# Patient Record
Sex: FEMALE | Race: WHITE | Employment: OTHER | URBAN - METROPOLITAN AREA
[De-identification: names, ages, dates, MRNs, and addresses within clinical notes are randomized per-mention and may not be internally consistent; named-entity substitution may affect disease eponyms.]

---

## 2017-01-09 ENCOUNTER — OFFICE VISIT (OUTPATIENT)
Dept: ORTHOPEDICS CLINIC | Facility: CLINIC | Age: 67
End: 2017-01-09

## 2017-01-09 ENCOUNTER — HOSPITAL ENCOUNTER (OUTPATIENT)
Dept: GENERAL RADIOLOGY | Facility: HOSPITAL | Age: 67
Discharge: HOME | End: 2017-01-09
Attending: ORTHOPAEDIC SURGERY | Admitting: ORTHOPAEDIC SURGERY
Payer: MEDICARE

## 2017-01-09 DIAGNOSIS — T14.8XXA FRACTURE: ICD-10-CM

## 2017-01-09 DIAGNOSIS — S82.035A CLOSED NONDISPLACED TRANSVERSE FRACTURE OF LEFT PATELLA, INITIAL ENCOUNTER: Primary | ICD-10-CM

## 2017-01-09 PROCEDURE — 73560 X-RAY EXAM OF KNEE 1 OR 2: CPT

## 2017-01-09 PROCEDURE — 27520 TREAT KNEECAP FRACTURE: CPT | Performed by: ORTHOPAEDIC SURGERY

## 2017-01-09 NOTE — PROGRESS NOTES
Patient is a 51-year-old female who fell 2-1/2 weeks ago. She was seen in the emergency room on 22 December given a knee immobilizer and has not followed up since that time.   She has been walking with a knee immobilizer and walker describes some left knee pump daily and one aspirin daily if she can tolerate such medications which she states she has in the past without difficulty.   If there is any calf pain occurring she needs and go to the emergency room right away and be seen which is not the case at the p

## 2017-01-16 ENCOUNTER — OFFICE VISIT (OUTPATIENT)
Dept: SURGERY | Facility: CLINIC | Age: 67
End: 2017-01-16

## 2017-01-16 VITALS
RESPIRATION RATE: 17 BRPM | DIASTOLIC BLOOD PRESSURE: 73 MMHG | BODY MASS INDEX: 32.78 KG/M2 | HEART RATE: 71 BPM | HEIGHT: 66 IN | WEIGHT: 204 LBS | SYSTOLIC BLOOD PRESSURE: 131 MMHG

## 2017-01-16 DIAGNOSIS — R31.29 MICROHEMATURIA: Primary | ICD-10-CM

## 2017-01-16 LAB
BACTERIA UR QL AUTO: NEGATIVE /HPF
BILIRUB UR QL: NEGATIVE
COLOR UR: YELLOW
GLUCOSE UR-MCNC: NEGATIVE MG/DL
HGB UR QL STRIP.AUTO: NEGATIVE
KETONES UR-MCNC: NEGATIVE MG/DL
NITRITE UR QL STRIP.AUTO: NEGATIVE
PH UR: 5 [PH] (ref 5–8)
PROT UR-MCNC: NEGATIVE MG/DL
RBC #/AREA URNS AUTO: 7 /HPF
SP GR UR STRIP: 1.02 (ref 1–1.03)
UROBILINOGEN UR STRIP-ACNC: <2
VIT C UR-MCNC: NEGATIVE MG/DL
WBC #/AREA URNS AUTO: 25 /HPF

## 2017-01-16 PROCEDURE — G0463 HOSPITAL OUTPT CLINIC VISIT: HCPCS | Performed by: UROLOGY

## 2017-01-16 PROCEDURE — 99204 OFFICE O/P NEW MOD 45 MIN: CPT | Performed by: UROLOGY

## 2017-01-16 NOTE — PROGRESS NOTES
SUBJECTIVE:  Tung Cedillo is a 77year old female who presents for a consultation at the request of, and a copy of this note will be sent to, Dr. Carissa Hamilton, for evaluation of  hematuria. She states that the problem is unchanged.  Symptoms include noted s dizziness or fainting, palpitations, leg swelling, nocturia, or claudication. GASTROINTESTINAL:  Negative for nausea, vomiting, diarrhea, constipation, heartburn or indigestion, abdominal pains, bloody or tarry stools.   GENERAL: Denies:  weight gain, weig

## 2017-01-30 ENCOUNTER — OFFICE VISIT (OUTPATIENT)
Dept: ORTHOPEDICS CLINIC | Facility: CLINIC | Age: 67
End: 2017-01-30

## 2017-01-30 ENCOUNTER — HOSPITAL ENCOUNTER (OUTPATIENT)
Dept: GENERAL RADIOLOGY | Facility: HOSPITAL | Age: 67
Discharge: HOME | End: 2017-01-30
Attending: ORTHOPAEDIC SURGERY | Admitting: ORTHOPAEDIC SURGERY
Payer: MEDICARE

## 2017-01-30 ENCOUNTER — TELEPHONE (OUTPATIENT)
Dept: INTERNAL MEDICINE CLINIC | Facility: CLINIC | Age: 67
End: 2017-01-30

## 2017-01-30 DIAGNOSIS — T14.8XXA FRACTURE: ICD-10-CM

## 2017-01-30 DIAGNOSIS — S82.035D CLOSED NONDISPLACED TRANSVERSE FRACTURE OF LEFT PATELLA WITH ROUTINE HEALING, SUBSEQUENT ENCOUNTER: Primary | ICD-10-CM

## 2017-01-30 DIAGNOSIS — Z09 FOLLOW UP: Primary | ICD-10-CM

## 2017-01-30 PROCEDURE — 99024 POSTOP FOLLOW-UP VISIT: CPT | Performed by: ORTHOPAEDIC SURGERY

## 2017-01-30 PROCEDURE — G0463 HOSPITAL OUTPT CLINIC VISIT: HCPCS | Performed by: ORTHOPAEDIC SURGERY

## 2017-01-30 PROCEDURE — 73560 X-RAY EXAM OF KNEE 1 OR 2: CPT

## 2017-01-30 NOTE — TELEPHONE ENCOUNTER
NEEDS 2 REFERRAL     DR Eliot NARANJO   HAS APT ON THE [address]  FOLLOW UP    ( 3 )    FOR KNEE FRACTURE       ALSO NEEDS REFERRAL FOR PX THERAPY  LEFT KNEE    NEEDS 3 TIMES A WEEK FOR 4 TO 6 WEEKS   STARTING IN FEB.

## 2017-01-30 NOTE — PROGRESS NOTES
She presents for follow-up she denies any new complaints in the left knee. She has had a patella fracture that was from December 22 that was essentially nondisplaced. She has been wearing her knee immobilizer and weightbearing but uses a walker.     On ex

## 2017-02-01 NOTE — TELEPHONE ENCOUNTER
PT order already entered by Dr Cami Johnson. Referral for Dr Cami Johnson pended and routed to Dr Ramez Koehler.

## 2017-02-02 ENCOUNTER — TELEPHONE (OUTPATIENT)
Dept: SURGERY | Facility: CLINIC | Age: 67
End: 2017-02-02

## 2017-02-02 NOTE — TELEPHONE ENCOUNTER
Patient would like a call back from RN regarding Cystoscopy scheduled on 02/16 and CT Urogram 02/06.  Has questions please call thank you

## 2017-02-06 ENCOUNTER — HOSPITAL ENCOUNTER (OUTPATIENT)
Dept: CT IMAGING | Facility: HOSPITAL | Age: 67
Discharge: HOME | End: 2017-02-06
Attending: UROLOGY
Payer: MEDICARE

## 2017-02-06 DIAGNOSIS — R31.29 MICROHEMATURIA: ICD-10-CM

## 2017-02-06 LAB — CREAT BLD-MCNC: 0.8 MG/DL (ref 0.5–1.5)

## 2017-02-06 PROCEDURE — 76377 3D RENDER W/INTRP POSTPROCES: CPT

## 2017-02-06 PROCEDURE — 74178 CT ABD&PLV WO CNTR FLWD CNTR: CPT

## 2017-02-06 PROCEDURE — 82565 ASSAY OF CREATININE: CPT

## 2017-02-07 ENCOUNTER — OFFICE VISIT (OUTPATIENT)
Dept: PHYSICAL THERAPY | Facility: HOSPITAL | Age: 67
End: 2017-02-07
Attending: ORTHOPAEDIC SURGERY
Payer: MEDICARE

## 2017-02-07 DIAGNOSIS — T14.8XXA FRACTURE: Primary | ICD-10-CM

## 2017-02-07 PROCEDURE — 97110 THERAPEUTIC EXERCISES: CPT

## 2017-02-07 PROCEDURE — 97162 PT EVAL MOD COMPLEX 30 MIN: CPT

## 2017-02-07 NOTE — PROGRESS NOTES
LOWER EXTREMITY EVALUATION:   Referring Physician: Dr. Erika Carmichael  Date of Onset: 12/22/16 Date of Service: 2/7/2017   Diagnosis: L patellar fracture   PATIENT SUMMARY:   Tung Cedillo is a 77year old y/o female who presents to therapy today with complai housework. Newt Rings would benefit from skilled Physical Therapy to address the above impairments to allow for return to functional mobility.      Precautions:  None     OBJECTIVE:   Observation: Slight swelling and warmth about L knee; B knee valgus in moustapha None  Rehab Potential:good    FOTO: 30/100  Current status G Code: Initial Mobility: Walking and Moving Around CL: 60-79% impaired, limited, or restricted  Goal status G Code:  Mobility: Walking and Moving Around CJ: 20-39% impaired, limited, or restricted

## 2017-02-08 ENCOUNTER — TELEPHONE (OUTPATIENT)
Dept: SURGERY | Facility: CLINIC | Age: 67
End: 2017-02-08

## 2017-02-08 DIAGNOSIS — Z01.818 PREOP EXAMINATION: Primary | ICD-10-CM

## 2017-02-08 DIAGNOSIS — R39.9 SYMPTOM INVOLVING BLADDER: ICD-10-CM

## 2017-02-08 NOTE — TELEPHONE ENCOUNTER
Spoke to patient. Per Dr. Jerome Mcburney, please get patient's number to reach her. Number given to Dr. Jerome Mcburney. Please see contact for number. Patient states Dr. Jerome Mcburney called her.

## 2017-02-09 ENCOUNTER — OFFICE VISIT (OUTPATIENT)
Dept: PHYSICAL THERAPY | Facility: HOSPITAL | Age: 67
End: 2017-02-09
Attending: ORTHOPAEDIC SURGERY
Payer: MEDICARE

## 2017-02-09 DIAGNOSIS — T14.8XXA FRACTURE: Primary | ICD-10-CM

## 2017-02-09 PROCEDURE — 97140 MANUAL THERAPY 1/> REGIONS: CPT

## 2017-02-09 PROCEDURE — 97110 THERAPEUTIC EXERCISES: CPT

## 2017-02-09 NOTE — TELEPHONE ENCOUNTER
Staff I called this patient and discussed with her the results of her recent CT urogram showing an abnormality in her right renal pelvis.   She and I agreed to cancel her office cystoscopy scheduled for February 16, 2017 (staff please do so), and schedule h

## 2017-02-09 NOTE — TELEPHONE ENCOUNTER
Noted. appt cancelled in [facility], as requested by . Routed to surgery scheduler, Jane Marin for follow through.

## 2017-02-09 NOTE — PROGRESS NOTES
Dx: R patellar fx        Authorized # of Visits:  2/8 (Yaron Orozco)         Next MD visit: none scheduled  Fall Risk: standard         Precautions: n/a           Medication Changes since last visit?: No    Subjective: \"I have pain about 6-7/10.   I have m

## 2017-02-10 ENCOUNTER — TELEPHONE (OUTPATIENT)
Dept: SURGERY | Facility: CLINIC | Age: 67
End: 2017-02-10

## 2017-02-10 DIAGNOSIS — R31.1 BENIGN ESSENTIAL MICROSCOPIC HEMATURIA: Primary | ICD-10-CM

## 2017-02-10 NOTE — TELEPHONE ENCOUNTER
Noted. Called patient with time and date for procedure scheduled for Wednesday 02/15/17 @ 9:00, for Cysto,RRGPG,Right Ureteroscopy, Possible Biopsy, Possible Stent Placement, at [address]. Patient aware nothing further needed.

## 2017-02-13 ENCOUNTER — APPOINTMENT (OUTPATIENT)
Dept: LAB | Age: 67
End: 2017-02-13
Attending: UROLOGY
Payer: MEDICARE

## 2017-02-13 ENCOUNTER — LAB ENCOUNTER (OUTPATIENT)
Dept: LAB | Age: 67
End: 2017-02-13
Attending: UROLOGY
Payer: MEDICARE

## 2017-02-13 ENCOUNTER — APPOINTMENT (OUTPATIENT)
Dept: PHYSICAL THERAPY | Facility: HOSPITAL | Age: 67
End: 2017-02-13
Attending: ORTHOPAEDIC SURGERY
Payer: MEDICARE

## 2017-02-13 DIAGNOSIS — Z01.818 PREOP EXAMINATION: ICD-10-CM

## 2017-02-13 DIAGNOSIS — R39.9 SYMPTOM INVOLVING BLADDER: ICD-10-CM

## 2017-02-13 LAB
ANION GAP SERPL CALC-SCNC: 11 MMOL/L (ref 0–18)
BASOPHILS # BLD: 0 K/UL (ref 0–0.2)
BASOPHILS NFR BLD: 0 %
BUN SERPL-MCNC: 13 MG/DL (ref 8–20)
BUN/CREAT SERPL: 16.5 (ref 10–20)
CALCIUM SERPL-MCNC: 8.8 MG/DL (ref 8.5–10.5)
CHLORIDE SERPL-SCNC: 106 MMOL/L (ref 95–110)
CO2 SERPL-SCNC: 24 MMOL/L (ref 22–32)
CREAT SERPL-MCNC: 0.79 MG/DL (ref 0.5–1.5)
EOSINOPHIL # BLD: 0.1 K/UL (ref 0–0.7)
EOSINOPHIL NFR BLD: 2 %
ERYTHROCYTE [DISTWIDTH] IN BLOOD BY AUTOMATED COUNT: 15.1 % (ref 11–15)
GLUCOSE SERPL-MCNC: 103 MG/DL (ref 70–99)
HCT VFR BLD AUTO: 38 % (ref 35–48)
HGB BLD-MCNC: 12.7 G/DL (ref 12–16)
LYMPHOCYTES # BLD: 0.4 K/UL (ref 1–4)
LYMPHOCYTES NFR BLD: 7 %
MCH RBC QN AUTO: 29.6 PG (ref 27–32)
MCHC RBC AUTO-ENTMCNC: 33.6 G/DL (ref 32–37)
MCV RBC AUTO: 88.1 FL (ref 80–100)
MONOCYTES # BLD: 0.3 K/UL (ref 0–1)
MONOCYTES NFR BLD: 5 %
NEUTROPHILS # BLD AUTO: 4.9 K/UL (ref 1.8–7.7)
NEUTROPHILS NFR BLD: 87 %
OSMOLALITY UR CALC.SUM OF ELEC: 292 MOSM/KG (ref 275–295)
PLATELET # BLD AUTO: 163 K/UL (ref 140–400)
PMV BLD AUTO: 10.6 FL (ref 7.4–10.3)
POTASSIUM SERPL-SCNC: 3.7 MMOL/L (ref 3.3–5.1)
RBC # BLD AUTO: 4.31 M/UL (ref 3.7–5.4)
SODIUM SERPL-SCNC: 141 MMOL/L (ref 136–144)
WBC # BLD AUTO: 5.7 K/UL (ref 4–11)

## 2017-02-13 PROCEDURE — 85025 COMPLETE CBC W/AUTO DIFF WBC: CPT

## 2017-02-13 PROCEDURE — 87086 URINE CULTURE/COLONY COUNT: CPT

## 2017-02-13 PROCEDURE — 93005 ELECTROCARDIOGRAM TRACING: CPT

## 2017-02-13 PROCEDURE — 36415 COLL VENOUS BLD VENIPUNCTURE: CPT

## 2017-02-13 PROCEDURE — 80048 BASIC METABOLIC PNL TOTAL CA: CPT

## 2017-02-13 PROCEDURE — 93010 ELECTROCARDIOGRAM REPORT: CPT | Performed by: UROLOGY

## 2017-02-14 RX ORDER — LEVOFLOXACIN 5 MG/ML
500 INJECTION, SOLUTION INTRAVENOUS EVERY 24 HOURS
Status: DISCONTINUED | OUTPATIENT
Start: 2017-02-14 | End: 2017-02-14

## 2017-02-15 ENCOUNTER — HOSPITAL ENCOUNTER (OUTPATIENT)
Facility: HOSPITAL | Age: 67
Setting detail: HOSPITAL OUTPATIENT SURGERY
Discharge: HOME | End: 2017-02-15
Attending: UROLOGY | Admitting: UROLOGY
Payer: MEDICARE

## 2017-02-15 ENCOUNTER — ANESTHESIA EVENT (OUTPATIENT)
Dept: SURGERY | Facility: HOSPITAL | Age: 67
End: 2017-02-15

## 2017-02-15 ENCOUNTER — ANESTHESIA (OUTPATIENT)
Dept: SURGERY | Facility: HOSPITAL | Age: 67
End: 2017-02-15

## 2017-02-15 ENCOUNTER — APPOINTMENT (OUTPATIENT)
Dept: GENERAL RADIOLOGY | Facility: HOSPITAL | Age: 67
End: 2017-02-15
Attending: UROLOGY
Payer: MEDICARE

## 2017-02-15 ENCOUNTER — TELEPHONE (OUTPATIENT)
Dept: SURGERY | Facility: CLINIC | Age: 67
End: 2017-02-15

## 2017-02-15 ENCOUNTER — SURGERY (OUTPATIENT)
Age: 67
End: 2017-02-15

## 2017-02-15 VITALS
WEIGHT: 210 LBS | HEART RATE: 84 BPM | BODY MASS INDEX: 33.75 KG/M2 | RESPIRATION RATE: 14 BRPM | OXYGEN SATURATION: 94 % | DIASTOLIC BLOOD PRESSURE: 57 MMHG | TEMPERATURE: 98 F | SYSTOLIC BLOOD PRESSURE: 114 MMHG | HEIGHT: 66 IN

## 2017-02-15 DIAGNOSIS — N20.0 KIDNEY STONES: Primary | ICD-10-CM

## 2017-02-15 PROCEDURE — 0T768DZ DILATION OF RIGHT URETER WITH INTRALUMINAL DEVICE, VIA NATURAL OR ARTIFICIAL OPENING ENDOSCOPIC: ICD-10-PCS | Performed by: UROLOGY

## 2017-02-15 PROCEDURE — 74420 UROGRAPHY RTRGR +-KUB: CPT

## 2017-02-15 PROCEDURE — 52353 CYSTOURETERO W/LITHOTRIPSY: CPT | Performed by: UROLOGY

## 2017-02-15 PROCEDURE — 0TF68ZZ FRAGMENTATION IN RIGHT URETER, VIA NATURAL OR ARTIFICIAL OPENING ENDOSCOPIC: ICD-10-PCS | Performed by: UROLOGY

## 2017-02-15 PROCEDURE — BT1D1ZZ FLUOROSCOPY OF RIGHT KIDNEY, URETER AND BLADDER USING LOW OSMOLAR CONTRAST: ICD-10-PCS | Performed by: UROLOGY

## 2017-02-15 DEVICE — STENT URET 6F 26CM WO GW INL: Type: IMPLANTABLE DEVICE | Site: URETER | Status: FUNCTIONAL

## 2017-02-15 RX ORDER — METOCLOPRAMIDE HYDROCHLORIDE 5 MG/ML
INJECTION INTRAMUSCULAR; INTRAVENOUS AS NEEDED
Status: DISCONTINUED | OUTPATIENT
Start: 2017-02-15 | End: 2017-02-15 | Stop reason: SURG

## 2017-02-15 RX ORDER — SODIUM CHLORIDE, SODIUM LACTATE, POTASSIUM CHLORIDE, CALCIUM CHLORIDE 600; 310; 30; 20 MG/100ML; MG/100ML; MG/100ML; MG/100ML
INJECTION, SOLUTION INTRAVENOUS CONTINUOUS
Status: DISCONTINUED | OUTPATIENT
Start: 2017-02-15 | End: 2017-02-15

## 2017-02-15 RX ORDER — FAMOTIDINE 20 MG/1
20 TABLET ORAL ONCE
Status: COMPLETED | OUTPATIENT
Start: 2017-02-15 | End: 2017-02-15

## 2017-02-15 RX ORDER — MORPHINE SULFATE 2 MG/ML
2 INJECTION, SOLUTION INTRAMUSCULAR; INTRAVENOUS EVERY 10 MIN PRN
Status: DISCONTINUED | OUTPATIENT
Start: 2017-02-15 | End: 2017-02-15

## 2017-02-15 RX ORDER — MORPHINE SULFATE 4 MG/ML
4 INJECTION, SOLUTION INTRAMUSCULAR; INTRAVENOUS EVERY 10 MIN PRN
Status: DISCONTINUED | OUTPATIENT
Start: 2017-02-15 | End: 2017-02-15

## 2017-02-15 RX ORDER — ACETAMINOPHEN 325 MG/1
650 TABLET ORAL ONCE
Status: COMPLETED | OUTPATIENT
Start: 2017-02-15 | End: 2017-02-15

## 2017-02-15 RX ORDER — HYDROCODONE BITARTRATE AND ACETAMINOPHEN 5; 325 MG/1; MG/1
1 TABLET ORAL AS NEEDED
Status: DISCONTINUED | OUTPATIENT
Start: 2017-02-15 | End: 2017-02-15

## 2017-02-15 RX ORDER — LIDOCAINE HYDROCHLORIDE 20 MG/ML
JELLY TOPICAL AS NEEDED
Status: DISCONTINUED | OUTPATIENT
Start: 2017-02-15 | End: 2017-02-15 | Stop reason: HOSPADM

## 2017-02-15 RX ORDER — ACETAMINOPHEN AND CODEINE PHOSPHATE 300; 30 MG/1; MG/1
1 TABLET ORAL EVERY 4 HOURS PRN
Qty: 20 TABLET | Refills: 0 | Status: SHIPPED | OUTPATIENT
Start: 2017-02-15 | End: 2018-11-15 | Stop reason: ALTCHOICE

## 2017-02-15 RX ORDER — MORPHINE SULFATE 10 MG/ML
6 INJECTION, SOLUTION INTRAMUSCULAR; INTRAVENOUS EVERY 10 MIN PRN
Status: DISCONTINUED | OUTPATIENT
Start: 2017-02-15 | End: 2017-02-15

## 2017-02-15 RX ORDER — PHENAZOPYRIDINE HYDROCHLORIDE 200 MG/1
200 TABLET, FILM COATED ORAL 3 TIMES DAILY PRN
Qty: 10 TABLET | Refills: 0 | Status: SHIPPED | OUTPATIENT
Start: 2017-02-15 | End: 2017-02-20

## 2017-02-15 RX ORDER — METOCLOPRAMIDE 10 MG/1
10 TABLET ORAL ONCE
Status: COMPLETED | OUTPATIENT
Start: 2017-02-15 | End: 2017-02-15

## 2017-02-15 RX ORDER — LIDOCAINE HYDROCHLORIDE 10 MG/ML
INJECTION, SOLUTION EPIDURAL; INFILTRATION; INTRACAUDAL; PERINEURAL AS NEEDED
Status: DISCONTINUED | OUTPATIENT
Start: 2017-02-15 | End: 2017-02-15 | Stop reason: SURG

## 2017-02-15 RX ORDER — ONDANSETRON 2 MG/ML
4 INJECTION INTRAMUSCULAR; INTRAVENOUS ONCE AS NEEDED
Status: DISCONTINUED | OUTPATIENT
Start: 2017-02-15 | End: 2017-02-15

## 2017-02-15 RX ORDER — DEXAMETHASONE SODIUM PHOSPHATE 4 MG/ML
VIAL (ML) INJECTION AS NEEDED
Status: DISCONTINUED | OUTPATIENT
Start: 2017-02-15 | End: 2017-02-15 | Stop reason: SURG

## 2017-02-15 RX ORDER — HYDROMORPHONE HYDROCHLORIDE 1 MG/ML
0.2 INJECTION, SOLUTION INTRAMUSCULAR; INTRAVENOUS; SUBCUTANEOUS EVERY 5 MIN PRN
Status: DISCONTINUED | OUTPATIENT
Start: 2017-02-15 | End: 2017-02-15

## 2017-02-15 RX ORDER — SODIUM CHLORIDE, SODIUM LACTATE, POTASSIUM CHLORIDE, CALCIUM CHLORIDE 600; 310; 30; 20 MG/100ML; MG/100ML; MG/100ML; MG/100ML
INJECTION, SOLUTION INTRAVENOUS CONTINUOUS PRN
Status: DISCONTINUED | OUTPATIENT
Start: 2017-02-15 | End: 2017-02-15 | Stop reason: SURG

## 2017-02-15 RX ORDER — MIDAZOLAM HYDROCHLORIDE 1 MG/ML
INJECTION INTRAMUSCULAR; INTRAVENOUS AS NEEDED
Status: DISCONTINUED | OUTPATIENT
Start: 2017-02-15 | End: 2017-02-15 | Stop reason: SURG

## 2017-02-15 RX ORDER — HYDROMORPHONE HYDROCHLORIDE 1 MG/ML
0.4 INJECTION, SOLUTION INTRAMUSCULAR; INTRAVENOUS; SUBCUTANEOUS EVERY 5 MIN PRN
Status: DISCONTINUED | OUTPATIENT
Start: 2017-02-15 | End: 2017-02-15

## 2017-02-15 RX ORDER — NALOXONE HYDROCHLORIDE 0.4 MG/ML
80 INJECTION, SOLUTION INTRAMUSCULAR; INTRAVENOUS; SUBCUTANEOUS AS NEEDED
Status: DISCONTINUED | OUTPATIENT
Start: 2017-02-15 | End: 2017-02-15

## 2017-02-15 RX ORDER — SULFAMETHOXAZOLE AND TRIMETHOPRIM 800; 160 MG/1; MG/1
1 TABLET ORAL 2 TIMES DAILY
Qty: 6 TABLET | Refills: 0 | Status: SHIPPED | OUTPATIENT
Start: 2017-02-16 | End: 2017-02-19

## 2017-02-15 RX ORDER — LEVOFLOXACIN 5 MG/ML
500 INJECTION, SOLUTION INTRAVENOUS EVERY 24 HOURS
Status: COMPLETED | OUTPATIENT
Start: 2017-02-15 | End: 2017-02-15

## 2017-02-15 RX ORDER — PHENAZOPYRIDINE HYDROCHLORIDE 200 MG/1
200 TABLET, FILM COATED ORAL ONCE
Status: COMPLETED | OUTPATIENT
Start: 2017-02-15 | End: 2017-02-15

## 2017-02-15 RX ORDER — HYDROMORPHONE HYDROCHLORIDE 1 MG/ML
0.6 INJECTION, SOLUTION INTRAMUSCULAR; INTRAVENOUS; SUBCUTANEOUS EVERY 5 MIN PRN
Status: DISCONTINUED | OUTPATIENT
Start: 2017-02-15 | End: 2017-02-15

## 2017-02-15 RX ORDER — HYDROCODONE BITARTRATE AND ACETAMINOPHEN 5; 325 MG/1; MG/1
2 TABLET ORAL AS NEEDED
Status: DISCONTINUED | OUTPATIENT
Start: 2017-02-15 | End: 2017-02-15

## 2017-02-15 RX ADMIN — MIDAZOLAM HYDROCHLORIDE 2 MG: 1 INJECTION INTRAMUSCULAR; INTRAVENOUS at 08:55:00

## 2017-02-15 RX ADMIN — SODIUM CHLORIDE, SODIUM LACTATE, POTASSIUM CHLORIDE, CALCIUM CHLORIDE: 600; 310; 30; 20 INJECTION, SOLUTION INTRAVENOUS at 08:49:00

## 2017-02-15 RX ADMIN — METOCLOPRAMIDE HYDROCHLORIDE 10 MG: 5 INJECTION INTRAMUSCULAR; INTRAVENOUS at 09:20:00

## 2017-02-15 RX ADMIN — DEXAMETHASONE SODIUM PHOSPHATE 4 MG: 4 MG/ML VIAL (ML) INJECTION at 09:03:00

## 2017-02-15 RX ADMIN — LIDOCAINE HYDROCHLORIDE 50 MG: 10 INJECTION, SOLUTION EPIDURAL; INFILTRATION; INTRACAUDAL; PERINEURAL at 09:03:00

## 2017-02-15 NOTE — OPERATIVE REPORT
[facility]    Operative Note     Bonita Rae Location: OR   [facility] [id number] MRN [id number]   Admission Date 2/15/2017 Operation Date 2/15/2017   Attending Physician Belkys Harris MD Operating Physician Darleen Kanner, MD      Preoperati stone.  There is some inadvertent air bubbles introduced into the proximal to mid right ureter. At this point I placed a second wire using a dual-lumen catheter in the form of a Glidewire.   I placed the [employer] (WCG) digital ureteroscope over the Glidewire wit the Guru wire and placed a 6 [employer] [place] by 26 cm Bard double-J stent under fluoroscopic and cystoscopic guidance. The patient tolerated the procedure well. There were no perioperative or immediate postoperative complications.   I was present and performed

## 2017-02-15 NOTE — INTERVAL H&P NOTE
Pre-op Diagnosis: micro hematuria    The above referenced H&P was reviewed by Darlyn Avendaño MD on 2/15/2017, the patient was examined and no significant changes have occurred in the patient's condition since the H&P was performed.   I discussed with the pa

## 2017-02-15 NOTE — H&P
SUBJECTIVE:  Maximino Bryson is a 77year old female who presents for a consultation at the request of, and a copy of this note will be sent to, Dr. Cleve Smith, for evaluation of  hematuria. She states that the problem is unchanged.  Symptoms include noted s pleurisy. CARDIOVASCULAR:  Negative for pain or chest discomfort, dizziness or fainting, palpitations, leg swelling, nocturia, or claudication.   GASTROINTESTINAL:  Negative for nausea, vomiting, diarrhea, constipation, heartburn or indigestion, abdominal

## 2017-02-15 NOTE — ANESTHESIA PREPROCEDURE EVALUATION
Anesthesia PreOp Note    HPI:     Cuauhtemoc Cisneros is a 77year old female who presents for preoperative consultation requested by:  Amalia Valenzuela MD    Date of Surgery: 2/15/2017    Procedure(s):  CYSTOSCOPY RETROGRADE  CYSTOSCOPY URETEROSCOPY  CYSTOSCO Calcium Carb-Cholecalciferol (OYSTER SHELL CALCIUM/VITAMIN D) 250-125 MG-UNIT Oral Tab  Disp:  Rfl:  2/14/2017 at Unknown time   Vitamin B-12 1000 MCG Oral Tab  Disp:  Rfl:  2/14/2017 at Unknown time       Current Facility-Administered Medications Ordere Signs: Body mass index is 33.91 kg/(m^2). height is 1.676 m (5' 6\") and weight is 95.255 kg (210 lb). Her oral temperature is 98.2 °F (36.8 °C). Her pulse is 69. Her respiration is 20 and oxygen saturation is 97%.     02/11/17  0901 02/15/17  0751   Pul

## 2017-02-15 NOTE — TELEPHONE ENCOUNTER
Staff I placed a right ureteral stent today on this patient. Please place her in the stent logbook. She needs a 2 week office visits to follow-up. Please call the patient tomorrow and arrange.   I will ask her to obtain a KUB prior to her office visit an

## 2017-02-15 NOTE — ANESTHESIA POSTPROCEDURE EVALUATION
Patient: Judy Mao    Procedure Summary     Date Anesthesia Start Anesthesia Stop Room / Location    02/15/17 0851  [facility] MAIN OR 14 / [facility] MAIN OR       Procedure Diagnosis Surgeon Responsible Provider    CYSTOSCOPY RETROGRADE (Right ); CYSTOSCOPY UR

## 2017-02-20 ENCOUNTER — OFFICE VISIT (OUTPATIENT)
Dept: INTERNAL MEDICINE CLINIC | Facility: CLINIC | Age: 67
End: 2017-02-20

## 2017-02-20 VITALS
BODY MASS INDEX: 34.01 KG/M2 | HEART RATE: 73 BPM | HEIGHT: 66 IN | SYSTOLIC BLOOD PRESSURE: 124 MMHG | DIASTOLIC BLOOD PRESSURE: 77 MMHG | WEIGHT: 211.63 LBS | RESPIRATION RATE: 18 BRPM | TEMPERATURE: 98 F

## 2017-02-20 DIAGNOSIS — J44.9 ASTHMA WITH COPD (CHRONIC OBSTRUCTIVE PULMONARY DISEASE) (HCC): ICD-10-CM

## 2017-02-20 DIAGNOSIS — E66.09 NON MORBID OBESITY DUE TO EXCESS CALORIES: Primary | ICD-10-CM

## 2017-02-20 DIAGNOSIS — E78.5 HYPERLIPIDEMIA, UNSPECIFIED HYPERLIPIDEMIA TYPE: ICD-10-CM

## 2017-02-20 DIAGNOSIS — S82.002A CLOSED DISPLACED FRACTURE OF LEFT PATELLA, UNSPECIFIED FRACTURE MORPHOLOGY, INITIAL ENCOUNTER: ICD-10-CM

## 2017-02-20 DIAGNOSIS — M17.0 PRIMARY OSTEOARTHRITIS OF BOTH KNEES: ICD-10-CM

## 2017-02-20 PROCEDURE — 99214 OFFICE O/P EST MOD 30 MIN: CPT | Performed by: INTERNAL MEDICINE

## 2017-02-20 PROCEDURE — G0463 HOSPITAL OUTPT CLINIC VISIT: HCPCS | Performed by: INTERNAL MEDICINE

## 2017-02-20 NOTE — TELEPHONE ENCOUNTER
Spoke with pt and gave her an appt for 3/3 at 10:45am and told her to complete the KUB prior to her appt. I also placed her in the stent book.

## 2017-02-20 NOTE — PROGRESS NOTES
HPI:    Patient ID: Ladarius Nunez is a 77year old female.     HPI    S/p fracture patella  Left leg on a leg immobilizer  Ongoing physical therapy    Asthma stable uses  resue 3 itmes a day  Uses regardless    Pt advised and seen by pulmonary  She is s Disp: 90 capsule Rfl: 0   VENTOLIN  (90 BASE) MCG/ACT Inhalation Aero Soln INHALE 2 PUFFS INTO THE LUNGS EVERY 4 (FOUR) HOURS AS NEEDED FOR WHEEZING.  Disp: 54 g Rfl: 1   FERROUS SULFATE CR OR  Disp:  Rfl:    Acetaminophen 500 MG Oral Cap  Disp:  Rfl oropharyngeal exudate. Eyes: Conjunctivae and EOM are normal. Pupils are equal, round, and reactive to light. Right eye exhibits no discharge. Left eye exhibits no discharge. No scleral icterus. Neck: Neck supple. No thyromegaly present.    Berta Angelo disease chronic   Increase PO water intake   Monitor     (E78.5) Hyperlipidemia, unspecified hyperlipidemia type  Plan: low chol diet    vollow up in  3months  Patient voiced understanding  and agrees with plan          No orders of the defined types were

## 2017-02-21 ENCOUNTER — OFFICE VISIT (OUTPATIENT)
Dept: PHYSICAL THERAPY | Facility: HOSPITAL | Age: 67
End: 2017-02-21
Attending: ORTHOPAEDIC SURGERY
Payer: MEDICARE

## 2017-02-21 DIAGNOSIS — T14.8XXA FRACTURE: Primary | ICD-10-CM

## 2017-02-21 PROCEDURE — 97110 THERAPEUTIC EXERCISES: CPT

## 2017-02-21 PROCEDURE — 97112 NEUROMUSCULAR REEDUCATION: CPT

## 2017-02-21 PROCEDURE — 97140 MANUAL THERAPY 1/> REGIONS: CPT

## 2017-02-21 NOTE — PROGRESS NOTES
Dx: L patellar fx        Authorized # of Visits:  3/8 [facility])         Next MD visit: 2/27/17  Fall Risk: standard         Precautions: n/a           Medication Changes since last visit?: No    Subjective: Patient reports she still has pain, has been

## 2017-02-22 ENCOUNTER — OFFICE VISIT (OUTPATIENT)
Dept: PHYSICAL THERAPY | Facility: HOSPITAL | Age: 67
End: 2017-02-22
Attending: ORTHOPAEDIC SURGERY
Payer: MEDICARE

## 2017-02-22 DIAGNOSIS — T14.8XXA FRACTURE: Primary | ICD-10-CM

## 2017-02-22 PROCEDURE — 97112 NEUROMUSCULAR REEDUCATION: CPT

## 2017-02-22 PROCEDURE — 97110 THERAPEUTIC EXERCISES: CPT

## 2017-02-22 PROCEDURE — 97140 MANUAL THERAPY 1/> REGIONS: CPT

## 2017-02-22 NOTE — PROGRESS NOTES
Dx: L patellar fx        Authorized # of Visits:  4/8 [facility])         Next MD visit: 2/27/17  Fall Risk: standard         Precautions: n/a           Medication Changes since last visit?: No    Subjective: \"The new exercises are difficult.    I am wo

## 2017-02-27 ENCOUNTER — TELEPHONE (OUTPATIENT)
Dept: FAMILY MEDICINE CLINIC | Facility: CLINIC | Age: 67
End: 2017-02-27

## 2017-02-27 ENCOUNTER — HOSPITAL ENCOUNTER (OUTPATIENT)
Dept: GENERAL RADIOLOGY | Facility: HOSPITAL | Age: 67
Discharge: HOME | End: 2017-02-27
Attending: ORTHOPAEDIC SURGERY | Admitting: ORTHOPAEDIC SURGERY
Payer: MEDICARE

## 2017-02-27 ENCOUNTER — OFFICE VISIT (OUTPATIENT)
Dept: ORTHOPEDICS CLINIC | Facility: CLINIC | Age: 67
End: 2017-02-27

## 2017-02-27 DIAGNOSIS — M51.37 DEGENERATION OF LUMBAR OR LUMBOSACRAL INTERVERTEBRAL DISC: ICD-10-CM

## 2017-02-27 DIAGNOSIS — Z47.89 ORTHOPEDIC AFTERCARE: ICD-10-CM

## 2017-02-27 DIAGNOSIS — S82.035D CLOSED NONDISPLACED TRANSVERSE FRACTURE OF LEFT PATELLA WITH ROUTINE HEALING, SUBSEQUENT ENCOUNTER: ICD-10-CM

## 2017-02-27 DIAGNOSIS — M17.10 OSTEOARTHRITIS OF KNEE, UNSPECIFIED LATERALITY, UNSPECIFIED OSTEOARTHRITIS TYPE: Primary | ICD-10-CM

## 2017-02-27 DIAGNOSIS — M25.562 ACUTE PAIN OF LEFT KNEE: ICD-10-CM

## 2017-02-27 DIAGNOSIS — I83.90: ICD-10-CM

## 2017-02-27 DIAGNOSIS — Z47.89 ORTHOPEDIC AFTERCARE: Primary | ICD-10-CM

## 2017-02-27 PROCEDURE — G0463 HOSPITAL OUTPT CLINIC VISIT: HCPCS | Performed by: ORTHOPAEDIC SURGERY

## 2017-02-27 PROCEDURE — 73562 X-RAY EXAM OF KNEE 3: CPT

## 2017-02-27 PROCEDURE — 99024 POSTOP FOLLOW-UP VISIT: CPT | Performed by: ORTHOPAEDIC SURGERY

## 2017-02-27 NOTE — PROGRESS NOTES
Patient presents for follow-up now 2 months after her patella fracture of the left patella. She denies significant pain she has been using her knee immobilizer but working on bending. She shows the ability to ambulate without it.   On physical exam the pa

## 2017-02-28 ENCOUNTER — OFFICE VISIT (OUTPATIENT)
Dept: PHYSICAL THERAPY | Facility: HOSPITAL | Age: 67
End: 2017-02-28
Attending: Other
Payer: MEDICARE

## 2017-02-28 PROCEDURE — 97116 GAIT TRAINING THERAPY: CPT

## 2017-02-28 PROCEDURE — 97110 THERAPEUTIC EXERCISES: CPT

## 2017-02-28 PROCEDURE — 97140 MANUAL THERAPY 1/> REGIONS: CPT

## 2017-02-28 NOTE — PROGRESS NOTES
Dx: L patellar fx        Authorized # of Visits:  5/8 [facility])         Next MD visit: 2/27/17  Fall Risk: standard         Precautions: n/a           Medication Changes since last visit?: No    Subjective: Patient reports she saw MD yesterday, states Charges: 1man, 2TE, 1 gait      Total Timed Treatment: 53 min  Total Treatment Time: 55 min

## 2017-03-02 ENCOUNTER — APPOINTMENT (OUTPATIENT)
Dept: PHYSICAL THERAPY | Facility: HOSPITAL | Age: 67
End: 2017-03-02
Attending: ORTHOPAEDIC SURGERY
Payer: MEDICARE

## 2017-03-02 ENCOUNTER — HOSPITAL ENCOUNTER (OUTPATIENT)
Dept: GENERAL RADIOLOGY | Facility: HOSPITAL | Age: 67
Discharge: HOME | End: 2017-03-02
Attending: UROLOGY
Payer: MEDICARE

## 2017-03-02 DIAGNOSIS — N20.0 KIDNEY STONE: ICD-10-CM

## 2017-03-02 DIAGNOSIS — N20.0 KIDNEY STONES: ICD-10-CM

## 2017-03-02 PROCEDURE — 74020 XR ABDOMEN MINIMUM 2 VIEWS (CPT=74020): CPT

## 2017-03-03 ENCOUNTER — OFFICE VISIT (OUTPATIENT)
Dept: SURGERY | Facility: CLINIC | Age: 67
End: 2017-03-03

## 2017-03-03 VITALS
SYSTOLIC BLOOD PRESSURE: 136 MMHG | BODY MASS INDEX: 34 KG/M2 | RESPIRATION RATE: 17 BRPM | WEIGHT: 211 LBS | HEART RATE: 82 BPM | DIASTOLIC BLOOD PRESSURE: 69 MMHG

## 2017-03-03 DIAGNOSIS — N20.0 KIDNEY STONES: Primary | ICD-10-CM

## 2017-03-03 PROCEDURE — 99214 OFFICE O/P EST MOD 30 MIN: CPT | Performed by: UROLOGY

## 2017-03-03 PROCEDURE — G0463 HOSPITAL OUTPT CLINIC VISIT: HCPCS | Performed by: UROLOGY

## 2017-03-03 NOTE — PROGRESS NOTES
Jessica Ernandez is a 77year old female.     HPI:   Patient presents with:  Hematuria: XRAY FOLLOW UP      57-year-old female accompanied by her daughter in follow-up to cystoscopy, right ureteroscopy holmium laser lithotripsy and stent placement performe Social History:   Smoking Status: Former Smoker                   Packs/Day: 0.30  Years: 30        Types: Cigarettes      Quit date: 02/11/2004    Alcohol Use: Yes           0.0 oz/week       0 Standard drinks or equivalent per week       Comment: s discussion. Orders This Visit:  No orders of the defined types were placed in this encounter.        Meds This Visit:    No prescriptions requested or ordered in this encounter    Imaging & Referrals:  [facility] [address]  [place]

## 2017-03-04 NOTE — TELEPHONE ENCOUNTER
Spoke with pt & stated she needs a f/u appt with Dr Hetal Parker for her LT knee pain. Order pended. pls advise, thanks in advance.            Future Appointments  Date Time Provider Almita Morales   3/7/2017 9:30 AM Carly Henriquez, PT [facility] PT EM [facility]   3/9/2

## 2017-03-06 ENCOUNTER — APPOINTMENT (OUTPATIENT)
Dept: PHYSICAL THERAPY | Facility: HOSPITAL | Age: 67
End: 2017-03-06
Attending: ORTHOPAEDIC SURGERY
Payer: MEDICARE

## 2017-03-07 ENCOUNTER — OFFICE VISIT (OUTPATIENT)
Dept: PHYSICAL THERAPY | Facility: HOSPITAL | Age: 67
End: 2017-03-07
Attending: ORTHOPAEDIC SURGERY
Payer: MEDICARE

## 2017-03-07 PROCEDURE — 97110 THERAPEUTIC EXERCISES: CPT

## 2017-03-07 PROCEDURE — 97140 MANUAL THERAPY 1/> REGIONS: CPT

## 2017-03-07 NOTE — PROGRESS NOTES
Dx: L patellar fx        Authorized # of Visits:  6/8 [facility])         Next MD visit: 3/27/17  Fall Risk: standard         Precautions: n/a           Medication Changes since last visit?: No    Subjective: Patient reports she is still sore, has been

## 2017-03-08 ENCOUNTER — APPOINTMENT (OUTPATIENT)
Dept: PHYSICAL THERAPY | Facility: HOSPITAL | Age: 67
End: 2017-03-08
Attending: ORTHOPAEDIC SURGERY
Payer: MEDICARE

## 2017-03-09 ENCOUNTER — OFFICE VISIT (OUTPATIENT)
Dept: PHYSICAL THERAPY | Facility: HOSPITAL | Age: 67
End: 2017-03-09
Attending: ORTHOPAEDIC SURGERY
Payer: MEDICARE

## 2017-03-09 PROCEDURE — 97110 THERAPEUTIC EXERCISES: CPT

## 2017-03-09 NOTE — PROGRESS NOTES
Patient Name: Patience Saravia: 6/24/1950, MRN: [id number]   Date:  3/9/2017  Referring Physician:  Fausto Sweeney    Diagnosis: L patellar fracture    Progress Summary    Pt has attended 7 of 8 approved visits in Physical Therapy.      Progress Not 90 day period.  Treatment will include: LE strengthening, stretching, ROM, manual therapy, gait and balance training       Patient was advised of these findings, precautions, and treatment options and has agreed to actively participate in planning and for t 4+/5 to negotiate stairs at home reciprocally    4. Patient will demo normalized gait pattern with no brace and LRAD to be able to walk for 30 minutes at the grocery store    Plan: Continue to increase LE ROM and strengthening exercises.  Progress closed ch

## 2017-03-13 ENCOUNTER — OFFICE VISIT (OUTPATIENT)
Dept: PHYSICAL THERAPY | Facility: HOSPITAL | Age: 67
End: 2017-03-13
Attending: ORTHOPAEDIC SURGERY
Payer: MEDICARE

## 2017-03-13 DIAGNOSIS — T14.8XXA FRACTURE: Primary | ICD-10-CM

## 2017-03-13 PROCEDURE — 97140 MANUAL THERAPY 1/> REGIONS: CPT

## 2017-03-13 PROCEDURE — 97110 THERAPEUTIC EXERCISES: CPT

## 2017-03-13 NOTE — PROGRESS NOTES
Dx: L patellar fx        Authorized # of Visits:  8/8 [facility])         Next MD visit: 3/27/17  Fall Risk: standard         Precautions: n/a           Medication Changes since last visit?: No    Subjective: \" My knee is a little sore today from the

## 2017-03-15 ENCOUNTER — APPOINTMENT (OUTPATIENT)
Dept: PHYSICAL THERAPY | Facility: HOSPITAL | Age: 67
End: 2017-03-15
Attending: ORTHOPAEDIC SURGERY
Payer: MEDICARE

## 2017-03-17 ENCOUNTER — APPOINTMENT (OUTPATIENT)
Dept: PHYSICAL THERAPY | Facility: HOSPITAL | Age: 67
End: 2017-03-17
Attending: ORTHOPAEDIC SURGERY
Payer: MEDICARE

## 2017-03-21 ENCOUNTER — OFFICE VISIT (OUTPATIENT)
Dept: PHYSICAL THERAPY | Facility: HOSPITAL | Age: 67
End: 2017-03-21
Attending: ORTHOPAEDIC SURGERY
Payer: MEDICARE

## 2017-03-21 ENCOUNTER — HOSPITAL ENCOUNTER (OUTPATIENT)
Dept: GENERAL RADIOLOGY | Facility: HOSPITAL | Age: 67
Discharge: HOME | End: 2017-03-21
Attending: UROLOGY
Payer: MEDICARE

## 2017-03-21 DIAGNOSIS — N20.0 KIDNEY STONES: ICD-10-CM

## 2017-03-21 PROCEDURE — 74020 XR ABDOMEN MINIMUM 2 VIEWS (CPT=74020): CPT

## 2017-03-21 PROCEDURE — 97110 THERAPEUTIC EXERCISES: CPT

## 2017-03-21 NOTE — PROGRESS NOTES
Dx: L patellar fx        Authorized # of Visits:  9/16 [facility])         Next MD visit: 3/27/17  Fall Risk: standard         Precautions: n/a           Medication Changes since last visit?: No    Subjective: Patient reports she was trying to mop and

## 2017-03-22 ENCOUNTER — OFFICE VISIT (OUTPATIENT)
Dept: SURGERY | Facility: CLINIC | Age: 67
End: 2017-03-22

## 2017-03-22 ENCOUNTER — HOSPITAL ENCOUNTER (EMERGENCY)
Facility: HOSPITAL | Age: 67
Discharge: HOME | End: 2017-03-22
Admitting: UROLOGY
Payer: MEDICARE

## 2017-03-22 VITALS
BODY MASS INDEX: 32.14 KG/M2 | TEMPERATURE: 98 F | OXYGEN SATURATION: 99 % | HEIGHT: 66 IN | RESPIRATION RATE: 20 BRPM | DIASTOLIC BLOOD PRESSURE: 57 MMHG | HEART RATE: 75 BPM | WEIGHT: 200 LBS | SYSTOLIC BLOOD PRESSURE: 117 MMHG

## 2017-03-22 VITALS
DIASTOLIC BLOOD PRESSURE: 68 MMHG | SYSTOLIC BLOOD PRESSURE: 112 MMHG | HEART RATE: 66 BPM | TEMPERATURE: 98 F | RESPIRATION RATE: 16 BRPM | HEIGHT: 66 IN | BODY MASS INDEX: 32.14 KG/M2 | WEIGHT: 200 LBS

## 2017-03-22 DIAGNOSIS — L84 CORN: ICD-10-CM

## 2017-03-22 DIAGNOSIS — R82.90 URINE FINDING: ICD-10-CM

## 2017-03-22 DIAGNOSIS — S90.414A: Primary | ICD-10-CM

## 2017-03-22 DIAGNOSIS — N20.0 KIDNEY STONES: Primary | ICD-10-CM

## 2017-03-22 PROCEDURE — 99213 OFFICE O/P EST LOW 20 MIN: CPT | Performed by: UROLOGY

## 2017-03-22 PROCEDURE — 99282 EMERGENCY DEPT VISIT SF MDM: CPT

## 2017-03-22 PROCEDURE — G0463 HOSPITAL OUTPT CLINIC VISIT: HCPCS | Performed by: UROLOGY

## 2017-03-22 NOTE — ED PROVIDER NOTES
Patient Seen in: [facility] AND [facility] Emergency Department    History   Patient presents with:  Musculoskeletal Problem    Stated Complaint: right 5th toes injury    HPI    Patient presents into the emergency room for evaluation of a wound to the right smal • Cancer Mother      unknown type   • Stroke Father 79     CVA   • Diabetes Father          Smoking Status: Former Smoker                   Packs/Day: 0.30  Years: 30        Types: Cigarettes      Quit date: 02/11/2004    Alcohol Use: Yes           0.0 MD  [address] [facility]  [phone]    Schedule an appointment as soon as possible for a visit in 2 days        Medications Prescribed:  Current Discharge Medication List          Ry BRAMBILA

## 2017-03-23 ENCOUNTER — TELEPHONE (OUTPATIENT)
Dept: INTERNAL MEDICINE CLINIC | Facility: CLINIC | Age: 67
End: 2017-03-23

## 2017-03-23 DIAGNOSIS — S90.414A: Primary | ICD-10-CM

## 2017-03-23 NOTE — TELEPHONE ENCOUNTER
Pt need to have a referral   [facility] Dr. Chaidez Delay phone# [phone]  RE--- F/U for the toe   Appt is on Thursday 03-30-17  Please advise

## 2017-03-23 NOTE — PROGRESS NOTES
Qasim Forrest is a 77year old female. HPI:   Patient presents with:  Kidney Problem:  Follow up      63-year-old female accompanied by her daughter in follow-up to cystoscopy right retrograde pyelogram and right ureteroscopy laser lithotripsy and st date: 02/11/2004    Alcohol Use: Yes           0.0 oz/week       0 Standard drinks or equivalent per week       Comment: socially       Medications (Active prior to today's visit):    Current Outpatient Prescriptions:  VENTOLIN  (90 Base) MCG/ACT In Referrals:  None     3/23/2017  Alonso Alexander MD

## 2017-03-24 ENCOUNTER — APPOINTMENT (OUTPATIENT)
Dept: PHYSICAL THERAPY | Facility: HOSPITAL | Age: 67
End: 2017-03-24
Attending: ORTHOPAEDIC SURGERY
Payer: MEDICARE

## 2017-03-27 ENCOUNTER — OFFICE VISIT (OUTPATIENT)
Dept: ORTHOPEDICS CLINIC | Facility: CLINIC | Age: 67
End: 2017-03-27

## 2017-03-27 ENCOUNTER — HOSPITAL ENCOUNTER (OUTPATIENT)
Dept: GENERAL RADIOLOGY | Facility: HOSPITAL | Age: 67
Discharge: HOME | End: 2017-03-27
Attending: ORTHOPAEDIC SURGERY
Payer: MEDICARE

## 2017-03-27 DIAGNOSIS — S82.035D CLOSED NONDISPLACED TRANSVERSE FRACTURE OF LEFT PATELLA WITH ROUTINE HEALING, SUBSEQUENT ENCOUNTER: Primary | ICD-10-CM

## 2017-03-27 DIAGNOSIS — Z47.89 ORTHOPEDIC AFTERCARE: ICD-10-CM

## 2017-03-27 PROCEDURE — 99213 OFFICE O/P EST LOW 20 MIN: CPT | Performed by: ORTHOPAEDIC SURGERY

## 2017-03-27 PROCEDURE — 73560 X-RAY EXAM OF KNEE 1 OR 2: CPT

## 2017-03-27 PROCEDURE — G0463 HOSPITAL OUTPT CLINIC VISIT: HCPCS | Performed by: ORTHOPAEDIC SURGERY

## 2017-03-27 NOTE — PROGRESS NOTES
Patient presents for follow-up stating she is in therapy and feels much better. There is still some soreness in the knee with certain activities. She states she is dramatically better than 2 months ago or even a month ago.   There is no calf swelling or t

## 2017-03-28 ENCOUNTER — OFFICE VISIT (OUTPATIENT)
Dept: OPHTHALMOLOGY | Facility: CLINIC | Age: 67
End: 2017-03-28

## 2017-03-28 DIAGNOSIS — H25.13 AGE-RELATED NUCLEAR CATARACT OF BOTH EYES: Primary | ICD-10-CM

## 2017-03-28 PROCEDURE — G0463 HOSPITAL OUTPT CLINIC VISIT: HCPCS | Performed by: OPHTHALMOLOGY

## 2017-03-28 PROCEDURE — 99213 OFFICE O/P EST LOW 20 MIN: CPT | Performed by: OPHTHALMOLOGY

## 2017-03-28 PROCEDURE — 92015 DETERMINE REFRACTIVE STATE: CPT | Performed by: OPHTHALMOLOGY

## 2017-03-28 NOTE — PATIENT INSTRUCTIONS
Age-related nuclear cataract of both eyes   Discussed diagnosis of cataracts in detail with patient. Cataract surgery not indicated at this time due to vision level. Will continue to monitor yearly.   Patient will call sooner than 1 year recall if they no

## 2017-03-29 ENCOUNTER — OFFICE VISIT (OUTPATIENT)
Dept: PHYSICAL THERAPY | Facility: HOSPITAL | Age: 67
End: 2017-03-29
Attending: ORTHOPAEDIC SURGERY
Payer: MEDICARE

## 2017-03-29 PROCEDURE — 97140 MANUAL THERAPY 1/> REGIONS: CPT

## 2017-03-29 PROCEDURE — 97110 THERAPEUTIC EXERCISES: CPT

## 2017-03-29 NOTE — PROGRESS NOTES
Dx: L patellar fx        Authorized # of Visits:  10/16 [facility])         Next MD visit: 3/27/17  Fall Risk: standard         Precautions: n/a           Medication Changes since last visit?: No    Subjective:  \"My knee is still weak.  I was trying t

## 2017-03-30 PROBLEM — J44.9 ASTHMA WITH COPD (CHRONIC OBSTRUCTIVE PULMONARY DISEASE) (HCC): Chronic | Status: ACTIVE | Noted: 2017-03-30

## 2017-03-30 PROBLEM — J44.9 ASTHMA WITH COPD (CHRONIC OBSTRUCTIVE PULMONARY DISEASE): Chronic | Status: ACTIVE | Noted: 2017-03-30

## 2017-03-30 PROBLEM — J44.89 ASTHMA WITH COPD (CHRONIC OBSTRUCTIVE PULMONARY DISEASE) (HCC): Chronic | Status: ACTIVE | Noted: 2017-03-30

## 2017-03-30 PROBLEM — J44.89 ASTHMA WITH COPD (CHRONIC OBSTRUCTIVE PULMONARY DISEASE): Chronic | Status: ACTIVE | Noted: 2017-03-30

## 2017-03-30 NOTE — TELEPHONE ENCOUNTER
HARRIS called patient and advised her to reschedule her appt with Dr Chacorta Blank. Patient is requesting a call once referral has been approved at [phone].

## 2017-03-30 NOTE — TELEPHONE ENCOUNTER
Patient calling to check on status of this referral since she just received call that her referral was still not received from Dr Carlotta Roman office.   [facility] - Dr Colton Toscano  Phone: [phone]

## 2017-04-04 ENCOUNTER — APPOINTMENT (OUTPATIENT)
Dept: PHYSICAL THERAPY | Facility: HOSPITAL | Age: 67
End: 2017-04-04
Attending: ORTHOPAEDIC SURGERY
Payer: MEDICARE

## 2017-04-04 PROCEDURE — 97110 THERAPEUTIC EXERCISES: CPT | Performed by: PHYSICAL THERAPIST

## 2017-04-04 PROCEDURE — 97140 MANUAL THERAPY 1/> REGIONS: CPT | Performed by: PHYSICAL THERAPIST

## 2017-04-04 NOTE — PROGRESS NOTES
Dx: L patellar fx        Authorized # of Visits:  11/16 [facility])         Next MD visit: 3/27/17  Fall Risk: standard         Precautions: n/a           Medication Changes since last visit?: No     Pain scale: \"about a 5 this morning\"    Julio to negotiate stairs at home reciprocally    4. Patient will demo normalized gait pattern with no brace and LRAD to be able to walk for 30 minutes at the grocery store    Plan: Continue to increase LE ROM and strengthening exercises.  Progress closed chain a

## 2017-04-06 ENCOUNTER — OFFICE VISIT (OUTPATIENT)
Dept: PHYSICAL THERAPY | Facility: HOSPITAL | Age: 67
End: 2017-04-06
Attending: ORTHOPAEDIC SURGERY
Payer: MEDICARE

## 2017-04-06 ENCOUNTER — TELEPHONE (OUTPATIENT)
Dept: SURGERY | Facility: CLINIC | Age: 67
End: 2017-04-06

## 2017-04-06 DIAGNOSIS — R31.9 HEMATURIA: Primary | ICD-10-CM

## 2017-04-06 PROCEDURE — 97110 THERAPEUTIC EXERCISES: CPT

## 2017-04-06 PROCEDURE — 97140 MANUAL THERAPY 1/> REGIONS: CPT

## 2017-04-06 NOTE — TELEPHONE ENCOUNTER
Patient brought to exam room, name and  verified. Patient c/o increased blood in her urine, frequency; onset 2 weeks, on and off, not constant. Patient denies fever, chills, n/v, pain, blood clots. Patient states she is able to empty her bladder.   Gifty

## 2017-04-06 NOTE — TELEPHONE ENCOUNTER
Patient contacted. Relayed Dr. Karina Ireland message below. Instructed patient to call our office on Monday for her urine results. Patient verbalized understanding. All questions answered.

## 2017-04-06 NOTE — TELEPHONE ENCOUNTER
Pt stated that she has blood in her urine and when she wipes. Pt is at  . Pt will wait for a call back in the waiting area.

## 2017-04-06 NOTE — TELEPHONE ENCOUNTER
No need for another KUB. SYmptoms likely related to stent. Followup as planned. Followup on urine studies.   Thanks

## 2017-04-06 NOTE — PROGRESS NOTES
Dx: L patellar fx        Authorized # of Visits:  12/16 [facility])         Next MD visit: 3/27/17  Fall Risk: standard         Precautions: n/a           Medication Changes since last visit?: No   Pain scale: same, 5/10  Subjective:  \"I feel like yecenia strength 4+/5 to negotiate stairs at home reciprocally    4. Patient will demo normalized gait pattern with no brace and LRAD to be able to walk for 30 minutes at the grocery store    Plan: Continue to increase LE ROM and strengthening exercises.  Progress

## 2017-04-10 ENCOUNTER — OFFICE VISIT (OUTPATIENT)
Dept: PHYSICAL THERAPY | Facility: HOSPITAL | Age: 67
End: 2017-04-10
Attending: ORTHOPAEDIC SURGERY
Payer: MEDICARE

## 2017-04-10 PROCEDURE — 97110 THERAPEUTIC EXERCISES: CPT

## 2017-04-10 PROCEDURE — 97140 MANUAL THERAPY 1/> REGIONS: CPT

## 2017-04-10 NOTE — PROGRESS NOTES
Dx: L patellar fx        Authorized # of Visits:  13/16 [facility])         Next MD visit: 3/27/17  Fall Risk: standard         Precautions: n/a           Medication Changes since last visit?: No   Pain scale: same, 5/10  Subjective:  \"My knee is sti Patient will demo L knee strength 4+/5 and L hip strength 4+/5 to negotiate stairs at home reciprocally    4.  Patient will demo normalized gait pattern with no brace and LRAD to be able to walk for 30 minutes at the grocery store    Plan: Continue to incre

## 2017-04-12 ENCOUNTER — APPOINTMENT (OUTPATIENT)
Dept: PHYSICAL THERAPY | Facility: HOSPITAL | Age: 67
End: 2017-04-12
Attending: ORTHOPAEDIC SURGERY
Payer: MEDICARE

## 2017-04-13 ENCOUNTER — OFFICE VISIT (OUTPATIENT)
Dept: SURGERY | Facility: CLINIC | Age: 67
End: 2017-04-13

## 2017-04-13 VITALS
RESPIRATION RATE: 16 BRPM | DIASTOLIC BLOOD PRESSURE: 62 MMHG | WEIGHT: 200 LBS | HEIGHT: 66 IN | SYSTOLIC BLOOD PRESSURE: 110 MMHG | HEART RATE: 68 BPM | BODY MASS INDEX: 32.14 KG/M2 | TEMPERATURE: 98 F

## 2017-04-13 DIAGNOSIS — N20.0 KIDNEY STONES: Primary | ICD-10-CM

## 2017-04-13 PROCEDURE — 99213 OFFICE O/P EST LOW 20 MIN: CPT | Performed by: UROLOGY

## 2017-04-13 PROCEDURE — 52310 CYSTOSCOPY AND TREATMENT: CPT | Performed by: UROLOGY

## 2017-04-13 NOTE — PROGRESS NOTES
Mateo Gallegos is a 77year old female. HPI:   Patient presents with:   Follow - Up: Cystoscopy with stent removal with Dr. Angel Xie    14-year-old female presents for cystoscopy and right ureteral stent removal most recently seen in the office March 23 hours as needed for Pain.  Disp: 20 tablet Rfl: 0   omeprazole 20 MG Oral Capsule Delayed Release TAKE 1 CAPSULE BY MOUTH   EVERY MORNING BEFORE BREAKFAST Disp: 90 capsule Rfl: 0   FERROUS SULFATE CR OR  Disp:  Rfl:    Acetaminophen 500 MG Oral Cap  Disp: 4/13/2017  Franchesca Barragan MD

## 2017-04-14 RX ORDER — OMEPRAZOLE 20 MG/1
CAPSULE, DELAYED RELEASE ORAL
Qty: 90 CAPSULE | Refills: 3 | Status: SHIPPED | OUTPATIENT
Start: 2017-04-14 | End: 2017-12-13

## 2017-04-17 ENCOUNTER — APPOINTMENT (OUTPATIENT)
Dept: PHYSICAL THERAPY | Facility: HOSPITAL | Age: 67
End: 2017-04-17
Attending: ORTHOPAEDIC SURGERY
Payer: MEDICARE

## 2017-04-18 ENCOUNTER — OFFICE VISIT (OUTPATIENT)
Dept: PHYSICAL THERAPY | Facility: HOSPITAL | Age: 67
End: 2017-04-18
Attending: ORTHOPAEDIC SURGERY
Payer: MEDICARE

## 2017-04-18 PROCEDURE — 97110 THERAPEUTIC EXERCISES: CPT

## 2017-04-18 NOTE — PROGRESS NOTES
Dx: L patellar fx        Author[facility] of Visits:  [facility])         Next MD visit: May 2017  Fall Risk: standard         Precautions: n/a           Medication Changes since last visit?: No   Pain scale: 5/10 B knees  Subjective:  Patient repor demo L knee AROM at least 2-120 deg to be able to don a pair of pants without difficulty  3. Patient will demo L knee strength 4+/5 and L hip strength 4+/5 to negotiate stairs at home reciprocally    4.  Patient will demo normalized gait pattern with no bra

## 2017-04-19 ENCOUNTER — APPOINTMENT (OUTPATIENT)
Dept: PHYSICAL THERAPY | Facility: HOSPITAL | Age: 67
End: 2017-04-19
Attending: ORTHOPAEDIC SURGERY
Payer: MEDICARE

## 2017-04-21 ENCOUNTER — OFFICE VISIT (OUTPATIENT)
Dept: PHYSICAL THERAPY | Facility: HOSPITAL | Age: 67
End: 2017-04-21
Attending: ORTHOPAEDIC SURGERY
Payer: MEDICARE

## 2017-04-21 ENCOUNTER — TELEPHONE (OUTPATIENT)
Dept: INTERNAL MEDICINE CLINIC | Facility: CLINIC | Age: 67
End: 2017-04-21

## 2017-04-21 PROCEDURE — 97110 THERAPEUTIC EXERCISES: CPT

## 2017-04-21 NOTE — PROGRESS NOTES
Dx: L patellar fx        Authorized # of Visits:  15/16 [facility])         Next MD visit: May 2017  Fall Risk: standard         Precautions: n/a           Medication Changes since last visit?: No   Pain scale: 5/10 B knees  Subjective:  Patient reports will be independent with HEP and it's progression. 2. Patient will demo L knee AROM at least 2-120 deg to be able to don a pair of pants without difficulty  3.  Patient will demo L knee strength 4+/5 and L hip strength 4+/5 to negotiate stairs at home reci

## 2017-04-21 NOTE — TELEPHONE ENCOUNTER
[facility] reports that patient was seen by Dr Denise Antony for a nail biopsy on April 10, 2017.  Clinic is calling to request referral for this procedure with the following codes:  Dx Codes: L60.3, M20.41, L84, and M79.674  CPT Code

## 2017-04-24 ENCOUNTER — APPOINTMENT (OUTPATIENT)
Dept: PHYSICAL THERAPY | Facility: HOSPITAL | Age: 67
End: 2017-04-24
Attending: ORTHOPAEDIC SURGERY
Payer: MEDICARE

## 2017-04-25 ENCOUNTER — OFFICE VISIT (OUTPATIENT)
Dept: PHYSICAL THERAPY | Facility: HOSPITAL | Age: 67
End: 2017-04-25
Attending: ORTHOPAEDIC SURGERY
Payer: MEDICARE

## 2017-04-25 PROCEDURE — 97110 THERAPEUTIC EXERCISES: CPT

## 2017-04-25 NOTE — TELEPHONE ENCOUNTER
[facility] calling checking status of referral, please call when referral approved. Please fax to [phone].

## 2017-04-25 NOTE — PROGRESS NOTES
Patient Name: Juan C Dubon: 6/24/1950, MRN: [id number]   Date:  4/25/2017  Referring Physician:  Dung Hale    Diagnosis: L patellar fx     Discharge Summary    Pt has attended 16 visits in Physical Therapy.      Progress Note Start Date: 3/ Walking and Moving Around, CJ: 20-39% impaired, limited, or restricted    Plan: Discharge PT    Patient was advised of these findings, precautions, and treatment options and has agreed to actively participate in planning and for this course of care.     Der Morrell movements - NOT TODAY  Standing hip hikes x 10 x  2 B (pt required significant cueing to avoid compensation) - NOT TODAY  Stand hip flex then abd x 10 B (BUE support) - NOT TODAY  Tilt board taps 20x M/L - NOT TODAY  Sidestepping at barre x 4 laps with RTB

## 2017-04-26 ENCOUNTER — APPOINTMENT (OUTPATIENT)
Dept: PHYSICAL THERAPY | Facility: HOSPITAL | Age: 67
End: 2017-04-26
Attending: ORTHOPAEDIC SURGERY
Payer: MEDICARE

## 2017-05-01 ENCOUNTER — APPOINTMENT (OUTPATIENT)
Dept: PHYSICAL THERAPY | Facility: HOSPITAL | Age: 67
End: 2017-05-01
Attending: ORTHOPAEDIC SURGERY
Payer: MEDICARE

## 2017-05-09 ENCOUNTER — HOSPITAL ENCOUNTER (OUTPATIENT)
Dept: ULTRASOUND IMAGING | Facility: HOSPITAL | Age: 67
Discharge: HOME | End: 2017-05-09
Attending: UROLOGY
Payer: MEDICARE

## 2017-05-09 DIAGNOSIS — N20.0 KIDNEY STONES: ICD-10-CM

## 2017-05-09 PROCEDURE — 76770 US EXAM ABDO BACK WALL COMP: CPT | Performed by: UROLOGY

## 2017-05-12 ENCOUNTER — TELEPHONE (OUTPATIENT)
Dept: SURGERY | Facility: CLINIC | Age: 67
End: 2017-05-12

## 2017-05-12 ENCOUNTER — OFFICE VISIT (OUTPATIENT)
Dept: SURGERY | Facility: CLINIC | Age: 67
End: 2017-05-12

## 2017-05-12 VITALS
SYSTOLIC BLOOD PRESSURE: 114 MMHG | HEIGHT: 66 IN | BODY MASS INDEX: 41.78 KG/M2 | TEMPERATURE: 98 F | DIASTOLIC BLOOD PRESSURE: 66 MMHG | HEART RATE: 64 BPM | RESPIRATION RATE: 16 BRPM | WEIGHT: 260 LBS

## 2017-05-12 DIAGNOSIS — N13.39 OTHER HYDRONEPHROSIS: ICD-10-CM

## 2017-05-12 DIAGNOSIS — N20.0 KIDNEY STONES: Primary | ICD-10-CM

## 2017-05-12 PROCEDURE — G0463 HOSPITAL OUTPT CLINIC VISIT: HCPCS | Performed by: UROLOGY

## 2017-05-12 PROCEDURE — 99213 OFFICE O/P EST LOW 20 MIN: CPT | Performed by: UROLOGY

## 2017-05-12 NOTE — PROGRESS NOTES
Ladarius Nunez is a 77year old female. HPI:   Patient presents with:  Kidney Problem: Patient states she doing well overall. Denies any pain or discomfort. 77year old female accompanied by her daughter in followup to a visit from 4/13/2017.   Zachary Maciel Delayed Release TAKE 1 CAPSULE BY MOUTH   EVERY MORNING BEFORE BREAKFAST Disp: 90 capsule Rfl: 3   VENTOLIN  (90 Base) MCG/ACT Inhalation Aero Soln INHALE 2 PUFFS INTO THE LUNGS EVERY 4 (FOUR) HOURS AS NEEDED FOR WHEEZING.  Disp: 54 g Rfl: 1   Acetam

## 2017-05-12 NOTE — TELEPHONE ENCOUNTER
Staff I had asked the patient to obtain a mag3 lasix renogram today but I also would like her to get a KUB same day. I placed the order. Please call her and notify her.

## 2017-05-15 ENCOUNTER — OFFICE VISIT (OUTPATIENT)
Dept: INTERNAL MEDICINE CLINIC | Facility: CLINIC | Age: 67
End: 2017-05-15

## 2017-05-15 VITALS
DIASTOLIC BLOOD PRESSURE: 74 MMHG | WEIGHT: 210.63 LBS | SYSTOLIC BLOOD PRESSURE: 114 MMHG | HEART RATE: 71 BPM | HEIGHT: 66 IN | BODY MASS INDEX: 33.85 KG/M2 | TEMPERATURE: 99 F

## 2017-05-15 DIAGNOSIS — E66.09 NON MORBID OBESITY DUE TO EXCESS CALORIES: ICD-10-CM

## 2017-05-15 DIAGNOSIS — Z00.00 ENCOUNTER FOR MEDICARE ANNUAL WELLNESS EXAM: Primary | ICD-10-CM

## 2017-05-15 DIAGNOSIS — Z91.81 AT HIGH RISK FOR FALLS: ICD-10-CM

## 2017-05-15 DIAGNOSIS — M51.37 DEGENERATION OF LUMBAR OR LUMBOSACRAL INTERVERTEBRAL DISC: ICD-10-CM

## 2017-05-15 DIAGNOSIS — K21.9 GASTROESOPHAGEAL REFLUX DISEASE WITHOUT ESOPHAGITIS: ICD-10-CM

## 2017-05-15 DIAGNOSIS — J45.909 UNCOMPLICATED ASTHMA, UNSPECIFIED ASTHMA SEVERITY: ICD-10-CM

## 2017-05-15 DIAGNOSIS — J44.9 COPD, MILD (HCC): ICD-10-CM

## 2017-05-15 DIAGNOSIS — Z12.31 VISIT FOR SCREENING MAMMOGRAM: ICD-10-CM

## 2017-05-15 DIAGNOSIS — M17.0 PRIMARY OSTEOARTHRITIS OF BOTH KNEES: ICD-10-CM

## 2017-05-15 DIAGNOSIS — H25.13 AGE-RELATED NUCLEAR CATARACT OF BOTH EYES: ICD-10-CM

## 2017-05-15 DIAGNOSIS — I83.93 VARICOSE VEINS OF BOTH LOWER EXTREMITIES: ICD-10-CM

## 2017-05-15 PROBLEM — E66.01 SEVERE OBESITY (BMI >= 40) (HCC): Chronic | Status: ACTIVE | Noted: 2017-05-15

## 2017-05-15 PROBLEM — D69.6 THROMBOCYTOPENIA: Chronic | Status: ACTIVE | Noted: 2017-05-15

## 2017-05-15 PROBLEM — D69.6 THROMBOCYTOPENIA (HCC): Chronic | Status: ACTIVE | Noted: 2017-05-15

## 2017-05-15 PROCEDURE — 96160 PT-FOCUSED HLTH RISK ASSMT: CPT | Performed by: INTERNAL MEDICINE

## 2017-05-15 PROCEDURE — G0439 PPPS, SUBSEQ VISIT: HCPCS | Performed by: INTERNAL MEDICINE

## 2017-05-15 PROCEDURE — G0463 HOSPITAL OUTPT CLINIC VISIT: HCPCS | Performed by: INTERNAL MEDICINE

## 2017-05-15 PROCEDURE — 99213 OFFICE O/P EST LOW 20 MIN: CPT | Performed by: INTERNAL MEDICINE

## 2017-05-15 RX ORDER — MULTIVIT WITH MINERALS/LUTEIN
TABLET ORAL
COMMUNITY
Start: 2017-04-14

## 2017-05-15 NOTE — PROGRESS NOTES
HPI:    Patient ID: Jessica Ernandez is a 77year old female.     HPI    Medicare annual exam and follow pu of chornic medical problems   Including but not limited to asthma   Uses rescue inhaler daily regardless of breathing issue :        Headache no  di fever, chills, activity change and fatigue. HENT: Negative for ear discharge, nosebleeds, postnasal drip, rhinorrhea, sinus pressure and sore throat. Eyes: Negative for pain, discharge and redness.    Respiratory: Negative for cough, chest tightness, s Vitamin B-12 1000 MCG Oral Tab  Disp:  Rfl:      Allergies:No Known Allergies    HISTORY:  Past Medical History   Diagnosis Date   • Lipid screening 06/09/2014   • Osteoporosis screening 06/20/2014   • DVT of lower extremity (deep venous thrombosis) ([facility]. Lymphadenopathy:     She has no cervical adenopathy. Neurological: She is alert. Skin: No rash noted. She is not diaphoretic (obese). No erythema.    Varicose veins chronic  No rednes no tenderness   Discoloration form prior swelling and open wounds 0-18    CALCULATED OSMOLALITY      275-295 mOsm/kg    GFR, Non-      >=60    GFR, -American      >=60    Case Report          GENERAL CATEGORIZATION:          FINAL DIAGNOSIS          GROSS DESCRIPTION          PROCEDURE          CL 0-18    CALCULATED OSMOLALITY      275-295 mOsm/kg    GFR, Non-      >=60    GFR, -American      >=60    Case Report       Non-Gynecologic Cytology                          Case: [id number] . . .    GENERAL CATEGORIZATION:       Benign UROBILINOGEN,SEMI-QN      <2.0     LEUKOCYTES      Negative     ASCORBIC ACID      Negative mg/dL     SQUAM EPI CELLS UR           WBC      0-5 /HPF     RBC      0-3 /HPF     BACTERIA      None Seen /HPF     Glucose      70-99 mg/dL 103 (H)    Sodium URINE      1.002 - 1.035 1.013   URINE PH      5.0 - 8.0 6.0   PROTEIN, URINE, QUAL. Negative mg/dL Negative   GLUCOSE, URINE, QUAL. Negative mg/dL Negative   KETONES, URINE      Negative mg/dL Negative   BILIRUBIN, URINE, QUAL.       Negative Neg last KUB have resolved. -MAG3 lasix renogarm to assess for chronic hydro without obstruction.  -Followup 2 weeks to review those results. She understands plan and agrees. PFT 7/2014  IMPRESSION  1. Mild obstructive ventilatory limitation.   2. Signific extremities  Plan: chornic  Much better   Has some scarring from prior wounds  Discoloration dark stasis dermatitis   Stable monitor    (J44.9) COPD, mild (HCC)  Plan: chornic  Cont med monitor    (Z68.35) BMI 35.0-35.9,adult  Plan: lower caloric intaek  c Interaction    If you are a male age 38-65 or a female age 47-67, do you take aspirin?: Yes    Have you had any immunizations at another office such as Influenza, Hepatitis B, Tetanus, or Pneumococcal?: No     Functional Ability     Bathing or Showering: N here.  Cognitive Assessment     What day of the week is this?: Correct    What month is it?: Correct    What year is it?: Correct    Recall \"Ball\": Correct    Recall \"Flag\": Correct    Recall \"Tree\": Correct      ALLERGIES:   No Known Allergies    CU CVA   • Diabetes Father    • Glaucoma Neg    • Macular degeneration Neg       SOCIAL HISTORY:     Smoking Status: Former Smoker                   Packs/Day: 0.30  Years: 30        Types: Cigarettes      Quit date: 02/11/2004    Alcohol Use: Yes 78        EKG One Time completed    Colorectal Cancer Screening      Colonoscopy Screen every 10 years Colonoscopy,5 Years due on 10/10/2019 Update Health Maintenance if applicable    Flex Sigmoidoscopy Screen every 10 years No results found for this or an 09/16/2016 3.8    No flowsheet data found. Creatinine  Annually CREATININE (mg/dL)   Date Value   02/13/2017 0.79     CREATININE (P) (mg/dL)   Date Value   09/16/2016 0.77    No flowsheet data found.     BUN  Annually BUN (mg/dL)   Date Value   02/13/2 previous visit.          SUGGESTED VACCINATIONS - Influenza, Pneumococcal, Zoster, Tetanus     Immunization History  Administered            Date(s) Administered    Influenza Vaccine, No Preserv, 3YR +                          11/19/2014      Pneumococcal (

## 2017-05-15 NOTE — TELEPHONE ENCOUNTER
Patient contacted. Patient is aware to have a KUB the same day she is scheduled for lasix renogram. Patient verbalized understanding and states will comply.

## 2017-05-16 PROBLEM — D69.6 THROMBOCYTOPENIA: Chronic | Status: RESOLVED | Noted: 2017-05-15 | Resolved: 2017-05-16

## 2017-05-16 PROBLEM — D69.6 THROMBOCYTOPENIA (HCC): Chronic | Status: RESOLVED | Noted: 2017-05-15 | Resolved: 2017-05-16

## 2017-05-24 ENCOUNTER — OFFICE VISIT (OUTPATIENT)
Dept: INTERNAL MEDICINE CLINIC | Facility: CLINIC | Age: 67
End: 2017-05-24

## 2017-05-24 ENCOUNTER — OFFICE VISIT (OUTPATIENT)
Dept: SURGERY | Facility: CLINIC | Age: 67
End: 2017-05-24

## 2017-05-24 VITALS
WEIGHT: 207 LBS | DIASTOLIC BLOOD PRESSURE: 55 MMHG | HEART RATE: 63 BPM | TEMPERATURE: 98 F | HEIGHT: 66 IN | BODY MASS INDEX: 33.27 KG/M2 | RESPIRATION RATE: 16 BRPM | SYSTOLIC BLOOD PRESSURE: 103 MMHG

## 2017-05-24 VITALS
HEART RATE: 63 BPM | DIASTOLIC BLOOD PRESSURE: 65 MMHG | RESPIRATION RATE: 18 BRPM | WEIGHT: 207.19 LBS | OXYGEN SATURATION: 99 % | HEIGHT: 66 IN | BODY MASS INDEX: 33.3 KG/M2 | SYSTOLIC BLOOD PRESSURE: 111 MMHG

## 2017-05-24 DIAGNOSIS — K21.9 GASTROESOPHAGEAL REFLUX DISEASE, ESOPHAGITIS PRESENCE NOT SPECIFIED: ICD-10-CM

## 2017-05-24 DIAGNOSIS — G47.33 OSA ON CPAP: Primary | ICD-10-CM

## 2017-05-24 DIAGNOSIS — M15.9 OSTEOARTHRITIS OF MULTIPLE JOINTS, UNSPECIFIED OSTEOARTHRITIS TYPE: ICD-10-CM

## 2017-05-24 DIAGNOSIS — Z99.89 OSA ON CPAP: Primary | ICD-10-CM

## 2017-05-24 DIAGNOSIS — R21 RASH: Primary | ICD-10-CM

## 2017-05-24 DIAGNOSIS — E66.9 OBESITY (BMI 30-39.9): ICD-10-CM

## 2017-05-24 DIAGNOSIS — R53.82 CHRONIC FATIGUE: ICD-10-CM

## 2017-05-24 PROBLEM — D69.6 THROMBOCYTOPENIA (HCC): Chronic | Status: ACTIVE | Noted: 2017-05-24

## 2017-05-24 PROBLEM — M19.90 OA (OSTEOARTHRITIS): Status: ACTIVE | Noted: 2017-05-24

## 2017-05-24 PROBLEM — D69.6 THROMBOCYTOPENIA: Chronic | Status: ACTIVE | Noted: 2017-05-24

## 2017-05-24 PROBLEM — R53.83 FATIGUE: Status: ACTIVE | Noted: 2017-05-24

## 2017-05-24 PROCEDURE — G0463 HOSPITAL OUTPT CLINIC VISIT: HCPCS | Performed by: INTERNAL MEDICINE

## 2017-05-24 PROCEDURE — 99213 OFFICE O/P EST LOW 20 MIN: CPT | Performed by: INTERNAL MEDICINE

## 2017-05-24 PROCEDURE — 99204 OFFICE O/P NEW MOD 45 MIN: CPT | Performed by: INTERNAL MEDICINE

## 2017-05-24 NOTE — PROGRESS NOTES
[facility] Consultation Note       Date of Consult:  2017    Patient:  Adarsh Gale  :      1950  MRN:      [id number]    Referring Provider: Dr. Netta Tolliver       Chief Complaint:  Patient presents with:  Consult  Weight Disp: 1 Inhaler Rfl: 10   Nystatin 095562 UNIT/GM External Powder Apply 1 Application topically 2 (two) times daily.  Disp: 1 Bottle Rfl: 1   REDNESS RELIEVER EYE DROPS 0.05 % Ophthalmic Solution  Disp:  Rfl:    Multiple Vitamins-Minerals (Edmonia Catena food meals/week:  2 meals/week    Nutritional Goals Reviewed and Discussed:     Limit carbohydrates to 100 gms per day, Eat 100-200 calories within 1 hour of waking up and Eat 3-4 cups of fresh fruit or vegetables daily    Behavior Modifications Reviewed a type  Chronic fatigue  Obesity (BMI 30-39. 9)    PLAN     Patient is not interested in bariatric surgery. Patient desires to pursue traditional weight loss at this time. Patient was instructed to continue wearing their CPaP as recommended.      GERD: Th

## 2017-05-25 ENCOUNTER — HOSPITAL ENCOUNTER (OUTPATIENT)
Dept: GENERAL RADIOLOGY | Facility: HOSPITAL | Age: 67
Discharge: HOME | End: 2017-05-25
Attending: UROLOGY
Payer: MEDICARE

## 2017-05-25 DIAGNOSIS — N20.0 KIDNEY STONES: ICD-10-CM

## 2017-05-25 DIAGNOSIS — N20.0 KIDNEY STONE: ICD-10-CM

## 2017-05-25 PROCEDURE — 74000 XR ABDOMEN (1 VIEW) (CPT=74000): CPT | Performed by: UROLOGY

## 2017-05-26 ENCOUNTER — HOSPITAL ENCOUNTER (OUTPATIENT)
Dept: NUCLEAR MEDICINE | Facility: HOSPITAL | Age: 67
Discharge: HOME | End: 2017-05-26
Attending: UROLOGY
Payer: MEDICARE

## 2017-05-26 DIAGNOSIS — N13.39 OTHER HYDRONEPHROSIS: ICD-10-CM

## 2017-05-26 PROCEDURE — 78708 K FLOW/FUNCT IMAGE W/DRUG: CPT | Performed by: UROLOGY

## 2017-05-26 RX ORDER — FUROSEMIDE 10 MG/ML
INJECTION INTRAMUSCULAR; INTRAVENOUS
Status: DISPENSED
Start: 2017-05-26 | End: 2017-05-27

## 2017-06-01 PROBLEM — D69.6 THROMBOCYTOPENIA (HCC): Chronic | Status: RESOLVED | Noted: 2017-05-24 | Resolved: 2017-06-01

## 2017-06-01 PROBLEM — D69.6 THROMBOCYTOPENIA: Chronic | Status: RESOLVED | Noted: 2017-05-24 | Resolved: 2017-06-01

## 2017-06-07 NOTE — PROGRESS NOTES
HPI:    Patient ID: Jessica Ernandez is a 77year old female.     HPI    See emergently walked in    Would like evalauted for skin discoloration  Also had  A large skin tag left upper arm    /55 mmHg  Pulse 63  Temp(Src) 97.8 °F (36.6 °C) (Tympanic) child 33 years ago   • Anemia    • Esophageal reflux    • Colon polyp    • Asthma    • Fracture 12/2016     KNEE NO SURGERY   • Obesity (BMI 30-39. 9)           Past Surgical History    COLONOSCOPY  2014      Family History   Problem Relation Age of Onset

## 2017-06-12 ENCOUNTER — TELEPHONE (OUTPATIENT)
Dept: SURGERY | Facility: CLINIC | Age: 67
End: 2017-06-12

## 2017-06-12 RX ORDER — BUDESONIDE AND FORMOTEROL FUMARATE DIHYDRATE 160; 4.5 UG/1; UG/1
AEROSOL RESPIRATORY (INHALATION)
Qty: 3 INHALER | Refills: 11 | Status: SHIPPED | OUTPATIENT
Start: 2017-06-12 | End: 2018-06-20

## 2017-06-14 NOTE — TELEPHONE ENCOUNTER
Lasix renogram result is normal.  Please call and notify patient. Followup in 6 months please.   Thanks

## 2017-06-15 ENCOUNTER — HOSPITAL ENCOUNTER (OUTPATIENT)
Dept: MAMMOGRAPHY | Facility: HOSPITAL | Age: 67
Discharge: HOME | End: 2017-06-15
Attending: INTERNAL MEDICINE
Payer: MEDICARE

## 2017-06-15 DIAGNOSIS — Z12.31 VISIT FOR SCREENING MAMMOGRAM: ICD-10-CM

## 2017-06-15 PROCEDURE — 77067 SCR MAMMO BI INCL CAD: CPT | Performed by: INTERNAL MEDICINE

## 2017-06-15 RX ORDER — NYSTATIN 100000 [USP'U]/G
POWDER TOPICAL
Qty: 60 G | Refills: 1 | Status: SHIPPED | OUTPATIENT
Start: 2017-06-15 | End: 2018-01-15

## 2017-06-15 NOTE — TELEPHONE ENCOUNTER
Patient calling again. Please call thank you. Upset that she has been waiting for results since 05/25.

## 2017-06-16 ENCOUNTER — OFFICE VISIT (OUTPATIENT)
Dept: DERMATOLOGY CLINIC | Facility: CLINIC | Age: 67
End: 2017-06-16

## 2017-06-16 DIAGNOSIS — D23.30 BENIGN NEOPLASM OF SKIN OF FACE: ICD-10-CM

## 2017-06-16 DIAGNOSIS — D23.60 BENIGN NEOPLASM OF SKIN OF UPPER LIMB, INCLUDING SHOULDER, UNSPECIFIED LATERALITY: ICD-10-CM

## 2017-06-16 DIAGNOSIS — D48.5 NEOPLASM OF UNCERTAIN BEHAVIOR OF SKIN: Primary | ICD-10-CM

## 2017-06-16 DIAGNOSIS — D23.4 BENIGN NEOPLASM OF SCALP AND SKIN OF NECK: ICD-10-CM

## 2017-06-16 DIAGNOSIS — D23.5 BENIGN NEOPLASM OF SKIN OF TRUNK, EXCEPT SCROTUM: ICD-10-CM

## 2017-06-16 PROCEDURE — 88304 TISSUE EXAM BY PATHOLOGIST: CPT | Performed by: DERMATOLOGY

## 2017-06-16 PROCEDURE — 99202 OFFICE O/P NEW SF 15 MIN: CPT | Performed by: DERMATOLOGY

## 2017-06-16 PROCEDURE — 11403 EXC TR-EXT B9+MARG 2.1-3CM: CPT | Performed by: DERMATOLOGY

## 2017-06-19 NOTE — TELEPHONE ENCOUNTER
Pt calling asking about results of the KUB of 5/25 and the Lasix renogram of 5/26. I to pt that she agreed in the plan at [address] [place] [address] of  5/12 that she would have the 2 tests and f/u in 2 weeks to discuss results. Pt does not have a f/u appt schd. Tasked to Jessica Rivero.  MARTÍN

## 2017-06-20 NOTE — PROGRESS NOTES
Quick Note:    The pathology report from last visit showed Acrochordon (tag) . Please log in test results, send biopsy results letter.  Pt to rtc 1 year or prn.  ______

## 2017-06-21 NOTE — TELEPHONE ENCOUNTER
No evidence of high grade mechanical obstruction. She should followup for an office visit in 6 months.   Thanks

## 2017-06-23 NOTE — TELEPHONE ENCOUNTER
Patient called back and gave KUB results, per KHB. Transferred patient to  to make December follow up appointment.

## 2017-06-26 ENCOUNTER — HOSPITAL ENCOUNTER (OUTPATIENT)
Dept: ULTRASOUND IMAGING | Facility: HOSPITAL | Age: 67
Discharge: HOME | End: 2017-06-26
Attending: INTERNAL MEDICINE
Payer: MEDICARE

## 2017-06-26 ENCOUNTER — HOSPITAL ENCOUNTER (OUTPATIENT)
Dept: MAMMOGRAPHY | Facility: HOSPITAL | Age: 67
Discharge: HOME | End: 2017-06-26
Attending: INTERNAL MEDICINE
Payer: MEDICARE

## 2017-06-26 DIAGNOSIS — R92.8 ABNORMAL MAMMOGRAM: ICD-10-CM

## 2017-06-26 PROCEDURE — 76642 ULTRASOUND BREAST LIMITED: CPT | Performed by: INTERNAL MEDICINE

## 2017-06-26 PROCEDURE — 77065 DX MAMMO INCL CAD UNI: CPT | Performed by: INTERNAL MEDICINE

## 2017-07-03 NOTE — PROGRESS NOTES
Bonita Rae is a 79year old female. HPI:     CC:  Patient presents with:  Lesion: New pt presents with lesion on left arm x10 years. Started small and grew over the years. Denies pain and bleeding. No family and personal hx of skin cancer.    Derm P Acetaminophen-Codeine #3 300-30 MG Oral Tab Take 1 tablet by mouth every 4 (four) hours as needed for Pain.  Disp: 20 tablet Rfl: 0   FERROUS SULFATE CR OR  Disp:  Rfl:    Acetaminophen 500 MG Oral Cap  Disp:  Rfl:    REDNESS RELIEVER EYE DROPS 0.05 % Oph visit.    HPI:  Patient presents with:  Lesion: New pt presents with lesion on left arm x10 years. Started small and grew over the years. Denies pain and bleeding. No family and personal hx of skin cancer.    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 This will result in a permanent scar, which will be longer than the size of the lesion.  Further excision may be n

## 2017-07-12 ENCOUNTER — OFFICE VISIT (OUTPATIENT)
Dept: SURGERY | Facility: CLINIC | Age: 67
End: 2017-07-12

## 2017-07-12 VITALS
BODY MASS INDEX: 33.98 KG/M2 | HEART RATE: 56 BPM | HEIGHT: 66 IN | DIASTOLIC BLOOD PRESSURE: 78 MMHG | WEIGHT: 211.44 LBS | OXYGEN SATURATION: 97 % | SYSTOLIC BLOOD PRESSURE: 127 MMHG | RESPIRATION RATE: 18 BRPM

## 2017-07-12 DIAGNOSIS — E66.9 OBESITY (BMI 30-39.9): ICD-10-CM

## 2017-07-12 DIAGNOSIS — M17.0 PRIMARY OSTEOARTHRITIS OF BOTH KNEES: ICD-10-CM

## 2017-07-12 DIAGNOSIS — K21.9 GASTROESOPHAGEAL REFLUX DISEASE, ESOPHAGITIS PRESENCE NOT SPECIFIED: ICD-10-CM

## 2017-07-12 DIAGNOSIS — R53.82 CHRONIC FATIGUE: ICD-10-CM

## 2017-07-12 DIAGNOSIS — G47.33 OSA ON CPAP: Primary | ICD-10-CM

## 2017-07-12 DIAGNOSIS — Z99.89 OSA ON CPAP: Primary | ICD-10-CM

## 2017-07-12 PROCEDURE — 99214 OFFICE O/P EST MOD 30 MIN: CPT | Performed by: INTERNAL MEDICINE

## 2017-07-12 NOTE — PROGRESS NOTES
[address], [facility]  [address] [facility] [address] [facility] [address]  Dept: [phone]     Date:   2017    Patient:  Radha Kohler  :      1950  MRN:      [id number]    Chief Complaint: Disp: 54 g Rfl: 1   Acetaminophen-Codeine #3 300-30 MG Oral Tab Take 1 tablet by mouth every 4 (four) hours as needed for Pain.  Disp: 20 tablet Rfl: 0   FERROUS SULFATE CR OR  Disp:  Rfl:    Acetaminophen 500 MG Oral Cap  Disp:  Rfl:    REDNESS RELIEVER EY states the following:  · Eats 3 meal(s) per day  · Length of time it takes to consume a meal:  20  · # of snacks per day: 1 Type of snacks:  fruit  · Amount of soda consumption per day:  occasional  · Amount of water (in ounces) per day:  64  · Drinking be apnea has been stable since the last clinic visit. There has not been any increase in hyper-somnolence. GERD:  The patient states her acid reflux has been well controlled with her current medication. No symptoms of heartburn or indigestion.     Encount

## 2017-07-31 ENCOUNTER — OFFICE VISIT (OUTPATIENT)
Dept: DERMATOLOGY CLINIC | Facility: CLINIC | Age: 67
End: 2017-07-31

## 2017-07-31 DIAGNOSIS — Z48.02 ENCOUNTER FOR REMOVAL OF SUTURES: Primary | ICD-10-CM

## 2017-07-31 DIAGNOSIS — D23.60 BENIGN NEOPLASM OF SKIN OF UPPER LIMB, INCLUDING SHOULDER, UNSPECIFIED LATERALITY: ICD-10-CM

## 2017-08-07 NOTE — PROGRESS NOTES
Zachary Krause is a 79year old female. Patient presents with:  Derm Problem: pt walked in to check biopsy site to left upper arm - sutures removed, site red and irritated            Review of patient's allergies indicates no known allergies.     Cu 30. 00        Types: Cigarettes     Quit date: 2/11/2004  Alcohol use: Yes           0.0 oz/week     Comment: socially                  Current Outpatient Prescriptions:  hydrochlorothiazide 12.5 MG Oral Cap Take 1 capsule (12.5 mg total) by mouth daily.  Alina Williamson History Main Topics   Smoking status: Former Smoker  0.30 Packs/day  For 30.00 Years     Types: Cigarettes    Quit date: 2/11/2004    Smokeless tobacco: Not on file    Alcohol use Yes  0.0 oz/week     Comment: socially    Drug use: No    Sexual activity: N defined types were placed in this encounter.       Meds & Refills for this Visit:   No prescriptions requested or ordered in this encounter    Encounter for removal of sutures  (primary encounter diagnosis)  Benign neoplasm of skin of upper limb, including

## 2017-08-14 ENCOUNTER — OFFICE VISIT (OUTPATIENT)
Dept: INTERNAL MEDICINE CLINIC | Facility: CLINIC | Age: 67
End: 2017-08-14

## 2017-08-14 VITALS
WEIGHT: 211 LBS | HEIGHT: 66 IN | SYSTOLIC BLOOD PRESSURE: 102 MMHG | BODY MASS INDEX: 33.91 KG/M2 | DIASTOLIC BLOOD PRESSURE: 65 MMHG | TEMPERATURE: 98 F | HEART RATE: 59 BPM

## 2017-08-14 DIAGNOSIS — J44.9 ASTHMA WITH COPD (CHRONIC OBSTRUCTIVE PULMONARY DISEASE) (HCC): ICD-10-CM

## 2017-08-14 DIAGNOSIS — J45.40 MODERATE PERSISTENT ASTHMA WITHOUT COMPLICATION: ICD-10-CM

## 2017-08-14 DIAGNOSIS — J44.9 COPD, MILD (HCC): ICD-10-CM

## 2017-08-14 DIAGNOSIS — R60.0 LOWER EXTREMITY EDEMA: ICD-10-CM

## 2017-08-14 DIAGNOSIS — I83.93: ICD-10-CM

## 2017-08-14 DIAGNOSIS — E78.5 HYPERLIPIDEMIA, UNSPECIFIED HYPERLIPIDEMIA TYPE: ICD-10-CM

## 2017-08-14 DIAGNOSIS — M17.0 PRIMARY OSTEOARTHRITIS OF BOTH KNEES: Primary | ICD-10-CM

## 2017-08-14 PROCEDURE — G0463 HOSPITAL OUTPT CLINIC VISIT: HCPCS | Performed by: INTERNAL MEDICINE

## 2017-08-14 PROCEDURE — 99214 OFFICE O/P EST MOD 30 MIN: CPT | Performed by: INTERNAL MEDICINE

## 2017-08-14 RX ORDER — HYDROCHLOROTHIAZIDE 12.5 MG/1
12.5 CAPSULE, GELATIN COATED ORAL DAILY
Qty: 90 CAPSULE | Refills: 1 | Status: SHIPPED | OUTPATIENT
Start: 2017-08-14 | End: 2017-12-13

## 2017-08-28 NOTE — PROGRESS NOTES
HPI:    Patient ID: Siena Machado is a 79year old female.     HPI   3 month follow up    HTN  Long standing history of hypertension  More than a year temo  Status::::: controlled:::::::::::::::::::::::::::::y   sympotms  :        Headache no  dizziness TIMES DAILY. Disp: 60 g Rfl: 1   SYMBICORT 160-4.5 MCG/ACT Inhalation Aerosol INHALE 2 PUFFS INTO THE LUNGS TWO TIMES DAILY.  Disp: 3 Inhaler Rfl: 11   Ascorbic Acid (VITAMIN C) 1000 MG Oral Tab  Disp:  Rfl:    OMEPRAZOLE 20 MG Oral Capsule Delayed Release Physical Exam   Constitutional: She appears well-developed. No distress. HENT:   Head: Normocephalic and atraumatic.    Right Ear: External ear normal.   Left Ear: External ear normal.   Nose: Nose normal.   Mouth/Throat: Oropharynx is clear and moist. No Meds This Visit:  Signed Prescriptions Disp Refills    hydrochlorothiazide 12.5 MG Oral Cap 90 capsule 1      Sig: Take 1 capsule (12.5 mg total) by mouth daily.            Imaging & Referrals:  None        KAVON#[id number]

## 2017-09-03 DIAGNOSIS — R60.0 LOWER EXTREMITY EDEMA: ICD-10-CM

## 2017-09-05 RX ORDER — HYDROCHLOROTHIAZIDE 12.5 MG/1
12.5 CAPSULE, GELATIN COATED ORAL DAILY
Qty: 30 CAPSULE | Refills: 1 | OUTPATIENT
Start: 2017-09-05

## 2017-09-07 ENCOUNTER — TELEPHONE (OUTPATIENT)
Dept: INTERNAL MEDICINE CLINIC | Facility: CLINIC | Age: 67
End: 2017-09-07

## 2017-09-07 NOTE — TELEPHONE ENCOUNTER
Jimbo Willis from [facility] is calling stating that the pt contacted them requesting a prescription for:    Omega 3   Lidocaine ointment    Was this discussed at pt's last OV on 08/14/2017? Would Dr. Andrew Cornejo want to prescribe these medications?

## 2017-09-08 ENCOUNTER — TELEPHONE (OUTPATIENT)
Dept: OTHER | Age: 67
End: 2017-09-08

## 2017-09-08 DIAGNOSIS — M25.569 KNEE PAIN, UNSPECIFIED CHRONICITY, UNSPECIFIED LATERALITY: ICD-10-CM

## 2017-09-08 DIAGNOSIS — M51.37 DEGENERATION OF LUMBAR OR LUMBOSACRAL INTERVERTEBRAL DISC: Primary | ICD-10-CM

## 2017-09-08 DIAGNOSIS — Z09 FOLLOW UP: ICD-10-CM

## 2017-09-08 NOTE — TELEPHONE ENCOUNTER
[address], informed pharmacy doctor denied med requests.     Left message for pt to call back, transfer to triage

## 2017-09-08 NOTE — TELEPHONE ENCOUNTER
Pt requesting a referral for physical therapy through [facility]. Pt has chronic pain in neck, back and now her knees.   She states that she checked with her insurance as she had already had therapy at [facility].  Her insurance allows for this PT at [facility] but a refe

## 2017-09-12 NOTE — TELEPHONE ENCOUNTER
This is personal request  And she is requesting PT     NECK  BACK and KNEE   Appropriated documentation is needed  For this request     For   Insurance coveraage  Please have patient make a follow up appt  Give her a referral to see orthopedic for follow u

## 2017-09-12 NOTE — TELEPHONE ENCOUNTER
Advised patient of Dr. Elliot Seals note and number provided to orthopedic. Patient verbalized understanding and will call back to schedule an appointment with Dr Davonte Dillon.

## 2017-10-31 ENCOUNTER — OFFICE VISIT (OUTPATIENT)
Dept: SURGERY | Facility: CLINIC | Age: 67
End: 2017-10-31

## 2017-10-31 VITALS
SYSTOLIC BLOOD PRESSURE: 115 MMHG | HEIGHT: 66 IN | HEART RATE: 54 BPM | WEIGHT: 209.31 LBS | DIASTOLIC BLOOD PRESSURE: 73 MMHG | RESPIRATION RATE: 16 BRPM | OXYGEN SATURATION: 98 % | BODY MASS INDEX: 33.64 KG/M2

## 2017-10-31 DIAGNOSIS — K21.9 GASTROESOPHAGEAL REFLUX DISEASE, ESOPHAGITIS PRESENCE NOT SPECIFIED: ICD-10-CM

## 2017-10-31 DIAGNOSIS — M17.0 PRIMARY OSTEOARTHRITIS OF BOTH KNEES: ICD-10-CM

## 2017-10-31 DIAGNOSIS — E66.9 OBESITY (BMI 30-39.9): ICD-10-CM

## 2017-10-31 DIAGNOSIS — G47.33 OSA ON CPAP: Primary | ICD-10-CM

## 2017-10-31 DIAGNOSIS — Z99.89 OSA ON CPAP: Primary | ICD-10-CM

## 2017-10-31 DIAGNOSIS — R53.82 CHRONIC FATIGUE: ICD-10-CM

## 2017-10-31 PROCEDURE — 99214 OFFICE O/P EST MOD 30 MIN: CPT | Performed by: INTERNAL MEDICINE

## 2017-10-31 NOTE — PROGRESS NOTES
[address] [place] [address] [address] [facility] [address]  Dept: [phone]     Date:   2017    Patient:  Juanito West  :      1950  MRN:      [id number]    Chief Complaint: Base) MCG/ACT Inhalation Aero Soln INHALE 2 PUFFS INTO THE LUNGS EVERY 4 (FOUR) HOURS AS NEEDED FOR WHEEZING. Disp: 54 g Rfl: 1   Acetaminophen-Codeine #3 300-30 MG Oral Tab Take 1 tablet by mouth every 4 (four) hours as needed for Pain.  Disp: 20 tablet Rf Intake: 130  · Is patient exercising? yes  · Type of exercise?  Walk for  45 Minutes    Eating Habits  · Patient states the following:  · Eats 3 meal(s) per day  · Length of time it takes to consume a meal:  20  · # of snacks per day: 1 Type of snacks:  fru turgor normal. No rashes or lesions    ASSESSMENT     OBSTRUCTIVE SLEEP APNEA: The patient states her sleep apnea has been stable since the last clinic visit. There has not been any increase in hyper-somnolence.      GERD:  The patient states her acid reflu

## 2017-11-01 ENCOUNTER — TELEPHONE (OUTPATIENT)
Dept: INTERNAL MEDICINE CLINIC | Facility: CLINIC | Age: 67
End: 2017-11-01

## 2017-11-01 DIAGNOSIS — E66.09 EXOGENOUS OBESITY: Primary | ICD-10-CM

## 2017-11-01 NOTE — TELEPHONE ENCOUNTER
Dr. Susanne Cotton,    Pt called requesting a referral for Dr. Mahesh Mcdaniel, needs follow up visits. Please advise and sign off on referral.      Thank you, Managed Care.

## 2017-12-01 ENCOUNTER — TELEPHONE (OUTPATIENT)
Dept: SURGERY | Facility: CLINIC | Age: 67
End: 2017-12-01

## 2017-12-01 NOTE — TELEPHONE ENCOUNTER
Patient has her 6month follow up on 12/04/17. Patient would like to know if she needs to have any lab work or imaging done prior to her appt. Please call.  Thank you

## 2017-12-01 NOTE — TELEPHONE ENCOUNTER
Phoned pt and spoke with her. Informed her it does not appear that any additional labs or imaging were ordered, for her fov appt. She is thankful and will keep appt as scheduled.

## 2017-12-04 ENCOUNTER — OFFICE VISIT (OUTPATIENT)
Dept: SURGERY | Facility: CLINIC | Age: 67
End: 2017-12-04

## 2017-12-04 ENCOUNTER — HOSPITAL ENCOUNTER (OUTPATIENT)
Dept: GENERAL RADIOLOGY | Facility: HOSPITAL | Age: 67
Discharge: HOME | End: 2017-12-04
Attending: UROLOGY
Payer: MEDICARE

## 2017-12-04 VITALS
SYSTOLIC BLOOD PRESSURE: 107 MMHG | WEIGHT: 200 LBS | BODY MASS INDEX: 31.39 KG/M2 | DIASTOLIC BLOOD PRESSURE: 69 MMHG | HEIGHT: 67 IN | TEMPERATURE: 98 F | HEART RATE: 59 BPM

## 2017-12-04 DIAGNOSIS — N20.0 KIDNEY STONES: Primary | ICD-10-CM

## 2017-12-04 DIAGNOSIS — N20.0 KIDNEY STONES: ICD-10-CM

## 2017-12-04 PROCEDURE — 99213 OFFICE O/P EST LOW 20 MIN: CPT | Performed by: UROLOGY

## 2017-12-04 PROCEDURE — 74020 XR ABDOMEN MINIMUM 2 VIEWS (CPT=74020): CPT | Performed by: UROLOGY

## 2017-12-04 PROCEDURE — G0463 HOSPITAL OUTPT CLINIC VISIT: HCPCS | Performed by: UROLOGY

## 2017-12-04 NOTE — PROGRESS NOTES
Griffin Machado is a 79year old female. HPI:   Patient presents with:  Kidney Problem: Follow up. Denies any flank pain and any voiding issues. 41-year-old female accompanied by her daughter in follow-up to a previous visit May 12, 2017.   She p total) by mouth daily. Disp: 90 capsule Rfl: 1   NYSTATIN 518856 UNIT/GM External Powder APPLY TOPICALLY TWO TIMES DAILY. Disp: 60 g Rfl: 1   SYMBICORT 160-4.5 MCG/ACT Inhalation Aerosol INHALE 2 PUFFS INTO THE LUNGS TWO TIMES DAILY.  Disp: 3 Inhaler Rfl: 1

## 2017-12-07 ENCOUNTER — TELEPHONE (OUTPATIENT)
Dept: OTHER | Age: 67
End: 2017-12-07

## 2017-12-13 ENCOUNTER — OFFICE VISIT (OUTPATIENT)
Dept: INTERNAL MEDICINE CLINIC | Facility: CLINIC | Age: 67
End: 2017-12-13

## 2017-12-13 VITALS
BODY MASS INDEX: 34.57 KG/M2 | SYSTOLIC BLOOD PRESSURE: 103 MMHG | HEART RATE: 68 BPM | HEIGHT: 64.5 IN | WEIGHT: 205 LBS | DIASTOLIC BLOOD PRESSURE: 61 MMHG | TEMPERATURE: 98 F

## 2017-12-13 DIAGNOSIS — J45.40 MODERATE PERSISTENT ASTHMA WITHOUT COMPLICATION: ICD-10-CM

## 2017-12-13 DIAGNOSIS — I10 ESSENTIAL HYPERTENSION: Primary | ICD-10-CM

## 2017-12-13 DIAGNOSIS — E78.5 HYPERLIPIDEMIA, UNSPECIFIED HYPERLIPIDEMIA TYPE: ICD-10-CM

## 2017-12-13 DIAGNOSIS — J44.9 ASTHMA WITH COPD (CHRONIC OBSTRUCTIVE PULMONARY DISEASE) (HCC): ICD-10-CM

## 2017-12-13 DIAGNOSIS — M17.0 PRIMARY OSTEOARTHRITIS OF BOTH KNEES: ICD-10-CM

## 2017-12-13 PROCEDURE — 99214 OFFICE O/P EST MOD 30 MIN: CPT | Performed by: INTERNAL MEDICINE

## 2017-12-13 PROCEDURE — G0463 HOSPITAL OUTPT CLINIC VISIT: HCPCS | Performed by: INTERNAL MEDICINE

## 2017-12-13 RX ORDER — HYDROCHLOROTHIAZIDE 12.5 MG/1
12.5 CAPSULE, GELATIN COATED ORAL DAILY
Qty: 90 CAPSULE | Refills: 3 | Status: SHIPPED | OUTPATIENT
Start: 2017-12-13 | End: 2018-05-05

## 2017-12-13 RX ORDER — OMEPRAZOLE 20 MG/1
CAPSULE, DELAYED RELEASE ORAL
Qty: 90 CAPSULE | Refills: 3 | Status: SHIPPED | OUTPATIENT
Start: 2017-12-13 | End: 2018-05-08

## 2017-12-13 NOTE — PROGRESS NOTES
HPI:    Patient ID: Ulices Reynoso is a 79year old female.     HPI    Follow up    HTN        Headache no  dizziness        no                             Blurred vision no  palpitaionsSyncope no  Chest pain  no  PND  Orthopnea no  Weakness  No  Low sal Prescriptions:  omeprazole 20 MG Oral Capsule Delayed Release TAKE 1 CAPSULE BY MOUTH   EVERY MORNING BEFORE BREAKFAST Disp: 90 capsule Rfl: 3   hydrochlorothiazide 12.5 MG Oral Cap Take 1 capsule (12.5 mg total) by mouth daily.  Disp: 90 capsule Rfl: 3   N Packs/day: 0.30      Years: 30.00        Types: Cigarettes     Quit date: 2/11/2004  Smokeless tobacco: Former User                     Alcohol use: Yes           0.0 oz/week     Comment: socially       PHYSICAL EXAM:    Physical Exam   Consti veins chronic  monitor    (J44.9) Asthma with COPD (chronic obstructive pulmonary disease) ([facility])  Plan: controlled CPM    (Z68.35) BMI 35.0-35.9,adult  Plan: low fat low chol wt loss diet    (J45.40) Moderate persistent asthma without complication  Plan: c

## 2017-12-29 ENCOUNTER — PATIENT OUTREACH (OUTPATIENT)
Dept: CASE MANAGEMENT | Age: 67
End: 2017-12-29

## 2017-12-29 NOTE — PROGRESS NOTES
Spoke to patient and stated she is interested in enrolling into the program.    Patient identified with a potential need for Chronic Care Management services. Called patient to introduce self and availability of Chronic Care Management services.   Patient

## 2018-01-02 NOTE — PROGRESS NOTES
Spoke with pt, scheduled her for Thursday 1/4/18 at 10 am for initial ccm outreach. Pt in agreement with this plan.

## 2018-01-04 ENCOUNTER — PATIENT OUTREACH (OUTPATIENT)
Dept: CASE MANAGEMENT | Age: 68
End: 2018-01-04

## 2018-01-04 DIAGNOSIS — M51.37 DEGENERATION OF LUMBAR OR LUMBOSACRAL INTERVERTEBRAL DISC: ICD-10-CM

## 2018-01-04 DIAGNOSIS — K21.9 GASTROESOPHAGEAL REFLUX DISEASE, ESOPHAGITIS PRESENCE NOT SPECIFIED: ICD-10-CM

## 2018-01-04 DIAGNOSIS — R53.82 CHRONIC FATIGUE: ICD-10-CM

## 2018-01-04 DIAGNOSIS — E66.9 OBESITY (BMI 30-39.9): ICD-10-CM

## 2018-01-04 DIAGNOSIS — M17.0 PRIMARY OSTEOARTHRITIS OF BOTH KNEES: ICD-10-CM

## 2018-01-04 DIAGNOSIS — J44.9 ASTHMA WITH COPD (CHRONIC OBSTRUCTIVE PULMONARY DISEASE) (HCC): Chronic | ICD-10-CM

## 2018-01-04 PROCEDURE — 99490 CHRNC CARE MGMT STAFF 1ST 20: CPT

## 2018-01-04 NOTE — PROGRESS NOTES
No answer for initial CCM outreach call. [id number]  name and number provided for further follow up.

## 2018-01-04 NOTE — PROGRESS NOTES
Spoke to Terry Escalona at [facility] about CCM, HIPAA verified, current care plan and performed CCM assessment.  Reviewed pt Patient Active Problem List:     Osteoarthritis of knee     Degeneration of lumbar or lumbosacral intervertebral disc     Age-related nuclear potential barriers are present that could prevent compliance with medication regimen. At this time, no barriers reported. Meggan Suarez reports is stable on all medications and denies experiencing any side effects. Care Team: Reviewed and updated.     Your pavithra drinks water with each meal.  Drinks tea and coffee. Care Plan: Reviewed and updated where needed  Sending education on: no needs at this time. Care :  Work on for next time: monitor and support  Resources needed: none at this time.

## 2018-01-10 ENCOUNTER — OFFICE VISIT (OUTPATIENT)
Dept: SURGERY | Facility: CLINIC | Age: 68
End: 2018-01-10

## 2018-01-10 VITALS
HEIGHT: 66 IN | SYSTOLIC BLOOD PRESSURE: 118 MMHG | HEART RATE: 76 BPM | RESPIRATION RATE: 16 BRPM | BODY MASS INDEX: 32.6 KG/M2 | WEIGHT: 202.88 LBS | DIASTOLIC BLOOD PRESSURE: 75 MMHG

## 2018-01-10 DIAGNOSIS — R53.82 CHRONIC FATIGUE: ICD-10-CM

## 2018-01-10 DIAGNOSIS — K21.9 GASTROESOPHAGEAL REFLUX DISEASE, ESOPHAGITIS PRESENCE NOT SPECIFIED: ICD-10-CM

## 2018-01-10 DIAGNOSIS — G47.33 OSA ON CPAP: Primary | ICD-10-CM

## 2018-01-10 DIAGNOSIS — E66.9 OBESITY (BMI 30-39.9): ICD-10-CM

## 2018-01-10 DIAGNOSIS — M17.0 PRIMARY OSTEOARTHRITIS OF BOTH KNEES: ICD-10-CM

## 2018-01-10 DIAGNOSIS — Z99.89 OSA ON CPAP: Primary | ICD-10-CM

## 2018-01-10 PROCEDURE — 99214 OFFICE O/P EST MOD 30 MIN: CPT | Performed by: INTERNAL MEDICINE

## 2018-01-10 NOTE — PROGRESS NOTES
[address], [facility]  [address] [facility] 91 [facility] [address]  Dept: [phone]     Date:   2017    Patient:  Joseph Jake  :      1950  MRN:      [id number]    Chief Complaint: WHEEZING. Disp: 54 g Rfl: 1   Acetaminophen-Codeine #3 300-30 MG Oral Tab Take 1 tablet by mouth every 4 (four) hours as needed for Pain.  Disp: 20 tablet Rfl: 0   FERROUS SULFATE CR OR  Disp:  Rfl:    Acetaminophen 500 MG Oral Cap  Disp:  Rfl:    REDNESS R following:  · Eats 3 meal(s) per day  · Length of time it takes to consume a meal:  20  · # of snacks per day: 1 Type of snacks:  fruit  · Amount of soda consumption per day:  occasional  · Amount of water (in ounces) per day:  64  · Drinking between meals stable since the last clinic visit. There has not been any increase in hyper-somnolence. GERD:  The patient states her acid reflux has been well controlled with her current medication. No symptoms of heartburn or indigestion.     Encounter Diagnosis(se

## 2018-01-18 RX ORDER — NYSTATIN 100000 [USP'U]/G
POWDER TOPICAL
Qty: 60 G | Refills: 1 | Status: SHIPPED | OUTPATIENT
Start: 2018-01-18 | End: 2021-06-23

## 2018-01-18 NOTE — TELEPHONE ENCOUNTER
LOV: 12/13/17  Last Rx: 6/15/17    No protocol     Please advise in regards to refill request. Thank You

## 2018-01-22 ENCOUNTER — LAB ENCOUNTER (OUTPATIENT)
Dept: LAB | Age: 68
End: 2018-01-22
Attending: INTERNAL MEDICINE
Payer: MEDICARE

## 2018-01-22 DIAGNOSIS — E78.5 HYPERLIPIDEMIA, UNSPECIFIED HYPERLIPIDEMIA TYPE: ICD-10-CM

## 2018-01-22 DIAGNOSIS — I10 ESSENTIAL HYPERTENSION: ICD-10-CM

## 2018-01-22 LAB
ALBUMIN SERPL BCP-MCNC: 3.9 G/DL (ref 3.5–4.8)
ALBUMIN/GLOB SERPL: 1.4 {RATIO} (ref 1–2)
ALP SERPL-CCNC: 64 U/L (ref 32–100)
ALT SERPL-CCNC: 12 U/L (ref 14–54)
ANION GAP SERPL CALC-SCNC: 11 MMOL/L (ref 0–18)
AST SERPL-CCNC: 21 U/L (ref 15–41)
BACTERIA UR QL AUTO: NEGATIVE /HPF
BILIRUB SERPL-MCNC: 0.8 MG/DL (ref 0.3–1.2)
BUN SERPL-MCNC: 15 MG/DL (ref 8–20)
BUN/CREAT SERPL: 20.8 (ref 10–20)
CALCIUM SERPL-MCNC: 9 MG/DL (ref 8.5–10.5)
CHLORIDE SERPL-SCNC: 104 MMOL/L (ref 95–110)
CHOLEST SERPL-MCNC: 164 MG/DL (ref 110–200)
CO2 SERPL-SCNC: 26 MMOL/L (ref 22–32)
CREAT SERPL-MCNC: 0.72 MG/DL (ref 0.5–1.5)
ERYTHROCYTE [DISTWIDTH] IN BLOOD BY AUTOMATED COUNT: 15 % (ref 11–15)
GLOBULIN PLAS-MCNC: 2.7 G/DL (ref 2.5–3.7)
GLUCOSE SERPL-MCNC: 82 MG/DL (ref 70–99)
HCT VFR BLD AUTO: 36.6 % (ref 35–48)
HDLC SERPL-MCNC: 77 MG/DL
HGB BLD-MCNC: 12.3 G/DL (ref 12–16)
LDLC SERPL CALC-MCNC: 78 MG/DL (ref 0–99)
MCH RBC QN AUTO: 30.4 PG (ref 27–32)
MCHC RBC AUTO-ENTMCNC: 33.6 G/DL (ref 32–37)
MCV RBC AUTO: 90.5 FL (ref 80–100)
NONHDLC SERPL-MCNC: 87 MG/DL
OSMOLALITY UR CALC.SUM OF ELEC: 292 MOSM/KG (ref 275–295)
PLATELET # BLD AUTO: 136 K/UL (ref 140–400)
PMV BLD AUTO: 9.8 FL (ref 7.4–10.3)
POTASSIUM SERPL-SCNC: 3.4 MMOL/L (ref 3.3–5.1)
PROT SERPL-MCNC: 6.6 G/DL (ref 5.9–8.4)
RBC # BLD AUTO: 4.04 M/UL (ref 3.7–5.4)
RBC #/AREA URNS AUTO: 2 /HPF
SODIUM SERPL-SCNC: 141 MMOL/L (ref 136–144)
T4 FREE SERPL-MCNC: 0.82 NG/DL (ref 0.58–1.64)
TRIGL SERPL-MCNC: 45 MG/DL (ref 1–149)
TSH SERPL-ACNC: 1.57 UIU/ML (ref 0.45–5.33)
WBC # BLD AUTO: 4.3 K/UL (ref 4–11)
WBC #/AREA URNS AUTO: 16 /HPF

## 2018-01-22 PROCEDURE — 80061 LIPID PANEL: CPT

## 2018-01-22 PROCEDURE — 87086 URINE CULTURE/COLONY COUNT: CPT

## 2018-01-22 PROCEDURE — 85027 COMPLETE CBC AUTOMATED: CPT

## 2018-01-22 PROCEDURE — 36415 COLL VENOUS BLD VENIPUNCTURE: CPT

## 2018-01-22 PROCEDURE — 80053 COMPREHEN METABOLIC PANEL: CPT

## 2018-01-22 PROCEDURE — 84443 ASSAY THYROID STIM HORMONE: CPT

## 2018-01-22 PROCEDURE — 81015 MICROSCOPIC EXAM OF URINE: CPT

## 2018-01-22 PROCEDURE — 84439 ASSAY OF FREE THYROXINE: CPT

## 2018-01-29 ENCOUNTER — OFFICE VISIT (OUTPATIENT)
Dept: OBGYN CLINIC | Facility: CLINIC | Age: 68
End: 2018-01-29

## 2018-01-29 VITALS
HEART RATE: 62 BPM | BODY MASS INDEX: 33 KG/M2 | SYSTOLIC BLOOD PRESSURE: 113 MMHG | DIASTOLIC BLOOD PRESSURE: 68 MMHG | WEIGHT: 203 LBS

## 2018-01-29 DIAGNOSIS — Z12.31 ENCOUNTER FOR SCREENING MAMMOGRAM FOR MALIGNANT NEOPLASM OF BREAST: ICD-10-CM

## 2018-01-29 DIAGNOSIS — Z01.419 WELL WOMAN EXAM WITH ROUTINE GYNECOLOGICAL EXAM: Primary | ICD-10-CM

## 2018-01-29 PROCEDURE — G0101 CA SCREEN;PELVIC/BREAST EXAM: HCPCS | Performed by: CLINICAL NURSE SPECIALIST

## 2018-01-29 PROCEDURE — 99387 INIT PM E/M NEW PAT 65+ YRS: CPT | Performed by: CLINICAL NURSE SPECIALIST

## 2018-01-29 NOTE — PROGRESS NOTES
Qasim Forrest is a 79year old female  No LMP recorded. Patient is not currently having periods (Reason: Menopause). Patient presents with:  Gyn Exam: NP, Annual.  New patient.  Unsure of when last annual exam. Does not think she has had a pap since History Narrative    Live with daughter    Work retired    -     -     No pets no    H/o birds > 10 yrs       FAMILY HISTORY:  Family History   Problem Relation Age of Onset   • Diabetes Mother 58   • Cancer Mother      unknown type   • heartburn, abdominal pain, diarrhea or constipation  Genitourinary:  denies dysuria, incontinence, abnormal vaginal discharge, vaginal itching  Musculoskeletal:  denies back pain. Skin/Breast:  Denies any breast pain, lumps, or discharge.    Neurological: any vaginal bleeding. Encouraged 1500 mg calcium w/ vit D. Encouraged weight bearing exercise. Follow-up in one year.

## 2018-02-01 ENCOUNTER — PATIENT OUTREACH (OUTPATIENT)
Dept: CASE MANAGEMENT | Age: 68
End: 2018-02-01

## 2018-02-01 DIAGNOSIS — M15.9 OSTEOARTHRITIS OF MULTIPLE JOINTS, UNSPECIFIED OSTEOARTHRITIS TYPE: ICD-10-CM

## 2018-02-01 DIAGNOSIS — G47.33 OSA ON CPAP: ICD-10-CM

## 2018-02-01 DIAGNOSIS — E66.9 OBESITY (BMI 30-39.9): ICD-10-CM

## 2018-02-01 DIAGNOSIS — J44.9 ASTHMA WITH COPD (CHRONIC OBSTRUCTIVE PULMONARY DISEASE) (HCC): Chronic | ICD-10-CM

## 2018-02-01 DIAGNOSIS — M17.10 OSTEOARTHRITIS OF KNEE, UNSPECIFIED LATERALITY, UNSPECIFIED OSTEOARTHRITIS TYPE: ICD-10-CM

## 2018-02-01 DIAGNOSIS — Z99.89 OSA ON CPAP: ICD-10-CM

## 2018-02-01 PROCEDURE — 99490 CHRNC CARE MGMT STAFF 1ST 20: CPT

## 2018-02-01 NOTE — PROGRESS NOTES
Spoke to Rosaura Berumen at [facility] about CCM, HIPAA verified, current care plan and performed CCM assessment.  Reviewed pt Patient Active Problem List:     Osteoarthritis of knee     Degeneration of lumbar or lumbosacral intervertebral disc     Age-related nuclear (Xuan)        [facility], [address], [address]  [address] [place] [address] [address] [address]  [address], [address] [facility]

## 2018-03-01 ENCOUNTER — PATIENT OUTREACH (OUTPATIENT)
Dept: CASE MANAGEMENT | Age: 68
End: 2018-03-01

## 2018-03-12 ENCOUNTER — HOSPITAL ENCOUNTER (OUTPATIENT)
Dept: GENERAL RADIOLOGY | Age: 68
Discharge: HOME | End: 2018-03-12
Attending: INTERNAL MEDICINE | Admitting: INTERNAL MEDICINE
Payer: MEDICARE

## 2018-03-12 ENCOUNTER — OFFICE VISIT (OUTPATIENT)
Dept: INTERNAL MEDICINE CLINIC | Facility: CLINIC | Age: 68
End: 2018-03-12

## 2018-03-12 VITALS
BODY MASS INDEX: 30.53 KG/M2 | SYSTOLIC BLOOD PRESSURE: 105 MMHG | DIASTOLIC BLOOD PRESSURE: 65 MMHG | TEMPERATURE: 98 F | HEART RATE: 77 BPM | HEIGHT: 66 IN | WEIGHT: 190 LBS

## 2018-03-12 DIAGNOSIS — I10 ESSENTIAL HYPERTENSION: ICD-10-CM

## 2018-03-12 DIAGNOSIS — E78.5 HYPERLIPIDEMIA, UNSPECIFIED HYPERLIPIDEMIA TYPE: ICD-10-CM

## 2018-03-12 DIAGNOSIS — J45.41 MODERATE PERSISTENT ASTHMA WITH EXACERBATION: ICD-10-CM

## 2018-03-12 DIAGNOSIS — J45.41 MODERATE PERSISTENT ASTHMA WITH EXACERBATION: Primary | ICD-10-CM

## 2018-03-12 DIAGNOSIS — R60.0 LOWER EXTREMITY EDEMA: ICD-10-CM

## 2018-03-12 DIAGNOSIS — J44.9 ASTHMA WITH COPD (CHRONIC OBSTRUCTIVE PULMONARY DISEASE) (HCC): ICD-10-CM

## 2018-03-12 DIAGNOSIS — M15.9 OSTEOARTHRITIS OF MULTIPLE JOINTS, UNSPECIFIED OSTEOARTHRITIS TYPE: ICD-10-CM

## 2018-03-12 DIAGNOSIS — D69.6 THROMBOCYTOPENIA (HCC): ICD-10-CM

## 2018-03-12 PROCEDURE — G0463 HOSPITAL OUTPT CLINIC VISIT: HCPCS | Performed by: INTERNAL MEDICINE

## 2018-03-12 PROCEDURE — 71046 X-RAY EXAM CHEST 2 VIEWS: CPT | Performed by: INTERNAL MEDICINE

## 2018-03-12 PROCEDURE — 99214 OFFICE O/P EST MOD 30 MIN: CPT | Performed by: INTERNAL MEDICINE

## 2018-03-12 RX ORDER — METHYLPREDNISOLONE 4 MG/1
TABLET ORAL
Qty: 1 KIT | Refills: 0 | Status: SHIPPED | OUTPATIENT
Start: 2018-03-12 | End: 2018-04-11

## 2018-03-12 RX ORDER — AZITHROMYCIN 250 MG/1
TABLET, FILM COATED ORAL
Qty: 6 TABLET | Refills: 0 | Status: SHIPPED | OUTPATIENT
Start: 2018-03-12 | End: 2018-04-11

## 2018-03-12 NOTE — PROGRESS NOTES
HPI:    Patient ID: Bonita Rae is a 79year old female.     HPI    Cough fever chills for 2 wks    Using rescue inhaler every 4 hours    /65 (BP Location: Right arm, Patient Position: Sitting, Cuff Size: large)   Pulse 77   Temp 98.4 °F (36.9 ° needed for Pain.  Disp: 20 tablet Rfl: 0   FERROUS SULFATE CR OR  Disp:  Rfl:    Acetaminophen 500 MG Oral Cap  Disp:  Rfl:    REDNESS RELIEVER EYE DROPS 0.05 % Ophthalmic Solution  Disp:  Rfl:    Multiple Vitamins-Minerals (WOMENS MULTIVITAMIN PLUS OR)  Soila Patterson present. Cardiovascular: Normal rate, regular rhythm, normal heart sounds and intact distal pulses. Exam reveals no gallop. No murmur heard. Pulmonary/Chest: Effort normal and breath sounds normal. No respiratory distress. She has no wheezes.  She ha

## 2018-03-19 ENCOUNTER — PATIENT OUTREACH (OUTPATIENT)
Dept: CASE MANAGEMENT | Age: 68
End: 2018-03-19

## 2018-03-19 DIAGNOSIS — M17.10 OSTEOARTHRITIS OF KNEE, UNSPECIFIED LATERALITY, UNSPECIFIED OSTEOARTHRITIS TYPE: ICD-10-CM

## 2018-03-19 DIAGNOSIS — M15.9 OSTEOARTHRITIS OF MULTIPLE JOINTS, UNSPECIFIED OSTEOARTHRITIS TYPE: ICD-10-CM

## 2018-03-19 DIAGNOSIS — M51.37 DEGENERATION OF LUMBAR OR LUMBOSACRAL INTERVERTEBRAL DISC: ICD-10-CM

## 2018-03-19 DIAGNOSIS — E66.9 OBESITY (BMI 30-39.9): ICD-10-CM

## 2018-03-19 DIAGNOSIS — J44.9 ASTHMA WITH COPD (CHRONIC OBSTRUCTIVE PULMONARY DISEASE) (HCC): Chronic | ICD-10-CM

## 2018-03-19 PROCEDURE — 99490 CHRNC CARE MGMT STAFF 1ST 20: CPT

## 2018-03-19 NOTE — PROGRESS NOTES
Spoke to Ami Gagnon for CCM call.     Medical History  Patient Active Problem List:     Osteoarthritis of knee     Degeneration of lumbar or lumbosacral intervertebral disc     Age-related nuclear cataract of both eyes     Asthma with COPD (chr continues with program from Dr. Marvin Hoskins. Lost appetite when she was sick recently. Has lost 13# so far. Has appt with Dr. Marvin Hoksins on 3/21/18. Activity was down with URI. · Back Pain- has it on and off but nothing with regularity.   Having issues with l

## 2018-03-19 NOTE — PROGRESS NOTES
Rec'd vmm from pt requesting call back for monthly cmm outreach. Time Spent This Encounter Total: 1 min medical record review, telephone,  communication.        Monthly Minute Total including today: 8

## 2018-03-21 ENCOUNTER — TELEPHONE (OUTPATIENT)
Dept: INTERNAL MEDICINE CLINIC | Facility: CLINIC | Age: 68
End: 2018-03-21

## 2018-03-21 ENCOUNTER — OFFICE VISIT (OUTPATIENT)
Dept: SURGERY | Facility: CLINIC | Age: 68
End: 2018-03-21

## 2018-03-21 VITALS
HEIGHT: 66 IN | BODY MASS INDEX: 31.02 KG/M2 | RESPIRATION RATE: 16 BRPM | OXYGEN SATURATION: 98 % | WEIGHT: 193 LBS | SYSTOLIC BLOOD PRESSURE: 118 MMHG | HEART RATE: 56 BPM | DIASTOLIC BLOOD PRESSURE: 71 MMHG

## 2018-03-21 DIAGNOSIS — M15.9 OSTEOARTHRITIS OF MULTIPLE JOINTS, UNSPECIFIED OSTEOARTHRITIS TYPE: ICD-10-CM

## 2018-03-21 DIAGNOSIS — E66.9 OBESITY (BMI 30-39.9): ICD-10-CM

## 2018-03-21 DIAGNOSIS — R53.82 CHRONIC FATIGUE: ICD-10-CM

## 2018-03-21 DIAGNOSIS — G47.33 OSA ON CPAP: Primary | ICD-10-CM

## 2018-03-21 DIAGNOSIS — K21.9 GASTROESOPHAGEAL REFLUX DISEASE, ESOPHAGITIS PRESENCE NOT SPECIFIED: ICD-10-CM

## 2018-03-21 DIAGNOSIS — E66.09 EXOGENOUS OBESITY: Primary | ICD-10-CM

## 2018-03-21 DIAGNOSIS — Z99.89 OSA ON CPAP: Primary | ICD-10-CM

## 2018-03-21 PROCEDURE — 99214 OFFICE O/P EST MOD 30 MIN: CPT | Performed by: INTERNAL MEDICINE

## 2018-03-21 NOTE — PROGRESS NOTES
[address], [facility]  [address] [facility] 91 [facility] [address]  Dept: [phone]     Date:   2017    Patient:  Juanito West  :      1950  MRN:      [id number]    Chief Complaint: Inhalation Aerosol INHALE 2 PUFFS INTO THE LUNGS TWO TIMES DAILY.  Disp: 3 Inhaler Rfl: 11   Ascorbic Acid (VITAMIN C) 1000 MG Oral Tab  Disp:  Rfl:    VENTOLIN  (90 Base) MCG/ACT Inhalation Aero Soln INHALE 2 PUFFS INTO THE LUNGS EVERY 4 (FOUR) HOUR Discussed:       · Patient has a Food Journal?: yes   · Patient is reading nutrition labels? yes  · Average Caloric Intake:     · Average CHO Intake: 130  · Is patient exercising? yes  · Type of exercise?  Walk for  45 Minutes    Eating Habits  · Patient s sounds normal; no masses,  no organomegaly  Extremities: edema limited movement in knees  Pulses: 2+ and symmetric  Skin: Skin color, texture, turgor normal. No rashes or lesions    ASSESSMENT     OBSTRUCTIVE SLEEP APNEA: The patient states her sleep apnea

## 2018-03-21 NOTE — TELEPHONE ENCOUNTER
Dr. Mikie Grace,    Pt called requesting a referral to be seen by Dr. Festus Garcia. Please advise and sign off on referral if you agree. Thank you, Managed Care.

## 2018-04-11 ENCOUNTER — OFFICE VISIT (OUTPATIENT)
Dept: INTERNAL MEDICINE CLINIC | Facility: CLINIC | Age: 68
End: 2018-04-11

## 2018-04-11 VITALS
DIASTOLIC BLOOD PRESSURE: 65 MMHG | HEART RATE: 70 BPM | BODY MASS INDEX: 32.3 KG/M2 | HEIGHT: 66 IN | WEIGHT: 201 LBS | TEMPERATURE: 98 F | SYSTOLIC BLOOD PRESSURE: 102 MMHG

## 2018-04-11 DIAGNOSIS — J44.9 ASTHMA WITH COPD (CHRONIC OBSTRUCTIVE PULMONARY DISEASE) (HCC): Chronic | ICD-10-CM

## 2018-04-11 DIAGNOSIS — M51.37 DEGENERATION OF LUMBAR OR LUMBOSACRAL INTERVERTEBRAL DISC: ICD-10-CM

## 2018-04-11 DIAGNOSIS — E66.9 OBESITY (BMI 30-39.9): ICD-10-CM

## 2018-04-11 DIAGNOSIS — H25.13 AGE-RELATED NUCLEAR CATARACT OF BOTH EYES: ICD-10-CM

## 2018-04-11 DIAGNOSIS — K21.9 GASTROESOPHAGEAL REFLUX DISEASE, ESOPHAGITIS PRESENCE NOT SPECIFIED: ICD-10-CM

## 2018-04-11 DIAGNOSIS — Z00.00 ENCOUNTER FOR ANNUAL HEALTH EXAMINATION: Primary | ICD-10-CM

## 2018-04-11 DIAGNOSIS — M17.10 OSTEOARTHRITIS OF KNEE, UNSPECIFIED LATERALITY, UNSPECIFIED OSTEOARTHRITIS TYPE: ICD-10-CM

## 2018-04-11 PROCEDURE — G0439 PPPS, SUBSEQ VISIT: HCPCS | Performed by: INTERNAL MEDICINE

## 2018-04-11 NOTE — PATIENT INSTRUCTIONS
[address] SCREENING SCHEDULE   Tests on this list are recommended by your physician but may not be covered, or covered at this frequency, by your insurer. Please check with your insurance carrier before scheduling to verify coverage.    Court Vallejo in their lifetime   • Anyone with a family history    Colorectal Cancer Screening  Covered up to Age 76     Colonoscopy Screen   Covered every 10 years- more often if abnormal Colonoscopy,5 Years due on 10/10/2019 Update [facility] if applicable Please get every year    Pneumococcal 13 (Prevnar)  Covered Once after 65   Orders placed or performed in visit on 12/19/16  -PNEUMOCOCCAL VACC, 13 DIMITRIS IM    Please get once after your 65th birthday    Pneumococcal 23 (Pneumovax)  Covered Once after 72   O physician but may not be covered, or covered at this frequency, by your insurer. Please check with your insurance carrier before scheduling to verify coverage.    PREVENTATIVE SERVICES  INDICATIONS AND SCHEDULE Internal Lab or Procedure External Lab or Proc Covered up to Age 76     Colonoscopy Screen   Covered every 10 years- more often if abnormal Colonoscopy,5 Years due on 10/10/2019 Update Health Maintenance if applicable    Flex Sigmoidoscopy Screen  Covered every 5 years No results found for this or any placed or performed in visit on 12/19/16  -PNEUMOCOCCAL VACC, 13 DIMITRIS IM    Please get once after your 65th birthday    Pneumococcal 23 (Pneumovax)  Covered Once after 65   Orders placed or performed in visit on 11/09/15  -PNEUMOCOCCAL IMMUNIZATION    Pleas

## 2018-04-11 NOTE — PROGRESS NOTES
HPI:    Patient ID: Radha Kohler is a 79year old female.     HPI      Middle tow on the left   Pain  Felt like there was glass under her toe  Skin was sensitive  This am  Woke up with it now it is better  Paiin lasted a hour    [employer] History:   Diagnosis Date   • Anemia     FeSo4   • Asthma    • Colon polyp    • DVT of lower extremity (deep venous thrombosis) ([facility] [address]     post delivery of last child 33 years ago   • Esophageal reflux    • Fracture 12/2016    KNEE NO SURGERY   • Hyperlipid

## 2018-04-19 ENCOUNTER — PATIENT OUTREACH (OUTPATIENT)
Dept: CASE MANAGEMENT | Age: 68
End: 2018-04-19

## 2018-04-19 DIAGNOSIS — Z99.89 OSA ON CPAP: ICD-10-CM

## 2018-04-19 DIAGNOSIS — G47.33 OSA ON CPAP: ICD-10-CM

## 2018-04-19 DIAGNOSIS — J44.9 ASTHMA WITH COPD (CHRONIC OBSTRUCTIVE PULMONARY DISEASE) (HCC): Chronic | ICD-10-CM

## 2018-04-19 DIAGNOSIS — M51.37 DEGENERATION OF LUMBAR OR LUMBOSACRAL INTERVERTEBRAL DISC: ICD-10-CM

## 2018-04-19 DIAGNOSIS — E66.9 OBESITY (BMI 30-39.9): ICD-10-CM

## 2018-04-19 DIAGNOSIS — M15.9 OSTEOARTHRITIS OF MULTIPLE JOINTS, UNSPECIFIED OSTEOARTHRITIS TYPE: ICD-10-CM

## 2018-04-19 PROCEDURE — 99490 CHRNC CARE MGMT STAFF 1ST 20: CPT

## 2018-04-19 NOTE — PROGRESS NOTES
Chart reviewed. Call to patient. She is busy right now but agrees to speak with CCMN. Call dropped. Time Spent This Encounter Total: 6 min medical record review, telephone,  communication.      Monthly Minute Total including today: 6

## 2018-04-19 NOTE — PROGRESS NOTES
Call back to patient. Scheduled time to speak with her on 4/20/18. Will call her around 9 am.  She is in agreement with this plan. Time Spent This Encounter Total: 2 min medical record review, telephone,  communication.        Monthly Minute Total incl

## 2018-04-20 NOTE — PROGRESS NOTES
Spoke to Ami Montes for CCM call.     Medical History  Patient Active Problem List:     Osteoarthritis of knee     Degeneration of lumbar or lumbosacral intervertebral disc     Age-related nuclear cataract of both eyes     Asthma with COPD (chr very confident                   confidence: 4                    Care Manager Follow Up: 1 month    Reason For Follow Up: review progress and or barriers towards patients goals.      Care managers interventions: monitor and support    Time Spent This Encou

## 2018-04-23 NOTE — PROGRESS NOTES
HPI:   Quang Armenta is a 79year old female who presents for a Medicare Subsequent Annual Wellness visit (Pt already had Initial Annual Wellness).       Annual Physical due on 01/29/2019        Fall/Risk Assessment   She has been screened for Falls and directives standard forms performed Face to Face with patient and Family/surrogate (if present), and forms available to patient in AVS       She does NOT have a Power of  for [employer] on file in [address] [place] [address] Rd.    Advance care planning including the explan 0.72 01/22/2018   GLU 82 01/22/2018        CBC  (most recent labs)     Lab Results  Component Value Date   WBC 4.3 01/22/2018   HGB 12.3 01/22/2018    (L) 01/22/2018        ALLERGIES:   She has No Known Allergies.     CURRENT MEDICATIONS:     [address] included Cigarettes. She has a 9.00 pack-year smoking history. She has quit using smokeless tobacco. She reports that she drinks alcohol. She reports that she does not use drugs.      REVIEW OF SYSTEMS:   Review of Systems   Constitutional: Negative for act about missing the telephone ring or doorbell:  No I have trouble hearing conversations in a noisy background such as a crowded room or restaurant:  Yes   I get confused about where sounds come from:  No I misunderstand some words in a sentence and need to alert. Gait normal.   Skin: Skin is warm and dry. No lesion and no rash noted. She is not diaphoretic.    Psychiatric: Her behavior is normal. Thought content normal.        Vaccination History     Immunization History   Administered Date(s) Administered and lifestyle change discussed. Modification of risk for CAD discussed. Patient voiced understanding and agrees with current plan and management. Dajuan marie           Diet assessment: good     PLAN:  The patient indicates understanding of these issues and a Visit Annually: Diabetics, FHx Glaucoma, AA>50, > 65 No flowsheet data found. Bone Density Screening      Dexascan Every two years Last Dexa Scan:   XR DEXA BONE DENSITOMETRY (CPT=77080) 07/24/2015    No flowsheet data found.     Pap and Pelvic Potassium (mmol/L)   Date Value   01/22/2018 3.4     POTASSIUM (P) (mmol/L)   Date Value   09/16/2016 3.8    No flowsheet data found.     Creatinine  Annually Creatinine (mg/dL)   Date Value   01/22/2018 0.72     CREATININE (P) (mg/dL)   Date Value   09/16/

## 2018-05-07 ENCOUNTER — NURSE TRIAGE (OUTPATIENT)
Dept: OTHER | Age: 68
End: 2018-05-07

## 2018-05-07 ENCOUNTER — TELEPHONE (OUTPATIENT)
Dept: INTERNAL MEDICINE CLINIC | Facility: CLINIC | Age: 68
End: 2018-05-07

## 2018-05-07 DIAGNOSIS — R39.9 URINARY SYMPTOM OR SIGN: ICD-10-CM

## 2018-05-07 DIAGNOSIS — Z01.00 EYE EXAM, ROUTINE: Primary | ICD-10-CM

## 2018-05-07 NOTE — TELEPHONE ENCOUNTER
Action Requested: Summary for Provider     []  Critical Lab, Recommendations Needed  [] Need Additional Advice  [x]   FYI    []   Need Orders  [] Need Medications Sent to Pharmacy  []  Other     SUMMARY: Pt c/o intermittent lower back pain that radiates to

## 2018-05-07 NOTE — TELEPHONE ENCOUNTER
HI Dr. Ck Arredondo,     Patient called to get two referrals, one for Dr. Gerson Parker and there other for Dr. Amor Carlos. Please enter DX ans sign referrals if you agree.      Thank you,   [address]

## 2018-05-07 NOTE — TELEPHONE ENCOUNTER
Pt stts she has one pill left   Pt stts this RX should have been requested as well   Please advise         Current Outpatient Prescriptions:  omeprazole 20 MG Oral Capsule Delayed Release TAKE 1 CAPSULE BY MOUTH   EVERY MORNING BEFORE BREAKFAST Disp: 90 ca

## 2018-05-08 ENCOUNTER — OFFICE VISIT (OUTPATIENT)
Dept: INTERNAL MEDICINE CLINIC | Facility: CLINIC | Age: 68
End: 2018-05-08

## 2018-05-08 VITALS
BODY MASS INDEX: 31.02 KG/M2 | TEMPERATURE: 98 F | HEART RATE: 61 BPM | WEIGHT: 193 LBS | DIASTOLIC BLOOD PRESSURE: 65 MMHG | HEIGHT: 66 IN | SYSTOLIC BLOOD PRESSURE: 115 MMHG

## 2018-05-08 DIAGNOSIS — G89.29 CHRONIC BILATERAL LOW BACK PAIN WITHOUT SCIATICA: ICD-10-CM

## 2018-05-08 DIAGNOSIS — M54.50 CHRONIC BILATERAL LOW BACK PAIN WITHOUT SCIATICA: ICD-10-CM

## 2018-05-08 DIAGNOSIS — M17.0 PRIMARY OSTEOARTHRITIS OF BOTH KNEES: Primary | ICD-10-CM

## 2018-05-08 PROCEDURE — 99212 OFFICE O/P EST SF 10 MIN: CPT | Performed by: INTERNAL MEDICINE

## 2018-05-08 PROCEDURE — 99214 OFFICE O/P EST MOD 30 MIN: CPT | Performed by: INTERNAL MEDICINE

## 2018-05-08 RX ORDER — HYDROCHLOROTHIAZIDE 12.5 MG/1
12.5 CAPSULE, GELATIN COATED ORAL DAILY
Qty: 90 CAPSULE | Refills: 0 | Status: SHIPPED | OUTPATIENT
Start: 2018-05-08 | End: 2018-07-25

## 2018-05-08 RX ORDER — OMEPRAZOLE 20 MG/1
CAPSULE, DELAYED RELEASE ORAL
Qty: 90 CAPSULE | Refills: 0 | Status: SHIPPED | OUTPATIENT
Start: 2018-05-08

## 2018-05-08 NOTE — TELEPHONE ENCOUNTER
Hypertensive Medications  Protocol Criteria:  · Appointment scheduled in the past 6 months or in the next 3 months  · BMP or CMP in the past 12 months  · Creatinine result < 2  Recent Outpatient Visits            3 weeks ago Encounter for annual health exa Hypertensive Medications Protocol Passed5/7 11:39 AM   CMP or BMP in past 12 months    Serum Creatinine < 2.0    Appointment in past 6 or next 3 months       Pending Prescriptions Disp Refills    HYDROCHLOROTHIAZIDE 12.5 MG Oral Cap [Pharmacy Med Name: HYD

## 2018-05-15 ENCOUNTER — PATIENT OUTREACH (OUTPATIENT)
Dept: CASE MANAGEMENT | Age: 68
End: 2018-05-15

## 2018-05-15 DIAGNOSIS — J44.9 ASTHMA WITH COPD (CHRONIC OBSTRUCTIVE PULMONARY DISEASE) (HCC): Chronic | ICD-10-CM

## 2018-05-15 DIAGNOSIS — M51.37 DEGENERATION OF LUMBAR OR LUMBOSACRAL INTERVERTEBRAL DISC: ICD-10-CM

## 2018-05-15 DIAGNOSIS — M15.9 OSTEOARTHRITIS OF MULTIPLE JOINTS, UNSPECIFIED OSTEOARTHRITIS TYPE: ICD-10-CM

## 2018-05-15 DIAGNOSIS — E66.9 OBESITY (BMI 30-39.9): ICD-10-CM

## 2018-05-15 DIAGNOSIS — G47.33 OSA ON CPAP: ICD-10-CM

## 2018-05-15 DIAGNOSIS — Z99.89 OSA ON CPAP: ICD-10-CM

## 2018-05-15 PROCEDURE — 99490 CHRNC CARE MGMT STAFF 1ST 20: CPT

## 2018-05-15 NOTE — PROGRESS NOTES
Spoke to Ami Walters verified for CCM call.     Medical History  Patient Active Problem List:     Osteoarthritis of knee     Degeneration of lumbar or lumbosacral intervertebral disc     Age-related nuclear cataract of both eyes     Asthma with COPD (chr plan.  Self-Management Abilities (patient reported)             (1) least confident in achieving goal to (5) very confident                   confidence: 4            Care Manager Follow Up: 1 month    Reason For Follow Up: review progress and or barriers

## 2018-05-15 NOTE — PROGRESS NOTES
Chart reviewed. Call to patient, has company over right now, Requests call back in an hour or so. Time Spent This Encounter Total: 6 min medical record review, telephone,  communication.        Monthly Minute Total including today: 6

## 2018-05-16 PROBLEM — R53.83 FATIGUE: Status: RESOLVED | Noted: 2017-05-24 | Resolved: 2018-05-16

## 2018-05-21 NOTE — PROGRESS NOTES
HPI:    Patient ID: Maximino Bryson is a 79year old female.     HPI   Same complaint low back pain  Currently  Patient complain mor of knee  Pain   presistnet  Low back is sore  But knee hurts her more    Chronic  Was seen by ortho in the past   And rayne VENTOLIN  (90 Base) MCG/ACT Inhalation Aero Soln INHALE 2 PUFFS INTO THE LUNGS EVERY 4 (FOUR) HOURS AS NEEDED FOR WHEEZING.  Disp: 54 g Rfl: 1   Acetaminophen-Codeine #3 300-30 MG Oral Tab Take 1 tablet by mouth every 4 (four) hours as needed for P Alcohol use: Yes           0.0 oz/week     Comment: socially       PHYSICAL EXAM:    Physical Exam   Constitutional: She appears well-developed. Cardiovascular: Normal rate and regular rhythm.     Pulmonary/Chest: Effort normal and breath sounds no

## 2018-05-24 ENCOUNTER — OFFICE VISIT (OUTPATIENT)
Dept: SURGERY | Facility: CLINIC | Age: 68
End: 2018-05-24

## 2018-05-24 VITALS
HEIGHT: 66 IN | HEART RATE: 65 BPM | DIASTOLIC BLOOD PRESSURE: 62 MMHG | RESPIRATION RATE: 16 BRPM | BODY MASS INDEX: 31.44 KG/M2 | WEIGHT: 195.63 LBS | OXYGEN SATURATION: 94 % | SYSTOLIC BLOOD PRESSURE: 107 MMHG

## 2018-05-24 DIAGNOSIS — E66.9 OBESITY (BMI 30-39.9): ICD-10-CM

## 2018-05-24 DIAGNOSIS — K21.9 GASTROESOPHAGEAL REFLUX DISEASE, ESOPHAGITIS PRESENCE NOT SPECIFIED: Primary | ICD-10-CM

## 2018-05-24 DIAGNOSIS — R63.5 WEIGHT GAIN: ICD-10-CM

## 2018-05-24 DIAGNOSIS — M15.9 OSTEOARTHRITIS OF MULTIPLE JOINTS, UNSPECIFIED OSTEOARTHRITIS TYPE: ICD-10-CM

## 2018-05-24 PROCEDURE — 99214 OFFICE O/P EST MOD 30 MIN: CPT | Performed by: INTERNAL MEDICINE

## 2018-05-24 NOTE — PROGRESS NOTES
[address], [address] [facility] [address]  Dept: [phone]     Date:   2017    Patient:  Alexandra Jenkins  :      1950  MRN:      [id number]    Chief Complaint: Tab  Disp:  Rfl:    VENTOLIN  (90 Base) MCG/ACT Inhalation Aero Soln INHALE 2 PUFFS INTO THE LUNGS EVERY 4 (FOUR) HOURS AS NEEDED FOR WHEEZING.  Disp: 54 g Rfl: 1   Acetaminophen-Codeine #3 300-30 MG Oral Tab Take 1 tablet by mouth every 4 (four) susanne CHO Intake: 130  · Is patient exercising? yes  · Type of exercise?  Walk for  45 Minutes    Eating Habits  · Patient states the following:  · Eats 3 meal(s) per day  · Length of time it takes to consume a meal:  20  · # of snacks per day: 1 Type of snacks: texture, turgor normal. No rashes or lesions    ASSESSMENT     OBSTRUCTIVE SLEEP APNEA: The patient states her sleep apnea has been stable since the last clinic visit. There has not been any increase in hyper-somnolence.      GERD:  The patient states her a

## 2018-06-13 ENCOUNTER — PATIENT OUTREACH (OUTPATIENT)
Dept: CASE MANAGEMENT | Age: 68
End: 2018-06-13

## 2018-06-13 DIAGNOSIS — M17.10 OSTEOARTHRITIS OF KNEE, UNSPECIFIED LATERALITY, UNSPECIFIED OSTEOARTHRITIS TYPE: ICD-10-CM

## 2018-06-13 DIAGNOSIS — J44.9 ASTHMA WITH COPD (CHRONIC OBSTRUCTIVE PULMONARY DISEASE) (HCC): Chronic | ICD-10-CM

## 2018-06-13 DIAGNOSIS — M51.37 DEGENERATION OF LUMBAR OR LUMBOSACRAL INTERVERTEBRAL DISC: ICD-10-CM

## 2018-06-13 DIAGNOSIS — K21.9 GASTROESOPHAGEAL REFLUX DISEASE, ESOPHAGITIS PRESENCE NOT SPECIFIED: ICD-10-CM

## 2018-06-13 DIAGNOSIS — E66.9 OBESITY (BMI 30-39.9): ICD-10-CM

## 2018-06-13 NOTE — PROGRESS NOTES
[id number]  name and number provided for further follow up. Time Spent This Encounter Total: 5 min medical record review, telephone,  communication.        Monthly Minute Total including today: 5

## 2018-06-13 NOTE — PROGRESS NOTES
Spoke to Ami Hernandez verified for CCM call.     Medical History  Patient Active Problem List:     Osteoarthritis of knee     Degeneration of lumbar or lumbosacral intervertebral disc     Age-related nuclear cataract of both eyes     Asthma with COPD (chr action plan.   Self-Management Abilities (patient reported)             (1) least confident in achieving goal to (5) very confident                   confidence: 5              Care Manager Follow Up: 1 month    Reason For Follow Up: review progress and or

## 2018-06-20 RX ORDER — BUDESONIDE AND FORMOTEROL FUMARATE DIHYDRATE 160; 4.5 UG/1; UG/1
AEROSOL RESPIRATORY (INHALATION)
Qty: 3 INHALER | Refills: 2 | Status: SHIPPED | OUTPATIENT
Start: 2018-06-20 | End: 2019-03-13

## 2018-06-21 NOTE — TELEPHONE ENCOUNTER
Refill Protocol Appointment Criteria  · Appointment scheduled in the past 6 months or in the next 3 months  Recent Outpatient Visits            3 weeks ago Gastroesophageal reflux disease, esophagitis presence not specified    [facility]

## 2018-06-30 PROCEDURE — 99490 CHRNC CARE MGMT STAFF 1ST 20: CPT

## 2018-07-05 ENCOUNTER — OFFICE VISIT (OUTPATIENT)
Dept: OPHTHALMOLOGY | Facility: CLINIC | Age: 68
End: 2018-07-05

## 2018-07-05 DIAGNOSIS — H25.13 AGE-RELATED NUCLEAR CATARACT OF BOTH EYES: Primary | ICD-10-CM

## 2018-07-05 PROCEDURE — 92014 COMPRE OPH EXAM EST PT 1/>: CPT | Performed by: OPHTHALMOLOGY

## 2018-07-05 NOTE — ASSESSMENT & PLAN NOTE
Discussed diagnosis of cataracts in detail with patient. Cataract surgery not indicated at this time due to vision level. Will continue to monitor yearly. Patient will call sooner than 1 year recall if they notice a change in vision.     Reassured zaria

## 2018-07-05 NOTE — PROGRESS NOTES
Marcos Holland is a 76year old female. HPI:     HPI     Consult    Additional comments: Consult per Dr. Paulo Hong   Patient is here for a complete exam.  Patient denies any ocular complaints.          Last edited by Micaela Dao on 7 Ascorbic Acid (VITAMIN C) 1000 MG Oral Tab  Disp:  Rfl:    VENTOLIN  (90 Base) MCG/ACT Inhalation Aero Soln INHALE 2 PUFFS INTO THE LUNGS EVERY 4 (FOUR) HOURS AS NEEDED FOR WHEEZING.  Disp: 54 g Rfl: 1   Acetaminophen-Codeine #3 300-30 MG Oral Tab sclerosis, Trace Cortical cataract    Vitreous Clear Clear          Fundus Exam       Right Left    Disc Good rim, Temporal crescent Good rim, Temporal crescent    C/D Ratio 0.1 0.1    Macula Normal Normal    Vessels Normal Normal    Periphery Normal Karol Lopez

## 2018-07-17 ENCOUNTER — PATIENT OUTREACH (OUTPATIENT)
Dept: CASE MANAGEMENT | Age: 68
End: 2018-07-17

## 2018-07-17 DIAGNOSIS — J44.9 ASTHMA WITH COPD (CHRONIC OBSTRUCTIVE PULMONARY DISEASE) (HCC): Chronic | ICD-10-CM

## 2018-07-17 DIAGNOSIS — K21.9 GASTROESOPHAGEAL REFLUX DISEASE, ESOPHAGITIS PRESENCE NOT SPECIFIED: ICD-10-CM

## 2018-07-17 DIAGNOSIS — E66.9 OBESITY (BMI 30-39.9): ICD-10-CM

## 2018-07-17 DIAGNOSIS — M17.10 OSTEOARTHRITIS OF KNEE, UNSPECIFIED LATERALITY, UNSPECIFIED OSTEOARTHRITIS TYPE: ICD-10-CM

## 2018-07-17 NOTE — PROGRESS NOTES
Spoke to Ami Hernandez verified for CCM call.     Medical History  Patient Active Problem List:     Osteoarthritis of knee     Degeneration of lumbar or lumbosacral intervertebral disc     Age-related nuclear cataract of both eyes     Asthma with COPD (chr least confident in achieving goal to (5) very confident                   confidence: 5                 Care Manager Follow Up: 1 month  Reason For Follow Up: review progress and or barriers towards patients goals.      Care managers interventions: monitor

## 2018-07-25 ENCOUNTER — OFFICE VISIT (OUTPATIENT)
Dept: INTERNAL MEDICINE CLINIC | Facility: CLINIC | Age: 68
End: 2018-07-25
Payer: MEDICARE

## 2018-07-25 VITALS
DIASTOLIC BLOOD PRESSURE: 68 MMHG | BODY MASS INDEX: 31.82 KG/M2 | WEIGHT: 198 LBS | HEIGHT: 66 IN | SYSTOLIC BLOOD PRESSURE: 107 MMHG | HEART RATE: 58 BPM | TEMPERATURE: 98 F

## 2018-07-25 DIAGNOSIS — I83.93 ASYMPTOMATIC VARICOSE VEINS OF BOTH LOWER EXTREMITIES: ICD-10-CM

## 2018-07-25 DIAGNOSIS — K21.9 GASTROESOPHAGEAL REFLUX DISEASE WITHOUT ESOPHAGITIS: ICD-10-CM

## 2018-07-25 DIAGNOSIS — J44.9 ASTHMA WITH COPD (CHRONIC OBSTRUCTIVE PULMONARY DISEASE) (HCC): ICD-10-CM

## 2018-07-25 DIAGNOSIS — M17.0 PRIMARY OSTEOARTHRITIS OF BOTH KNEES: Primary | ICD-10-CM

## 2018-07-25 DIAGNOSIS — J45.40 MODERATE PERSISTENT ASTHMA WITHOUT COMPLICATION: ICD-10-CM

## 2018-07-25 DIAGNOSIS — E78.5 HYPERLIPIDEMIA, UNSPECIFIED HYPERLIPIDEMIA TYPE: ICD-10-CM

## 2018-07-25 DIAGNOSIS — E66.9 OBESITY (BMI 30-39.9): ICD-10-CM

## 2018-07-25 PROCEDURE — 99212 OFFICE O/P EST SF 10 MIN: CPT | Performed by: INTERNAL MEDICINE

## 2018-07-25 PROCEDURE — 99214 OFFICE O/P EST MOD 30 MIN: CPT | Performed by: INTERNAL MEDICINE

## 2018-07-25 RX ORDER — HYDROCHLOROTHIAZIDE 12.5 MG/1
12.5 CAPSULE, GELATIN COATED ORAL DAILY
Qty: 90 CAPSULE | Refills: 0 | Status: SHIPPED | OUTPATIENT
Start: 2018-07-25 | End: 2019-06-24

## 2018-07-25 NOTE — PROGRESS NOTES
HPI:    Patient ID: Jessica Ernandez is a 76year old female.     HPI    Follow up  Worsening knee pain both    /68 (BP Location: Right arm, Patient Position: Sitting, Cuff Size: large)   Pulse 58   Temp 98.1 °F (36.7 °C) (Oral)   Ht 5' 6\" (1.676 m) Capsule Delayed Release TAKE 1 CAPSULE BY MOUTH   EVERY MORNING BEFORE BREAKFAST Disp: 90 capsule Rfl: 0   NYSTATIN 028308 UNIT/GM External Powder APPLY TOPICALLY TWO TIMES DAILY Disp: 60 g Rfl: 1   Ascorbic Acid (VITAMIN C) 1000 MG Oral Tab  Disp:  Rfl: User                     Alcohol use: Yes           0.0 oz/week     Comment: socially       PHYSICAL EXAM:    Physical Exam   Constitutional: She appears well-developed. No distress. HENT:   Head: Normocephalic and atraumatic.    Right Ear: External ear n unspecified hyperlipidemia type  Plan: low chold iet     (K21.9) Gastroesophageal reflux disease without esophagitis  Plan: GERD precaution    (E66.9) Obesity (BMI 30-39. 9)  Plan: WT loss advsed    Medication reviewed. Refill will be given as needed .   Med

## 2018-07-31 PROCEDURE — 99490 CHRNC CARE MGMT STAFF 1ST 20: CPT

## 2018-08-16 ENCOUNTER — OFFICE VISIT (OUTPATIENT)
Dept: SURGERY | Facility: CLINIC | Age: 68
End: 2018-08-16
Payer: MEDICARE

## 2018-08-16 ENCOUNTER — PATIENT OUTREACH (OUTPATIENT)
Dept: CASE MANAGEMENT | Age: 68
End: 2018-08-16

## 2018-08-16 VITALS
SYSTOLIC BLOOD PRESSURE: 101 MMHG | RESPIRATION RATE: 18 BRPM | BODY MASS INDEX: 31.99 KG/M2 | WEIGHT: 199.06 LBS | HEART RATE: 64 BPM | OXYGEN SATURATION: 97 % | HEIGHT: 66 IN | DIASTOLIC BLOOD PRESSURE: 65 MMHG

## 2018-08-16 DIAGNOSIS — K21.9 GASTROESOPHAGEAL REFLUX DISEASE, ESOPHAGITIS PRESENCE NOT SPECIFIED: Primary | ICD-10-CM

## 2018-08-16 DIAGNOSIS — M17.0 OSTEOARTHRITIS OF BOTH KNEES, UNSPECIFIED OSTEOARTHRITIS TYPE: ICD-10-CM

## 2018-08-16 DIAGNOSIS — E66.9 OBESITY (BMI 30-39.9): ICD-10-CM

## 2018-08-16 DIAGNOSIS — R63.5 WEIGHT GAIN: ICD-10-CM

## 2018-08-16 DIAGNOSIS — Z51.81 ENCOUNTER FOR THERAPEUTIC DRUG MONITORING: ICD-10-CM

## 2018-08-16 DIAGNOSIS — M15.9 OSTEOARTHRITIS OF MULTIPLE JOINTS, UNSPECIFIED OSTEOARTHRITIS TYPE: ICD-10-CM

## 2018-08-16 DIAGNOSIS — K21.9 GASTROESOPHAGEAL REFLUX DISEASE, ESOPHAGITIS PRESENCE NOT SPECIFIED: ICD-10-CM

## 2018-08-16 DIAGNOSIS — J44.9 ASTHMA WITH COPD (CHRONIC OBSTRUCTIVE PULMONARY DISEASE) (HCC): Chronic | ICD-10-CM

## 2018-08-16 PROCEDURE — 99214 OFFICE O/P EST MOD 30 MIN: CPT | Performed by: INTERNAL MEDICINE

## 2018-08-16 RX ORDER — TOPIRAMATE 25 MG/1
25 TABLET ORAL EVERY EVENING
Qty: 30 TABLET | Refills: 2 | Status: SHIPPED | OUTPATIENT
Start: 2018-08-16 | End: 2018-11-19

## 2018-08-16 NOTE — PROGRESS NOTES
Chart reviewed. Currently at appt with Dr. Maritza Rosales, will outreach at a later time. Time Spent This Encounter Total: 4 min medical record review, telephone,  communication.        Monthly Minute Total including today: 4

## 2018-08-16 NOTE — PROGRESS NOTES
[address], [facility]  [address] [facility] [address] [facility] [address]  Dept: [phone]     Date:   2017    Patient:  Jo Blank  :      1950  MRN:      [id number]    Chief Complaint: External Powder APPLY TOPICALLY TWO TIMES DAILY Disp: 60 g Rfl: 1   Ascorbic Acid (VITAMIN C) 1000 MG Oral Tab  Disp:  Rfl:    VENTOLIN  (90 Base) MCG/ACT Inhalation Aero Soln INHALE 2 PUFFS INTO THE LUNGS EVERY 4 (FOUR) HOURS AS NEEDED FOR WHEEZING Food Journal?: yes   · Patient is reading nutrition labels? yes  · Average Caloric Intake:     · Average CHO Intake: 130  · Is patient exercising? yes  · Type of exercise?  Walk for  45 Minutes    Eating Habits  · Patient states the following:  · Eats 3 me organomegaly  Extremities: edema limited movement in knees  Pulses: 2+ and symmetric  Skin: Skin color, texture, turgor normal. No rashes or lesions    ASSESSMENT     OBSTRUCTIVE SLEEP APNEA: The patient states her sleep apnea has been stable since the las

## 2018-08-17 NOTE — PROGRESS NOTES
Spoke to Ami Hernandez verified for CCM call.     Medical History  Patient Active Problem List:     Osteoarthritis of knee     Degeneration of lumbar or lumbosacral intervertebral disc     Age-related nuclear cataract of both eyes     Asthma with COPD (chr Plan:    • Goal from previous outreach: Pt would like to lose weight. • Patient reported progress toward goal: Wt increased slightly      Update to previous barriers: drinking fruit juice, will eat more whole fruit.    • Patient Repo

## 2018-08-19 ENCOUNTER — APPOINTMENT (OUTPATIENT)
Dept: ULTRASOUND IMAGING | Facility: HOSPITAL | Age: 68
End: 2018-08-19
Attending: EMERGENCY MEDICINE
Payer: MEDICARE

## 2018-08-19 ENCOUNTER — HOSPITAL ENCOUNTER (EMERGENCY)
Facility: HOSPITAL | Age: 68
Discharge: HOME | End: 2018-08-19
Attending: EMERGENCY MEDICINE
Payer: MEDICARE

## 2018-08-19 VITALS
WEIGHT: 199 LBS | HEART RATE: 68 BPM | BODY MASS INDEX: 31.98 KG/M2 | RESPIRATION RATE: 20 BRPM | TEMPERATURE: 99 F | DIASTOLIC BLOOD PRESSURE: 67 MMHG | HEIGHT: 66 IN | OXYGEN SATURATION: 96 % | SYSTOLIC BLOOD PRESSURE: 125 MMHG

## 2018-08-19 DIAGNOSIS — L03.115 CELLULITIS OF RIGHT LEG: ICD-10-CM

## 2018-08-19 DIAGNOSIS — R60.0 BILATERAL LOWER EXTREMITY EDEMA: Primary | ICD-10-CM

## 2018-08-19 LAB
ANION GAP SERPL CALC-SCNC: 8 MMOL/L (ref 0–18)
BACTERIA UR QL AUTO: NEGATIVE /HPF
BASOPHILS # BLD: 0 K/UL (ref 0–0.2)
BASOPHILS NFR BLD: 1 %
BILIRUB UR QL: NEGATIVE
BNP SERPL-MCNC: 78 PG/ML (ref 0–100)
BUN SERPL-MCNC: 17 MG/DL (ref 8–20)
BUN/CREAT SERPL: 17.9 (ref 10–20)
CALCIUM SERPL-MCNC: 9.1 MG/DL (ref 8.5–10.5)
CHLORIDE SERPL-SCNC: 109 MMOL/L (ref 95–110)
CLARITY UR: CLEAR
CO2 SERPL-SCNC: 24 MMOL/L (ref 22–32)
COLOR UR: YELLOW
CREAT SERPL-MCNC: 0.95 MG/DL (ref 0.5–1.5)
EOSINOPHIL # BLD: 0.1 K/UL (ref 0–0.7)
EOSINOPHIL NFR BLD: 2 %
ERYTHROCYTE [DISTWIDTH] IN BLOOD BY AUTOMATED COUNT: 14.5 % (ref 11–15)
GLUCOSE SERPL-MCNC: 87 MG/DL (ref 70–99)
GLUCOSE UR-MCNC: NEGATIVE MG/DL
HCT VFR BLD AUTO: 35.6 % (ref 35–48)
HGB BLD-MCNC: 12.1 G/DL (ref 12–16)
HGB UR QL STRIP.AUTO: NEGATIVE
HYALINE CASTS #/AREA URNS AUTO: 1 /LPF
KETONES UR-MCNC: NEGATIVE MG/DL
LYMPHOCYTES # BLD: 1.1 K/UL (ref 1–4)
LYMPHOCYTES NFR BLD: 20 %
MCH RBC QN AUTO: 30.3 PG (ref 27–32)
MCHC RBC AUTO-ENTMCNC: 33.8 G/DL (ref 32–37)
MCV RBC AUTO: 89.5 FL (ref 80–100)
MONOCYTES # BLD: 0.6 K/UL (ref 0–1)
MONOCYTES NFR BLD: 11 %
NEUTROPHILS # BLD AUTO: 3.6 K/UL (ref 1.8–7.7)
NEUTROPHILS NFR BLD: 67 %
NITRITE UR QL STRIP.AUTO: NEGATIVE
OSMOLALITY UR CALC.SUM OF ELEC: 293 MOSM/KG (ref 275–295)
PH UR: 5 [PH] (ref 5–8)
PLATELET # BLD AUTO: 147 K/UL (ref 140–400)
PMV BLD AUTO: 9.8 FL (ref 7.4–10.3)
POTASSIUM SERPL-SCNC: 3.7 MMOL/L (ref 3.3–5.1)
PROT UR-MCNC: NEGATIVE MG/DL
RBC # BLD AUTO: 3.98 M/UL (ref 3.7–5.4)
RBC #/AREA URNS AUTO: 2 /HPF
SODIUM SERPL-SCNC: 141 MMOL/L (ref 136–144)
SP GR UR STRIP: 1.02 (ref 1–1.03)
UROBILINOGEN UR STRIP-ACNC: <2
VIT C UR-MCNC: NEGATIVE MG/DL
WBC # BLD AUTO: 5.4 K/UL (ref 4–11)
WBC #/AREA URNS AUTO: 3 /HPF

## 2018-08-19 PROCEDURE — 96365 THER/PROPH/DIAG IV INF INIT: CPT

## 2018-08-19 PROCEDURE — 93970 EXTREMITY STUDY: CPT | Performed by: EMERGENCY MEDICINE

## 2018-08-19 PROCEDURE — 80048 BASIC METABOLIC PNL TOTAL CA: CPT | Performed by: EMERGENCY MEDICINE

## 2018-08-19 PROCEDURE — 99284 EMERGENCY DEPT VISIT MOD MDM: CPT

## 2018-08-19 PROCEDURE — 83880 ASSAY OF NATRIURETIC PEPTIDE: CPT | Performed by: EMERGENCY MEDICINE

## 2018-08-19 PROCEDURE — 85025 COMPLETE CBC W/AUTO DIFF WBC: CPT | Performed by: EMERGENCY MEDICINE

## 2018-08-19 PROCEDURE — 81001 URINALYSIS AUTO W/SCOPE: CPT | Performed by: EMERGENCY MEDICINE

## 2018-08-19 RX ORDER — CEPHALEXIN 500 MG/1
500 CAPSULE ORAL 4 TIMES DAILY
Qty: 28 CAPSULE | Refills: 0 | Status: SHIPPED | OUTPATIENT
Start: 2018-08-19 | End: 2018-11-15 | Stop reason: ALTCHOICE

## 2018-08-19 NOTE — ED PROVIDER NOTES
Patient Seen in: [facility] AND [facility] Emergency Department    History   Patient presents with:  Swelling Edema (cardiovascular, metabolic)    Stated Complaint: right leg swelling and painful    HPI    She presents to the emergency department today complaini (5' 6\")   Wt 90.3 kg   SpO2 96%   BMI 32.12 kg/m²         Physical Exam   Constitutional: She is oriented to person, place, and time. She appears well-developed and well-nourished. No distress. HENT:   Head: Normocephalic.    Eyes: Conjunctivae and EOM a cm Baker cyst. Similar to June 12, 2014. Dictated by (CST): Charlotte Maddox MD on 8/19/2018 at 13:26     Approved by (CST):  Charlotte Maddox MD on 8/19/2018 at 13:27            Radiology exams  Viewed and reviewed by myself and findings discussed wi

## 2018-08-21 ENCOUNTER — TELEPHONE (OUTPATIENT)
Dept: INTERNAL MEDICINE CLINIC | Facility: CLINIC | Age: 68
End: 2018-08-21

## 2018-08-21 DIAGNOSIS — I87.2 VENOUS INSUFFICIENCY: Primary | ICD-10-CM

## 2018-08-21 NOTE — TELEPHONE ENCOUNTER
Dr. Susanne Cotton,    Pt called requesting a referral to Dr. Jaspreet Riley. Per pt she is being referred by ER pt was seen in ER for swelling edema. Please advise and sign off on referral if you agree.     Thank you, Thea Chapa Small-Referral Specialist.

## 2018-08-27 ENCOUNTER — TELEPHONE (OUTPATIENT)
Dept: INTERNAL MEDICINE CLINIC | Facility: CLINIC | Age: 68
End: 2018-08-27

## 2018-08-27 NOTE — TELEPHONE ENCOUNTER
Patient stated to disregard request. Rolando Granados on getting calls from this pharmacy asking if doctor approved medication. Patient is using her current medication including asper cream and doing fine.

## 2018-08-31 PROCEDURE — 99490 CHRNC CARE MGMT STAFF 1ST 20: CPT

## 2018-09-04 ENCOUNTER — HOSPITAL ENCOUNTER (OUTPATIENT)
Dept: GENERAL RADIOLOGY | Facility: HOSPITAL | Age: 68
Discharge: HOME | End: 2018-09-04
Attending: ORTHOPAEDIC SURGERY
Payer: MEDICARE

## 2018-09-04 ENCOUNTER — OFFICE VISIT (OUTPATIENT)
Dept: ORTHOPEDICS CLINIC | Facility: CLINIC | Age: 68
End: 2018-09-04
Payer: MEDICARE

## 2018-09-04 VITALS — SYSTOLIC BLOOD PRESSURE: 111 MMHG | HEART RATE: 60 BPM | RESPIRATION RATE: 16 BRPM | DIASTOLIC BLOOD PRESSURE: 68 MMHG

## 2018-09-04 DIAGNOSIS — M25.562 PAIN IN BOTH KNEES, UNSPECIFIED CHRONICITY: ICD-10-CM

## 2018-09-04 DIAGNOSIS — M25.561 PAIN IN BOTH KNEES, UNSPECIFIED CHRONICITY: ICD-10-CM

## 2018-09-04 DIAGNOSIS — M17.12 PRIMARY OSTEOARTHRITIS OF LEFT KNEE: ICD-10-CM

## 2018-09-04 DIAGNOSIS — M17.11 PRIMARY OSTEOARTHRITIS OF RIGHT KNEE: Primary | ICD-10-CM

## 2018-09-04 PROCEDURE — 99214 OFFICE O/P EST MOD 30 MIN: CPT | Performed by: ORTHOPAEDIC SURGERY

## 2018-09-04 PROCEDURE — 20610 DRAIN/INJ JOINT/BURSA W/O US: CPT | Performed by: ORTHOPAEDIC SURGERY

## 2018-09-04 PROCEDURE — G0463 HOSPITAL OUTPT CLINIC VISIT: HCPCS | Performed by: ORTHOPAEDIC SURGERY

## 2018-09-04 PROCEDURE — 73564 X-RAY EXAM KNEE 4 OR MORE: CPT | Performed by: ORTHOPAEDIC SURGERY

## 2018-09-04 NOTE — PROGRESS NOTES
Per verbal order from [facility], draw up 4ml of 1% lidocaine and 2ml of Kenalog 10 for cortisone injection to bilateral knee.  Swapna Agustin

## 2018-09-04 NOTE — PROGRESS NOTES
9/4/2018  Osiel Herrera  6/24/1950  76year old   female  Guillermo Wolf MD    HPI:   Patient presents with:  Knee Pain: Bilateral - onset long time ago when she fell on concreete floor - she has pain all around the knees rated as 8-9/10 at all th (four) hours as needed for Pain.  Disp: 20 tablet Rfl: 0   FERROUS SULFATE CR OR  Disp:  Rfl:    Acetaminophen 500 MG Oral Cap  Disp:  Rfl:    REDNESS RELIEVER EYE DROPS 0.05 % Ophthalmic Solution  Disp:  Rfl:    Multiple Vitamins-Minerals (Zurdo Lira soft.  The patient's lower extremities are warm and pink with brisk capillary refill and 2+ distal pulses. Sensation is intact to light touch in superficial peroneal, deep peroneal, sural, saphenous, and tibial nerve distributions.   The patient has 5/5 st

## 2018-09-14 ENCOUNTER — PATIENT OUTREACH (OUTPATIENT)
Dept: CASE MANAGEMENT | Age: 68
End: 2018-09-14

## 2018-09-14 DIAGNOSIS — E66.9 OBESITY (BMI 30-39.9): ICD-10-CM

## 2018-09-14 DIAGNOSIS — M17.0 OSTEOARTHRITIS OF BOTH KNEES, UNSPECIFIED OSTEOARTHRITIS TYPE: ICD-10-CM

## 2018-09-14 DIAGNOSIS — J44.9 ASTHMA WITH COPD (CHRONIC OBSTRUCTIVE PULMONARY DISEASE) (HCC): Chronic | ICD-10-CM

## 2018-09-14 DIAGNOSIS — K21.9 GASTROESOPHAGEAL REFLUX DISEASE, ESOPHAGITIS PRESENCE NOT SPECIFIED: ICD-10-CM

## 2018-09-14 NOTE — PROGRESS NOTES
Spoke to Ami Hernandez verified for CCM call.     Medical History  Patient Active Problem List:     Osteoarthritis of knee     Degeneration of lumbar or lumbosacral intervertebral disc     Age-related nuclear cataract of both eyes     Asthma with COPD (chr • Patient reported progress toward goal: seeing Dr. Timothy Mcdermott, had slight gain this past month. • Update to previous barriers: decrease juice consumption.       Patient Reported New Barriers And Concerns: none                 - Plan for overcoming all

## 2018-09-30 PROCEDURE — 99490 CHRNC CARE MGMT STAFF 1ST 20: CPT

## 2018-10-09 ENCOUNTER — OFFICE VISIT (OUTPATIENT)
Dept: ORTHOPEDICS CLINIC | Facility: CLINIC | Age: 68
End: 2018-10-09
Payer: MEDICARE

## 2018-10-09 DIAGNOSIS — M17.12 PRIMARY OSTEOARTHRITIS OF LEFT KNEE: ICD-10-CM

## 2018-10-09 DIAGNOSIS — M17.11 PRIMARY OSTEOARTHRITIS OF RIGHT KNEE: Primary | ICD-10-CM

## 2018-10-09 PROCEDURE — G0463 HOSPITAL OUTPT CLINIC VISIT: HCPCS | Performed by: ORTHOPAEDIC SURGERY

## 2018-10-09 PROCEDURE — 99213 OFFICE O/P EST LOW 20 MIN: CPT | Performed by: ORTHOPAEDIC SURGERY

## 2018-10-09 NOTE — PROGRESS NOTES
This is a pleasant 60-year-old female with a previous diagnosis of bilateral knee osteoarthritis. The patient was given cortisone injections in September 2018. The patient comes in today for repeat evaluation.   The patient reports that her pain is improv

## 2018-10-24 ENCOUNTER — PATIENT OUTREACH (OUTPATIENT)
Dept: CASE MANAGEMENT | Age: 68
End: 2018-10-24

## 2018-10-24 DIAGNOSIS — M17.0 OSTEOARTHRITIS OF BOTH KNEES, UNSPECIFIED OSTEOARTHRITIS TYPE: ICD-10-CM

## 2018-10-24 DIAGNOSIS — M15.9 OSTEOARTHRITIS OF MULTIPLE JOINTS, UNSPECIFIED OSTEOARTHRITIS TYPE: ICD-10-CM

## 2018-10-24 DIAGNOSIS — E66.9 OBESITY (BMI 30-39.9): ICD-10-CM

## 2018-10-24 DIAGNOSIS — K21.9 GASTROESOPHAGEAL REFLUX DISEASE, ESOPHAGITIS PRESENCE NOT SPECIFIED: ICD-10-CM

## 2018-10-24 DIAGNOSIS — J44.9 ASTHMA WITH COPD (CHRONIC OBSTRUCTIVE PULMONARY DISEASE) (HCC): Chronic | ICD-10-CM

## 2018-10-24 NOTE — PROGRESS NOTES
Spoke to Ami Skinner verified for CCM call.     Medical History  Patient Active Problem List:     Osteoarthritis of knee     Degeneration of lumbar or lumbosacral intervertebral disc     Age-related nuclear cataract of both eyes     Asthma with COPD (chr outreach: Would like to lose weight. • Patient reported progress toward goal: continues on wt loss program with Dr. Court Davidson she's \"up and down\". Update to previous barriers: compliant with treatment plan.    • Patient Repor

## 2018-10-31 PROCEDURE — 99490 CHRNC CARE MGMT STAFF 1ST 20: CPT

## 2018-11-06 ENCOUNTER — PATIENT OUTREACH (OUTPATIENT)
Dept: CASE MANAGEMENT | Age: 68
End: 2018-11-06

## 2018-11-06 NOTE — PROGRESS NOTES
Spoke with patient and informed her that she would be receiving a new CCM who will be reaching out to her in the near future. Time Spent This Encounter Total: 8 min medical record review, telephone,  communication.         Monthly Minute Total includin

## 2018-11-07 ENCOUNTER — OFFICE VISIT (OUTPATIENT)
Dept: INTERNAL MEDICINE CLINIC | Facility: CLINIC | Age: 68
End: 2018-11-07
Payer: MEDICARE

## 2018-11-07 VITALS
HEIGHT: 66 IN | SYSTOLIC BLOOD PRESSURE: 103 MMHG | HEART RATE: 64 BPM | TEMPERATURE: 98 F | WEIGHT: 180 LBS | DIASTOLIC BLOOD PRESSURE: 61 MMHG | BODY MASS INDEX: 28.93 KG/M2

## 2018-11-07 DIAGNOSIS — J45.40 MODERATE PERSISTENT ASTHMA WITHOUT COMPLICATION: Primary | ICD-10-CM

## 2018-11-07 DIAGNOSIS — M17.0 PRIMARY OSTEOARTHRITIS OF BOTH KNEES: ICD-10-CM

## 2018-11-07 DIAGNOSIS — E78.5 HYPERLIPIDEMIA, UNSPECIFIED HYPERLIPIDEMIA TYPE: ICD-10-CM

## 2018-11-07 DIAGNOSIS — K21.9 GASTROESOPHAGEAL REFLUX DISEASE WITHOUT ESOPHAGITIS: ICD-10-CM

## 2018-11-07 DIAGNOSIS — I87.2 VENOUS INSUFFICIENCY: ICD-10-CM

## 2018-11-07 PROCEDURE — G0463 HOSPITAL OUTPT CLINIC VISIT: HCPCS | Performed by: INTERNAL MEDICINE

## 2018-11-07 PROCEDURE — 99214 OFFICE O/P EST MOD 30 MIN: CPT | Performed by: INTERNAL MEDICINE

## 2018-11-07 PROCEDURE — 90653 IIV ADJUVANT VACCINE IM: CPT | Performed by: INTERNAL MEDICINE

## 2018-11-07 PROCEDURE — G0008 ADMIN INFLUENZA VIRUS VAC: HCPCS | Performed by: INTERNAL MEDICINE

## 2018-11-12 NOTE — PROGRESS NOTES
HPI:    Patient ID: Siena Machado is a 76year old female.     HPI  Follow up    Asthma stable  Chronic pain  Arthritis both knee          /61 (BP Location: Right arm, Patient Position: Sitting, Cuff Size: adult)   Pulse 64   Temp 97.9 °F (36.6 °C MG Oral Cap Take 1 capsule (12.5 mg total) by mouth daily. Disp: 90 capsule Rfl: 0   SYMBICORT 160-4.5 MCG/ACT Inhalation Aerosol INHALE 2 PUFFS INTO THE LUNGS TWO TIMES DAILY.  Disp: 3 Inhaler Rfl: 2   omeprazole 20 MG Oral Capsule Delayed Release TAKE 1 C CYSTOSCOPY URETEROSCOPY Right 2/15/2017    Performed by Theodosia Bumpers, MD at [address] [place] [address] MAIN OR      Family History   Problem Relation Age of Onset   • Diabetes Mother 58   • Cancer Mother         unknown type   • Other (Other) Mother    • Stroke Father 79 noted. She is not diaphoretic. No erythema. Nursing note and vitals reviewed.   gait banormality         ASSESSMENT/PLAN:   (J45.40) Moderate persistent asthma without complication  (primary encounter diagnosis)  Plan: URINE MICROSCOPIC W REFLEX CULTURE

## 2018-11-15 ENCOUNTER — PATIENT OUTREACH (OUTPATIENT)
Dept: CASE MANAGEMENT | Age: 68
End: 2018-11-15

## 2018-11-15 DIAGNOSIS — E66.9 OBESITY (BMI 30-39.9): ICD-10-CM

## 2018-11-15 DIAGNOSIS — M17.0 OSTEOARTHRITIS OF BOTH KNEES, UNSPECIFIED OSTEOARTHRITIS TYPE: ICD-10-CM

## 2018-11-15 DIAGNOSIS — R63.5 WEIGHT GAIN: ICD-10-CM

## 2018-11-15 NOTE — PROGRESS NOTES
Spoke to Ami Hernandez verified for CCM call.     Medical History  Patient Active Problem List:     Osteoarthritis of knee     Degeneration of lumbar or lumbosacral intervertebral disc     Age-related nuclear cataract of both eyes     Asthma with COPD (chr - Plan for overcoming all barriers: n/a             Patient agrees to goal action plan.   Self-Management Abilities (patient reported)             (1) least confident in achieving goal to (5) very confident                   confidence: Amarjit [id number]

## 2018-11-19 ENCOUNTER — OFFICE VISIT (OUTPATIENT)
Dept: SURGERY | Facility: CLINIC | Age: 68
End: 2018-11-19
Payer: MEDICARE

## 2018-11-19 DIAGNOSIS — K21.9 GASTROESOPHAGEAL REFLUX DISEASE, ESOPHAGITIS PRESENCE NOT SPECIFIED: Primary | ICD-10-CM

## 2018-11-19 DIAGNOSIS — Z51.81 ENCOUNTER FOR THERAPEUTIC DRUG MONITORING: ICD-10-CM

## 2018-11-19 DIAGNOSIS — M15.9 OSTEOARTHRITIS OF MULTIPLE JOINTS, UNSPECIFIED OSTEOARTHRITIS TYPE: ICD-10-CM

## 2018-11-19 PROBLEM — E78.5 HYPERLIPIDEMIA: Status: ACTIVE | Noted: 2018-11-19

## 2018-11-19 PROCEDURE — 99214 OFFICE O/P EST MOD 30 MIN: CPT | Performed by: INTERNAL MEDICINE

## 2018-11-19 RX ORDER — TOPIRAMATE 25 MG/1
25 TABLET ORAL 2 TIMES DAILY
Qty: 60 TABLET | Refills: 2 | Status: SHIPPED | OUTPATIENT
Start: 2018-11-19 | End: 2019-02-25

## 2018-11-19 NOTE — PROGRESS NOTES
[address], [address] [facility] [address]  Dept: [phone]     Date:   2017    Patient:  Griffin Machado  :      1950  MRN:      [id number]    Chief Complaint: Powder APPLY TOPICALLY TWO TIMES DAILY Disp: 60 g Rfl: 1   Ascorbic Acid (VITAMIN C) 1000 MG Oral Tab  Disp:  Rfl:    VENTOLIN  (90 Base) MCG/ACT Inhalation Aero Soln INHALE 2 PUFFS INTO THE LUNGS EVERY 4 (FOUR) HOURS AS NEEDED FOR WHEEZING.  Disp: 5 Back Care: Not Asked        Exercise: Not Asked        Bike Helmet: Not Asked        Seat Belt: Not Asked        Self-Exams: Not Asked        Grew up on a farm: Not Asked        History of tanning: Not Asked        Outdoor occupation: Not Asked        Pt h Modifications Reviewed and Discussed  Eat breakfast, Eat 3 meals per day, Plan meals in advance, Read nutrition labels, Drink 64 oz of water per day, Maintain a daily food journal, No drinking 30 minutes before or after meals, Utlize portion control strate type    PLAN   No orders of the defined types were placed in this encounter. Patient is not interested in bariatric surgery. Patient desires to pursue traditional weight loss at this time.       Patient was instructed to continue wearing their CPaP as re

## 2018-11-30 PROCEDURE — 99490 CHRNC CARE MGMT STAFF 1ST 20: CPT

## 2018-12-04 ENCOUNTER — HOSPITAL ENCOUNTER (OUTPATIENT)
Dept: GENERAL RADIOLOGY | Facility: HOSPITAL | Age: 68
Discharge: HOME | End: 2018-12-04
Attending: UROLOGY
Payer: MEDICARE

## 2018-12-04 ENCOUNTER — OFFICE VISIT (OUTPATIENT)
Dept: SURGERY | Facility: CLINIC | Age: 68
End: 2018-12-04
Payer: MEDICARE

## 2018-12-04 VITALS
SYSTOLIC BLOOD PRESSURE: 98 MMHG | BODY MASS INDEX: 28.93 KG/M2 | DIASTOLIC BLOOD PRESSURE: 60 MMHG | WEIGHT: 180 LBS | HEIGHT: 66 IN | HEART RATE: 67 BPM

## 2018-12-04 DIAGNOSIS — N20.0 KIDNEY STONES: Primary | ICD-10-CM

## 2018-12-04 DIAGNOSIS — N20.0 URIC ACID NEPHROLITHIASIS: ICD-10-CM

## 2018-12-04 PROCEDURE — 99213 OFFICE O/P EST LOW 20 MIN: CPT | Performed by: UROLOGY

## 2018-12-04 PROCEDURE — G0463 HOSPITAL OUTPT CLINIC VISIT: HCPCS | Performed by: UROLOGY

## 2018-12-04 PROCEDURE — 74018 RADEX ABDOMEN 1 VIEW: CPT | Performed by: UROLOGY

## 2018-12-04 NOTE — PROGRESS NOTES
Alexandra Jenkins is a 76year old female. HPI:   Patient presents with:  Kidney Problem: Patient is a 76year old female accompanied by her daughter for her annual visit. Constipation: Patient c/o constipation. Last bowl movement 2 days ago.  Not taki CYSTOSCOPY RETROGRADE Right 2/15/2017    Performed by Bill Meehan MD at [address] Right 2/15/2017    Performed by Bill Meehan MD at [address] [facility]   • CYSTOSCOPY URETEROSCOPY Right 2/15/2017    Performed by Gaurav Haney Rfl:    Calcium Carb-Cholecalciferol (OYSTER SHELL CALCIUM/VITAMIN D) 250-125 MG-UNIT Oral Tab  Disp:  Rfl:    Vitamin B-12 1000 MCG Oral Tab  Disp:  Rfl:        Allergies:  No Known Allergies      ROS:       PHYSICAL EXAM:        ASSESSMENT/PLAN:   Asses

## 2018-12-17 ENCOUNTER — PATIENT OUTREACH (OUTPATIENT)
Dept: CASE MANAGEMENT | Age: 68
End: 2018-12-17

## 2018-12-17 DIAGNOSIS — E66.9 OBESITY (BMI 30-39.9): ICD-10-CM

## 2018-12-17 DIAGNOSIS — E78.00 PURE HYPERCHOLESTEROLEMIA: ICD-10-CM

## 2018-12-17 NOTE — PROGRESS NOTES
Spoke to Ami Victoria verified for CCM call.     Medical History  Patient Active Problem List:     Osteoarthritis of knee     Degeneration of lumbar or lumbosacral intervertebral disc     Age-related nuclear cataract of both eyes     Asthma with COPD (chr improved with Miralax daily.     Previous goal met:Progressing    Self Management Goals/Action Plan:    Goal from previous outreach: Would like to lose weight.   Gaol weight I 145 lbs                     · Patient reported progress toward goal: Contniues on

## 2018-12-31 PROCEDURE — 99490 CHRNC CARE MGMT STAFF 1ST 20: CPT

## 2019-01-17 ENCOUNTER — PATIENT OUTREACH (OUTPATIENT)
Dept: CASE MANAGEMENT | Age: 69
End: 2019-01-17

## 2019-01-17 DIAGNOSIS — E66.9 OBESITY (BMI 30-39.9): ICD-10-CM

## 2019-01-17 DIAGNOSIS — M51.37 DEGENERATION OF LUMBAR OR LUMBOSACRAL INTERVERTEBRAL DISC: ICD-10-CM

## 2019-01-17 DIAGNOSIS — M15.9 OSTEOARTHRITIS OF MULTIPLE JOINTS, UNSPECIFIED OSTEOARTHRITIS TYPE: ICD-10-CM

## 2019-01-17 DIAGNOSIS — E78.00 PURE HYPERCHOLESTEROLEMIA: ICD-10-CM

## 2019-01-17 NOTE — PROGRESS NOTES
Spoke to Ami Montes for CCM call.     Medical History  Patient Active Problem List:     Osteoarthritis of knee     Degeneration of lumbar or lumbosacral intervertebral disc     Age-related nuclear cataract of both eyes     Asthma with COPD (chr exercise. • Patient Reported New Barriers And Concerns: States she feels tired. - Plan for overcoming all barriers: Drink plenty of water, protein and electrolytes to prevent dehydration.  Contact Dr. Siomara Silver for dietary advise if needed

## 2019-01-31 PROCEDURE — 99490 CHRNC CARE MGMT STAFF 1ST 20: CPT

## 2019-02-15 ENCOUNTER — PATIENT OUTREACH (OUTPATIENT)
Dept: CASE MANAGEMENT | Age: 69
End: 2019-02-15

## 2019-02-15 DIAGNOSIS — E66.9 OBESITY (BMI 30-39.9): ICD-10-CM

## 2019-02-15 DIAGNOSIS — E78.00 PURE HYPERCHOLESTEROLEMIA: ICD-10-CM

## 2019-02-15 DIAGNOSIS — M51.37 DEGENERATION OF LUMBAR OR LUMBOSACRAL INTERVERTEBRAL DISC: ICD-10-CM

## 2019-02-20 NOTE — PROGRESS NOTES
Spoke to Ami Hernandez verified for CCM call.     Medical History  Patient Active Problem List:     Osteoarthritis of knee     Degeneration of lumbar or lumbosacral intervertebral disc     Age-related nuclear cataract of both eyes     Asthma with COPD (chr weight current goal weight 150 lbs                        · Patient reported progress toward goal: strict low carb diet and exercise     · Patient Reported New Barriers And Concerns: weather                   - Plan for overcoming all barriers:Walks indoor

## 2019-02-25 ENCOUNTER — OFFICE VISIT (OUTPATIENT)
Dept: SURGERY | Facility: CLINIC | Age: 69
End: 2019-02-25
Payer: MEDICARE

## 2019-02-25 VITALS
SYSTOLIC BLOOD PRESSURE: 116 MMHG | HEART RATE: 62 BPM | BODY MASS INDEX: 27.32 KG/M2 | RESPIRATION RATE: 16 BRPM | OXYGEN SATURATION: 98 % | DIASTOLIC BLOOD PRESSURE: 66 MMHG | HEIGHT: 66 IN | WEIGHT: 170 LBS

## 2019-02-25 VITALS
SYSTOLIC BLOOD PRESSURE: 100 MMHG | HEIGHT: 66 IN | DIASTOLIC BLOOD PRESSURE: 66 MMHG | BODY MASS INDEX: 26.84 KG/M2 | RESPIRATION RATE: 16 BRPM | WEIGHT: 167 LBS | HEART RATE: 60 BPM | OXYGEN SATURATION: 100 %

## 2019-02-25 DIAGNOSIS — M17.0 OSTEOARTHRITIS OF BOTH KNEES, UNSPECIFIED OSTEOARTHRITIS TYPE: ICD-10-CM

## 2019-02-25 DIAGNOSIS — Z51.81 ENCOUNTER FOR THERAPEUTIC DRUG MONITORING: ICD-10-CM

## 2019-02-25 DIAGNOSIS — E78.00 PURE HYPERCHOLESTEROLEMIA: Primary | ICD-10-CM

## 2019-02-25 DIAGNOSIS — E66.3 OVERWEIGHT (BMI 25.0-29.9): ICD-10-CM

## 2019-02-25 PROBLEM — E66.9 OBESITY (BMI 30-39.9): Status: RESOLVED | Noted: 2017-05-24 | Resolved: 2019-02-25

## 2019-02-25 PROCEDURE — 99214 OFFICE O/P EST MOD 30 MIN: CPT | Performed by: INTERNAL MEDICINE

## 2019-02-25 RX ORDER — TOPIRAMATE 25 MG/1
25 TABLET ORAL 2 TIMES DAILY
Qty: 60 TABLET | Refills: 2 | Status: SHIPPED | OUTPATIENT
Start: 2019-02-25 | End: 2019-05-20

## 2019-02-25 NOTE — PROGRESS NOTES
[address] [place] [address] [address] [facility] [address]  Dept: [phone]     Date:   2017    Patient:  Henry Patel  :      1950  MRN:      [id number]    Chief Complaint: [id number] UNIT/GM External Powder APPLY TOPICALLY TWO TIMES DAILY Disp: 60 g Rfl: 1   Ascorbic Acid (VITAMIN C) 1000 MG Oral Tab  Disp:  Rfl:    VENTOLIN  (90 Base) MCG/ACT Inhalation Aero Soln INHALE 2 PUFFS INTO THE LUNGS EVERY 4 (FOUR) HOURS AS NEED Not on file        Attends meetings of clubs or organizations: Not on file        Relationship status: Not on file      Intimate partner violence:        Fear of current or ex partner: Not on file        Emotionally abused: Not on file        Physically ab labels? yes  · Average Caloric Intake:     · Average CHO Intake: 130  · Is patient exercising? yes  · Type of exercise?  Walk for  45 Minutes    Eating Habits  · Patient states the following:  · Eats 3 meal(s) per day  · Length of time it takes to consume knees  Pulses: 2+ and symmetric  Skin: Skin color, texture, turgor normal. No rashes or lesions    ASSESSMENT     OBSTRUCTIVE SLEEP APNEA: The patient states her sleep apnea has been stable since the last clinic visit.  There has not been any increase in hy

## 2019-02-28 PROCEDURE — 99490 CHRNC CARE MGMT STAFF 1ST 20: CPT

## 2019-03-13 ENCOUNTER — OFFICE VISIT (OUTPATIENT)
Dept: INTERNAL MEDICINE CLINIC | Facility: CLINIC | Age: 69
End: 2019-03-13
Payer: MEDICARE

## 2019-03-13 ENCOUNTER — APPOINTMENT (OUTPATIENT)
Dept: LAB | Age: 69
End: 2019-03-13
Attending: INTERNAL MEDICINE
Payer: MEDICARE

## 2019-03-13 VITALS
WEIGHT: 166.38 LBS | TEMPERATURE: 98 F | BODY MASS INDEX: 26.74 KG/M2 | HEART RATE: 69 BPM | HEIGHT: 66 IN | DIASTOLIC BLOOD PRESSURE: 55 MMHG | SYSTOLIC BLOOD PRESSURE: 99 MMHG

## 2019-03-13 DIAGNOSIS — E78.5 HYPERLIPIDEMIA, UNSPECIFIED HYPERLIPIDEMIA TYPE: ICD-10-CM

## 2019-03-13 DIAGNOSIS — Z00.00 ENCOUNTER FOR MEDICARE ANNUAL WELLNESS EXAM: Primary | ICD-10-CM

## 2019-03-13 DIAGNOSIS — Z12.31 VISIT FOR SCREENING MAMMOGRAM: ICD-10-CM

## 2019-03-13 DIAGNOSIS — Z00.00 ENCOUNTER FOR ANNUAL HEALTH EXAMINATION: ICD-10-CM

## 2019-03-13 DIAGNOSIS — I87.2 VENOUS INSUFFICIENCY: ICD-10-CM

## 2019-03-13 DIAGNOSIS — Z78.0 POSTMENOPAUSAL: ICD-10-CM

## 2019-03-13 DIAGNOSIS — K21.9 GASTROESOPHAGEAL REFLUX DISEASE WITHOUT ESOPHAGITIS: ICD-10-CM

## 2019-03-13 DIAGNOSIS — J45.40 MODERATE PERSISTENT ASTHMA WITHOUT COMPLICATION: ICD-10-CM

## 2019-03-13 DIAGNOSIS — J44.9 ASTHMA WITH COPD (CHRONIC OBSTRUCTIVE PULMONARY DISEASE) (HCC): ICD-10-CM

## 2019-03-13 DIAGNOSIS — M17.0 PRIMARY OSTEOARTHRITIS OF BOTH KNEES: ICD-10-CM

## 2019-03-13 LAB
ALBUMIN SERPL-MCNC: 3.9 G/DL (ref 3.4–5)
ALBUMIN/GLOB SERPL: 1.1 {RATIO} (ref 1–2)
ALP LIVER SERPL-CCNC: 84 U/L (ref 55–142)
ALT SERPL-CCNC: 19 U/L (ref 13–56)
ANION GAP SERPL CALC-SCNC: 6 MMOL/L (ref 0–18)
AST SERPL-CCNC: 18 U/L (ref 15–37)
BACTERIA UR QL AUTO: NEGATIVE /HPF
BILIRUB SERPL-MCNC: 0.7 MG/DL (ref 0.1–2)
BUN BLD-MCNC: 16 MG/DL (ref 7–18)
BUN/CREAT SERPL: 21.3 (ref 10–20)
CALCIUM BLD-MCNC: 9.2 MG/DL (ref 8.5–10.1)
CHLORIDE SERPL-SCNC: 109 MMOL/L (ref 98–107)
CHOLEST SMN-MCNC: 174 MG/DL (ref ?–200)
CO2 SERPL-SCNC: 29 MMOL/L (ref 21–32)
CREAT BLD-MCNC: 0.75 MG/DL (ref 0.55–1.02)
DEPRECATED RDW RBC AUTO: 45.1 FL (ref 35.1–46.3)
ERYTHROCYTE [DISTWIDTH] IN BLOOD BY AUTOMATED COUNT: 13.6 % (ref 11–15)
GLOBULIN PLAS-MCNC: 3.5 G/DL (ref 2.8–4.4)
GLUCOSE BLD-MCNC: 91 MG/DL (ref 70–99)
HCT VFR BLD AUTO: 38.6 % (ref 35–48)
HDLC SERPL-MCNC: 91 MG/DL (ref 40–59)
HGB BLD-MCNC: 13 G/DL (ref 12–16)
LDLC SERPL CALC-MCNC: 72 MG/DL (ref ?–100)
M PROTEIN MFR SERPL ELPH: 7.4 G/DL (ref 6.4–8.2)
MCH RBC QN AUTO: 30.6 PG (ref 26–34)
MCHC RBC AUTO-ENTMCNC: 33.7 G/DL (ref 31–37)
MCV RBC AUTO: 90.8 FL (ref 80–100)
NONHDLC SERPL-MCNC: 83 MG/DL (ref ?–130)
OSMOLALITY SERPL CALC.SUM OF ELEC: 299 MOSM/KG (ref 275–295)
PLATELET # BLD AUTO: 155 10(3)UL (ref 150–450)
POTASSIUM SERPL-SCNC: 3.4 MMOL/L (ref 3.5–5.1)
RBC # BLD AUTO: 4.25 X10(6)UL (ref 3.8–5.3)
RBC #/AREA URNS AUTO: 0 /HPF
SODIUM SERPL-SCNC: 144 MMOL/L (ref 136–145)
TRIGL SERPL-MCNC: 53 MG/DL (ref 30–149)
TSI SER-ACNC: 1.67 MIU/ML (ref 0.36–3.74)
VLDLC SERPL CALC-MCNC: 11 MG/DL (ref 0–30)
WBC # BLD AUTO: 4.5 X10(3) UL (ref 4–11)
WBC #/AREA URNS AUTO: 1 /HPF

## 2019-03-13 PROCEDURE — 80061 LIPID PANEL: CPT

## 2019-03-13 PROCEDURE — 84443 ASSAY THYROID STIM HORMONE: CPT

## 2019-03-13 PROCEDURE — 80053 COMPREHEN METABOLIC PANEL: CPT

## 2019-03-13 PROCEDURE — G0463 HOSPITAL OUTPT CLINIC VISIT: HCPCS | Performed by: INTERNAL MEDICINE

## 2019-03-13 PROCEDURE — G0439 PPPS, SUBSEQ VISIT: HCPCS | Performed by: INTERNAL MEDICINE

## 2019-03-13 PROCEDURE — 85027 COMPLETE CBC AUTOMATED: CPT

## 2019-03-13 PROCEDURE — 81015 MICROSCOPIC EXAM OF URINE: CPT

## 2019-03-13 PROCEDURE — 36415 COLL VENOUS BLD VENIPUNCTURE: CPT

## 2019-03-13 PROCEDURE — 90471 IMMUNIZATION ADMIN: CPT | Performed by: INTERNAL MEDICINE

## 2019-03-13 PROCEDURE — 90715 TDAP VACCINE 7 YRS/> IM: CPT | Performed by: INTERNAL MEDICINE

## 2019-03-13 RX ORDER — BUDESONIDE AND FORMOTEROL FUMARATE DIHYDRATE 160; 4.5 UG/1; UG/1
AEROSOL RESPIRATORY (INHALATION)
Qty: 3 INHALER | Refills: 2 | Status: SHIPPED | OUTPATIENT
Start: 2019-03-13 | End: 2020-06-29

## 2019-03-13 RX ORDER — ALBUTEROL SULFATE 90 UG/1
2 AEROSOL, METERED RESPIRATORY (INHALATION) EVERY 4 HOURS PRN
Qty: 54 G | Refills: 1 | Status: SHIPPED | OUTPATIENT
Start: 2019-03-13 | End: 2020-06-29

## 2019-03-13 NOTE — PATIENT INSTRUCTIONS
[address] SCREENING SCHEDULE   Tests on this list are recommended by your physician but may not be covered, or covered at this frequency, by your insurer. Please check with your insurance carrier before scheduling to verify coverage.    Jojo Aponte Anyone with a family history    Colorectal Cancer Screening  Covered up to Age 76     Colonoscopy Screen   Covered every 10 years- more often if abnormal Colonoscopy due on 10/10/2019 Update [facility] if applicable    Flex Sigmoidoscopy Screen  Co 13 (Prevnar)  Covered Once after 65 Orders placed or performed in visit on 12/19/16   • PNEUMOCOCCAL VACC, 13 DIMITRIS IM    Please get once after your 65th birthday    Pneumococcal 23 (Pneumovax)  Covered Once after 65 Orders placed or performed in visit on 11 SCHEDULE   Tests on this list are recommended by your physician but may not be covered, or covered at this frequency, by your insurer. Please check with your insurance carrier before scheduling to verify coverage.    PREVENTATIVE SERVICES  INDICATIONS AND S more often if abnormal Colonoscopy due on 10/10/2019 Update [facility] if applicable    Flex Sigmoidoscopy Screen  Covered every 5 years No results found for this or any previous visit. No flowsheet data found.      Fecal Occult Blood   Covered Patience once after your 65th birthday    Pneumococcal 23 (Pneumovax)  Covered Once after 65 Orders placed or performed in visit on 11/09/15   • PNEUMOCOCCAL IMMUNIZATION    Please get once after your 65th birthday    Hepatitis B for Moderate/High Risk       No ord

## 2019-03-14 ENCOUNTER — PATIENT OUTREACH (OUTPATIENT)
Dept: CASE MANAGEMENT | Age: 69
End: 2019-03-14

## 2019-03-14 DIAGNOSIS — E78.00 PURE HYPERCHOLESTEROLEMIA: ICD-10-CM

## 2019-03-14 DIAGNOSIS — M51.37 DEGENERATION OF LUMBAR OR LUMBOSACRAL INTERVERTEBRAL DISC: ICD-10-CM

## 2019-03-14 DIAGNOSIS — E66.3 OVERWEIGHT (BMI 25.0-29.9): ICD-10-CM

## 2019-03-14 NOTE — PROGRESS NOTES
Spoke to Ami Hernandez verified for CCM call.     Medical History  Patient Active Problem List:     Osteoarthritis of knee     Degeneration of lumbar or lumbosacral intervertebral disc     Age-related nuclear cataract of both eyes     Asthma with COPD (chr to cold weather. Drink plenty of water, protein and electrolytes to prevent dehydration. Contact Dr. Callie Spence for dietary advise if needed.              Patient agrees to goal action plan.   Self-Management Abilities (patient reported)             (1) least c

## 2019-03-21 ENCOUNTER — HOSPITAL ENCOUNTER (OUTPATIENT)
Dept: MAMMOGRAPHY | Age: 69
Discharge: HOME | End: 2019-03-21
Attending: INTERNAL MEDICINE
Payer: MEDICARE

## 2019-03-21 ENCOUNTER — HOSPITAL ENCOUNTER (OUTPATIENT)
Dept: BONE DENSITY | Age: 69
Discharge: HOME | End: 2019-03-21
Attending: INTERNAL MEDICINE
Payer: MEDICARE

## 2019-03-21 DIAGNOSIS — Z78.0 POSTMENOPAUSAL: ICD-10-CM

## 2019-03-21 DIAGNOSIS — Z12.31 VISIT FOR SCREENING MAMMOGRAM: ICD-10-CM

## 2019-03-21 PROCEDURE — 77063 BREAST TOMOSYNTHESIS BI: CPT | Performed by: INTERNAL MEDICINE

## 2019-03-21 PROCEDURE — 77067 SCR MAMMO BI INCL CAD: CPT | Performed by: INTERNAL MEDICINE

## 2019-03-21 PROCEDURE — 77080 DXA BONE DENSITY AXIAL: CPT | Performed by: INTERNAL MEDICINE

## 2019-03-23 PROBLEM — R63.5 WEIGHT GAIN: Status: RESOLVED | Noted: 2018-05-24 | Resolved: 2019-03-23

## 2019-03-24 NOTE — PROGRESS NOTES
HPI:   Bonita Rae is a 76year old female who presents for a Medicare Subsequent Annual Wellness visit (Pt already had Initial Annual Wellness).     Annual Physical due on 04/11/2019      BP 99/55 (BP Location: Left arm, Patient Position: Sitting, Cu Depression Screening (PHQ-2/PHQ-9): Over the LAST 2 WEEKS   Little interest or pleasure in doing things (over the last two weeks)?: Several days  Feeling down, depressed, or hopeless (over the last two weeks)?: Several days  PHQ-2 SCORE: 2     Advanc (osteoarthritis)     Hyperlipidemia     Overweight (BMI 25.0-29. 9)    Wt Readings from Last 3 Encounters:  03/13/19 : 166 lb 6.4 oz (75.5 kg)  02/25/19 : 167 lb (75.8 kg)  12/04/18 : 180 lb (81.6 kg)     Last Cholesterol Labs:   Lab Results   Component Charisse lower extremity (deep venous thrombosis) ([facility] [address] Esophageal reflux, Fracture (12/2016), Hyperlipidemia, Lipid screening (06/09/2014), OA (osteoarthritis) (5/24/2017), OA (osteoarthritis) (5/24/2017), Obesity (BMI 30-39.9), Osteoporosis screening (06/20/2014 Cuff Size: adult)   Pulse 69   Temp 97.9 °F (36.6 °C) (Oral)   Ht 5' 6\" (1.676 m)   Wt 166 lb 6.4 oz (75.5 kg)   BMI 26.86 kg/m²  Estimated body mass index is 26.86 kg/m² as calculated from the following:    Height as of this encounter: 5' 6\" (1.676 m). Mouth/Throat: Oropharynx is clear and moist. No oropharyngeal exudate or posterior oropharyngeal erythema. Eyes: Conjunctivae and EOM are normal. Pupils are equal, round, and reactive to light. Right eye exhibits no discharge.  Left eye exhibits no disc (CPT=77080)  asymptomatic monitor    (E78.5) Hyperlipidemia, unspecified hyperlipidemia type  Plan: Low-cholesterol diet advised    (K21.9) Gastroesophageal reflux disease without esophagitis  Plan: Reflux precaution  Asymptomatic    (I87.2) Venous insuffi Colorectal Cancer Screening      Colonoscopy Screen every 10 years Colonoscopy due on 10/10/2019 Update [facility] if applicable    Flex Sigmoidoscopy Screen every 10 years No results found for this or any previous visit. No flowsheet data found. cover unless Medically needed    Zoster   Not covered by Medicare Part B No vaccine history found This may be covered with your pharmacy  prescription benefits      [address] [facility] Internal Lab or Procedure External Lab or Procedure      Annual

## 2019-03-31 PROCEDURE — 99490 CHRNC CARE MGMT STAFF 1ST 20: CPT

## 2019-04-15 ENCOUNTER — PATIENT OUTREACH (OUTPATIENT)
Dept: CASE MANAGEMENT | Age: 69
End: 2019-04-15

## 2019-04-15 DIAGNOSIS — E66.3 OVERWEIGHT (BMI 25.0-29.9): ICD-10-CM

## 2019-04-15 DIAGNOSIS — M51.37 DEGENERATION OF LUMBAR OR LUMBOSACRAL INTERVERTEBRAL DISC: ICD-10-CM

## 2019-04-15 DIAGNOSIS — J44.9 ASTHMA WITH COPD (CHRONIC OBSTRUCTIVE PULMONARY DISEASE) (HCC): Chronic | ICD-10-CM

## 2019-04-15 DIAGNOSIS — E78.00 PURE HYPERCHOLESTEROLEMIA: ICD-10-CM

## 2019-04-30 PROCEDURE — 99490 CHRNC CARE MGMT STAFF 1ST 20: CPT

## 2019-05-16 ENCOUNTER — PATIENT OUTREACH (OUTPATIENT)
Dept: CASE MANAGEMENT | Age: 69
End: 2019-05-16

## 2019-05-16 DIAGNOSIS — M51.37 DEGENERATION OF LUMBAR OR LUMBOSACRAL INTERVERTEBRAL DISC: ICD-10-CM

## 2019-05-16 DIAGNOSIS — E66.3 OVERWEIGHT (BMI 25.0-29.9): ICD-10-CM

## 2019-05-16 DIAGNOSIS — E78.00 PURE HYPERCHOLESTEROLEMIA: ICD-10-CM

## 2019-05-16 NOTE — PROGRESS NOTES
Spoke to Ami Sierra verified for CCM call.     Medical History  Patient Active Problem List:     Osteoarthritis of knee     Degeneration of lumbar or lumbosacral intervertebral disc     Age-related nuclear cataract of both eyes     Asthma with COPD (chr with a healthy diet daily exercise.     · Patient Reported on going barriers And Concerns:  about her daughter not being able to get pregnant persists. States her daughter becomes very angry and yells as her just out of frustration.   ·

## 2019-05-20 ENCOUNTER — OFFICE VISIT (OUTPATIENT)
Dept: SURGERY | Facility: CLINIC | Age: 69
End: 2019-05-20
Payer: MEDICARE

## 2019-05-20 VITALS
BODY MASS INDEX: 27 KG/M2 | SYSTOLIC BLOOD PRESSURE: 119 MMHG | WEIGHT: 168 LBS | DIASTOLIC BLOOD PRESSURE: 69 MMHG | HEIGHT: 66 IN | HEART RATE: 84 BPM | OXYGEN SATURATION: 96 % | RESPIRATION RATE: 16 BRPM

## 2019-05-20 DIAGNOSIS — K21.9 GASTROESOPHAGEAL REFLUX DISEASE, ESOPHAGITIS PRESENCE NOT SPECIFIED: Primary | ICD-10-CM

## 2019-05-20 DIAGNOSIS — E78.00 PURE HYPERCHOLESTEROLEMIA: ICD-10-CM

## 2019-05-20 DIAGNOSIS — E66.3 OVERWEIGHT (BMI 25.0-29.9): ICD-10-CM

## 2019-05-20 DIAGNOSIS — M15.9 OSTEOARTHRITIS OF MULTIPLE JOINTS, UNSPECIFIED OSTEOARTHRITIS TYPE: ICD-10-CM

## 2019-05-20 PROCEDURE — 99214 OFFICE O/P EST MOD 30 MIN: CPT | Performed by: INTERNAL MEDICINE

## 2019-05-20 RX ORDER — TOPIRAMATE 25 MG/1
25 TABLET ORAL 2 TIMES DAILY
Qty: 60 TABLET | Refills: 2 | Status: SHIPPED | OUTPATIENT
Start: 2019-05-20 | End: 2019-08-19

## 2019-05-20 NOTE — PROGRESS NOTES
[address] 91 [facility] [address]  Dept: [phone]     Date:   2017    Patient:  Jarod Copeland  :      1950  MRN:      [id number]    Chief Complaint: total) by mouth daily.  Disp: 90 capsule Rfl: 0   omeprazole 20 MG Oral Capsule Delayed Release TAKE 1 CAPSULE BY MOUTH   EVERY MORNING BEFORE BREAKFAST Disp: 90 capsule Rfl: 0   NYSTATIN 135125 UNIT/GM External Powder APPLY TOPICALLY TWO TIMES DAILY Disp: Active member of club or organization: Not on file        Attends meetings of clubs or organizations: Not on file        Relationship status: Not on file      Intimate partner violence:        Fear of current or ex partner: Not on file        Emotionally a · Patient is reading nutrition labels? yes  · Average Caloric Intake:     · Average CHO Intake: 130  · Is patient exercising? yes  · Type of exercise?  Walk for  45 Minutes    Eating Habits  · Patient states the following:  · Eats 3 meal(s) per day  · Maico Trinh organomegaly  Extremities: edema limited movement in knees  Pulses: 2+ and symmetric  Skin: Skin color, texture, turgor normal. No rashes or lesions    ASSESSMENT     OBSTRUCTIVE SLEEP APNEA: The patient states her sleep apnea has been stable since the las

## 2019-05-31 PROCEDURE — 99490 CHRNC CARE MGMT STAFF 1ST 20: CPT

## 2019-06-14 ENCOUNTER — PATIENT OUTREACH (OUTPATIENT)
Dept: CASE MANAGEMENT | Age: 69
End: 2019-06-14

## 2019-06-14 DIAGNOSIS — E78.00 PURE HYPERCHOLESTEROLEMIA: ICD-10-CM

## 2019-06-14 DIAGNOSIS — E66.3 OVERWEIGHT (BMI 25.0-29.9): ICD-10-CM

## 2019-06-14 NOTE — PROGRESS NOTES
Spoke to Ami Hernandez verified for CCM call.     Medical History  Patient Active Problem List:     Osteoarthritis of knee     Degeneration of lumbar or lumbosacral intervertebral disc     Age-related nuclear cataract of both eyes     Asthma with COPD (chr daily     · Patient Reported on going barriers And Concerns:  about her daughter not being able to get pregnant persists. States her daughter becomes very angry and yells as her just out of frustration.   ·                     - Plan for overcoming all barr

## 2019-06-24 ENCOUNTER — OFFICE VISIT (OUTPATIENT)
Dept: INTERNAL MEDICINE CLINIC | Facility: CLINIC | Age: 69
End: 2019-06-24
Payer: MEDICARE

## 2019-06-24 VITALS
HEIGHT: 66 IN | SYSTOLIC BLOOD PRESSURE: 95 MMHG | DIASTOLIC BLOOD PRESSURE: 57 MMHG | WEIGHT: 175 LBS | BODY MASS INDEX: 28.13 KG/M2 | HEART RATE: 62 BPM | TEMPERATURE: 98 F

## 2019-06-24 DIAGNOSIS — M17.0 OSTEOARTHRITIS OF BOTH KNEES, UNSPECIFIED OSTEOARTHRITIS TYPE: ICD-10-CM

## 2019-06-24 DIAGNOSIS — K21.9 GASTROESOPHAGEAL REFLUX DISEASE, ESOPHAGITIS PRESENCE NOT SPECIFIED: ICD-10-CM

## 2019-06-24 DIAGNOSIS — E78.00 PURE HYPERCHOLESTEROLEMIA: ICD-10-CM

## 2019-06-24 DIAGNOSIS — I83.813 VARICOSE VEINS OF BOTH LOWER EXTREMITIES WITH PAIN: ICD-10-CM

## 2019-06-24 DIAGNOSIS — J44.9 ASTHMA WITH COPD (CHRONIC OBSTRUCTIVE PULMONARY DISEASE) (HCC): Primary | Chronic | ICD-10-CM

## 2019-06-24 PROCEDURE — 99214 OFFICE O/P EST MOD 30 MIN: CPT | Performed by: INTERNAL MEDICINE

## 2019-06-24 PROCEDURE — G0463 HOSPITAL OUTPT CLINIC VISIT: HCPCS | Performed by: INTERNAL MEDICINE

## 2019-06-24 RX ORDER — HYDROCHLOROTHIAZIDE 12.5 MG/1
12.5 CAPSULE, GELATIN COATED ORAL DAILY
Qty: 90 CAPSULE | Refills: 3 | Status: SHIPPED | OUTPATIENT
Start: 2019-06-24 | End: 2019-08-19

## 2019-06-24 NOTE — PROGRESS NOTES
HPI:    Patient ID: Quang Armenta is a 71year old female.     HPI   follow up    Asthma  The same  Rescue inhaler habitually uses daily  Already seen by pulmonary    complaint of knee pain  Both and varicose vein  Mild leg swelling   scarrring both leg Disp: 60 tablet Rfl: 2   SYMBICORT 160-4.5 MCG/ACT Inhalation Aerosol INHALE 2 PUFFS INTO THE LUNGS TWO TIMES DAILY.  Disp: 3 Inhaler Rfl: 2   Albuterol Sulfate HFA (VENTOLIN HFA) 108 (90 Base) MCG/ACT Inhalation Aero Soln Inhale 2 puffs into the lungs ever Right 2/15/2017    Performed by Sharon Barraza MD at [facility] MAIN OR      Family History   Problem Relation Age of Onset   • Diabetes Mother 58   • Cancer Mother         unknown type   • Other (Other) Mother    • Stroke Father 79        CVA   • Diabetes Fathe No rash noted. She is not diaphoretic. No erythema. Nursing note and vitals reviewed.     Component      Latest Ref Rng & Units 3/13/2019   Glucose      70 - 99 mg/dL 91   Sodium      136 - 145 mmol/L 144   Potassium      3.5 - 5.1 mmol/L 3.4 (L)   Chlori 13% 10 year risk for major osteoporotic fracture. 2.8% 10 year risk for hip fracture.       mammo neg 3/21/19  dexa osteopenia 3/21/90    ASSESSMENT/PLAN:   (J44.9) Asthma with COPD (chronic obstructive pulmonary disease) (HCC)  (primary encounter diagnosi

## 2019-06-30 PROCEDURE — 99490 CHRNC CARE MGMT STAFF 1ST 20: CPT

## 2019-07-05 ENCOUNTER — PATIENT OUTREACH (OUTPATIENT)
Dept: CASE MANAGEMENT | Age: 69
End: 2019-07-05

## 2019-07-05 DIAGNOSIS — M51.37 DEGENERATION OF LUMBAR OR LUMBOSACRAL INTERVERTEBRAL DISC: ICD-10-CM

## 2019-07-05 DIAGNOSIS — E78.00 PURE HYPERCHOLESTEROLEMIA: ICD-10-CM

## 2019-07-05 DIAGNOSIS — E66.3 OVERWEIGHT (BMI 25.0-29.9): ICD-10-CM

## 2019-07-05 NOTE — PROGRESS NOTES
Spoke to Ami Rosenthal verified for CCM call.     Medical History  Patient Active Problem List:     Osteoarthritis of knee     Degeneration of lumbar or lumbosacral intervertebral disc     Age-related nuclear cataract of both eyes     Asthma with COPD (chr • Update to previous barriers: states she has been keeping hydrated and remaining positive. • Patient Reported New Barriers And Concerns: varicose veins                   - Plan for overcoming all barriers: states she will follow up with Dr. Tennille Medina

## 2019-07-31 PROCEDURE — 99490 CHRNC CARE MGMT STAFF 1ST 20: CPT

## 2019-08-05 ENCOUNTER — PATIENT OUTREACH (OUTPATIENT)
Dept: CASE MANAGEMENT | Age: 69
End: 2019-08-05

## 2019-08-05 DIAGNOSIS — M17.0 OSTEOARTHRITIS OF BOTH KNEES, UNSPECIFIED OSTEOARTHRITIS TYPE: ICD-10-CM

## 2019-08-05 DIAGNOSIS — J44.9 ASTHMA WITH COPD (CHRONIC OBSTRUCTIVE PULMONARY DISEASE) (HCC): Chronic | ICD-10-CM

## 2019-08-05 DIAGNOSIS — E78.00 PURE HYPERCHOLESTEROLEMIA: ICD-10-CM

## 2019-08-05 NOTE — PROGRESS NOTES
8/5/2019  Spoke to Reece luu for CCM.       Updates to patient care team/ comments: None  Patient reported changes in medications: None  Med Adherence  Comment: Taking as directed     Health Maintenance: Up to date  Influenza Vaccine(1) due on 09/01/2019  Col prn.  Time Spent This Encounter Total: 21 min medical record review, telephone communication, care plan updates where needed, education, goals and action plan recreation/update. Provided acknowledgment and validation to patient's concerns.    Monthly Minute

## 2019-08-16 ENCOUNTER — TELEPHONE (OUTPATIENT)
Dept: INTERNAL MEDICINE CLINIC | Facility: CLINIC | Age: 69
End: 2019-08-16

## 2019-08-16 DIAGNOSIS — E66.3 OVERWEIGHT (BMI 25.0-29.9): Primary | ICD-10-CM

## 2019-08-19 ENCOUNTER — OFFICE VISIT (OUTPATIENT)
Dept: SURGERY | Facility: CLINIC | Age: 69
End: 2019-08-19
Payer: MEDICARE

## 2019-08-19 VITALS
BODY MASS INDEX: 28.13 KG/M2 | SYSTOLIC BLOOD PRESSURE: 98 MMHG | WEIGHT: 175 LBS | DIASTOLIC BLOOD PRESSURE: 60 MMHG | HEIGHT: 66 IN | RESPIRATION RATE: 16 BRPM | OXYGEN SATURATION: 98 %

## 2019-08-19 DIAGNOSIS — R63.5 WEIGHT GAIN: ICD-10-CM

## 2019-08-19 DIAGNOSIS — E66.3 OVERWEIGHT (BMI 25.0-29.9): ICD-10-CM

## 2019-08-19 DIAGNOSIS — K21.9 GASTROESOPHAGEAL REFLUX DISEASE, ESOPHAGITIS PRESENCE NOT SPECIFIED: Primary | ICD-10-CM

## 2019-08-19 DIAGNOSIS — Z51.81 ENCOUNTER FOR THERAPEUTIC DRUG MONITORING: ICD-10-CM

## 2019-08-19 DIAGNOSIS — M17.0 OSTEOARTHRITIS OF BOTH KNEES, UNSPECIFIED OSTEOARTHRITIS TYPE: ICD-10-CM

## 2019-08-19 PROCEDURE — 99214 OFFICE O/P EST MOD 30 MIN: CPT | Performed by: INTERNAL MEDICINE

## 2019-08-19 RX ORDER — HYDROCHLOROTHIAZIDE 12.5 MG/1
12.5 CAPSULE, GELATIN COATED ORAL DAILY
Qty: 90 CAPSULE | Refills: 3 | Status: SHIPPED | OUTPATIENT
Start: 2019-08-19 | End: 2019-10-29

## 2019-08-19 RX ORDER — TOPIRAMATE 25 MG/1
25 TABLET ORAL 2 TIMES DAILY
Qty: 180 TABLET | Refills: 1 | Status: SHIPPED | OUTPATIENT
Start: 2019-08-19 | End: 2019-11-17

## 2019-08-19 NOTE — PROGRESS NOTES
[address], [facility]  [address] [facility] 91 [facility] [address]  Dept: [phone]     Date:   2017    Patient:  Curly Goldmann  :      1950  MRN:      [id number]    Chief Complaint: into the lungs every 4 (four) hours as needed.  For wheezing Disp: 54 g Rfl: 1   omeprazole 20 MG Oral Capsule Delayed Release TAKE 1 CAPSULE BY MOUTH   EVERY MORNING BEFORE BREAKFAST Disp: 90 capsule Rfl: 0   NYSTATIN 632169 UNIT/GM External Powder APPLY T Attends Latter day service: Not on file        Active member of club or organization: Not on file        Attends meetings of clubs or organizations: Not on file        Relationship status: Not on file      Intimate partner violence:        Fear of current o Discussed:       · Patient has a Food Journal?: yes   · Patient is reading nutrition labels? yes  · Average Caloric Intake:     · Average CHO Intake: 130  · Is patient exercising? yes  · Type of exercise?  Walk for  45 Minutes    Eating Habits  · Patient s sounds normal; no masses,  no organomegaly  Extremities: edema limited movement in knees  Pulses: 2+ and symmetric  Skin: Skin color, texture, turgor normal. No rashes or lesions    ASSESSMENT     OBSTRUCTIVE SLEEP APNEA: The patient states her sleep apnea

## 2019-08-31 PROCEDURE — 99490 CHRNC CARE MGMT STAFF 1ST 20: CPT

## 2019-09-10 ENCOUNTER — TELEPHONE (OUTPATIENT)
Dept: GASTROENTEROLOGY | Facility: CLINIC | Age: 69
End: 2019-09-10

## 2019-09-10 ENCOUNTER — PATIENT OUTREACH (OUTPATIENT)
Dept: CASE MANAGEMENT | Age: 69
End: 2019-09-10

## 2019-09-10 NOTE — TELEPHONE ENCOUNTER
----- Message from Piyush Hummel RN sent at 8/13/2015  2:57 PM CDT -----  Regarding: Recall Colon  Recall colon in 5 years per CB.  Colon done 10/10/14

## 2019-09-13 ENCOUNTER — HOSPITAL ENCOUNTER (OUTPATIENT)
Dept: ULTRASOUND IMAGING | Facility: HOSPITAL | Age: 69
Discharge: HOME | End: 2019-09-13
Attending: INTERNAL MEDICINE
Payer: MEDICARE

## 2019-09-13 DIAGNOSIS — I83.813 VARICOSE VEINS OF BOTH LOWER EXTREMITIES WITH PAIN: ICD-10-CM

## 2019-09-13 PROCEDURE — 93970 EXTREMITY STUDY: CPT | Performed by: INTERNAL MEDICINE

## 2019-09-17 ENCOUNTER — TELEPHONE (OUTPATIENT)
Dept: INTERNAL MEDICINE CLINIC | Facility: CLINIC | Age: 69
End: 2019-09-17

## 2019-09-17 DIAGNOSIS — Z12.11 COLON CANCER SCREENING: ICD-10-CM

## 2019-09-17 DIAGNOSIS — K21.9 GASTROESOPHAGEAL REFLUX DISEASE, ESOPHAGITIS PRESENCE NOT SPECIFIED: Primary | ICD-10-CM

## 2019-09-17 NOTE — TELEPHONE ENCOUNTER
Pt req another recommendation due to Dr. Dago Hale do not handle the req for varicose veins any longer, please advise

## 2019-09-18 NOTE — TELEPHONE ENCOUNTER
Dr Nikolai Zapata please review pended order for [facility] per patient request below, thank you.

## 2019-09-18 NOTE — TELEPHONE ENCOUNTER
Please ask manage care  Re vascular surgeon for varicose veins  Suggestion re  Who is covered by her insurance   Thanks for your help

## 2019-09-23 ENCOUNTER — OFFICE VISIT (OUTPATIENT)
Dept: INTERNAL MEDICINE CLINIC | Facility: CLINIC | Age: 69
End: 2019-09-23
Payer: MEDICARE

## 2019-09-23 VITALS
HEIGHT: 66 IN | DIASTOLIC BLOOD PRESSURE: 60 MMHG | TEMPERATURE: 97 F | HEART RATE: 60 BPM | SYSTOLIC BLOOD PRESSURE: 104 MMHG | WEIGHT: 177 LBS | BODY MASS INDEX: 28.45 KG/M2

## 2019-09-23 DIAGNOSIS — I87.2 VENOUS INSUFFICIENCY OF BOTH LOWER EXTREMITIES: ICD-10-CM

## 2019-09-23 DIAGNOSIS — I10 ESSENTIAL HYPERTENSION: ICD-10-CM

## 2019-09-23 DIAGNOSIS — J44.9 ASTHMA WITH COPD (CHRONIC OBSTRUCTIVE PULMONARY DISEASE) (HCC): ICD-10-CM

## 2019-09-23 DIAGNOSIS — I83.893 VARICOSE VEINS OF BOTH LOWER EXTREMITIES WITH COMPLICATIONS: ICD-10-CM

## 2019-09-23 DIAGNOSIS — M17.0 PRIMARY OSTEOARTHRITIS OF BOTH KNEES: Primary | ICD-10-CM

## 2019-09-23 PROCEDURE — 99214 OFFICE O/P EST MOD 30 MIN: CPT | Performed by: INTERNAL MEDICINE

## 2019-09-23 PROCEDURE — G0008 ADMIN INFLUENZA VIRUS VAC: HCPCS | Performed by: INTERNAL MEDICINE

## 2019-09-23 PROCEDURE — 90662 IIV NO PRSV INCREASED AG IM: CPT | Performed by: INTERNAL MEDICINE

## 2019-09-30 ENCOUNTER — PATIENT OUTREACH (OUTPATIENT)
Dept: CASE MANAGEMENT | Age: 69
End: 2019-09-30

## 2019-09-30 DIAGNOSIS — M17.0 OSTEOARTHRITIS OF BOTH KNEES, UNSPECIFIED OSTEOARTHRITIS TYPE: ICD-10-CM

## 2019-09-30 DIAGNOSIS — M15.9 OSTEOARTHRITIS OF MULTIPLE JOINTS, UNSPECIFIED OSTEOARTHRITIS TYPE: ICD-10-CM

## 2019-09-30 DIAGNOSIS — E78.00 PURE HYPERCHOLESTEROLEMIA: ICD-10-CM

## 2019-09-30 PROCEDURE — 99490 CHRNC CARE MGMT STAFF 1ST 20: CPT

## 2019-09-30 NOTE — PROGRESS NOTES
9/30/2019  Spoke to Matthias Cabrera for CCM.       Updates to patient care team/ comments: None  Patient reported changes in medications: None  Med Adherence  Comment: Taking as directed     Health Maintenance: Preventative care is current   Colonoscopy due on 10/1 Care managers interventions: Encouraged to continue with low carb healthy orquidea ace meals and daily exercise of 30 minutes.     Time Spent This Encounter Total: 22min medical record review, telephone communication, care plan updates where needed, educat

## 2019-10-05 NOTE — PROGRESS NOTES
HPI:    Patient ID: Aishwarya Dye is a 71year old female.     HPI    Follow up   Doing well  Chronic varicose vaine  HTN  Long standing history of hypertension     sympotms  :        Headache no  dizziness        no                             Blurred Negative for agitation and behavioral problems. Current Outpatient Medications:  topiramate 25 MG Oral Tab Take 1 tablet (25 mg total) by mouth 2 (two) times daily.  Disp: 180 tablet Rfl: 1   hydrochlorothiazide 12.5 MG Oral Cap Take 1 capsule (12 • COLONOSCOPY  2014   • CYSTOSCOPY RETROGRADE Right 2/15/2017    Performed by Sharon Barraza MD at [address] [place] [address] Gideon Right 2/15/2017    Performed by Sharon Barraza MD at [facility] OR   • CYSTOSCOPY URETEROSCOPY Right 2/15/2017 tenderness. [place] legged  Gait abn  Varicose veins   Lymphadenopathy:     She has no cervical adenopathy. Neurological: She is alert. Skin: No rash noted. She is not diaphoretic. No erythema. Nursing note and vitals reviewed.            ASSESSMENT/ESTELLE

## 2019-10-10 ENCOUNTER — TELEPHONE (OUTPATIENT)
Dept: INTERNAL MEDICINE CLINIC | Facility: CLINIC | Age: 69
End: 2019-10-10

## 2019-10-10 ENCOUNTER — OFFICE VISIT (OUTPATIENT)
Dept: OPHTHALMOLOGY | Facility: CLINIC | Age: 69
End: 2019-10-10
Payer: MEDICARE

## 2019-10-10 DIAGNOSIS — Z01.00 ROUTINE EYE EXAM: Primary | ICD-10-CM

## 2019-10-10 DIAGNOSIS — H53.003 BILATERAL AMBLYOPIA: ICD-10-CM

## 2019-10-10 DIAGNOSIS — H25.13 AGE-RELATED NUCLEAR CATARACT OF BOTH EYES: Primary | ICD-10-CM

## 2019-10-10 DIAGNOSIS — H52.4 HYPEROPIA OF BOTH EYES WITH ASTIGMATISM AND PRESBYOPIA: ICD-10-CM

## 2019-10-10 DIAGNOSIS — H52.203 HYPEROPIA OF BOTH EYES WITH ASTIGMATISM AND PRESBYOPIA: ICD-10-CM

## 2019-10-10 DIAGNOSIS — H52.03 HYPEROPIA OF BOTH EYES WITH ASTIGMATISM AND PRESBYOPIA: ICD-10-CM

## 2019-10-10 PROCEDURE — 92015 DETERMINE REFRACTIVE STATE: CPT | Performed by: OPHTHALMOLOGY

## 2019-10-10 PROCEDURE — 92014 COMPRE OPH EXAM EST PT 1/>: CPT | Performed by: OPHTHALMOLOGY

## 2019-10-10 NOTE — PATIENT INSTRUCTIONS
Age-related nuclear cataract of both eyes  Discussed with patient that cataract in both eyes are advanced enough at this time to consider surgery; it would be patient's choice. Discussed options such as surgery or change of glasses RX.   Discussed surgical

## 2019-10-10 NOTE — PROGRESS NOTES
Osiel Herrera is a 71year old female. HPI:     HPI     Consult      Additional comments: Per Dr. Aiyana Davis is here for a complete exam. Pt has not noticed any big changes in vision.  Pt complains of not driving at night an (Patient taking differently: Take 12.5 mg by mouth as needed.  ) Disp: 90 capsule Rfl: 3   SYMBICORT 160-4.5 MCG/ACT Inhalation Aerosol INHALE 2 PUFFS INTO THE LUNGS TWO TIMES DAILY.  Disp: 3 Inhaler Rfl: 2   Albuterol Sulfate HFA (VENTOLIN HFA) 108 (90 Bas Exam     Slit Lamp Exam       Right Left    Lids/Lashes Dermatochalasis, Meibomian gland dysfunction Dermatochalasis, Meibomian gland dysfunction    Conjunctiva/Sclera Normal Normal    Cornea trace MDF, trace guttae  1+ MDF, trace guttae    Anterior [place] for a referral to Dr. Magan Willoughby. Reassured patient that other than cataracts, eyes look very healthy; there is no evidence of glaucoma or macular degeneration in either eye. Advised yearly eye exams.        Hyperopia of both eyes with astigmatism and pres

## 2019-10-10 NOTE — TELEPHONE ENCOUNTER
PATIENT CALLED AND REQUESTED REFERRAL TO DR Ngoc Boogie (OPHTHALMOLOGY) FOR APPT SHE HAS TODAY 10/10/19. PLEASE SIGN OFF IF AGREE.   THANK YOU,  Doris 42

## 2019-10-10 NOTE — ASSESSMENT & PLAN NOTE
Patient has possible bilateral amblyopia due to high prescription, but patient states she could see well in both eyes as a child.

## 2019-10-22 ENCOUNTER — TELEPHONE (OUTPATIENT)
Dept: GASTROENTEROLOGY | Facility: CLINIC | Age: 69
End: 2019-10-22

## 2019-10-22 ENCOUNTER — OFFICE VISIT (OUTPATIENT)
Dept: GASTROENTEROLOGY | Facility: CLINIC | Age: 69
End: 2019-10-22
Payer: MEDICARE

## 2019-10-22 VITALS
BODY MASS INDEX: 29.09 KG/M2 | WEIGHT: 181 LBS | HEART RATE: 82 BPM | HEIGHT: 66 IN | DIASTOLIC BLOOD PRESSURE: 65 MMHG | SYSTOLIC BLOOD PRESSURE: 107 MMHG

## 2019-10-22 DIAGNOSIS — Z86.010 HISTORY OF COLON POLYPS: ICD-10-CM

## 2019-10-22 DIAGNOSIS — Z86.19 HISTORY OF HELICOBACTER PYLORI INFECTION: ICD-10-CM

## 2019-10-22 DIAGNOSIS — Z12.11 ENCOUNTER FOR SCREENING COLONOSCOPY: Primary | ICD-10-CM

## 2019-10-22 DIAGNOSIS — Z12.11 SCREEN FOR COLON CANCER: Primary | ICD-10-CM

## 2019-10-22 PROCEDURE — 99204 OFFICE O/P NEW MOD 45 MIN: CPT | Performed by: PHYSICIAN ASSISTANT

## 2019-10-22 RX ORDER — POLYETHYLENE GLYCOL 3350, SODIUM CHLORIDE, SODIUM BICARBONATE, POTASSIUM CHLORIDE 420; 11.2; 5.72; 1.48 G/4L; G/4L; G/4L; G/4L
POWDER, FOR SOLUTION ORAL
Qty: 1 BOTTLE | Refills: 0 | Status: SHIPPED | OUTPATIENT
Start: 2019-10-22 | End: 2020-01-20 | Stop reason: ALTCHOICE

## 2019-10-22 NOTE — PATIENT INSTRUCTIONS
1. Schedule colonoscopy with Dr. Emerald Gonzales with MAC anesthesia @ [facility] or [facility]    2.  bowel prep from pharmacy - I have prescribed Trilyte split dose preparation    3.  Medication Changes:  +HOLD IRON X 7 DAYS PRIOR TO THE PROCEDURE  ** If MAC @ [facility]/NE:

## 2019-10-22 NOTE — H&P
[address] [facility] [address] Gastroenterology                                                                                                  Clinic History and Physical     Pa illicit drug use   - Occupation:   - Lives near family - dtr lives with her  +      History, Medications, Allergies, ROS:      Past Medical History:   Diagnosis Date   • Anemia     FeSo4   • Asthma    • Colon polyp    • DVT of lower extremity (deep ve needed.  ), Disp: 90 capsule, Rfl: 3  SYMBICORT 160-4.5 MCG/ACT Inhalation Aerosol, INHALE 2 PUFFS INTO THE LUNGS TWO TIMES DAILY. , Disp: 3 Inhaler, Rfl: 2  Albuterol Sulfate HFA (VENTOLIN HFA) 108 (90 Base) MCG/ACT Inhalation Aero Soln, Inhale 2 puffs int breathing  Abdomen: soft, NTND abdomen with +NABS appreciated  Back: No CVA tenderness  Skin: dry, warm, no jaundice  Ext: no LE edema is evident.    Neuro: Alert and interactive, and patient is having movements of all 4 extremities   Psych: calm, appropria before and/or the day of the procedure(s)   - NO alcohol, recreational drugs nor erectile dysfunction mediations 24 hours before procedure(s)   - NO herbal supplements or weight loss medications x 7 days prior to the procedure(s)    ** If MAC @ [facility] or IV t

## 2019-10-23 ENCOUNTER — PATIENT OUTREACH (OUTPATIENT)
Dept: CASE MANAGEMENT | Age: 69
End: 2019-10-23

## 2019-10-23 DIAGNOSIS — R63.5 WEIGHT GAIN: ICD-10-CM

## 2019-10-23 DIAGNOSIS — J44.9 ASTHMA WITH COPD (CHRONIC OBSTRUCTIVE PULMONARY DISEASE) (HCC): Chronic | ICD-10-CM

## 2019-10-23 DIAGNOSIS — E78.00 PURE HYPERCHOLESTEROLEMIA: ICD-10-CM

## 2019-10-23 DIAGNOSIS — E66.3 OVERWEIGHT (BMI 25.0-29.9): ICD-10-CM

## 2019-10-23 NOTE — PROGRESS NOTES
Contacting patient to follow up on CCM for the month.  LMCB       Time with pt -  min  Chart review - 3 min  Total time - 3 min

## 2019-10-29 ENCOUNTER — OFFICE VISIT (OUTPATIENT)
Dept: SURGERY | Facility: CLINIC | Age: 69
End: 2019-10-29
Payer: MEDICARE

## 2019-10-29 VITALS
HEIGHT: 66 IN | OXYGEN SATURATION: 97 % | HEART RATE: 58 BPM | WEIGHT: 178 LBS | BODY MASS INDEX: 28.61 KG/M2 | SYSTOLIC BLOOD PRESSURE: 120 MMHG | DIASTOLIC BLOOD PRESSURE: 70 MMHG

## 2019-10-29 DIAGNOSIS — K21.9 GASTROESOPHAGEAL REFLUX DISEASE, ESOPHAGITIS PRESENCE NOT SPECIFIED: ICD-10-CM

## 2019-10-29 DIAGNOSIS — R63.5 WEIGHT GAIN: ICD-10-CM

## 2019-10-29 DIAGNOSIS — E66.3 OVERWEIGHT (BMI 25.0-29.9): ICD-10-CM

## 2019-10-29 DIAGNOSIS — E78.00 PURE HYPERCHOLESTEROLEMIA: Primary | ICD-10-CM

## 2019-10-29 DIAGNOSIS — Z51.81 ENCOUNTER FOR THERAPEUTIC DRUG MONITORING: ICD-10-CM

## 2019-10-29 PROCEDURE — 99214 OFFICE O/P EST MOD 30 MIN: CPT | Performed by: INTERNAL MEDICINE

## 2019-10-29 RX ORDER — PHENTERMINE HYDROCHLORIDE 15 MG/1
15 CAPSULE ORAL EVERY MORNING
Qty: 30 CAPSULE | Refills: 2 | Status: SHIPPED | OUTPATIENT
Start: 2019-10-29 | End: 2020-01-27

## 2019-10-29 RX ORDER — HYDROCHLOROTHIAZIDE 12.5 MG/1
12.5 CAPSULE, GELATIN COATED ORAL AS NEEDED
Qty: 30 CAPSULE | Refills: 2 | Status: SHIPPED | OUTPATIENT
Start: 2019-10-29 | End: 2020-04-27

## 2019-10-29 NOTE — PROGRESS NOTES
[address] 91 [facility] [address]  Dept: [phone]     Date:   2017    Patient:  Noemi Velasquez  :      1950  MRN:      [id number]    Chief Complaint: Inhalation Aerosol, INHALE 2 PUFFS INTO THE LUNGS TWO TIMES DAILY. , Disp: 3 Inhaler, Rfl: 2  Albuterol Sulfate HFA (VENTOLIN HFA) 108 (90 Base) MCG/ACT Inhalation Aero Soln, Inhale 2 puffs into the lungs every 4 (four) hours as needed.  For wheezing, Disp: Days per week: Not on file        Minutes per session: Not on file      Stress: Not on file    Relationships      Social connections:        Talks on phone: Not on file        Gets together: Not on file        Attends Orthodox service: Not on file 58   • Cancer Mother         unknown type   • Other (Other) Mother    • Stroke Father 79        CVA   • Diabetes Father    • Glaucoma Neg    • Macular degeneration Neg        Food Journal  · Reviewed and Discussed:       · Patient has a Food Journal?: yes conjunctivae/corneas clear. PERRL, EOM's intact. Fundi benign.   Lungs: clear to auscultation bilaterally  Heart: S1, S2 normal, no murmur, click, rub or gallop, regular rate and rhythm  Abdomen: soft, non-tender; bowel sounds normal; no masses,  no organom careful with late night eating    Tolerating topiramate.  Helps with cravings    PHENTERMINE: Since the patient would like to try phentermine, and is aware of the potential side effects (hypertension, palpitations, tachycardia, and anxiety), I will give The

## 2019-10-31 PROCEDURE — 99490 CHRNC CARE MGMT STAFF 1ST 20: CPT

## 2019-10-31 NOTE — PROGRESS NOTES
10/31/2019  Spoke to Reece luu for CCM.       Updates to patient care team/ comments: None  Patient reported changes in medications: None  Med Adherence  Comment: Taking as directed    Health Maintenance:Reviewed with pt  FIT Colorectal Screening due on 06/24 least confident in achieving goal, 5= very confident               - confidence: 4    Care Manager Follow Up: One month    Reason For Follow Up: review progress and or barriers towards patients goals.      Care managers interventions: Praised patient for on

## 2019-11-12 ENCOUNTER — PATIENT OUTREACH (OUTPATIENT)
Dept: CASE MANAGEMENT | Age: 69
End: 2019-11-12

## 2019-11-12 DIAGNOSIS — E78.00 PURE HYPERCHOLESTEROLEMIA: ICD-10-CM

## 2019-11-12 DIAGNOSIS — M17.0 OSTEOARTHRITIS OF BOTH KNEES, UNSPECIFIED OSTEOARTHRITIS TYPE: ICD-10-CM

## 2019-11-12 NOTE — PROGRESS NOTES
11/12/2019  Spoke to Matthias Cabrera for CCM.       Updates to patient care team/ comments: UTD  Patient reported changes in medications: None  Med Adherence  Comment: Taking as directed    Health Maintenance:Reviewed  FIT Colorectal Screening due on 06/24/2000  Co (patient reported)             1= least confident in achieving goal, 5= very confident               - confidence: 5    Care Manager Follow Up: One month    Reason For Follow Up: review progress and or barriers towards patients goals.      Care managers int

## 2019-11-26 ENCOUNTER — ANESTHESIA EVENT (OUTPATIENT)
Dept: ENDOSCOPY | Facility: HOSPITAL | Age: 69
End: 2019-11-26
Payer: MEDICARE

## 2019-11-26 ENCOUNTER — ANESTHESIA (OUTPATIENT)
Dept: ENDOSCOPY | Facility: HOSPITAL | Age: 69
End: 2019-11-26
Payer: MEDICARE

## 2019-11-26 ENCOUNTER — HOSPITAL ENCOUNTER (OUTPATIENT)
Facility: HOSPITAL | Age: 69
Setting detail: HOSPITAL OUTPATIENT SURGERY
Discharge: HOME | End: 2019-11-26
Attending: INTERNAL MEDICINE | Admitting: INTERNAL MEDICINE
Payer: MEDICARE

## 2019-11-26 VITALS
WEIGHT: 173 LBS | TEMPERATURE: 97 F | HEIGHT: 66 IN | DIASTOLIC BLOOD PRESSURE: 63 MMHG | OXYGEN SATURATION: 95 % | SYSTOLIC BLOOD PRESSURE: 101 MMHG | RESPIRATION RATE: 17 BRPM | BODY MASS INDEX: 27.8 KG/M2 | HEART RATE: 59 BPM

## 2019-11-26 DIAGNOSIS — Z12.11 SCREEN FOR COLON CANCER: ICD-10-CM

## 2019-11-26 DIAGNOSIS — Z86.010 HISTORY OF COLON POLYPS: ICD-10-CM

## 2019-11-26 PROCEDURE — 0DJD8ZZ INSPECTION OF LOWER INTESTINAL TRACT, VIA NATURAL OR ARTIFICIAL OPENING ENDOSCOPIC: ICD-10-PCS | Performed by: INTERNAL MEDICINE

## 2019-11-26 PROCEDURE — G0105 COLORECTAL SCRN; HI RISK IND: HCPCS | Performed by: INTERNAL MEDICINE

## 2019-11-26 RX ORDER — LIDOCAINE HYDROCHLORIDE 10 MG/ML
INJECTION, SOLUTION EPIDURAL; INFILTRATION; INTRACAUDAL; PERINEURAL AS NEEDED
Status: DISCONTINUED | OUTPATIENT
Start: 2019-11-26 | End: 2019-11-26 | Stop reason: SURG

## 2019-11-26 RX ORDER — SODIUM CHLORIDE, SODIUM LACTATE, POTASSIUM CHLORIDE, CALCIUM CHLORIDE 600; 310; 30; 20 MG/100ML; MG/100ML; MG/100ML; MG/100ML
INJECTION, SOLUTION INTRAVENOUS CONTINUOUS
Status: DISCONTINUED | OUTPATIENT
Start: 2019-11-26 | End: 2019-11-26

## 2019-11-26 RX ORDER — FAMOTIDINE 40 MG/1
40 TABLET, FILM COATED ORAL 2 TIMES DAILY PRN
Qty: 60 TABLET | Refills: 5 | Status: SHIPPED | OUTPATIENT
Start: 2019-11-26 | End: 2019-12-26

## 2019-11-26 RX ORDER — NALOXONE HYDROCHLORIDE 0.4 MG/ML
80 INJECTION, SOLUTION INTRAMUSCULAR; INTRAVENOUS; SUBCUTANEOUS AS NEEDED
Status: DISCONTINUED | OUTPATIENT
Start: 2019-11-26 | End: 2019-11-26

## 2019-11-26 RX ADMIN — LIDOCAINE HYDROCHLORIDE 50 MG: 10 INJECTION, SOLUTION EPIDURAL; INFILTRATION; INTRACAUDAL; PERINEURAL at 14:19:00

## 2019-11-26 RX ADMIN — SODIUM CHLORIDE, SODIUM LACTATE, POTASSIUM CHLORIDE, CALCIUM CHLORIDE: 600; 310; 30; 20 INJECTION, SOLUTION INTRAVENOUS at 14:57:00

## 2019-11-26 RX ADMIN — SODIUM CHLORIDE, SODIUM LACTATE, POTASSIUM CHLORIDE, CALCIUM CHLORIDE: 600; 310; 30; 20 INJECTION, SOLUTION INTRAVENOUS at 14:16:00

## 2019-11-26 NOTE — H&P
History & Physical Examination    Patient Name: Maximino Bryson  MRN: [id number]  CSN: [id number]  YOB: 1950    Diagnosis: Screening colonoscopy, history of colon polyp    Present Illness:     71year old female patient of Dr. Cleve Smith known Disp: 30 capsule, Rfl: 2, 11/25/2019  SYMBICORT 160-4.5 MCG/ACT Inhalation Aerosol, INHALE 2 PUFFS INTO THE LUNGS TWO TIMES DAILY. , Disp: 3 Inhaler, Rfl: 2, Taking  Albuterol Sulfate HFA (VENTOLIN HFA) 108 (90 Base) MCG/ACT Inhalation Aero Soln, Inhale 2 p • Overweight (BMI 25.0-29.9) 2/25/2019   • Thrombocytopenia ([address] 5/15/2017     Past Surgical History:   Procedure Laterality Date   • COLONOSCOPY  2014   • CYSTOSCOPY RETROGRADE Right 2/15/2017    Performed by Domi Carranza MD at [address] [place] [address]

## 2019-11-26 NOTE — OPERATIVE REPORT
COLONOSCOPY PROCEDURE REPORT     DATE OF PROCEDURE:  11/26/2019     PCP: Helena Ortiz MD     PREOPERATIVE DIAGNOSIS: Screening colonoscopy, history of colon polyp     POSTOPERATIVE DIAGNOSIS:  See impression. SURGEON:  Jaden Hamilton M.D.

## 2019-11-26 NOTE — ANESTHESIA POSTPROCEDURE EVALUATION
Patient: Felicity Melendez    Procedure Summary     Date:  11/26/19 Room / Location:  [facility] ENDOSCOPY 05 / [facility] ENDOSCOPY    Anesthesia Start:  5515 Anesthesia Stop:  5638    Procedure:  COLONOSCOPY (N/A ) Diagnosis:       Screen for colon cancer      History

## 2019-11-26 NOTE — ANESTHESIA PREPROCEDURE EVALUATION
Anesthesia PreOp Note    HPI:     Alexandra Jenkins is a 71year old female who presents for preoperative consultation requested by: Medina Burnham MD    Date of Surgery: 11/26/2019    Procedure(s):  COLONOSCOPY  Indication: Screen for colon can Thrombocytopenia ([facility] [address] 5/15/2017       Past Surgical History:   Procedure Laterality Date   • COLONOSCOPY  2014   • CYSTOSCOPY RETROGRADE Right 2/15/2017    Performed by Iveth Day MD at [address] [place] [address] OR   • [facility] Right 2/15/2017    Pe No current [facility]-ordered outpatient medications on file.       No Known Allergies    Family History   Problem Relation Age of Onset   • Diabetes Mother 58   • Cancer Mother         unknown type   • Other (Other) Mother    • Stroke Father 79        CVA   • Transfusions: Not Asked        Caffeine Concern: No        Occupational Exposure: Not Asked        Hobby Hazards: Not Asked        Sleep Concern: Not Asked        Stress Concern: Not Asked        Weight Concern: Not Asked        Special Diet: Not Asked The patient desires the anesthetic management as planned.   Josef Felipe  11/26/2019 12:59 PM

## 2019-11-30 PROCEDURE — 99490 CHRNC CARE MGMT STAFF 1ST 20: CPT

## 2019-12-05 ENCOUNTER — HOSPITAL ENCOUNTER (OUTPATIENT)
Dept: GENERAL RADIOLOGY | Facility: HOSPITAL | Age: 69
Discharge: HOME | End: 2019-12-05
Attending: UROLOGY
Payer: MEDICARE

## 2019-12-05 ENCOUNTER — OFFICE VISIT (OUTPATIENT)
Dept: SURGERY | Facility: CLINIC | Age: 69
End: 2019-12-05
Payer: MEDICARE

## 2019-12-05 VITALS
DIASTOLIC BLOOD PRESSURE: 65 MMHG | SYSTOLIC BLOOD PRESSURE: 127 MMHG | BODY MASS INDEX: 28 KG/M2 | WEIGHT: 173 LBS | HEART RATE: 80 BPM

## 2019-12-05 DIAGNOSIS — N20.0 RIGHT KIDNEY STONE: Primary | ICD-10-CM

## 2019-12-05 DIAGNOSIS — N20.0 RIGHT KIDNEY STONE: ICD-10-CM

## 2019-12-05 PROCEDURE — 99213 OFFICE O/P EST LOW 20 MIN: CPT | Performed by: UROLOGY

## 2019-12-05 PROCEDURE — 74019 RADEX ABDOMEN 2 VIEWS: CPT | Performed by: UROLOGY

## 2019-12-05 NOTE — PROGRESS NOTES
Noemi Velasquez is a 71year old female. HPI:   Patient presents with:   Follow - Up: patient presents for annual visit hx of kidney stones has no problems at this time       40-year-old female with a history of kidney stones most recently seen in the ENDOSCOPY   • CYSTOSCOPY RETROGRADE Right 2/15/2017    Performed by Paola Woods MD at [address] [place] [address] OR   • [place] Right 2/15/2017    Performed by Paola Woods MD at [address] [place] [address] OR   • CYSTOSCOPY URETEROSCOPY Right 2/15/2017    Performed Ascorbic Acid (VITAMIN C) 1000 MG Oral Tab      • FERROUS SULFATE CR OR      • Acetaminophen 500 MG Oral Cap      • REDNESS RELIEVER EYE DROPS 0.05 % Ophthalmic Solution      • Multiple Vitamins-Minerals (WOMENS MULTIVITAMIN PLUS OR)      • Calcium Carb-Ch

## 2019-12-11 ENCOUNTER — PATIENT OUTREACH (OUTPATIENT)
Dept: CASE MANAGEMENT | Age: 69
End: 2019-12-11

## 2019-12-11 DIAGNOSIS — E78.00 PURE HYPERCHOLESTEROLEMIA: ICD-10-CM

## 2019-12-11 DIAGNOSIS — E66.3 OVERWEIGHT (BMI 25.0-29.9): ICD-10-CM

## 2019-12-11 DIAGNOSIS — J44.9 ASTHMA WITH COPD (CHRONIC OBSTRUCTIVE PULMONARY DISEASE) (HCC): Chronic | ICD-10-CM

## 2019-12-11 NOTE — PROGRESS NOTES
12/11/2019  Spoke to Reece luu for CCM. Updates to patient care team/ comments: UTD  Patient reported changes in medications: Taking OTC Nyquil for cold.   Med Adherence  Comment: Taking as directed     Health Maintenance:Reviewed with patient up to date Up: review progress and or barriers towards patients goals. Care managers interventions: Encouraged patient to continue on low carb diet. Praised patient for weight loss and dedication to weight loss goals she has set.     [facility]

## 2019-12-13 ENCOUNTER — TELEPHONE (OUTPATIENT)
Dept: SURGERY | Facility: CLINIC | Age: 69
End: 2019-12-13

## 2019-12-13 NOTE — TELEPHONE ENCOUNTER
Pt called (name and  notified) read pt the results of x-ray pt is aware and will follow up in 1 year.

## 2019-12-13 NOTE — TELEPHONE ENCOUNTER
----- Message from Charles Rogel MD sent at 12/13/2019  8:56 AM CST -----  Please let patient know her abdominal x-ray shows no change in the size or the number of stones seen. She should follow-up in 1 year as scheduled.

## 2019-12-31 PROCEDURE — 99490 CHRNC CARE MGMT STAFF 1ST 20: CPT

## 2020-01-10 ENCOUNTER — PATIENT OUTREACH (OUTPATIENT)
Dept: CASE MANAGEMENT | Age: 70
End: 2020-01-10

## 2020-01-10 DIAGNOSIS — M51.37 DEGENERATION OF LUMBAR OR LUMBOSACRAL INTERVERTEBRAL DISC: ICD-10-CM

## 2020-01-10 DIAGNOSIS — E78.00 PURE HYPERCHOLESTEROLEMIA: ICD-10-CM

## 2020-01-10 DIAGNOSIS — M15.9 OSTEOARTHRITIS OF MULTIPLE JOINTS, UNSPECIFIED OSTEOARTHRITIS TYPE: ICD-10-CM

## 2020-01-10 NOTE — PROGRESS NOTES
1/10/2020  Spoke to Rey Bryson for CCM.       Updates to patient care team/ comments: UTD  Patient reported changes in medications: None  Med Adherence  Comment: Taking as directed    Health Maintenance: UTD  Annual Depression Screen due on 03/13/2020  Mammogr and action plan recreation/update. Provided acknowledgment and validation to patient's concerns.    Monthly Minute Total including today: 24    Physical assessment, complete health history, and care plan established by Uche Rodriguez MD, need for CCM deter

## 2020-01-20 ENCOUNTER — OFFICE VISIT (OUTPATIENT)
Dept: INTERNAL MEDICINE CLINIC | Facility: CLINIC | Age: 70
End: 2020-01-20
Payer: MEDICARE

## 2020-01-20 VITALS
TEMPERATURE: 98 F | HEART RATE: 67 BPM | WEIGHT: 183 LBS | SYSTOLIC BLOOD PRESSURE: 96 MMHG | HEIGHT: 66 IN | BODY MASS INDEX: 29.41 KG/M2 | DIASTOLIC BLOOD PRESSURE: 57 MMHG

## 2020-01-20 DIAGNOSIS — K21.9 GASTROESOPHAGEAL REFLUX DISEASE WITHOUT ESOPHAGITIS: ICD-10-CM

## 2020-01-20 DIAGNOSIS — E78.00 PURE HYPERCHOLESTEROLEMIA: ICD-10-CM

## 2020-01-20 DIAGNOSIS — Z12.31 VISIT FOR SCREENING MAMMOGRAM: ICD-10-CM

## 2020-01-20 DIAGNOSIS — I83.893 VARICOSE VEINS OF BOTH LOWER EXTREMITIES WITH COMPLICATIONS: ICD-10-CM

## 2020-01-20 DIAGNOSIS — M17.0 OSTEOARTHRITIS OF BOTH KNEES, UNSPECIFIED OSTEOARTHRITIS TYPE: ICD-10-CM

## 2020-01-20 DIAGNOSIS — I10 ESSENTIAL HYPERTENSION: Primary | ICD-10-CM

## 2020-01-20 PROCEDURE — 99214 OFFICE O/P EST MOD 30 MIN: CPT | Performed by: INTERNAL MEDICINE

## 2020-01-21 NOTE — PROGRESS NOTES
HPI:    Patient ID: Gail Rodriguez is a 71year old female.     HPI    HTN  Long standing history of hypertension     sympotms  :        Headache no  dizziness        no                             Blurred vision no  palpitaionsSyncope no  Chest pain  no Current Outpatient Medications   Medication Sig Dispense Refill   • Phentermine HCl 15 MG Oral Cap Take 1 capsule (15 mg total) by mouth every morning.  30 capsule 2   • hydrochlorothiazide 12.5 MG Oral Cap Take 1 capsule (12.5 mg total) by mouth as neede MAIN OR   • CYSTOSCOPY STENT INSERTION Right 2/15/2017    Performed by Bernardo Alicia MD at [address] [place] [address] MAIN OR   • CYSTOSCOPY URETEROSCOPY Right 2/15/2017    Performed by Bernardo Alicia MD at [address] [place] [address] MAIN OR      Family History   Problem Relation Age of Onset   • D diaphoretic. No erythema. Nursing note and vitals reviewed.   varicose veins         ASSESSMENT/PLAN:   (I10) Essential hypertension  (primary encounter diagnosis)  Plan: URINE MICROSCOPIC W REFLEX CULTURE        controlledCPM    (E78.00) Pure hypercholes

## 2020-01-27 ENCOUNTER — OFFICE VISIT (OUTPATIENT)
Dept: SURGERY | Facility: CLINIC | Age: 70
End: 2020-01-27
Payer: MEDICARE

## 2020-01-27 VITALS
DIASTOLIC BLOOD PRESSURE: 62 MMHG | WEIGHT: 188 LBS | SYSTOLIC BLOOD PRESSURE: 113 MMHG | HEART RATE: 58 BPM | OXYGEN SATURATION: 100 % | HEIGHT: 66 IN | BODY MASS INDEX: 30.22 KG/M2

## 2020-01-27 DIAGNOSIS — Z51.81 ENCOUNTER FOR THERAPEUTIC DRUG MONITORING: ICD-10-CM

## 2020-01-27 DIAGNOSIS — K21.9 GASTROESOPHAGEAL REFLUX DISEASE, ESOPHAGITIS PRESENCE NOT SPECIFIED: Primary | ICD-10-CM

## 2020-01-27 DIAGNOSIS — E66.9 OBESITY (BMI 30-39.9): ICD-10-CM

## 2020-01-27 PROCEDURE — 99214 OFFICE O/P EST MOD 30 MIN: CPT | Performed by: INTERNAL MEDICINE

## 2020-01-27 RX ORDER — PHENTERMINE HYDROCHLORIDE 15 MG/1
15 CAPSULE ORAL EVERY MORNING
Qty: 30 CAPSULE | Refills: 2 | Status: SHIPPED | OUTPATIENT
Start: 2020-01-27 | End: 2020-04-27

## 2020-01-27 NOTE — PROGRESS NOTES
[address], [address]  Dept: [phone]       Patient:  Ayush Pastrana  :      1950  MRN:      [id number]    Chief Complaint:  Patient presents Inhale 2 puffs into the lungs every 4 (four) hours as needed.  For wheezing 54 g 1   • omeprazole 20 MG Oral Capsule Delayed Release TAKE 1 CAPSULE BY MOUTH   EVERY MORNING BEFORE BREAKFAST 90 capsule 0   • NYSTATIN 211697 UNIT/GM External Powder APPLY TOPI organization: Not on file        Attends meetings of clubs or organizations: Not on file        Relationship status: Not on file      Intimate partner violence:        Fear of current or ex partner: Not on file        Emotionally abused: Not on file Journal  · Reviewed and Discussed:       · Patient has a Food Journal?: yes   · Patient is reading nutrition labels? yes  · Average Caloric Intake:     · Average CHO Intake: 130  · Is patient exercising? yes  · Type of exercise?  Walk for  45 Minutes    Ea soft, non-tender; bowel sounds normal; no masses,  no organomegaly  Extremities: edema limited movement in knees  Pulses: 2+ and symmetric  Skin: Skin color, texture, turgor normal. No rashes or lesions    ASSESSMENT     OBSTRUCTIVE SLEEP APNEA: The patien

## 2020-01-31 PROCEDURE — 99490 CHRNC CARE MGMT STAFF 1ST 20: CPT

## 2020-02-03 ENCOUNTER — PATIENT OUTREACH (OUTPATIENT)
Dept: CASE MANAGEMENT | Age: 70
End: 2020-02-03

## 2020-02-03 DIAGNOSIS — R63.5 WEIGHT GAIN: ICD-10-CM

## 2020-02-03 DIAGNOSIS — E66.3 OVERWEIGHT (BMI 25.0-29.9): ICD-10-CM

## 2020-02-03 DIAGNOSIS — E78.00 PURE HYPERCHOLESTEROLEMIA: ICD-10-CM

## 2020-02-03 DIAGNOSIS — K21.9 GASTROESOPHAGEAL REFLUX DISEASE, ESOPHAGITIS PRESENCE NOT SPECIFIED: ICD-10-CM

## 2020-02-03 NOTE — PROGRESS NOTES
2/3/2020  Spoke to Reece luu for CCM.       Updates to patient care team/ comments: UTD  Patient reported changes in medications: None  Med Adherence  Comment:Taking as directed    Health Maintenance: UTD  Annual Depression Screen due on 03/13/2020  Mammogram interventions: Encouraged planing outing with daughter a head of time, seeking new hobbies to keep busy and continue with diet managed by Dr. Amberly Rey. Encouraged three balanced meals along with 20-30 minutes of daily exercise and stretching.     Time Spe

## 2020-02-18 ENCOUNTER — TELEPHONE (OUTPATIENT)
Dept: OPHTHALMOLOGY | Facility: CLINIC | Age: 70
End: 2020-02-18

## 2020-02-18 DIAGNOSIS — H25.13 AGE-RELATED NUCLEAR CATARACT OF BOTH EYES: Primary | ICD-10-CM

## 2020-02-18 NOTE — TELEPHONE ENCOUNTER
Per briana referral received from office has , pt has appointment next month and new referral is needed, states office has fax number.

## 2020-02-18 NOTE — TELEPHONE ENCOUNTER
Spoke with managed care and they said they will process the referral and authorize it. Referral was authorized and I faxed to [facility] at [address] [place] [address] Ophthalmology as requested.

## 2020-02-18 NOTE — TELEPHONE ENCOUNTER
Put in referral request and routed the call information to Dr. Sonal Issa. I see that there is an open referral.  I will call [facility] care to verify.

## 2020-02-29 PROCEDURE — 99490 CHRNC CARE MGMT STAFF 1ST 20: CPT

## 2020-03-03 ENCOUNTER — PATIENT OUTREACH (OUTPATIENT)
Dept: CASE MANAGEMENT | Age: 70
End: 2020-03-03

## 2020-03-03 DIAGNOSIS — E78.00 PURE HYPERCHOLESTEROLEMIA: ICD-10-CM

## 2020-03-03 DIAGNOSIS — M15.9 OSTEOARTHRITIS OF MULTIPLE JOINTS, UNSPECIFIED OSTEOARTHRITIS TYPE: ICD-10-CM

## 2020-03-03 DIAGNOSIS — M51.37 DEGENERATION OF LUMBAR OR LUMBOSACRAL INTERVERTEBRAL DISC: ICD-10-CM

## 2020-03-03 NOTE — PROGRESS NOTES
3/3/2020  Spoke to Flaca Ramirez for CCM. Updates to patient care team/ comments: UTD  Patient reported changes in medications: None  Med Adherence  Comment: Taking as directed    Health Maintenance:  UTD, reviewed with patient.    Annual Depression Screen d barriers:none            Patient agrees to goal action plan. yes  Self-Management Abilities (patient reported)             1= least confident in achieving goal, 5= very confident               - confidence: 5    Care Manager Follow Up:  One month    Reason

## 2020-03-31 PROCEDURE — 99490 CHRNC CARE MGMT STAFF 1ST 20: CPT

## 2020-04-07 ENCOUNTER — TELEPHONE (OUTPATIENT)
Dept: SURGERY | Facility: CLINIC | Age: 70
End: 2020-04-07

## 2020-04-08 ENCOUNTER — PATIENT OUTREACH (OUTPATIENT)
Dept: CASE MANAGEMENT | Age: 70
End: 2020-04-08

## 2020-04-08 DIAGNOSIS — E78.00 PURE HYPERCHOLESTEROLEMIA: ICD-10-CM

## 2020-04-08 DIAGNOSIS — J44.9 ASTHMA WITH COPD (CHRONIC OBSTRUCTIVE PULMONARY DISEASE) (HCC): Chronic | ICD-10-CM

## 2020-04-08 DIAGNOSIS — M51.37 DEGENERATION OF LUMBAR OR LUMBOSACRAL INTERVERTEBRAL DISC: ICD-10-CM

## 2020-04-08 NOTE — PROGRESS NOTES
Contacting patient to follow up on CCM for the month.  LMCB    Time with pt -  min  Chart review - 2 min  Total time - 2 min  Total Monthly time- 2  min

## 2020-04-08 NOTE — TELEPHONE ENCOUNTER
Pt would like to know if her upcoming appointment with Dr. Arslan Archuleta will be canceled or will it be a telephone call. Please advise patient.

## 2020-04-08 NOTE — PROGRESS NOTES
4/8/2020  Spoke to Reece luu for CCM.       Updates to patient care team/ comments: UTD  Patient reported changes in medications: None  Med Adherence  Comment: Taking as directed    Health Maintenance: Reviewed with patient and encouraged updating once public Reported New Barriers And Concerns: None                   - Plan for overcoming all barriers:nonen/a             Patient agrees to goal action plan.  yes  Self-Management Abilities (patient reported)             1= least confident in achieving goal, 5= dwayne

## 2020-04-27 ENCOUNTER — VIRTUAL PHONE E/M (OUTPATIENT)
Dept: SURGERY | Facility: CLINIC | Age: 70
End: 2020-04-27
Payer: MEDICARE

## 2020-04-27 VITALS — BODY MASS INDEX: 27.32 KG/M2 | HEIGHT: 66 IN | WEIGHT: 170 LBS

## 2020-04-27 DIAGNOSIS — R63.2 INCREASED APPETITE: Primary | ICD-10-CM

## 2020-04-27 DIAGNOSIS — Z51.81 ENCOUNTER FOR THERAPEUTIC DRUG MONITORING: ICD-10-CM

## 2020-04-27 DIAGNOSIS — E66.3 OVERWEIGHT FOR HEIGHT: ICD-10-CM

## 2020-04-27 DIAGNOSIS — K21.9 GASTROESOPHAGEAL REFLUX DISEASE, ESOPHAGITIS PRESENCE NOT SPECIFIED: ICD-10-CM

## 2020-04-27 PROCEDURE — 99442 PHONE E/M BY PHYS 11-20 MIN: CPT | Performed by: INTERNAL MEDICINE

## 2020-04-27 RX ORDER — HYDROCHLOROTHIAZIDE 12.5 MG/1
12.5 CAPSULE, GELATIN COATED ORAL AS NEEDED
Qty: 30 CAPSULE | Refills: 2 | Status: SHIPPED | OUTPATIENT
Start: 2020-04-27 | End: 2020-06-29

## 2020-04-27 RX ORDER — PHENTERMINE HYDROCHLORIDE 15 MG/1
15 CAPSULE ORAL EVERY MORNING
Qty: 30 CAPSULE | Refills: 2 | Status: SHIPPED | OUTPATIENT
Start: 2020-04-27 | End: 2021-06-16

## 2020-04-27 NOTE — PROGRESS NOTES
[address] [place] [id number]  Dept: [phone]     Virtual Telephone Check-In    Ulices Reynoso verbally consents to a Virtual/Telephone Check-In visit on 04/2 mg total) by mouth as needed. 30 capsule 2   • SYMBICORT 160-4.5 MCG/ACT Inhalation Aerosol INHALE 2 PUFFS INTO THE LUNGS TWO TIMES DAILY.  3 Inhaler 2   • Albuterol Sulfate HFA (VENTOLIN HFA) 108 (90 Base) MCG/ACT Inhalation Aero Soln Inhale 2 puffs into t Not on file      Stress: Not on file    Relationships      Social connections:        Talks on phone: Not on file        Gets together: Not on file        Attends [employer] service: Not on file        Active member of club or organization: Not on file Relation Age of Onset   • Diabetes Mother 58   • Cancer Mother         unknown type   • Other (Other) Mother    • Stroke Father 79        CVA   • Diabetes Father    • Glaucoma Neg    • Macular degeneration Neg        Food Journal  · Reviewed and Discussed: APNEA: The patient states her sleep apnea has been stable since the last clinic visit. There has not been any increase in hyper-somnolence. GERD:  The patient states her acid reflux has been well controlled with her current medication.   No symptoms of

## 2020-04-30 PROCEDURE — 99490 CHRNC CARE MGMT STAFF 1ST 20: CPT

## 2020-05-06 ENCOUNTER — PATIENT OUTREACH (OUTPATIENT)
Dept: CASE MANAGEMENT | Age: 70
End: 2020-05-06

## 2020-05-06 DIAGNOSIS — E66.3 OVERWEIGHT FOR HEIGHT: ICD-10-CM

## 2020-05-06 DIAGNOSIS — E78.00 PURE HYPERCHOLESTEROLEMIA: ICD-10-CM

## 2020-05-06 NOTE — PROGRESS NOTES
5/6/2020  Spoke to Meggan Suarez for CCM.       Updates to patient care team/ comments: UTD  Patient reported changes in medications: None  Med Adherence  Comment: Taking as directed    Patricia Abel scheduled in June, Mammogram is scheduled for Praised patient for staying active and continuing with low carb diet. Encouraged three low carb balanced meals along with 20-30 minutes of daily exercise and stretching as tolerated.       Time Spent This Encounter Total: 23 min medical record review, tele

## 2020-05-07 ENCOUNTER — HOSPITAL ENCOUNTER (OUTPATIENT)
Dept: MAMMOGRAPHY | Facility: HOSPITAL | Age: 70
Discharge: HOME | End: 2020-05-07
Attending: INTERNAL MEDICINE
Payer: MEDICARE

## 2020-05-07 DIAGNOSIS — Z12.31 VISIT FOR SCREENING MAMMOGRAM: ICD-10-CM

## 2020-05-07 PROCEDURE — 77067 SCR MAMMO BI INCL CAD: CPT | Performed by: INTERNAL MEDICINE

## 2020-05-07 PROCEDURE — 77063 BREAST TOMOSYNTHESIS BI: CPT | Performed by: INTERNAL MEDICINE

## 2020-05-31 PROCEDURE — 99490 CHRNC CARE MGMT STAFF 1ST 20: CPT

## 2020-06-03 ENCOUNTER — PATIENT OUTREACH (OUTPATIENT)
Dept: CASE MANAGEMENT | Age: 70
End: 2020-06-03

## 2020-06-03 DIAGNOSIS — E78.00 PURE HYPERCHOLESTEROLEMIA: ICD-10-CM

## 2020-06-03 DIAGNOSIS — J44.9 ASTHMA WITH COPD (CHRONIC OBSTRUCTIVE PULMONARY DISEASE) (HCC): Chronic | ICD-10-CM

## 2020-06-03 DIAGNOSIS — E66.3 OVERWEIGHT FOR HEIGHT: ICD-10-CM

## 2020-06-03 NOTE — PROGRESS NOTES
6/3/2020  Spoke to Reece luu for CCM.       Updates to patient care team/ comments: UTD  Patient reported changes in medications: None  Med Adherence  Comment: Taking as directed    Health Maintenance:Reviewed and encouraged updating once public health concer month    Reason For Follow Up: review progress and or barriers towards patients goals. Care managers interventions: Suggested pt speak with MMP about speaking with a counselor or therapist. Pt declined.      Encouraged she continue with weight loss fantasma

## 2020-06-29 ENCOUNTER — OFFICE VISIT (OUTPATIENT)
Dept: INTERNAL MEDICINE CLINIC | Facility: CLINIC | Age: 70
End: 2020-06-29
Payer: MEDICARE

## 2020-06-29 VITALS
SYSTOLIC BLOOD PRESSURE: 101 MMHG | HEIGHT: 66 IN | DIASTOLIC BLOOD PRESSURE: 56 MMHG | WEIGHT: 176 LBS | TEMPERATURE: 98 F | HEART RATE: 57 BPM | BODY MASS INDEX: 28.28 KG/M2

## 2020-06-29 DIAGNOSIS — E66.3 OVERWEIGHT FOR HEIGHT: ICD-10-CM

## 2020-06-29 DIAGNOSIS — K21.9 GASTROESOPHAGEAL REFLUX DISEASE, ESOPHAGITIS PRESENCE NOT SPECIFIED: ICD-10-CM

## 2020-06-29 DIAGNOSIS — I10 ESSENTIAL HYPERTENSION: ICD-10-CM

## 2020-06-29 DIAGNOSIS — H25.13 AGE-RELATED NUCLEAR CATARACT OF BOTH EYES: ICD-10-CM

## 2020-06-29 DIAGNOSIS — Z00.00 ENCOUNTER FOR ANNUAL HEALTH EXAMINATION: ICD-10-CM

## 2020-06-29 DIAGNOSIS — E78.00 PURE HYPERCHOLESTEROLEMIA: ICD-10-CM

## 2020-06-29 DIAGNOSIS — I83.893 VARICOSE VEINS OF BOTH LOWER EXTREMITIES WITH COMPLICATIONS: ICD-10-CM

## 2020-06-29 DIAGNOSIS — M17.0 OSTEOARTHRITIS OF BOTH KNEES, UNSPECIFIED OSTEOARTHRITIS TYPE: ICD-10-CM

## 2020-06-29 DIAGNOSIS — J44.9 ASTHMA WITH COPD (CHRONIC OBSTRUCTIVE PULMONARY DISEASE) (HCC): ICD-10-CM

## 2020-06-29 DIAGNOSIS — Z00.00 MEDICARE ANNUAL WELLNESS VISIT, SUBSEQUENT: Primary | ICD-10-CM

## 2020-06-29 DIAGNOSIS — K21.9 GASTROESOPHAGEAL REFLUX DISEASE WITHOUT ESOPHAGITIS: ICD-10-CM

## 2020-06-29 PROCEDURE — G0439 PPPS, SUBSEQ VISIT: HCPCS | Performed by: INTERNAL MEDICINE

## 2020-06-29 RX ORDER — ALBUTEROL SULFATE 90 UG/1
2 AEROSOL, METERED RESPIRATORY (INHALATION) EVERY 4 HOURS PRN
Qty: 54 G | Refills: 1 | Status: SHIPPED | OUTPATIENT
Start: 2020-06-29 | End: 2021-06-23

## 2020-06-29 RX ORDER — BUDESONIDE AND FORMOTEROL FUMARATE DIHYDRATE 160; 4.5 UG/1; UG/1
AEROSOL RESPIRATORY (INHALATION)
Qty: 3 INHALER | Refills: 2 | Status: SHIPPED | OUTPATIENT
Start: 2020-06-29 | End: 2021-06-23

## 2020-06-29 RX ORDER — HYDROCHLOROTHIAZIDE 12.5 MG/1
12.5 CAPSULE, GELATIN COATED ORAL AS NEEDED
Qty: 90 CAPSULE | Refills: 1 | Status: SHIPPED | OUTPATIENT
Start: 2020-06-29 | End: 2020-07-27

## 2020-06-30 PROCEDURE — 99490 CHRNC CARE MGMT STAFF 1ST 20: CPT

## 2020-07-02 ENCOUNTER — PATIENT OUTREACH (OUTPATIENT)
Dept: CASE MANAGEMENT | Age: 70
End: 2020-07-02

## 2020-07-02 DIAGNOSIS — E78.00 PURE HYPERCHOLESTEROLEMIA: ICD-10-CM

## 2020-07-02 DIAGNOSIS — M15.9 OSTEOARTHRITIS OF MULTIPLE JOINTS, UNSPECIFIED OSTEOARTHRITIS TYPE: ICD-10-CM

## 2020-07-02 DIAGNOSIS — R63.5 WEIGHT GAIN: ICD-10-CM

## 2020-07-02 DIAGNOSIS — J44.9 ASTHMA WITH COPD (CHRONIC OBSTRUCTIVE PULMONARY DISEASE) (HCC): Chronic | ICD-10-CM

## 2020-07-02 DIAGNOSIS — E66.3 OVERWEIGHT FOR HEIGHT: ICD-10-CM

## 2020-07-02 NOTE — PROGRESS NOTES
7/2/2020  Spoke to Reece luu for CCM. Updates to patient care team/ comments: UTD  Patient reported changes in medications: None  Med Adherence  Comment: Taking as directed    Health Maintenance:  UTD, reviewed with patient.    Influenza Vaccine(1) due o For Follow Up: review progress and or barriers towards patients goals. Care managers interventions: Suggested she download Map my walk a free pavithra and to help keep and log her daily walks.  Encouraged her to keep hydrated and walk early morning or once h

## 2020-07-02 NOTE — PROGRESS NOTES
Contacting patient to follow up on CCM for the month.  LMCB       Time with pt -  min  Chart review - 2 min  Total time -2  min  Total Monthly time-2  min

## 2020-07-11 PROBLEM — R63.5 WEIGHT GAIN: Status: RESOLVED | Noted: 2018-05-24 | Resolved: 2020-07-11

## 2020-07-11 PROBLEM — R63.2 INCREASED APPETITE: Status: RESOLVED | Noted: 2020-04-27 | Resolved: 2020-07-11

## 2020-07-11 NOTE — PATIENT INSTRUCTIONS
[address] [place] [address] Blvd SCREENING SCHEDULE   Tests on this list are recommended by your physician but may not be covered, or covered at this frequency, by your insurer. Please check with your insurance carrier before scheduling to verify coverage.    Indira Lara Covered every 10 years- more often if abnormal Colonoscopy due on 11/26/2024 Update Health Maintenance if applicable    Flex Sigmoidoscopy Screen  Covered every 5 years No results found for this or any previous visit. No flowsheet data found.      Fecal O Once after 65 Orders placed or performed in visit on 12/19/16   • PNEUMOCOCCAL VACC, 13 DIMITRIS IM    Please get once after your 65th birthday    Pneumococcal 23 (Pneumovax)  Covered Once after 65 Orders placed or performed in visit on 11/09/15   • Rema Sorto

## 2020-07-27 RX ORDER — HYDROCHLOROTHIAZIDE 12.5 MG/1
12.5 CAPSULE, GELATIN COATED ORAL AS NEEDED
Qty: 90 CAPSULE | Refills: 0 | Status: SHIPPED | OUTPATIENT
Start: 2020-07-27 | End: 2021-09-07

## 2020-07-31 PROCEDURE — 99490 CHRNC CARE MGMT STAFF 1ST 20: CPT

## 2020-08-12 ENCOUNTER — PATIENT OUTREACH (OUTPATIENT)
Dept: CASE MANAGEMENT | Age: 70
End: 2020-08-12

## 2020-08-12 DIAGNOSIS — E66.3 OVERWEIGHT FOR HEIGHT: ICD-10-CM

## 2020-08-12 DIAGNOSIS — E78.00 PURE HYPERCHOLESTEROLEMIA: ICD-10-CM

## 2020-08-12 DIAGNOSIS — K21.9 GASTROESOPHAGEAL REFLUX DISEASE, ESOPHAGITIS PRESENCE NOT SPECIFIED: ICD-10-CM

## 2020-08-12 NOTE — PROGRESS NOTES
8/12/2020  Spoke to Reece luu for CCM. Updates to patient care team/ comments: UTD  Patient reported changes in medications: None  Med Adherence  Comment: Taking as directed    Health Maintenance:  UTD, reviewed with patient.    Influenza Vaccine(1) due           1= least confident in achieving goal, 5= very confident               - confidence: 5    Care Manager Follow Up: One month    Reason For Follow Up: review progress and or barriers towards patients goals.      Care managers interventions: Praised

## 2020-08-31 PROCEDURE — 99490 CHRNC CARE MGMT STAFF 1ST 20: CPT

## 2020-09-03 ENCOUNTER — PATIENT OUTREACH (OUTPATIENT)
Dept: CASE MANAGEMENT | Age: 70
End: 2020-09-03

## 2020-09-03 DIAGNOSIS — E78.00 PURE HYPERCHOLESTEROLEMIA: ICD-10-CM

## 2020-09-03 DIAGNOSIS — E66.3 OVERWEIGHT FOR HEIGHT: ICD-10-CM

## 2020-09-03 DIAGNOSIS — J44.9 ASTHMA WITH COPD (CHRONIC OBSTRUCTIVE PULMONARY DISEASE) (HCC): Chronic | ICD-10-CM

## 2020-09-03 NOTE — PROGRESS NOTES
9/3/2020  Spoke to Madhavi Ann for CCM. Updates to patient care team/ comments: UTD  Patient reported changes in medications: None  Med Adherence  Comment: Taking as directed    Health Maintenance:  UTD, reviewed with patient.    Influenza Vaccine(1) due o goal, 5= very confident               - confidence: 5    Care Manager Follow Up: One month    Reason For Follow Up: review progress and or barriers towards patients goals.      Care managers interventions: Praised pt for exercising, discissed getting Flu va

## 2020-09-30 PROCEDURE — 99490 CHRNC CARE MGMT STAFF 1ST 20: CPT

## 2020-10-02 ENCOUNTER — PATIENT OUTREACH (OUTPATIENT)
Dept: CASE MANAGEMENT | Age: 70
End: 2020-10-02

## 2020-10-02 DIAGNOSIS — E66.3 OVERWEIGHT FOR HEIGHT: ICD-10-CM

## 2020-10-02 DIAGNOSIS — E78.00 PURE HYPERCHOLESTEROLEMIA: ICD-10-CM

## 2020-10-02 DIAGNOSIS — K21.9 GASTROESOPHAGEAL REFLUX DISEASE, UNSPECIFIED WHETHER ESOPHAGITIS PRESENT: ICD-10-CM

## 2020-10-02 NOTE — PROGRESS NOTES
10/2/2020  Spoke to Reece luu for CCM. Updates to patient care team/ comments: UTD  Patient reported changes in medications: None  Med Adherence  Comment: Taking as directed    Health Maintenance: Reviewed with patient.    Influenza Vaccine(1) due on 10/ pt.   Goals for next month:  1. Keep a food log. 2. Drink 48-64 ounces of non-caloric beverages per day. No fruit juices or regular soda. 3. Increase activity-upper body exercises, walk 10 minutes per day.   4. Increase fruit and vegetable servings to 5-6

## 2020-10-31 PROCEDURE — 99490 CHRNC CARE MGMT STAFF 1ST 20: CPT

## 2020-11-03 ENCOUNTER — PATIENT OUTREACH (OUTPATIENT)
Dept: CASE MANAGEMENT | Age: 70
End: 2020-11-03

## 2020-11-03 DIAGNOSIS — E78.00 PURE HYPERCHOLESTEROLEMIA: ICD-10-CM

## 2020-11-03 DIAGNOSIS — E66.3 OVERWEIGHT FOR HEIGHT: ICD-10-CM

## 2020-11-03 DIAGNOSIS — J44.9 ASTHMA WITH COPD (CHRONIC OBSTRUCTIVE PULMONARY DISEASE) (HCC): Chronic | ICD-10-CM

## 2020-11-03 NOTE — PROGRESS NOTES
11/3/2020  Spoke to Nia Hess for CCM. Updates to patient care team/ comments: UTD  Patient reported changes in medications: None  Med Adherence  Comment: Taking as directed    Health Maintenance:  UTD, reviewed with patient.    Mammogram due on 05/07/20 instructions.     Reviewed with pt  Future Appointments   Date Time Provider Almita Morales   12/7/2020 10:00 AM Catrina Harris MD [facility] Counts include [facility] beds at the [facility]         Time Spent This Encounter Total: 21  min medical record review, telephone communication, care plan

## 2020-11-30 PROCEDURE — 99490 CHRNC CARE MGMT STAFF 1ST 20: CPT

## 2020-12-04 ENCOUNTER — PATIENT OUTREACH (OUTPATIENT)
Dept: CASE MANAGEMENT | Age: 70
End: 2020-12-04

## 2020-12-04 DIAGNOSIS — E78.00 PURE HYPERCHOLESTEROLEMIA: ICD-10-CM

## 2020-12-04 DIAGNOSIS — E66.3 OVERWEIGHT FOR HEIGHT: ICD-10-CM

## 2020-12-04 DIAGNOSIS — J44.9 ASTHMA WITH COPD (CHRONIC OBSTRUCTIVE PULMONARY DISEASE) (HCC): Chronic | ICD-10-CM

## 2020-12-04 DIAGNOSIS — K21.9 GASTROESOPHAGEAL REFLUX DISEASE, UNSPECIFIED WHETHER ESOPHAGITIS PRESENT: ICD-10-CM

## 2020-12-04 NOTE — PROGRESS NOTES
12/4/2020  Spoke to Reece luu for CCM. Updates to patient care team/ comments: UTD  Patient reported changes in medications: None  Med Adherence  Comment: Taking as directed    Health Maintenance: Reviewed with patient.    Zoster Vaccines(1 of 2) due on balance her weight. Patient agrees to goal action plan. yes  Self-Management Abilities (patient reported)             1= least confident in achieving goal, 5= very confident               - confidence: 4    Care Manager Follow Up:  One month

## 2020-12-07 ENCOUNTER — HOSPITAL ENCOUNTER (OUTPATIENT)
Dept: GENERAL RADIOLOGY | Facility: HOSPITAL | Age: 70
Discharge: HOME | End: 2020-12-07
Attending: UROLOGY
Payer: MEDICARE

## 2020-12-07 ENCOUNTER — OFFICE VISIT (OUTPATIENT)
Dept: SURGERY | Facility: CLINIC | Age: 70
End: 2020-12-07
Payer: MEDICARE

## 2020-12-07 VITALS
HEIGHT: 66 IN | RESPIRATION RATE: 16 BRPM | BODY MASS INDEX: 27.16 KG/M2 | HEART RATE: 65 BPM | DIASTOLIC BLOOD PRESSURE: 76 MMHG | SYSTOLIC BLOOD PRESSURE: 124 MMHG | WEIGHT: 169 LBS

## 2020-12-07 DIAGNOSIS — N20.0 RIGHT KIDNEY STONE: ICD-10-CM

## 2020-12-07 DIAGNOSIS — N20.0 KIDNEY STONES: ICD-10-CM

## 2020-12-07 DIAGNOSIS — N20.0 KIDNEY STONES: Primary | ICD-10-CM

## 2020-12-07 PROCEDURE — 74019 RADEX ABDOMEN 2 VIEWS: CPT | Performed by: UROLOGY

## 2020-12-07 PROCEDURE — 99213 OFFICE O/P EST LOW 20 MIN: CPT | Performed by: UROLOGY

## 2020-12-07 NOTE — PROGRESS NOTES
Gail Rodriguez is a 79year old female. HPI:   Patient presents with:  Kidney Problem: pt comes today for yearly visit, pt denies any issues with stones      68-year-old female presents in follow-up to visit December 5, 2019.   She has a history of ki MD at [address] [place] [address] MAIN OR      Family History   Problem Relation Age of Onset   • Diabetes Mother 58   • Cancer Mother         unknown type   • Other (Other) Mother    • Stroke Father 79        CVA   • Diabetes Father    • Glaucoma Neg    • Macular degeneration Ne diagnosis)  Right kidney stone    Recommended:  –KUB to be done in the next 1 to 3 days to rule out any enlarging kidney stones in the right side. –Otherwise, follow-up in 1 year.          Orders This Visit:  No orders of the defined types were placed in t

## 2020-12-31 PROCEDURE — 99490 CHRNC CARE MGMT STAFF 1ST 20: CPT

## 2020-12-31 PROCEDURE — G2058 CCM ADD 20MIN: HCPCS

## 2021-01-06 ENCOUNTER — PATIENT OUTREACH (OUTPATIENT)
Dept: CASE MANAGEMENT | Age: 71
End: 2021-01-06

## 2021-01-07 ENCOUNTER — PATIENT OUTREACH (OUTPATIENT)
Dept: CASE MANAGEMENT | Age: 71
End: 2021-01-07

## 2021-01-07 DIAGNOSIS — E66.3 OVERWEIGHT FOR HEIGHT: ICD-10-CM

## 2021-01-07 DIAGNOSIS — E78.00 PURE HYPERCHOLESTEROLEMIA: ICD-10-CM

## 2021-01-07 NOTE — PROGRESS NOTES
1/7/2021  Spoke to Reece luu for CCM. Updates to patient care team/ comments: UTD  Patient reported changes in medications: None  Med Adherence  Comment: Taking as directed    Health Maintenance:Reviewed with patient.    Zoster Vaccines(1 of 2) due on 06 Manager Follow Up: One month    Reason For Follow Up: review progress and or barriers towards patients goals. Care managers interventions: Encouraged three low carb balanced meals along with 20-30 minutes of daily exercise and stretching.  Provided info

## 2021-01-13 ENCOUNTER — TELEPHONE (OUTPATIENT)
Dept: INTERNAL MEDICINE CLINIC | Facility: CLINIC | Age: 71
End: 2021-01-13

## 2021-01-13 NOTE — TELEPHONE ENCOUNTER
Patient called to see if she had any lab work to complete. Advised patient that orders for fasting blood work were placed 1/2/20 and she has 1 year to complete. Number provided to central scheduling.

## 2021-01-31 PROCEDURE — 99490 CHRNC CARE MGMT STAFF 1ST 20: CPT

## 2021-02-03 DIAGNOSIS — Z23 NEED FOR VACCINATION: ICD-10-CM

## 2021-02-16 ENCOUNTER — PATIENT OUTREACH (OUTPATIENT)
Dept: CASE MANAGEMENT | Age: 71
End: 2021-02-16

## 2021-02-16 DIAGNOSIS — E66.3 OVERWEIGHT FOR HEIGHT: ICD-10-CM

## 2021-02-16 DIAGNOSIS — K21.9 GASTROESOPHAGEAL REFLUX DISEASE, UNSPECIFIED WHETHER ESOPHAGITIS PRESENT: ICD-10-CM

## 2021-02-16 DIAGNOSIS — E78.00 PURE HYPERCHOLESTEROLEMIA: ICD-10-CM

## 2021-02-16 NOTE — PROGRESS NOTES
Contacting patient to follow up on CCM for the month.  LMCB    Time with pt -  min  Chart review -2  min  Total time - 2 min  Total Monthly time-2  min

## 2021-02-17 NOTE — PROGRESS NOTES
2/17/2021  Spoke to Reece luu for CCM. Updates to patient care team/ comments: UTD  Patient reported changes in medications: None  Med Adherence  Comment: Taking as directed    Health Maintenance: Reviewed with patient.    Zoster Vaccines(1 of 2) due on Encouraged three balanced meals along with 20-30 minutes of daily exercise and stretching. Praised on efforts to stay healthy. Sympathetically listened provided support. Encouraged she speak with a therapist or counselor.  Discussed deep breathing exerci

## 2021-02-28 PROCEDURE — 99490 CHRNC CARE MGMT STAFF 1ST 20: CPT

## 2021-03-16 ENCOUNTER — PATIENT OUTREACH (OUTPATIENT)
Dept: CASE MANAGEMENT | Age: 71
End: 2021-03-16

## 2021-03-16 DIAGNOSIS — E66.3 OVERWEIGHT FOR HEIGHT: ICD-10-CM

## 2021-03-16 DIAGNOSIS — K21.9 GASTROESOPHAGEAL REFLUX DISEASE, UNSPECIFIED WHETHER ESOPHAGITIS PRESENT: ICD-10-CM

## 2021-03-16 DIAGNOSIS — E78.00 PURE HYPERCHOLESTEROLEMIA: ICD-10-CM

## 2021-03-16 NOTE — PROGRESS NOTES
3/16/2021  Spoke to Reece luu for CCM. Updates to patient care team/ comments: Dr. Tati Dang added to care team.  Patient reported changes in medications: None  Med Adherence  Comment: Taking as directed    Health Maintenance:  Reviewed with patient.    Z continue eating healthy, walking daily and stated she plans to take a little time to herself and get some things done she has not been able to do. Patient agrees to goal action plan.  yes  Self-Management Abilities (patient reported)

## 2021-03-31 PROCEDURE — 99490 CHRNC CARE MGMT STAFF 1ST 20: CPT

## 2021-04-01 ENCOUNTER — PATIENT OUTREACH (OUTPATIENT)
Dept: CASE MANAGEMENT | Age: 71
End: 2021-04-01

## 2021-04-01 DIAGNOSIS — E66.3 OVERWEIGHT FOR HEIGHT: ICD-10-CM

## 2021-04-01 DIAGNOSIS — E78.00 PURE HYPERCHOLESTEROLEMIA: ICD-10-CM

## 2021-04-01 DIAGNOSIS — J44.9 ASTHMA WITH COPD (CHRONIC OBSTRUCTIVE PULMONARY DISEASE) (HCC): Chronic | ICD-10-CM

## 2021-04-01 DIAGNOSIS — K21.9 GASTROESOPHAGEAL REFLUX DISEASE, UNSPECIFIED WHETHER ESOPHAGITIS PRESENT: ICD-10-CM

## 2021-04-01 NOTE — PROGRESS NOTES
Contacting patient to follow up on CCM for the month.  LMCB    Time with pt -  min  Chart review  2-  min  Total time -2  min  Total Monthly time- 2 min

## 2021-04-02 NOTE — PROGRESS NOTES
4/2/2021  Spoke to Reece luu for CCM. Updates to patient care team/ comments: UTD  Patient reported changes in medications: None  Med Adherence  Comment: Taking as directed    Health Maintenance:  Reviewed with patient.    Zoster Vaccines(1 of 2) Never d achieving goal, 5= very confident               - confidence: 4    Care Manager Follow Up: One month    Reason For Follow Up: review progress and or barriers towards patients goals. Care managers interventions:  Discussed mental health services.  Dona

## 2021-04-30 PROCEDURE — 99490 CHRNC CARE MGMT STAFF 1ST 20: CPT

## 2021-05-06 ENCOUNTER — PATIENT OUTREACH (OUTPATIENT)
Dept: CASE MANAGEMENT | Age: 71
End: 2021-05-06

## 2021-05-06 DIAGNOSIS — E78.00 PURE HYPERCHOLESTEROLEMIA: ICD-10-CM

## 2021-05-06 DIAGNOSIS — K21.9 GASTROESOPHAGEAL REFLUX DISEASE, UNSPECIFIED WHETHER ESOPHAGITIS PRESENT: ICD-10-CM

## 2021-05-06 DIAGNOSIS — E66.3 OVERWEIGHT FOR HEIGHT: ICD-10-CM

## 2021-05-06 NOTE — PROGRESS NOTES
5/6/2021  Spoke to Reece luu for CCM. Updates to patient care team/ comments: UTD  Patient reported changes in medications: None  Med Adherence  Comment: Taking as directed    Health Maintenance:  Encouraged scheduling Medicare px.    COVID-19 Vaccine(1) confident in achieving goal, 5= very confident               - confidence: 3    Care Manager Follow Up: One month    Reason For Follow Up: review progress and or barriers towards patients goals.      Care managers interventions: Encouraged three balanced me

## 2021-05-31 PROCEDURE — 99490 CHRNC CARE MGMT STAFF 1ST 20: CPT

## 2021-06-01 ENCOUNTER — PATIENT OUTREACH (OUTPATIENT)
Dept: CASE MANAGEMENT | Age: 71
End: 2021-06-01

## 2021-06-01 DIAGNOSIS — E78.00 PURE HYPERCHOLESTEROLEMIA: ICD-10-CM

## 2021-06-01 DIAGNOSIS — K21.9 GASTROESOPHAGEAL REFLUX DISEASE, UNSPECIFIED WHETHER ESOPHAGITIS PRESENT: ICD-10-CM

## 2021-06-01 DIAGNOSIS — E66.3 OVERWEIGHT FOR HEIGHT: ICD-10-CM

## 2021-06-01 NOTE — PROGRESS NOTES
6/1/2021  Spoke to Matthias Cabrera for CCM. Updates to patient care team/ comments: UTD  Patient reported changes in medications: None  Med Adherence  Comment: Taking as directed    Health Maintenance:  Reviewed with patient.    Mammogram due on 05/07/2021  Fa Follow Up: One month    Reason For Follow Up: review progress and or barriers towards patients goals. Care managers interventions: Encouraged deep breathing exercises, following up with MMP if she starts feeling anxious or depressed.  Encouraged to call

## 2021-06-16 ENCOUNTER — OFFICE VISIT (OUTPATIENT)
Dept: SURGERY | Facility: CLINIC | Age: 71
End: 2021-06-16
Payer: MEDICARE

## 2021-06-16 VITALS
HEART RATE: 62 BPM | WEIGHT: 188.63 LBS | DIASTOLIC BLOOD PRESSURE: 63 MMHG | HEIGHT: 66 IN | SYSTOLIC BLOOD PRESSURE: 109 MMHG | OXYGEN SATURATION: 98 % | BODY MASS INDEX: 30.31 KG/M2

## 2021-06-16 DIAGNOSIS — E66.9 OBESITY (BMI 30-39.9): ICD-10-CM

## 2021-06-16 DIAGNOSIS — E78.00 PURE HYPERCHOLESTEROLEMIA: Primary | ICD-10-CM

## 2021-06-16 DIAGNOSIS — Z51.81 ENCOUNTER FOR THERAPEUTIC DRUG MONITORING: ICD-10-CM

## 2021-06-16 DIAGNOSIS — R63.2 INCREASED APPETITE: ICD-10-CM

## 2021-06-16 PROCEDURE — 99214 OFFICE O/P EST MOD 30 MIN: CPT | Performed by: INTERNAL MEDICINE

## 2021-06-16 RX ORDER — TOPIRAMATE 25 MG/1
25 TABLET ORAL 2 TIMES DAILY
Qty: 180 TABLET | Refills: 1 | Status: SHIPPED | OUTPATIENT
Start: 2021-06-16 | End: 2021-09-23

## 2021-06-16 RX ORDER — PHENTERMINE HYDROCHLORIDE 15 MG/1
CAPSULE ORAL
Qty: 30 CAPSULE | Refills: 0 | OUTPATIENT
Start: 2021-06-16

## 2021-06-16 NOTE — PROGRESS NOTES
[address]  Dept: [phone]       Patient:  Ayush Pastrana  :      1950  MRN:      [id number]    Chief Complaint:  Patient presents LUNGS TWO TIMES DAILY. 3 Inhaler 2   • Albuterol Sulfate HFA (VENTOLIN HFA) 108 (90 Base) MCG/ACT Inhalation Aero Soln Inhale 2 puffs into the lungs every 4 (four) hours as needed.  For wheezing 54 g 1   • Phentermine HCl 15 MG Oral Cap Take 1 capsule (15 m Back Care: Not Asked        Exercise: Not Asked        Bike Helmet: Not Asked        Seat Belt: Not Asked        Self-Exams: Not Asked        Grew up on a farm: Not Asked        History of tanning: Not Asked        Outdoor occupation: Not Asked        Pt h Mother         unknown type   • Other (Other) Mother    • Stroke Father 79        CVA   • Diabetes Father    • Glaucoma Neg    • Macular degeneration Neg        Food Journal  · Reviewed and Discussed:       · Patient has a Food Journal?: yes   · Patient is PERRL, EOM's intact. Fundi benign.   Lungs: clear to auscultation bilaterally  Heart: S1, S2 normal, no murmur, click, rub or gallop, regular rate and rhythm  Abdomen: soft, non-tender; bowel sounds normal; no masses,  no organomegaly  Extremities: edema li BID    HCTZ PRN.  Does not need to take daily    Follow up with PCP      Mario Alberto Castaneda MD

## 2021-06-23 ENCOUNTER — OFFICE VISIT (OUTPATIENT)
Dept: INTERNAL MEDICINE CLINIC | Facility: CLINIC | Age: 71
End: 2021-06-23
Payer: MEDICARE

## 2021-06-23 VITALS
DIASTOLIC BLOOD PRESSURE: 59 MMHG | SYSTOLIC BLOOD PRESSURE: 101 MMHG | HEART RATE: 59 BPM | HEIGHT: 66 IN | BODY MASS INDEX: 30.05 KG/M2 | WEIGHT: 187 LBS

## 2021-06-23 DIAGNOSIS — H25.13 AGE-RELATED NUCLEAR CATARACT OF BOTH EYES: ICD-10-CM

## 2021-06-23 DIAGNOSIS — E66.9 OBESITY (BMI 30-39.9): ICD-10-CM

## 2021-06-23 DIAGNOSIS — I83.893 VARICOSE VEINS OF BOTH LOWER EXTREMITIES WITH COMPLICATIONS: ICD-10-CM

## 2021-06-23 DIAGNOSIS — Z12.31 VISIT FOR SCREENING MAMMOGRAM: ICD-10-CM

## 2021-06-23 DIAGNOSIS — Z78.0 POSTMENOPAUSAL: ICD-10-CM

## 2021-06-23 DIAGNOSIS — I10 ESSENTIAL HYPERTENSION: ICD-10-CM

## 2021-06-23 DIAGNOSIS — Z00.00 ENCOUNTER FOR ANNUAL HEALTH EXAMINATION: ICD-10-CM

## 2021-06-23 DIAGNOSIS — J44.9 ASTHMA WITH COPD (CHRONIC OBSTRUCTIVE PULMONARY DISEASE) (HCC): ICD-10-CM

## 2021-06-23 DIAGNOSIS — E78.5 HYPERLIPIDEMIA, UNSPECIFIED HYPERLIPIDEMIA TYPE: ICD-10-CM

## 2021-06-23 DIAGNOSIS — M17.0 OSTEOARTHRITIS OF BOTH KNEES, UNSPECIFIED OSTEOARTHRITIS TYPE: ICD-10-CM

## 2021-06-23 DIAGNOSIS — Z00.00 MEDICARE ANNUAL WELLNESS VISIT, SUBSEQUENT: Primary | ICD-10-CM

## 2021-06-23 DIAGNOSIS — E78.00 PURE HYPERCHOLESTEROLEMIA: ICD-10-CM

## 2021-06-23 DIAGNOSIS — K21.9 GASTROESOPHAGEAL REFLUX DISEASE WITHOUT ESOPHAGITIS: ICD-10-CM

## 2021-06-23 PROCEDURE — G0439 PPPS, SUBSEQ VISIT: HCPCS | Performed by: INTERNAL MEDICINE

## 2021-06-23 RX ORDER — ALBUTEROL SULFATE 90 UG/1
2 AEROSOL, METERED RESPIRATORY (INHALATION) EVERY 4 HOURS PRN
Qty: 54 G | Refills: 1 | Status: SHIPPED | OUTPATIENT
Start: 2021-06-23 | End: 2021-09-23

## 2021-06-23 RX ORDER — BUDESONIDE AND FORMOTEROL FUMARATE DIHYDRATE 160; 4.5 UG/1; UG/1
AEROSOL RESPIRATORY (INHALATION)
Qty: 3 G | Refills: 1 | Status: SHIPPED | OUTPATIENT
Start: 2021-06-23 | End: 2021-09-23

## 2021-06-23 RX ORDER — NYSTATIN 100000 [USP'U]/G
1 POWDER TOPICAL 2 TIMES DAILY
Qty: 60 G | Refills: 1 | Status: SHIPPED | OUTPATIENT
Start: 2021-06-23 | End: 2021-09-28

## 2021-06-24 NOTE — PATIENT INSTRUCTIONS
[address] SCREENING SCHEDULE   Tests on this list are recommended by your physician but may not be covered, or covered at this frequency, by your insurer. Please check with your insurance carrier before scheduling to verify coverage.    PREVEN 03/21/2019      No recommendations at this time   Pap and Pelvic    Pap   Covered every 2 years for women at normal risk;  Annually if at high risk -  No recommendations at this time    Chlamydia Annually if high risk -  No recommendations at this time   Sc from the [facility]. http://www. idph.[facility]. [place]/public/books/advin.htm  A link to the [employer].  This site has a lot of good information including definitions of the different types of Advance Directive

## 2021-06-24 NOTE — PROGRESS NOTES
HPI:   Cuauhtemoc Cisneros is a 70year old female who presents for a Medicare Subsequent Annual Wellness visit (Pt already had Initial Annual Wellness).       Annual Physical due on 06/29/2021        Fall/Risk Assessment   She has been screened for Falls and CAGE screening score of 0 on 6/23/2021, showing low risk of alcohol abuse.         Patient Care Team: Patient Care Team:  Marcus Ibarra MD as PCP - General (Internal Medicine)  Mary Cardona MD as Consulting Physician (Internal Medicine)  Mila Partida Application topically 2 (two) times daily. HYDROCHLOROTHIAZIDE 12.5 MG Oral Cap, TAKE 1 CAPSULE (12.5 MG TOTAL) BY MOUTH AS NEEDED.   omeprazole 20 MG Oral Capsule Delayed Release, TAKE 1 CAPSULE BY MOUTH   EVERY MORNING BEFORE BREAKFAST  Ascorbic Acid (VI REVIEW OF SYSTEMS:   Review of Systems   Constitutional: Negative for activity change, chills, fatigue and fever. HENT: Negative for ear discharge, nosebleeds, postnasal drip, rhinorrhea, sinus pressure and sore throat.     Eyes: Negative for pain, di No I misunderstand some words in a sentence and need to ask people to repeat themselves: No   I especially have trouble understanding the speech of women and children: No I have trouble understanding the speaker in a large room such as at a meeting or plac back: Neck supple. Lymphadenopathy:      Cervical: No cervical adenopathy. Skin:     Findings: No erythema or rash. Neurological:      Mental Status: She is alert.          Vaccination History     Immunization History   Administered Date(s) Administer HERI    (Z12.31) Visit for screening mammogram  Plan: [facility] EMILIA 2D+3D SCREENING BILAT         (CPT=77067/13442)        . Breast exam.  Bilateral   No discrete palpable masses or tenderness  No nipple discharge  And no axillary adenopathy.   SELF BREAT EXAM Panel  Cholesterol  Lipoprotein (HDL)  Triglycerides Covered every 5 years for all Medicare beneficiaries without apparent signs or symptoms of cardiovascular disease Lab Results   Component Value Date    CHOLEST 174 03/13/2019    HDL 91 (H) 03/13/2019 Prevnar 13: 12/19/2016    [id number]: 08/11/2017     No recommendations at this time    Hepatitis B One screening covered for patients with certain risk factors   -  No recommendations at this time    Tetanus Toxoid Not covered by Medicare Part B unless m

## 2021-06-25 ENCOUNTER — TELEPHONE (OUTPATIENT)
Dept: INTERNAL MEDICINE CLINIC | Facility: CLINIC | Age: 71
End: 2021-06-25

## 2021-06-25 NOTE — TELEPHONE ENCOUNTER
Prior authorization for nystatin powder has been initiated through [facility] using keycode: [id number] It takes about 1-5 business days for a decision to come back.

## 2021-06-25 NOTE — TELEPHONE ENCOUNTER
HPI:  Patient is a 57-year-old man who comes today for his annual physical.  Patient had lumbar back surgery about 5 months ago.  His back pain is much better.  Postoperative he had a below the knee DVT of the LLE. He took xarelto for three mths. He currently has no sx.  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 He sees a neurologist.   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    Depression     Fatty liver disease, nonalcoholic     Hepatitis     in high school    History of DVT (deep vein thrombosis) 01/2020    occured post op back surgery    Hyperlipidemia     JOLLY on CPAP        Past Surgical History:   Procedure Laterality Date    COLONOSCOPY N/A 9/21/2018    Procedure: COLONOSCOPY;  Surgeon: Gonzalo Glez MD;  Location: [facility];  Service: General;  Laterality: N/A;    neck fusion      SHOULDER SURGERY         SHx: per the electronic medical record    FHx: recorded in the electronic medical record    ROS:    denies any chest pains or shortness of breath. Denies any nausea, vomiting or diarrhea. Denies any fever, chills or sweats. Denies any change in weight, voice, stool, skin or hair. Denies any dysuria, dyspepsia or dysphagia. Denies any change in vision, hearing or headaches. Denies any swollen lymph nodes or loss of memory.    PE:  /72 (BP Location: Right arm)   Pulse (!) 58   Ht 6' (1.829 m)   Wt 82.8 kg (182 lb 8.7 oz)   SpO2 98%   BMI 24.76 kg/m²   Gen: Well-developed, well-nourished, male, in no acute distress, oriented x3  HEENT: neck is supple, no adenopathy, carotids 2+ equal without bruits, thyroid exam normal size without nodules.  CHEST: clear to auscultation and percussion  CVS: regular rate and rhythm without significant murmur, gallop, or rubs  ABD: soft,  Nystatin 243179 UNIT/GM External Powder, Apply 1 Application topically 2 (two) times daily. , Disp: 60 g, Rfl: 1    Key: [id number]  Patient Last Name: Neli Yeboah  : 1950 benign, no rebound no guarding, no distention.  Bowel sounds are normal.     nontender.  No palpable masses.  No organomegaly and no audible bruits.  RECTAL: no masses.  Prostate 20  Grams without nodules.  EXT: no clubbing, cyanosis, or edema  LYMPH: no cervical, inguinal, or axillary adenopathy  FEET: no loss of sensation.  No ulcers or pressure sores.  NEURO: gait normal.  Cranial nerves II- XII intact. No nystagmus.  Speech normal.   Gross motor and sensory unremarkable.    Lab Results   Component Value Date    WBC 4.90 04/18/2019    HGB 14.1 04/18/2019    HCT 43.8 04/18/2019     04/18/2019    CHOL 191 04/18/2019    TRIG 78 04/18/2019    HDL 49 04/18/2019    ALT 32 04/18/2019    AST 34 04/18/2019     04/18/2019    K 4.7 04/18/2019     04/18/2019    CREATININE 0.9 04/18/2019    BUN 18 04/18/2019    CO2 29 04/18/2019    TSH 0.637 04/18/2019    PSA 0.37 04/18/2019    INR 1.0 07/08/2014       Impression:  Stable problems below  Patient Active Problem List   Diagnosis    Hyperlipemia    Anxiety    Depression    Cervical pain (neck)    Fatty liver    JOLLY on CPAP    Concussion    History of DVT (deep vein thrombosis)       Plan:   Orders Placed This Encounter    CBC auto differential    Comprehensive metabolic panel    Lipid Panel    TSH    PSA, Screening    escitalopram oxalate (LEXAPRO) 10 MG tablet    pravastatin (PRAVACHOL) 40 MG tablet    memantine (NAMENDA) 10 MG Tab    ALPRAZolam (XANAX) 0.5 MG tablet   meds refilled. Will have lab work done and we will call him with results. See me once per year or o/w as needed     This note is generated with speech recognition software and is subject to transcription error and sound alike phrases that may be missed by proofreading.

## 2021-06-30 PROBLEM — Z51.81 ENCOUNTER FOR THERAPEUTIC DRUG MONITORING: Status: RESOLVED | Noted: 2021-06-16 | Resolved: 2021-06-30

## 2021-06-30 PROBLEM — R63.2 INCREASED APPETITE: Status: RESOLVED | Noted: 2020-04-27 | Resolved: 2021-06-30

## 2021-06-30 PROBLEM — E66.3 OVERWEIGHT FOR HEIGHT: Status: RESOLVED | Noted: 2019-02-25 | Resolved: 2021-06-30

## 2021-06-30 PROCEDURE — 99490 CHRNC CARE MGMT STAFF 1ST 20: CPT

## 2021-07-07 ENCOUNTER — PATIENT OUTREACH (OUTPATIENT)
Dept: CASE MANAGEMENT | Age: 71
End: 2021-07-07

## 2021-07-07 ENCOUNTER — HOSPITAL ENCOUNTER (OUTPATIENT)
Dept: MAMMOGRAPHY | Age: 71
Discharge: HOME | End: 2021-07-07
Attending: INTERNAL MEDICINE
Payer: MEDICARE

## 2021-07-07 ENCOUNTER — HOSPITAL ENCOUNTER (OUTPATIENT)
Dept: BONE DENSITY | Age: 71
Discharge: HOME | End: 2021-07-07
Attending: INTERNAL MEDICINE
Payer: MEDICARE

## 2021-07-07 DIAGNOSIS — Z12.31 VISIT FOR SCREENING MAMMOGRAM: ICD-10-CM

## 2021-07-07 DIAGNOSIS — E78.00 PURE HYPERCHOLESTEROLEMIA: ICD-10-CM

## 2021-07-07 DIAGNOSIS — Z78.0 POSTMENOPAUSAL: ICD-10-CM

## 2021-07-07 DIAGNOSIS — E66.9 OBESITY (BMI 30-39.9): ICD-10-CM

## 2021-07-07 PROCEDURE — 77063 BREAST TOMOSYNTHESIS BI: CPT | Performed by: INTERNAL MEDICINE

## 2021-07-07 PROCEDURE — 77067 SCR MAMMO BI INCL CAD: CPT | Performed by: INTERNAL MEDICINE

## 2021-07-07 PROCEDURE — 77080 DXA BONE DENSITY AXIAL: CPT | Performed by: INTERNAL MEDICINE

## 2021-07-07 NOTE — PROGRESS NOTES
Contacting patient to follow up on CCM for the month.  LMCB    Total time - 2 min  Total Monthly time- 2 min

## 2021-07-08 NOTE — PROGRESS NOTES
7/8/2021  Spoke to Reece luu for CCM. Updates to patient care team/ comments: UTD  Patient reported changes in medications: None  Med Adherence  Comment: Taking as directed    Health Maintenance:  UTD, reviewed with patient.    COVID-19 Vaccine(1) Never Follow Up: One month    Reason For Follow Up: review progress and or barriers towards patients goals. Care managers interventions: Encouraged balanced meals along with 20-30 minutes of daily exercise and stretching. Offered mental health support.  Discu

## 2021-07-10 ENCOUNTER — LAB ENCOUNTER (OUTPATIENT)
Dept: LAB | Age: 71
End: 2021-07-10
Attending: INTERNAL MEDICINE
Payer: MEDICARE

## 2021-07-10 DIAGNOSIS — E78.5 HYPERLIPIDEMIA, UNSPECIFIED HYPERLIPIDEMIA TYPE: ICD-10-CM

## 2021-07-10 LAB
ALBUMIN SERPL-MCNC: 3.3 G/DL (ref 3.4–5)
ALBUMIN/GLOB SERPL: 1 {RATIO} (ref 1–2)
ALP LIVER SERPL-CCNC: 98 U/L
ALT SERPL-CCNC: 17 U/L
ANION GAP SERPL CALC-SCNC: 4 MMOL/L (ref 0–18)
AST SERPL-CCNC: 16 U/L (ref 15–37)
BILIRUB SERPL-MCNC: 0.5 MG/DL (ref 0.1–2)
BUN BLD-MCNC: 20 MG/DL (ref 7–18)
BUN/CREAT SERPL: 27.8 (ref 10–20)
CALCIUM BLD-MCNC: 8.8 MG/DL (ref 8.5–10.1)
CHLORIDE SERPL-SCNC: 115 MMOL/L (ref 98–112)
CHOLEST SMN-MCNC: 162 MG/DL (ref ?–200)
CO2 SERPL-SCNC: 25 MMOL/L (ref 21–32)
CREAT BLD-MCNC: 0.72 MG/DL
DEPRECATED RDW RBC AUTO: 49.6 FL (ref 35.1–46.3)
ERYTHROCYTE [DISTWIDTH] IN BLOOD BY AUTOMATED COUNT: 14.3 % (ref 11–15)
GLOBULIN PLAS-MCNC: 3.3 G/DL (ref 2.8–4.4)
GLUCOSE BLD-MCNC: 90 MG/DL (ref 70–99)
HCT VFR BLD AUTO: 35.7 %
HDLC SERPL-MCNC: 92 MG/DL (ref 40–59)
HGB BLD-MCNC: 11.4 G/DL
LDLC SERPL CALC-MCNC: 63 MG/DL (ref ?–100)
M PROTEIN MFR SERPL ELPH: 6.6 G/DL (ref 6.4–8.2)
MCH RBC QN AUTO: 30 PG (ref 26–34)
MCHC RBC AUTO-ENTMCNC: 31.9 G/DL (ref 31–37)
MCV RBC AUTO: 93.9 FL
NONHDLC SERPL-MCNC: 70 MG/DL (ref ?–130)
OSMOLALITY SERPL CALC.SUM OF ELEC: 300 MOSM/KG (ref 275–295)
PATIENT FASTING Y/N/NP: YES
PATIENT FASTING Y/N/NP: YES
PLATELET # BLD AUTO: 154 10(3)UL (ref 150–450)
POTASSIUM SERPL-SCNC: 3.7 MMOL/L (ref 3.5–5.1)
RBC # BLD AUTO: 3.8 X10(6)UL
SODIUM SERPL-SCNC: 144 MMOL/L (ref 136–145)
T4 FREE SERPL-MCNC: 0.8 NG/DL (ref 0.8–1.7)
TRIGL SERPL-MCNC: 23 MG/DL (ref 30–149)
TSI SER-ACNC: 2.61 MIU/ML (ref 0.36–3.74)
VLDLC SERPL CALC-MCNC: 3 MG/DL (ref 0–30)
WBC # BLD AUTO: 3.4 X10(3) UL (ref 4–11)

## 2021-07-10 PROCEDURE — 80053 COMPREHEN METABOLIC PANEL: CPT

## 2021-07-10 PROCEDURE — 84439 ASSAY OF FREE THYROXINE: CPT

## 2021-07-10 PROCEDURE — 84443 ASSAY THYROID STIM HORMONE: CPT

## 2021-07-10 PROCEDURE — 36415 COLL VENOUS BLD VENIPUNCTURE: CPT

## 2021-07-10 PROCEDURE — 85027 COMPLETE CBC AUTOMATED: CPT

## 2021-07-10 PROCEDURE — 80061 LIPID PANEL: CPT

## 2021-07-13 ENCOUNTER — LAB ENCOUNTER (OUTPATIENT)
Dept: LAB | Age: 71
End: 2021-07-13
Attending: INTERNAL MEDICINE
Payer: MEDICARE

## 2021-07-13 DIAGNOSIS — I10 ESSENTIAL HYPERTENSION: ICD-10-CM

## 2021-07-13 PROCEDURE — 81015 MICROSCOPIC EXAM OF URINE: CPT

## 2021-07-16 ENCOUNTER — LAB ENCOUNTER (OUTPATIENT)
Dept: LAB | Age: 71
End: 2021-07-16
Attending: INTERNAL MEDICINE
Payer: MEDICARE

## 2021-07-16 DIAGNOSIS — D64.9 ANEMIA, UNSPECIFIED TYPE: ICD-10-CM

## 2021-07-16 LAB
BASOPHILS # BLD AUTO: 0.04 X10(3) UL (ref 0–0.2)
BASOPHILS NFR BLD AUTO: 1 %
DEPRECATED RDW RBC AUTO: 47.7 FL (ref 35.1–46.3)
EOSINOPHIL # BLD AUTO: 0.14 X10(3) UL (ref 0–0.7)
EOSINOPHIL NFR BLD AUTO: 3.6 %
ERYTHROCYTE [DISTWIDTH] IN BLOOD BY AUTOMATED COUNT: 14.1 % (ref 11–15)
FOLATE SERPL-MCNC: >20 NG/ML (ref 8.7–?)
HCT VFR BLD AUTO: 36 %
HGB BLD-MCNC: 11.9 G/DL
IMM GRANULOCYTES # BLD AUTO: 0.01 X10(3) UL (ref 0–1)
IMM GRANULOCYTES NFR BLD: 0.3 %
IRON SATURATION: 19 %
IRON SERPL-MCNC: 56 UG/DL
LYMPHOCYTES # BLD AUTO: 1.08 X10(3) UL (ref 1–4)
LYMPHOCYTES NFR BLD AUTO: 27.6 %
MCH RBC QN AUTO: 30.1 PG (ref 26–34)
MCHC RBC AUTO-ENTMCNC: 33.1 G/DL (ref 31–37)
MCV RBC AUTO: 91.1 FL
MONOCYTES # BLD AUTO: 0.5 X10(3) UL (ref 0.1–1)
MONOCYTES NFR BLD AUTO: 12.8 %
NEUTROPHILS # BLD AUTO: 2.14 X10 (3) UL (ref 1.5–7.7)
NEUTROPHILS # BLD AUTO: 2.14 X10(3) UL (ref 1.5–7.7)
NEUTROPHILS NFR BLD AUTO: 54.7 %
PLATELET # BLD AUTO: 147 10(3)UL (ref 150–450)
RBC # BLD AUTO: 3.95 X10(6)UL
TOTAL IRON BINDING CAPACITY: 302 UG/DL (ref 240–450)
TRANSFERRIN SERPL-MCNC: 203 MG/DL (ref 200–360)
VIT B12 SERPL-MCNC: 375 PG/ML (ref 193–986)
WBC # BLD AUTO: 3.9 X10(3) UL (ref 4–11)

## 2021-07-16 PROCEDURE — 85025 COMPLETE CBC W/AUTO DIFF WBC: CPT

## 2021-07-16 PROCEDURE — 36415 COLL VENOUS BLD VENIPUNCTURE: CPT

## 2021-07-16 PROCEDURE — 82746 ASSAY OF FOLIC ACID SERUM: CPT

## 2021-07-16 PROCEDURE — 82607 VITAMIN B-12: CPT

## 2021-07-16 PROCEDURE — 83540 ASSAY OF IRON: CPT

## 2021-07-16 PROCEDURE — 84466 ASSAY OF TRANSFERRIN: CPT

## 2021-07-22 ENCOUNTER — HOSPITAL ENCOUNTER (OUTPATIENT)
Dept: ULTRASOUND IMAGING | Facility: HOSPITAL | Age: 71
Discharge: HOME | End: 2021-07-22
Attending: INTERNAL MEDICINE
Payer: MEDICARE

## 2021-07-22 ENCOUNTER — HOSPITAL ENCOUNTER (OUTPATIENT)
Dept: MAMMOGRAPHY | Facility: HOSPITAL | Age: 71
Discharge: HOME | End: 2021-07-22
Attending: INTERNAL MEDICINE
Payer: MEDICARE

## 2021-07-22 DIAGNOSIS — R92.8 ABNORMAL MAMMOGRAM: ICD-10-CM

## 2021-07-22 PROCEDURE — 77061 BREAST TOMOSYNTHESIS UNI: CPT | Performed by: INTERNAL MEDICINE

## 2021-07-22 PROCEDURE — 77065 DX MAMMO INCL CAD UNI: CPT | Performed by: INTERNAL MEDICINE

## 2021-07-22 PROCEDURE — 76642 ULTRASOUND BREAST LIMITED: CPT | Performed by: INTERNAL MEDICINE

## 2021-07-31 PROCEDURE — 99490 CHRNC CARE MGMT STAFF 1ST 20: CPT

## 2021-08-02 ENCOUNTER — PATIENT OUTREACH (OUTPATIENT)
Dept: CASE MANAGEMENT | Age: 71
End: 2021-08-02

## 2021-08-02 DIAGNOSIS — E66.9 OBESITY (BMI 30-39.9): ICD-10-CM

## 2021-08-02 DIAGNOSIS — K21.9 GASTROESOPHAGEAL REFLUX DISEASE, UNSPECIFIED WHETHER ESOPHAGITIS PRESENT: ICD-10-CM

## 2021-08-02 DIAGNOSIS — E78.00 PURE HYPERCHOLESTEROLEMIA: ICD-10-CM

## 2021-08-02 NOTE — PROGRESS NOTES
8/2/2021  Spoke to Reece luu for CCM. Updates to patient care team/ comments: UTD  Patient reported changes in medications: None  Med Adherence  Comment: Taking as directed    Health Maintenance:  Reviewed with patient. Encouraged updating.    COVID-19 V month    Reason For Follow Up: review progress and or barriers towards patients goals. Care managers interventions: Reviewed healthy eating habits, regular exercise, skin care and preventative guidelines.      Encouraged patient to consider speaking wit

## 2021-08-05 NOTE — PROGRESS NOTES
[place]. ([id number]).  name and number provided for further follow up. Time Spent This Encounter Total: 1 min medical record review, telephone,  communication.      Monthly Minute Total including today: 6
Chart Reviewed. [facility]. ([id number]).  name and number provided for further follow up. Time Spent This Encounter Total: 5 min medical record review, telephone,  communication.      Monthly Minute Total including today: 5
[facility]. ([id number]).  name and number provided for further follow up. Letter sent requesting call back also. Time Spent This Encounter Total: 1 min medical record review, telephone,  communication.    .     Monthly Minute Total including today:
This document is complete and the patient is ready for discharge.

## 2021-08-31 PROCEDURE — 99490 CHRNC CARE MGMT STAFF 1ST 20: CPT

## 2021-09-07 ENCOUNTER — PATIENT OUTREACH (OUTPATIENT)
Dept: CASE MANAGEMENT | Age: 71
End: 2021-09-07

## 2021-09-07 DIAGNOSIS — K21.9 GASTROESOPHAGEAL REFLUX DISEASE, UNSPECIFIED WHETHER ESOPHAGITIS PRESENT: ICD-10-CM

## 2021-09-07 DIAGNOSIS — E78.00 PURE HYPERCHOLESTEROLEMIA: ICD-10-CM

## 2021-09-07 DIAGNOSIS — E66.9 OBESITY (BMI 30-39.9): ICD-10-CM

## 2021-09-07 RX ORDER — HYDROCHLOROTHIAZIDE 12.5 MG/1
12.5 CAPSULE, GELATIN COATED ORAL AS NEEDED
Qty: 90 CAPSULE | Refills: 0 | Status: SHIPPED | OUTPATIENT
Start: 2021-09-07

## 2021-09-07 NOTE — PROGRESS NOTES
9/7/2021  Spoke to Reece luu for CCM. Updates to patient care team/ comments: UTD  Patient reported changes in medications: None  Med Adherence  Comment: Taking as directed    Health Maintenance: Reviewed with patient.    COVID-19 Vaccine(1) Never done interventions:     Reviewed healthy eating habits, regular exercise and preventative guidelines. Reinforced healthy diet, lifestyle, and exercise.     Future Appointments   Date Time Provider Almita Morales   9/20/2021 10:45 AM Nona Skaggs MD [facility]

## 2021-09-22 NOTE — TELEPHONE ENCOUNTER
I did not see this medication in \"Current\" listing. Pharmacy asking for a completed script.     TOPIRAMATE 25 mg tablet

## 2021-09-23 ENCOUNTER — TELEPHONE (OUTPATIENT)
Dept: INTERNAL MEDICINE CLINIC | Facility: CLINIC | Age: 71
End: 2021-09-23

## 2021-09-23 RX ORDER — ALBUTEROL SULFATE 90 UG/1
2 AEROSOL, METERED RESPIRATORY (INHALATION) EVERY 4 HOURS PRN
Qty: 54 G | Refills: 1 | Status: SHIPPED | OUTPATIENT
Start: 2021-09-23

## 2021-09-23 RX ORDER — BUDESONIDE AND FORMOTEROL FUMARATE DIHYDRATE 160; 4.5 UG/1; UG/1
AEROSOL RESPIRATORY (INHALATION)
Qty: 3 G | Refills: 1 | Status: SHIPPED | OUTPATIENT
Start: 2021-09-23

## 2021-09-23 RX ORDER — TOPIRAMATE 25 MG/1
25 TABLET ORAL 2 TIMES DAILY
Qty: 180 TABLET | Refills: 1 | Status: SHIPPED | OUTPATIENT
Start: 2021-09-23

## 2021-09-23 NOTE — TELEPHONE ENCOUNTER
Refill passed per [facility], [facility] protocol. Requested Prescriptions   Pending Prescriptions Disp Refills    topiramate 25 MG Oral Tab 180 tablet 1     Sig: Take 1 tablet (25 mg total) by mouth 2 (two) times daily.         Neurology Medications Passed - 9

## 2021-09-23 NOTE — TELEPHONE ENCOUNTER
Current Outpatient Medications:   •  HYDROCHLOROTHIAZIDE 12.5 MG Oral Cap, TAKE 1 CAPSULE (12.5 MG TOTAL) BY MOUTH AS NEEDED., Disp: 90 capsule, Rfl: 0  •  SYMBICORT 160-4.5 MCG/ACT Inhalation Aerosol, INHALE 2 PUFFS INTO THE LUNGS TWO TIMES DAILY. , Disp

## 2021-09-23 NOTE — TELEPHONE ENCOUNTER
Refill passed per [facility], [facility] protocol. Requested Prescriptions   Pending Prescriptions Disp Refills    SYMBICORT 160-4.5 MCG/ACT Inhalation Aerosol 3 g 1     Sig: INHALE 2 PUFFS INTO THE LUNGS TWO TIMES DAILY.         Asthma & COPD Medication Protoc

## 2021-09-28 RX ORDER — NYSTATIN 100000 [USP'U]/G
1 POWDER TOPICAL 2 TIMES DAILY
Qty: 60 G | Refills: 1 | Status: SHIPPED | OUTPATIENT
Start: 2021-09-28

## 2021-09-28 NOTE — TELEPHONE ENCOUNTER
Current Outpatient Medications:     •  Nystatin [id number] UNIT/GM External Powder, Apply 1 Application topically 2 (two) times daily. , Disp: 60 g, Rfl: 1

## 2021-09-29 ENCOUNTER — TELEPHONE (OUTPATIENT)
Dept: INTERNAL MEDICINE CLINIC | Facility: CLINIC | Age: 71
End: 2021-09-29

## 2021-09-29 NOTE — TELEPHONE ENCOUNTER
[facility] calling for scripts ventolin HFA 90 MCG inhaler and rx HCTZ 12.5. Please call at [phone],[facility].

## 2021-09-29 NOTE — TELEPHONE ENCOUNTER
Ventolin was refilled on 09/23/2021 to [facility]. hydrochlorothiazide was not prescribed by Dr. Mello Albrecht.

## 2021-09-30 PROCEDURE — 99490 CHRNC CARE MGMT STAFF 1ST 20: CPT

## 2021-10-04 ENCOUNTER — PATIENT OUTREACH (OUTPATIENT)
Dept: CASE MANAGEMENT | Age: 71
End: 2021-10-04

## 2021-10-04 DIAGNOSIS — E78.00 PURE HYPERCHOLESTEROLEMIA: ICD-10-CM

## 2021-10-04 DIAGNOSIS — J44.9 ASTHMA WITH COPD (CHRONIC OBSTRUCTIVE PULMONARY DISEASE) (HCC): Chronic | ICD-10-CM

## 2021-10-04 DIAGNOSIS — E66.9 OBESITY (BMI 30-39.9): ICD-10-CM

## 2021-10-04 NOTE — PROGRESS NOTES
10/4/2021  Spoke to Sophie Burciaga for CCM. Updates to patient care team/ comments: UTD  Patient reported changes in medications: None  Med Adherence  Comment: Taking as directed    Health Maintenance:  Reviewed with patient.    COVID-19 Vaccine(1) Never done managers interventions: Encouraged she call back with COVID vaccine information. Reviewed healthy eating habits, regular exercise and preventative guidelines. Reinforced healthy diet, lifestyle, and exercise.     Future Appointments   Date Time Provid

## 2021-10-31 PROCEDURE — 99490 CHRNC CARE MGMT STAFF 1ST 20: CPT

## 2021-11-03 ENCOUNTER — PATIENT OUTREACH (OUTPATIENT)
Dept: CASE MANAGEMENT | Age: 71
End: 2021-11-03

## 2021-11-03 DIAGNOSIS — E66.9 OBESITY (BMI 30-39.9): ICD-10-CM

## 2021-11-03 DIAGNOSIS — K21.9 GASTROESOPHAGEAL REFLUX DISEASE, UNSPECIFIED WHETHER ESOPHAGITIS PRESENT: ICD-10-CM

## 2021-11-03 DIAGNOSIS — E78.00 PURE HYPERCHOLESTEROLEMIA: ICD-10-CM

## 2021-11-03 NOTE — PROGRESS NOTES
11/3/2021  Spoke to Reece luu for CCM. Updates to patient care team/ comments: UTD  Patient reported changes in medications: None  Med Adherence  Comment: Taking as directed    Health Maintenance: Discussed with patient.  States she had Flu vaccine place - Plan for overcoming all barriers: States she is             Patient agrees to goal action plan.  yes  Self-Management Abilities (patient reported)             1= least confident in achieving goal, 5= very confident               - confidence:

## 2021-11-30 PROCEDURE — 99490 CHRNC CARE MGMT STAFF 1ST 20: CPT

## 2021-12-06 ENCOUNTER — OFFICE VISIT (OUTPATIENT)
Dept: GASTROENTEROLOGY | Facility: CLINIC | Age: 71
End: 2021-12-06
Payer: MEDICARE

## 2021-12-06 VITALS
WEIGHT: 200.38 LBS | BODY MASS INDEX: 32.2 KG/M2 | HEART RATE: 73 BPM | SYSTOLIC BLOOD PRESSURE: 116 MMHG | DIASTOLIC BLOOD PRESSURE: 68 MMHG | HEIGHT: 66 IN

## 2021-12-06 DIAGNOSIS — Z86.010 PERSONAL HISTORY OF COLONIC POLYPS: ICD-10-CM

## 2021-12-06 DIAGNOSIS — K21.9 GASTROESOPHAGEAL REFLUX DISEASE, UNSPECIFIED WHETHER ESOPHAGITIS PRESENT: Primary | ICD-10-CM

## 2021-12-06 DIAGNOSIS — D64.9 ANEMIA, UNSPECIFIED TYPE: ICD-10-CM

## 2021-12-06 PROCEDURE — 99214 OFFICE O/P EST MOD 30 MIN: CPT | Performed by: INTERNAL MEDICINE

## 2021-12-06 NOTE — PROGRESS NOTES
HPI:    Patient ID: Quang Armenta returns for follow-up today. Initially, she and her daughter are not exactly sure why she is here.     Review of the office visit with Dr. Andrew Cornejo in June and labs/lab results July 2021 shows normocytic anemia and refe significance. Distant history of smoking. Reassuring colonoscopy examination October 2014.    ======================  Previous visit 9/29/2014: Tanisha Mahan is a  woman referred to us by Dr. Jeanette Granados for a screening colonoscopy examination.  Anushka Ignacio hemorrhoids. RECOMMENDATIONS  1. High-fiber diet. 2. Follow up above colon polyp pathology results. 3. Repeat colonoscopy examination in 5 years.     D: 10/10/2014 1:42 P    ====================    FLORY CARTER     DATE OF SURGERY:  10/19/15 scope was drawn back to the stomach, air and secretions were suctioned out. The scope was withdrawn from the patient. She tolerated this brief procedure well. IMPRESSION:  1.  Normal esophagus and gastroesophageal junction in this patient   wit · Small internal hemorrhoids only on good prep colonoscopy examination to the cecum and terminal ileum as above.     RECOMMENDATIONS:  · High fiber diet.   · Repeat colonoscopy examination in 7–10 years  · No aspirin or NSAID medications for next 10 days abdominal tenderness. Skin:     General: Skin is warm. Neurological:      General: No focal deficit present. Mental Status: She is alert and oriented to person, place, and time.    Psychiatric:         Mood and Affect: Mood normal.         Behavior has no symptoms at present, Ms Claribel Pimentel defers any invasive testing for now. She is satisfied with a normal colonoscopy examination just 2 years ago. · I offered to follow her CBC and iron studies here, but Ms. Claribel Pimentel would rather continue to follow with

## 2021-12-07 ENCOUNTER — APPOINTMENT (OUTPATIENT)
Dept: GENERAL RADIOLOGY | Facility: HOSPITAL | Age: 71
End: 2021-12-07
Attending: EMERGENCY MEDICINE
Payer: MEDICARE

## 2021-12-07 ENCOUNTER — HOSPITAL ENCOUNTER (EMERGENCY)
Facility: HOSPITAL | Age: 71
Discharge: HOME | End: 2021-12-07
Attending: EMERGENCY MEDICINE
Payer: MEDICARE

## 2021-12-07 ENCOUNTER — PATIENT OUTREACH (OUTPATIENT)
Dept: CASE MANAGEMENT | Age: 71
End: 2021-12-07

## 2021-12-07 ENCOUNTER — NURSE TRIAGE (OUTPATIENT)
Dept: INTERNAL MEDICINE CLINIC | Facility: CLINIC | Age: 71
End: 2021-12-07

## 2021-12-07 ENCOUNTER — VIRTUAL PHONE E/M (OUTPATIENT)
Dept: INTERNAL MEDICINE CLINIC | Facility: CLINIC | Age: 71
End: 2021-12-07
Payer: MEDICARE

## 2021-12-07 ENCOUNTER — OFFICE VISIT (OUTPATIENT)
Dept: SURGERY | Facility: CLINIC | Age: 71
End: 2021-12-07
Payer: MEDICARE

## 2021-12-07 VITALS
HEART RATE: 70 BPM | BODY MASS INDEX: 31.82 KG/M2 | TEMPERATURE: 99 F | WEIGHT: 198 LBS | RESPIRATION RATE: 18 BRPM | HEIGHT: 66 IN | DIASTOLIC BLOOD PRESSURE: 70 MMHG | SYSTOLIC BLOOD PRESSURE: 124 MMHG | OXYGEN SATURATION: 95 %

## 2021-12-07 DIAGNOSIS — E78.00 PURE HYPERCHOLESTEROLEMIA: ICD-10-CM

## 2021-12-07 DIAGNOSIS — J44.9 ASTHMA WITH COPD (CHRONIC OBSTRUCTIVE PULMONARY DISEASE) (HCC): Primary | Chronic | ICD-10-CM

## 2021-12-07 DIAGNOSIS — N20.0 KIDNEY STONES: Primary | ICD-10-CM

## 2021-12-07 DIAGNOSIS — Z20.822 SUSPECTED 2019 NOVEL CORONAVIRUS INFECTION: ICD-10-CM

## 2021-12-07 DIAGNOSIS — E66.9 OBESITY (BMI 30-39.9): ICD-10-CM

## 2021-12-07 DIAGNOSIS — J44.9 ASTHMA WITH COPD (CHRONIC OBSTRUCTIVE PULMONARY DISEASE) (HCC): Chronic | ICD-10-CM

## 2021-12-07 DIAGNOSIS — U07.1 PNEUMONIA DUE TO COVID-19 VIRUS: ICD-10-CM

## 2021-12-07 DIAGNOSIS — J12.82 PNEUMONIA DUE TO COVID-19 VIRUS: ICD-10-CM

## 2021-12-07 DIAGNOSIS — U07.1 COVID-19: Primary | ICD-10-CM

## 2021-12-07 PROCEDURE — 99213 OFFICE O/P EST LOW 20 MIN: CPT | Performed by: UROLOGY

## 2021-12-07 PROCEDURE — 99284 EMERGENCY DEPT VISIT MOD MDM: CPT

## 2021-12-07 PROCEDURE — 99442 PHONE E/M BY PHYS 11-20 MIN: CPT | Performed by: NURSE PRACTITIONER

## 2021-12-07 PROCEDURE — 71045 X-RAY EXAM CHEST 1 VIEW: CPT | Performed by: EMERGENCY MEDICINE

## 2021-12-07 PROCEDURE — 93010 ELECTROCARDIOGRAM REPORT: CPT | Performed by: EMERGENCY MEDICINE

## 2021-12-07 PROCEDURE — 93005 ELECTROCARDIOGRAM TRACING: CPT

## 2021-12-07 RX ORDER — CODEINE PHOSPHATE AND GUAIFENESIN 10; 100 MG/5ML; MG/5ML
5 SOLUTION ORAL EVERY 6 HOURS PRN
Qty: 118 ML | Refills: 0 | Status: SHIPPED | OUTPATIENT
Start: 2021-12-07

## 2021-12-07 RX ORDER — ONDANSETRON 4 MG/1
4 TABLET, ORALLY DISINTEGRATING ORAL ONCE
Status: COMPLETED | OUTPATIENT
Start: 2021-12-07 | End: 2021-12-07

## 2021-12-07 RX ORDER — BENZOCAINE/MENTH/CETYLPYRD CL 15 MG-2 MG
1 LOZENGE MUCOUS MEMBRANE 4 TIMES DAILY PRN
Qty: 168 LOZENGE | Refills: 0 | Status: SHIPPED | OUTPATIENT
Start: 2021-12-07 | End: 2021-12-21

## 2021-12-07 NOTE — ED INITIAL ASSESSMENT (HPI)
Patient states she has been having poor appetite and diarrhea for the past week. Patient also having shortness of breath and chest pain. Patient states she was sent by PMD for Covid testing.

## 2021-12-07 NOTE — PROGRESS NOTES
Ayush Pastrana is a 70year old female.     HPI:   Patient presents with:  Kidney Problem: pt comes today for yearly visit, denies pain       70year old female with history of kidney stones s/p right ureteroscopy and laser lithotripsy 2/17 here for foll Packs/day: 0.30        Years: 30.00        Pack years: 9        Types: Cigarettes        Quit date: 2004        Years since quittin.8      Smokeless tobacco: Never Used    Vaping Use      Vaping Use: Never used    Alcohol use:  Yes      Alcohol/we encounter       Imaging & Referrals:  US KIDNEYS (CPT=76775)  XR ABDOMEN (1 VIEW) (CPT=74018)     12/7/2021  Franchesca Barragan MD

## 2021-12-07 NOTE — PROGRESS NOTES
Virtual Telephone Check-In    Quang Armenta verbally consents to a Virtual/Telephone Check-In visit on 12/07/21. Patient understands and accepts financial responsibility for any deductible, co-insurance and/or co-pays associated with this service. Sulfate HFA (VENTOLIN HFA) 108 (90 Base) MCG/ACT Inhalation Aero Soln, Inhale 2 puffs into the lungs every 4 (four) hours as needed.  For wheezing, Disp: 54 g, Rfl: 1  •  HYDROCHLOROTHIAZIDE 12.5 MG Oral Cap, TAKE 1 CAPSULE (12.5 MG TOTAL) BY MOUTH AS NEEDE Worsening  [x]   Unchanged  []    Waxing/Waning  []      Past medical/social history:   • Hypertension: Yes []     No [x]     • Asthma/COPD/other respiratory: Yes [x]     No []     • Diabetes: Yes []     No [x]      • Heart disease: Yes []     No [x] diagnosis:  Problem List Items Addressed This Visit        Respiratory    Asthma with COPD (chronic obstructive pulmonary disease) ([facility] [address] - Primary (Chronic)      Other Visit Diagnoses     Suspected 2019 novel coronavirus infection            Follow up-prn disease) ([facility] [address]  Continue with Symbicort  Continue with albuterol every 4-6 hours as needed. Suspected 2019 novel coronavirus infection  Discussed going to ER due to chest pain shortness of breath and severe diarrhea possible dehydration.   Patient agreed

## 2021-12-07 NOTE — PROGRESS NOTES
12/7/2021  Spoke to Abbie Lewis for CCM. Updates to patient care team/ comments: UTD  Patient reported changes in medications: None  Med Adherence  Comment: Taking as directed    Health Maintenance:  Reviewed with patient.    COVID-19 Vaccine(1) Never done Manager Follow Up: One week    Reason For Follow Up: review progress and or barriers towards patients goals.      Care managers interventions: Encouraged patient to keep appointment with [facility], stay hydrated and rest.     Bud Deng sent to Thanh DAY

## 2021-12-07 NOTE — TELEPHONE ENCOUNTER
Action Requested: Summary for Provider     []  Critical Lab, Recommendations Needed  [] Need Additional Advice  []   FYI    []   Need Orders  [] Need Medications Sent to Pharmacy  []  Other     SUMMARY: Per protocol OV made  Future Appointments   Date Time

## 2021-12-08 NOTE — ED QUICK NOTES
Patient was given discharge papers and verbalized understanding, changing into street clothes, began dry heaving, dr Sarkis Perez aware, verbal order for zofran odt 4 mg

## 2021-12-08 NOTE — TELEPHONE ENCOUNTER
Patient Information    Patient Name   Jaime Gaxiola MRN   [id number] Legal Sex   Female    1950       Order Summary    Canceled Orders   [facility] OP HOME MONITORING PROGRAM [[id number] CUSTOM]  Order #:  [id number]    Questions:   Is patient on oxyge

## 2021-12-08 NOTE — ED PROVIDER NOTES
Patient Seen in: [facility] AND [facility] Emergency Department      History   Patient presents with:  Difficulty Breathing    Stated Complaint: fever, cough, diarrhea    Subjective:   HPI    30-year-old female fully vaccinated for Covid complaint severe, cough, 17.8      Smokeless tobacco: Never Used    Vaping Use      Vaping Use: Never used    Alcohol use: Yes      Alcohol/week: 0.0 standard drinks      Comment: holidays    Drug use: No             Review of Systems   Constitutional: Negative for fever.    HENT: Report. Rate: 66  Rhythm: Sinus Rhythm  Reading: normal for rate, normal for rhythm, T wave inversions in V1/2    Cardiac Monitor:    Patient placed on the cardiac monitor and a rhythm strip obtained with the indication of shortness of breath.   Monitor sh symptoms worsen including fevers, chills, vomiting, SOB, pt  expresses understanding and agrees to d/c instructions    EMERGENCY DEPARTMENT MEDICAL DECISION MAKING:  After obtaining the patient's history, performing the physical exam and reviewing the diag provider within the next three months to obtain basic health screening including reassessment of your blood pressure. The patient has evidence of COVID-19 pneumonia and low oxygen saturation. There is concern for gradual decline at home.  As a result, a pu

## 2021-12-08 NOTE — TELEPHONE ENCOUNTER
Home Monitoring Day 1 of 7. Patient tested positive for Covid on 12/7 and had the antibody infusion on 12/7. What  was your temp today? - Patient did check it but can't remember what it was. She did not not have a fever. How did you take your temp?

## 2021-12-08 NOTE — TELEPHONE ENCOUNTER
While on the phone with patient's daughter, patient had questions about the medication she was given in ER yesterday. She is having loose stools and wanted to make sure medication was not going to make her constipated.  Relayed that the Codeine in cough syr

## 2021-12-09 NOTE — TELEPHONE ENCOUNTER
Home Monitoring Day 2 of 7. What  was your temp today? 99.5    How did you take your temp?   oral    What was your pulse ox today?  95%    Are you feeling short of breath today?    yes    Is the shortness of breath better, the same, or worse than yesterd

## 2021-12-10 ENCOUNTER — TELEPHONE (OUTPATIENT)
Dept: INTERNAL MEDICINE CLINIC | Facility: CLINIC | Age: 71
End: 2021-12-10

## 2021-12-10 NOTE — TELEPHONE ENCOUNTER
Pt received PAB infusion at [facility] ED on 12/07 for COVID-19. Please follow-up with pt for post-infusion assessment and home monitoring if needed. Thank you.

## 2021-12-10 NOTE — TELEPHONE ENCOUNTER
Raya Grigsby, ZENAIDACertified Medical AssistantSigned  7:42 AM                    Pt received PAB infusion at [address] [place] [address] ED on 12/07 for COVID-19. Please follow-up with pt for post-infusion assessment and home monitoring if needed.   Thank you.

## 2021-12-13 NOTE — TELEPHONE ENCOUNTER
Home Monitoring Day 6 of 7. What  was your temp today? 101.6    How did you take your temp?     with an oral thermometer    What was your pulse ox today?  94%    Are you feeling short of breath today?    Yes     Is the shortness of breath better, the michell

## 2021-12-14 NOTE — TELEPHONE ENCOUNTER
Home Monitoring Day 7 of 7. What  was your temp today? - 101.3    How did you take your temp?   with an oral thermometer    What was your pulse ox today?  94%    Are you feeling short of breath today?    Yes with exertion    Is the shortness of breath be

## 2021-12-16 ENCOUNTER — APPOINTMENT (OUTPATIENT)
Dept: GENERAL RADIOLOGY | Facility: HOSPITAL | Age: 71
End: 2021-12-16
Payer: MEDICARE

## 2021-12-16 ENCOUNTER — HOSPITAL ENCOUNTER (EMERGENCY)
Facility: HOSPITAL | Age: 71
Discharge: HOME | End: 2021-12-16
Payer: MEDICARE

## 2021-12-16 VITALS
BODY MASS INDEX: 26 KG/M2 | RESPIRATION RATE: 31 BRPM | HEART RATE: 88 BPM | WEIGHT: 160 LBS | SYSTOLIC BLOOD PRESSURE: 113 MMHG | DIASTOLIC BLOOD PRESSURE: 72 MMHG | OXYGEN SATURATION: 92 % | TEMPERATURE: 99 F

## 2021-12-16 DIAGNOSIS — U07.1 PNEUMONIA DUE TO COVID-19 VIRUS: Primary | ICD-10-CM

## 2021-12-16 DIAGNOSIS — J12.82 PNEUMONIA DUE TO COVID-19 VIRUS: Primary | ICD-10-CM

## 2021-12-16 PROCEDURE — 99283 EMERGENCY DEPT VISIT LOW MDM: CPT

## 2021-12-16 PROCEDURE — 71045 X-RAY EXAM CHEST 1 VIEW: CPT

## 2021-12-16 NOTE — TELEPHONE ENCOUNTER
What was your temp today?  102.0 F (mentions yesterday= 102.3 F)    How did you take your temp?     with an oral thermometer    What was your pulse ox today? 86%; later during call= 87%, later= 90%, later even after taking a couple of deep breaths= 88%

## 2021-12-16 NOTE — ED PROVIDER NOTES
Patient Seen in: [facility] AND [facility] Emergency Department      History   Patient presents with:  Difficulty Breathing    Stated Complaint: temp    Subjective:   HPI    66-year-old female with past medical history significant for arthritis, obesity, GERD, D Years: 30.00        Pack years: 9        Types: Cigarettes        Quit date: 2004        Years since quittin.8      Smokeless tobacco: Never Used    Vaping Use      Vaping Use: Never used    Alcohol use:  Yes      Alcohol/week: 0.0 standard d Continued follow-up imaging recommended to ensure resolution.    Dictated by (CST): Devika Leija MD on 12/16/2021 at 4:46 PM     Finalized by (CST): Devika Leija MD on 12/16/2021 at 4:48 PM                   MDM      Patient has room air oxygen saturations

## 2021-12-17 NOTE — TELEPHONE ENCOUNTER
Patient was seen at the [facility] ER on 12/16   Was treated and released       Disposition and Plan      Clinical Impression:  Pneumonia due to COVID-19 virus  (primary encounter diagnosis)      Disposition:  Discharge  12/16/2021  6:13 pm     Follow-up:  MARTÍN

## 2021-12-18 NOTE — TELEPHONE ENCOUNTER
Home Monitoring continued due to symptoms. What  was your temp today? - 98    How did you take your temp?  oral    What was your pulse ox today? 97-98%    Are you feeling short of breath today?   No sob    Is the shortness of breath better, the same, or

## 2021-12-20 NOTE — TELEPHONE ENCOUNTER
Pt informed of Love Mccartney's recommendations. Pt reports gum soreness and dry mouth, states it is hard to eat with the sore gums. Pt mentioned she has to be careful with brushing her teeth.     Pt declined video visit, state she doesn't know how to d

## 2021-12-31 PROCEDURE — 99490 CHRNC CARE MGMT STAFF 1ST 20: CPT

## 2022-01-03 ENCOUNTER — PATIENT OUTREACH (OUTPATIENT)
Dept: CASE MANAGEMENT | Age: 72
End: 2022-01-03

## 2022-01-03 DIAGNOSIS — E78.00 PURE HYPERCHOLESTEROLEMIA: ICD-10-CM

## 2022-01-03 DIAGNOSIS — E66.9 OBESITY (BMI 30-39.9): ICD-10-CM

## 2022-01-03 DIAGNOSIS — J44.9 ASTHMA WITH COPD (CHRONIC OBSTRUCTIVE PULMONARY DISEASE) (HCC): Chronic | ICD-10-CM

## 2022-01-03 NOTE — PROGRESS NOTES
1/3/2022  Spoke to Reece luu for CCM. Updates to patient care team/ comments: UTD  Patient reported changes in medications: None  Med Adherence  Comment: Taking as directed    Health Maintenance:  UTD, reviewed with patient.    COVID-19 Vaccine(1) Never overcoming all barriers: n/a             Patient agrees to goal action plan. yes  Self-Management Abilities (patient reported)             1= least confident in achieving goal, 5= very confident               - confidence: 4    Care Manager Follow Up:  One

## 2022-01-31 PROCEDURE — 99490 CHRNC CARE MGMT STAFF 1ST 20: CPT

## 2022-02-03 ENCOUNTER — HOSPITAL ENCOUNTER (OUTPATIENT)
Dept: ULTRASOUND IMAGING | Facility: HOSPITAL | Age: 72
Discharge: HOME | End: 2022-02-03
Attending: UROLOGY
Payer: MEDICARE

## 2022-02-03 DIAGNOSIS — N20.0 KIDNEY STONES: ICD-10-CM

## 2022-02-03 PROCEDURE — 76775 US EXAM ABDO BACK WALL LIM: CPT | Performed by: UROLOGY

## 2022-02-04 ENCOUNTER — PATIENT OUTREACH (OUTPATIENT)
Dept: CASE MANAGEMENT | Age: 72
End: 2022-02-04

## 2022-02-04 ENCOUNTER — TELEPHONE (OUTPATIENT)
Dept: SURGERY | Facility: CLINIC | Age: 72
End: 2022-02-04

## 2022-02-04 NOTE — TELEPHONE ENCOUNTER
----- Message from VINEET Tadeo sent at 2/4/2022 10:38 AM CST -----  Please let patient know her ultrasound shows possible stones in her right kidney. These are non obstructing and there is no swelling to your kidneys. Continue with plans for xray to further evaluate the stones seen. We will notify her once we receive those results.

## 2022-02-04 NOTE — TELEPHONE ENCOUNTER
S/W pt and informed her of the results msg as stated below from [facility] and told her that there is still an open xray order made in Dec that she should complete. She verbalized understanding and compliance.

## 2022-02-05 ENCOUNTER — HOSPITAL ENCOUNTER (OUTPATIENT)
Dept: GENERAL RADIOLOGY | Age: 72
Discharge: HOME | End: 2022-02-05
Attending: UROLOGY
Payer: MEDICARE

## 2022-02-05 DIAGNOSIS — N20.0 KIDNEY STONES: ICD-10-CM

## 2022-02-05 PROCEDURE — 74018 RADEX ABDOMEN 1 VIEW: CPT | Performed by: UROLOGY

## 2022-02-08 ENCOUNTER — TELEPHONE (OUTPATIENT)
Dept: INTERNAL MEDICINE CLINIC | Facility: CLINIC | Age: 72
End: 2022-02-08

## 2022-02-23 ENCOUNTER — OFFICE VISIT (OUTPATIENT)
Dept: INTERNAL MEDICINE CLINIC | Facility: CLINIC | Age: 72
End: 2022-02-23
Payer: MEDICARE

## 2022-02-23 VITALS
DIASTOLIC BLOOD PRESSURE: 70 MMHG | HEART RATE: 69 BPM | HEIGHT: 66 IN | SYSTOLIC BLOOD PRESSURE: 116 MMHG | BODY MASS INDEX: 31.5 KG/M2 | WEIGHT: 196 LBS
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
## 2022-02-23 DIAGNOSIS — I10 ESSENTIAL HYPERTENSION: Primary | ICD-10-CM

## 2022-02-23 DIAGNOSIS — E66.9 OBESITY (BMI 30-39.9): ICD-10-CM

## 2022-02-23 DIAGNOSIS — E78.5 HYPERLIPIDEMIA, UNSPECIFIED HYPERLIPIDEMIA TYPE: ICD-10-CM

## 2022-02-23 DIAGNOSIS — J44.9 ASTHMA WITH COPD (CHRONIC OBSTRUCTIVE PULMONARY DISEASE) (HCC): Chronic | ICD-10-CM

## 2022-02-23 PROCEDURE — G0008 ADMIN INFLUENZA VIRUS VAC: HCPCS | Performed by: INTERNAL MEDICINE

## 2022-02-23 PROCEDURE — 99214 OFFICE O/P EST MOD 30 MIN: CPT | Performed by: INTERNAL MEDICINE

## 2022-02-23 PROCEDURE — 90662 IIV NO PRSV INCREASED AG IM: CPT | Performed by: INTERNAL MEDICINE

## 2022-02-28 PROCEDURE — 99490 CHRNC CARE MGMT STAFF 1ST 20: CPT

## 2022-03-02 ENCOUNTER — PATIENT OUTREACH (OUTPATIENT)
Dept: CASE MANAGEMENT | Age: 72
End: 2022-03-02

## 2022-03-21 ENCOUNTER — TELEPHONE (OUTPATIENT)
Dept: INTERNAL MEDICINE CLINIC | Facility: CLINIC | Age: 72
End: 2022-03-21

## 2022-03-23 NOTE — TELEPHONE ENCOUNTER
Forms received , Pt is requesting parking placard and [employer] insurance form to be completed . Placed in Dr Patsy Yu office ADO.

## 2022-03-25 NOTE — TELEPHONE ENCOUNTER
Patient called to check on the status of forms for her license for her insurance company and her handiplate card.

## 2022-03-28 NOTE — TELEPHONE ENCOUNTER
Patient is requesting callback to discuss forms being completed below and need them back as soon as possible, please call when forms is ready to be picked up.

## 2022-03-29 NOTE — TELEPHONE ENCOUNTER
Parking Placard form was completed , Pt notified would like to  at [facility] office . Second form was for Katey per Dr Moss Gosselin Pt needs to come in to complete as its a legal document . Contacted Pt and appointment was scheduled for 03/30/22 .

## 2022-03-30 ENCOUNTER — OFFICE VISIT (OUTPATIENT)
Dept: INTERNAL MEDICINE CLINIC | Facility: CLINIC | Age: 72
End: 2022-03-30
Payer: MEDICARE

## 2022-03-30 VITALS
HEIGHT: 66 IN | WEIGHT: 204 LBS | DIASTOLIC BLOOD PRESSURE: 69 MMHG | SYSTOLIC BLOOD PRESSURE: 130 MMHG | HEART RATE: 75 BPM | BODY MASS INDEX: 32.78 KG/M2

## 2022-03-30 DIAGNOSIS — I10 ESSENTIAL HYPERTENSION: ICD-10-CM

## 2022-03-30 DIAGNOSIS — M17.0 OSTEOARTHRITIS OF BOTH KNEES, UNSPECIFIED OSTEOARTHRITIS TYPE: Primary | ICD-10-CM

## 2022-03-30 DIAGNOSIS — H25.9 SENILE CATARACT, UNSPECIFIED AGE-RELATED CATARACT TYPE, UNSPECIFIED LATERALITY: ICD-10-CM

## 2022-03-30 DIAGNOSIS — J44.9 ASTHMA WITH COPD (CHRONIC OBSTRUCTIVE PULMONARY DISEASE) (HCC): ICD-10-CM

## 2022-03-30 PROCEDURE — 99214 OFFICE O/P EST MOD 30 MIN: CPT | Performed by: INTERNAL MEDICINE

## 2022-03-31 PROCEDURE — 99490 CHRNC CARE MGMT STAFF 1ST 20: CPT

## 2022-04-01 ENCOUNTER — PATIENT OUTREACH (OUTPATIENT)
Dept: CASE MANAGEMENT | Age: 72
End: 2022-04-01

## 2022-04-23 ENCOUNTER — HOSPITAL ENCOUNTER (EMERGENCY)
Facility: HOSPITAL | Age: 72
Discharge: HOME | End: 2022-04-23
Attending: EMERGENCY MEDICINE
Payer: MEDICARE

## 2022-04-23 VITALS
OXYGEN SATURATION: 95 % | HEIGHT: 66 IN | DIASTOLIC BLOOD PRESSURE: 69 MMHG | SYSTOLIC BLOOD PRESSURE: 145 MMHG | TEMPERATURE: 100 F | WEIGHT: 168 LBS | BODY MASS INDEX: 27 KG/M2 | RESPIRATION RATE: 19 BRPM | HEART RATE: 82 BPM

## 2022-04-23 DIAGNOSIS — G62.9 PERIPHERAL POLYNEUROPATHY: ICD-10-CM

## 2022-04-23 DIAGNOSIS — L02.415 CELLULITIS AND ABSCESS OF RIGHT LEG: Primary | ICD-10-CM

## 2022-04-23 DIAGNOSIS — L03.115 CELLULITIS AND ABSCESS OF RIGHT LEG: Primary | ICD-10-CM

## 2022-04-23 PROCEDURE — 99283 EMERGENCY DEPT VISIT LOW MDM: CPT

## 2022-04-23 RX ORDER — GABAPENTIN 300 MG/1
300 CAPSULE ORAL NIGHTLY
Qty: 30 CAPSULE | Refills: 0 | Status: SHIPPED | OUTPATIENT
Start: 2022-04-23 | End: 2022-05-23

## 2022-04-23 RX ORDER — SULFAMETHOXAZOLE AND TRIMETHOPRIM 800; 160 MG/1; MG/1
1 TABLET ORAL 2 TIMES DAILY
Qty: 20 TABLET | Refills: 0 | Status: SHIPPED | OUTPATIENT
Start: 2022-04-23 | End: 2022-05-03

## 2022-04-24 NOTE — ED QUICK NOTES
The patient is cleared for discharge per Emergency Department physician. Discharge instructions were reviewed with patient including when and how to follow up with healthcare providers and when to seek emergency care. Medication use was reviewed and prescription details were given per Emergency Department provider request. Patient ambulated to exit with steady gait.

## 2022-04-24 NOTE — ED INITIAL ASSESSMENT (HPI)
Pt presents for c/o bilateral foot pain intermittently for the last several months. Pt reports she is supposed to have varicose vein surgery on bilateral legs. Pt reports right is worse than left, reports some intermittent redness in right leg.

## 2022-05-03 ENCOUNTER — OFFICE VISIT (OUTPATIENT)
Dept: INTERNAL MEDICINE CLINIC | Facility: CLINIC | Age: 72
End: 2022-05-03
Payer: MEDICARE

## 2022-05-03 ENCOUNTER — TELEPHONE (OUTPATIENT)
Dept: INTERNAL MEDICINE CLINIC | Facility: CLINIC | Age: 72
End: 2022-05-03

## 2022-05-03 ENCOUNTER — HOSPITAL ENCOUNTER (EMERGENCY)
Facility: HOSPITAL | Age: 72
Discharge: HOME | End: 2022-05-03
Attending: EMERGENCY MEDICINE
Payer: MEDICARE

## 2022-05-03 VITALS
DIASTOLIC BLOOD PRESSURE: 76 MMHG | HEART RATE: 84 BPM | HEIGHT: 66 IN | SYSTOLIC BLOOD PRESSURE: 160 MMHG | WEIGHT: 209 LBS | BODY MASS INDEX: 33.59 KG/M2 | RESPIRATION RATE: 16 BRPM

## 2022-05-03 VITALS
RESPIRATION RATE: 18 BRPM | OXYGEN SATURATION: 98 % | DIASTOLIC BLOOD PRESSURE: 61 MMHG | SYSTOLIC BLOOD PRESSURE: 116 MMHG | HEART RATE: 57 BPM | BODY MASS INDEX: 34 KG/M2 | TEMPERATURE: 97 F | WEIGHT: 209 LBS

## 2022-05-03 DIAGNOSIS — L03.115 CELLULITIS AND ABSCESS OF RIGHT LEG: Primary | ICD-10-CM

## 2022-05-03 DIAGNOSIS — L02.415 CELLULITIS AND ABSCESS OF RIGHT LEG: Primary | ICD-10-CM

## 2022-05-03 DIAGNOSIS — L03.115 CELLULITIS OF LEG, RIGHT: Primary | ICD-10-CM

## 2022-05-03 LAB
ANION GAP SERPL CALC-SCNC: 3 MMOL/L (ref 0–18)
BASOPHILS # BLD AUTO: 0.06 X10(3) UL (ref 0–0.2)
BASOPHILS NFR BLD AUTO: 1.2 %
BUN BLD-MCNC: 19 MG/DL (ref 7–18)
BUN/CREAT SERPL: 20.4 (ref 10–20)
CALCIUM BLD-MCNC: 8.9 MG/DL (ref 8.5–10.1)
CHLORIDE SERPL-SCNC: 111 MMOL/L (ref 98–112)
CO2 SERPL-SCNC: 29 MMOL/L (ref 21–32)
CREAT BLD-MCNC: 0.93 MG/DL
DEPRECATED RDW RBC AUTO: 47.2 FL (ref 35.1–46.3)
EOSINOPHIL # BLD AUTO: 0.22 X10(3) UL (ref 0–0.7)
EOSINOPHIL NFR BLD AUTO: 4.4 %
ERYTHROCYTE [DISTWIDTH] IN BLOOD BY AUTOMATED COUNT: 13.8 % (ref 11–15)
GLUCOSE BLD-MCNC: 81 MG/DL (ref 70–99)
HCT VFR BLD AUTO: 36.3 %
HGB BLD-MCNC: 11.5 G/DL
IMM GRANULOCYTES # BLD AUTO: 0.01 X10(3) UL (ref 0–1)
IMM GRANULOCYTES NFR BLD: 0.2 %
LYMPHOCYTES # BLD AUTO: 1.64 X10(3) UL (ref 1–4)
LYMPHOCYTES NFR BLD AUTO: 32.5 %
MCH RBC QN AUTO: 29.3 PG (ref 26–34)
MCHC RBC AUTO-ENTMCNC: 31.7 G/DL (ref 31–37)
MCV RBC AUTO: 92.4 FL
MONOCYTES # BLD AUTO: 0.52 X10(3) UL (ref 0.1–1)
MONOCYTES NFR BLD AUTO: 10.3 %
NEUTROPHILS # BLD AUTO: 2.6 X10 (3) UL (ref 1.5–7.7)
NEUTROPHILS # BLD AUTO: 2.6 X10(3) UL (ref 1.5–7.7)
NEUTROPHILS NFR BLD AUTO: 51.4 %
OSMOLALITY SERPL CALC.SUM OF ELEC: 297 MOSM/KG (ref 275–295)
PLATELET # BLD AUTO: 200 10(3)UL (ref 150–450)
POTASSIUM SERPL-SCNC: 4.1 MMOL/L (ref 3.5–5.1)
RBC # BLD AUTO: 3.93 X10(6)UL
SODIUM SERPL-SCNC: 143 MMOL/L (ref 136–145)
WBC # BLD AUTO: 5.1 X10(3) UL (ref 4–11)

## 2022-05-03 PROCEDURE — 99283 EMERGENCY DEPT VISIT LOW MDM: CPT

## 2022-05-03 PROCEDURE — 3078F DIAST BP <80 MM HG: CPT | Performed by: NURSE PRACTITIONER

## 2022-05-03 PROCEDURE — 36415 COLL VENOUS BLD VENIPUNCTURE: CPT

## 2022-05-03 PROCEDURE — 85025 COMPLETE CBC W/AUTO DIFF WBC: CPT | Performed by: EMERGENCY MEDICINE

## 2022-05-03 PROCEDURE — 80048 BASIC METABOLIC PNL TOTAL CA: CPT | Performed by: EMERGENCY MEDICINE

## 2022-05-03 PROCEDURE — 3008F BODY MASS INDEX DOCD: CPT | Performed by: NURSE PRACTITIONER

## 2022-05-03 PROCEDURE — 3077F SYST BP >= 140 MM HG: CPT | Performed by: NURSE PRACTITIONER

## 2022-05-03 PROCEDURE — 99213 OFFICE O/P EST LOW 20 MIN: CPT | Performed by: NURSE PRACTITIONER

## 2022-05-03 RX ORDER — SULFAMETHOXAZOLE AND TRIMETHOPRIM 800; 160 MG/1; MG/1
1 TABLET ORAL 2 TIMES DAILY
Qty: 20 TABLET | Refills: 0 | Status: SHIPPED | OUTPATIENT
Start: 2022-05-03 | End: 2022-05-13

## 2022-05-03 RX ORDER — CEPHALEXIN 500 MG/1
500 CAPSULE ORAL 2 TIMES DAILY
Qty: 14 CAPSULE | Refills: 0 | Status: SHIPPED | OUTPATIENT
Start: 2022-05-03 | End: 2022-05-03

## 2022-05-03 RX ORDER — CEPHALEXIN 500 MG/1
500 CAPSULE ORAL 3 TIMES DAILY
Qty: 30 CAPSULE | Refills: 0 | Status: SHIPPED | OUTPATIENT
Start: 2022-05-03 | End: 2022-05-13

## 2022-05-03 RX ORDER — CEPHALEXIN 500 MG/1
500 CAPSULE ORAL 3 TIMES DAILY
Qty: 30 CAPSULE | Refills: 0 | Status: SHIPPED | OUTPATIENT
Start: 2022-05-03 | End: 2022-05-03

## 2022-05-03 RX ORDER — SULFAMETHOXAZOLE AND TRIMETHOPRIM 800; 160 MG/1; MG/1
1 TABLET ORAL 2 TIMES DAILY
Qty: 20 TABLET | Refills: 0 | Status: SHIPPED | OUTPATIENT
Start: 2022-05-03 | End: 2022-05-03

## 2022-05-03 NOTE — TELEPHONE ENCOUNTER
Pt stated that she was in the ER on 4/23 due to right leg swelling see ER notes. Pt stated that it is still draining, red and painful. She is on Abx and today is her last day on the abx. Per pt it is slightly better then the ER visit. Pt is supposed to have vein surgery tomorrow but was informed that she needs to get her leg checked out first to see if she can have the surgery tomorrow or if it has to be cancelled. Pt will like to to be seen today for a evaluation on her right leg. Pt was given a appt to see Darcy Wei today.     Future Appointments   Date Time Provider Almita Morales   5/3/2022  2:50 PM VINEET De Oliveira [facility]   6/8/2022 10:00 AM MD FERMIN Muro   7/8/2022 11:00 AM [facility]

## 2022-05-04 ENCOUNTER — PATIENT OUTREACH (OUTPATIENT)
Dept: CASE MANAGEMENT | Age: 72
End: 2022-05-04

## 2022-05-04 NOTE — ED INITIAL ASSESSMENT (HPI)
Patient was seen at PCP office today and sent to ER to evaluation of her right lower leg. States she is having purulent drainage, redness, warmth, and swelling to right lower leg.

## 2022-05-25 ENCOUNTER — HOSPITAL ENCOUNTER (OUTPATIENT)
Dept: GENERAL RADIOLOGY | Age: 72
Discharge: HOME | End: 2022-05-25
Attending: INTERNAL MEDICINE
Payer: MEDICARE

## 2022-05-25 ENCOUNTER — LAB ENCOUNTER (OUTPATIENT)
Dept: LAB | Age: 72
End: 2022-05-25
Attending: INTERNAL MEDICINE
Payer: MEDICARE

## 2022-05-25 ENCOUNTER — OFFICE VISIT (OUTPATIENT)
Dept: INTERNAL MEDICINE CLINIC | Facility: CLINIC | Age: 72
End: 2022-05-25
Payer: MEDICARE

## 2022-05-25 VITALS
HEIGHT: 66 IN | WEIGHT: 208 LBS | HEART RATE: 62 BPM | DIASTOLIC BLOOD PRESSURE: 67 MMHG | BODY MASS INDEX: 33.43 KG/M2 | SYSTOLIC BLOOD PRESSURE: 115 MMHG

## 2022-05-25 DIAGNOSIS — L03.115 CELLULITIS AND ABSCESS OF RIGHT LOWER EXTREMITY: ICD-10-CM

## 2022-05-25 DIAGNOSIS — E78.5 HYPERLIPIDEMIA, UNSPECIFIED HYPERLIPIDEMIA TYPE: ICD-10-CM

## 2022-05-25 DIAGNOSIS — I10 ESSENTIAL HYPERTENSION, MALIGNANT: Primary | ICD-10-CM

## 2022-05-25 DIAGNOSIS — L03.115 CELLULITIS AND ABSCESS OF RIGHT LOWER EXTREMITY: Primary | ICD-10-CM

## 2022-05-25 DIAGNOSIS — L02.415 CELLULITIS AND ABSCESS OF RIGHT LOWER EXTREMITY: Primary | ICD-10-CM

## 2022-05-25 DIAGNOSIS — L02.415 CELLULITIS AND ABSCESS OF RIGHT LOWER EXTREMITY: ICD-10-CM

## 2022-05-25 LAB
ALBUMIN SERPL-MCNC: 3.5 G/DL (ref 3.4–5)
ALBUMIN/GLOB SERPL: 0.9 {RATIO} (ref 1–2)
ALP LIVER SERPL-CCNC: 107 U/L
ALT SERPL-CCNC: 19 U/L
ANION GAP SERPL CALC-SCNC: 6 MMOL/L (ref 0–18)
AST SERPL-CCNC: 22 U/L (ref 15–37)
BILIRUB SERPL-MCNC: 0.4 MG/DL (ref 0.1–2)
BUN BLD-MCNC: 17 MG/DL (ref 7–18)
BUN/CREAT SERPL: 16.8 (ref 10–20)
CALCIUM BLD-MCNC: 9.2 MG/DL (ref 8.5–10.1)
CHLORIDE SERPL-SCNC: 111 MMOL/L (ref 98–112)
CHOLEST SERPL-MCNC: 152 MG/DL (ref ?–200)
CO2 SERPL-SCNC: 25 MMOL/L (ref 21–32)
CREAT BLD-MCNC: 1.01 MG/DL
DEPRECATED RDW RBC AUTO: 49.1 FL (ref 35.1–46.3)
ERYTHROCYTE [DISTWIDTH] IN BLOOD BY AUTOMATED COUNT: 14.5 % (ref 11–15)
EST. AVERAGE GLUCOSE BLD GHB EST-MCNC: 120 MG/DL (ref 68–126)
FASTING PATIENT LIPID ANSWER: YES
FASTING STATUS PATIENT QL REPORTED: YES
GLOBULIN PLAS-MCNC: 3.9 G/DL (ref 2.8–4.4)
GLUCOSE BLD-MCNC: 95 MG/DL (ref 70–99)
HBA1C MFR BLD: 5.8 % (ref ?–5.7)
HCT VFR BLD AUTO: 35.2 %
HDLC SERPL-MCNC: 78 MG/DL (ref 40–59)
HGB BLD-MCNC: 11.4 G/DL
LDLC SERPL CALC-MCNC: 65 MG/DL (ref ?–100)
MCH RBC QN AUTO: 30.2 PG (ref 26–34)
MCHC RBC AUTO-ENTMCNC: 32.4 G/DL (ref 31–37)
MCV RBC AUTO: 93.1 FL
NONHDLC SERPL-MCNC: 74 MG/DL (ref ?–130)
OSMOLALITY SERPL CALC.SUM OF ELEC: 295 MOSM/KG (ref 275–295)
PLATELET # BLD AUTO: 146 10(3)UL (ref 150–450)
POTASSIUM SERPL-SCNC: 4 MMOL/L (ref 3.5–5.1)
PROT SERPL-MCNC: 7.4 G/DL (ref 6.4–8.2)
RBC # BLD AUTO: 3.78 X10(6)UL
RBC #/AREA URNS AUTO: >10 /HPF
SODIUM SERPL-SCNC: 142 MMOL/L (ref 136–145)
T4 FREE SERPL-MCNC: 0.8 NG/DL (ref 0.8–1.7)
TRIGL SERPL-MCNC: 40 MG/DL (ref 30–149)
TSI SER-ACNC: 3.73 MIU/ML (ref 0.36–3.74)
VLDLC SERPL CALC-MCNC: 6 MG/DL (ref 0–30)
WBC # BLD AUTO: 2.8 X10(3) UL (ref 4–11)

## 2022-05-25 PROCEDURE — 3074F SYST BP LT 130 MM HG: CPT | Performed by: INTERNAL MEDICINE

## 2022-05-25 PROCEDURE — 3008F BODY MASS INDEX DOCD: CPT | Performed by: INTERNAL MEDICINE

## 2022-05-25 PROCEDURE — 84443 ASSAY THYROID STIM HORMONE: CPT

## 2022-05-25 PROCEDURE — 83036 HEMOGLOBIN GLYCOSYLATED A1C: CPT

## 2022-05-25 PROCEDURE — 99214 OFFICE O/P EST MOD 30 MIN: CPT | Performed by: INTERNAL MEDICINE

## 2022-05-25 PROCEDURE — 84439 ASSAY OF FREE THYROXINE: CPT

## 2022-05-25 PROCEDURE — 36415 COLL VENOUS BLD VENIPUNCTURE: CPT

## 2022-05-25 PROCEDURE — 80053 COMPREHEN METABOLIC PANEL: CPT

## 2022-05-25 PROCEDURE — 81015 MICROSCOPIC EXAM OF URINE: CPT

## 2022-05-25 PROCEDURE — 80061 LIPID PANEL: CPT

## 2022-05-25 PROCEDURE — 85027 COMPLETE CBC AUTOMATED: CPT

## 2022-05-25 PROCEDURE — 73590 X-RAY EXAM OF LOWER LEG: CPT | Performed by: INTERNAL MEDICINE

## 2022-05-25 PROCEDURE — 3078F DIAST BP <80 MM HG: CPT | Performed by: INTERNAL MEDICINE

## 2022-05-25 RX ORDER — AMOXICILLIN AND CLAVULANATE POTASSIUM 875; 125 MG/1; MG/1
1 TABLET, FILM COATED ORAL 2 TIMES DAILY
Qty: 20 TABLET | Refills: 0 | Status: SHIPPED | OUTPATIENT
Start: 2022-05-25 | End: 2022-06-04

## 2022-05-25 RX ORDER — SULFAMETHOXAZOLE AND TRIMETHOPRIM 800; 160 MG/1; MG/1
1 TABLET ORAL 2 TIMES DAILY
Qty: 20 TABLET | Refills: 0 | Status: SHIPPED | OUTPATIENT
Start: 2022-05-25 | End: 2022-06-04

## 2022-05-25 NOTE — PATIENT INSTRUCTIONS
1.  We will continue with Bactrim 1 tablet twice daily but I will add Augmentin 1 tablet twice daily for more coverage of other bacteria. 2.  I have also given you topical mupirocin which will help cover any skin bacteria that the oral antibiotic may not help. 3.  I would like for you to get an x-ray of your right leg today and depending on those results I have also ordered an ultrasound to further evaluate if there is something underneath the skin that is causing this. The ultrasound will need to be scheduled. 4.  I have also placed a referral for the wound clinic and a podiatrist Dr. Alon Gilliland who works in [address] [place] [address]. I would like for you to try to see Dr. Alon Gilliland this Friday for further evaluation of this wound as it may require a bit of surgical debridement. 5.  You should never place gauze inside the wound as the wound heals it will attached to the gauze and remove the scar tissue. This wound will heal from the bottom up. 6.  I will give you a few Tefla along with some gauze, Tefla is a plastic coated membrane that you will placed on top of the wound so that as it heals it will not attach. Tefla is available in the pharmacy as well. You should keep this wound open to air for a few hours a day to promote healing. Avoid scratching as much as possible. 7.  With the right treatment the wound will heal and my hope is that after this course of antibiotics you will not need anymore.

## 2022-05-26 ENCOUNTER — TELEPHONE (OUTPATIENT)
Dept: INTERNAL MEDICINE CLINIC | Facility: CLINIC | Age: 72
End: 2022-05-26

## 2022-05-26 DIAGNOSIS — R82.90 ABNORMAL URINE FINDING: Primary | ICD-10-CM

## 2022-05-26 NOTE — TELEPHONE ENCOUNTER
Dr. Yesica Eden, your schedule is full tomorrow, are you able to work this patient in? Otherwise, patient is scheduled in wound clinic for 6/2 and with you on 6/7.

## 2022-05-26 NOTE — TELEPHONE ENCOUNTER
She does not need to go to wound care if she is going to f/u with me. If she needs to be seen sooner I can fit her in at 11AM tomorrow.

## 2022-05-27 ENCOUNTER — OFFICE VISIT (OUTPATIENT)
Dept: PODIATRY CLINIC | Facility: CLINIC | Age: 72
End: 2022-05-27
Payer: MEDICARE

## 2022-05-27 ENCOUNTER — LAB ENCOUNTER (OUTPATIENT)
Dept: LAB | Age: 72
End: 2022-05-27
Attending: INTERNAL MEDICINE
Payer: MEDICARE

## 2022-05-27 DIAGNOSIS — I87.2 VENOUS INSUFFICIENCY OF RIGHT LOWER EXTREMITY: ICD-10-CM

## 2022-05-27 DIAGNOSIS — R82.90 ABNORMAL URINE FINDING: ICD-10-CM

## 2022-05-27 DIAGNOSIS — L97.812 VENOUS STASIS ULCER OF OTHER PART OF RIGHT LOWER LEG WITH FAT LAYER EXPOSED WITH VARICOSE VEINS (HCC): Primary | ICD-10-CM

## 2022-05-27 DIAGNOSIS — I83.018 VENOUS STASIS ULCER OF OTHER PART OF RIGHT LOWER LEG WITH FAT LAYER EXPOSED WITH VARICOSE VEINS (HCC): Primary | ICD-10-CM

## 2022-05-27 PROCEDURE — 87086 URINE CULTURE/COLONY COUNT: CPT

## 2022-06-02 ENCOUNTER — APPOINTMENT (OUTPATIENT)
Dept: WOUND CARE | Facility: HOSPITAL | Age: 72
End: 2022-06-02
Attending: INTERNAL MEDICINE

## 2022-06-06 ENCOUNTER — APPOINTMENT (OUTPATIENT)
Dept: WOUND CARE | Facility: HOSPITAL | Age: 72
End: 2022-06-06
Attending: CLINICAL NURSE SPECIALIST

## 2022-06-07 ENCOUNTER — APPOINTMENT (OUTPATIENT)
Dept: WOUND CARE | Facility: HOSPITAL | Age: 72
End: 2022-06-07
Attending: INTERNAL MEDICINE

## 2022-06-08 ENCOUNTER — OFFICE VISIT (OUTPATIENT)
Dept: INTERNAL MEDICINE CLINIC | Facility: CLINIC | Age: 72
End: 2022-06-08
Payer: MEDICARE

## 2022-06-08 VITALS
BODY MASS INDEX: 32.27 KG/M2 | HEIGHT: 66 IN | WEIGHT: 200.81 LBS | DIASTOLIC BLOOD PRESSURE: 65 MMHG | HEART RATE: 70 BPM | SYSTOLIC BLOOD PRESSURE: 107 MMHG

## 2022-06-08 DIAGNOSIS — E78.00 PURE HYPERCHOLESTEROLEMIA: ICD-10-CM

## 2022-06-08 DIAGNOSIS — E66.9 OBESITY (BMI 30-39.9): ICD-10-CM

## 2022-06-08 DIAGNOSIS — M17.0 OSTEOARTHRITIS OF BOTH KNEES, UNSPECIFIED OSTEOARTHRITIS TYPE: ICD-10-CM

## 2022-06-08 DIAGNOSIS — Z00.00 MEDICARE ANNUAL WELLNESS VISIT, SUBSEQUENT: Primary | ICD-10-CM

## 2022-06-08 DIAGNOSIS — Z00.00 ENCOUNTER FOR ANNUAL HEALTH EXAMINATION: ICD-10-CM

## 2022-06-08 DIAGNOSIS — I83.93 ASYMPTOMATIC VARICOSE VEINS OF BOTH LOWER EXTREMITIES: ICD-10-CM

## 2022-06-08 DIAGNOSIS — J44.9 ASTHMA WITH COPD (CHRONIC OBSTRUCTIVE PULMONARY DISEASE) (HCC): Chronic | ICD-10-CM

## 2022-06-08 DIAGNOSIS — H25.13 AGE-RELATED NUCLEAR CATARACT OF BOTH EYES: ICD-10-CM

## 2022-06-08 DIAGNOSIS — K21.9 GASTROESOPHAGEAL REFLUX DISEASE WITHOUT ESOPHAGITIS: ICD-10-CM

## 2022-06-08 DIAGNOSIS — D69.6 PLATELETS DECREASED (HCC): ICD-10-CM

## 2022-06-08 RX ORDER — BUDESONIDE AND FORMOTEROL FUMARATE DIHYDRATE 160; 4.5 UG/1; UG/1
AEROSOL RESPIRATORY (INHALATION)
Qty: 3 G | Refills: 5 | Status: ON HOLD | OUTPATIENT
Start: 2022-06-08

## 2022-06-08 RX ORDER — ALBUTEROL SULFATE 90 UG/1
2 AEROSOL, METERED RESPIRATORY (INHALATION) EVERY 4 HOURS PRN
Qty: 54 G | Refills: 5 | Status: ON HOLD | OUTPATIENT
Start: 2022-06-08

## 2022-06-10 ENCOUNTER — PATIENT OUTREACH (OUTPATIENT)
Dept: CASE MANAGEMENT | Age: 72
End: 2022-06-10

## 2022-06-10 DIAGNOSIS — E78.00 PURE HYPERCHOLESTEROLEMIA: ICD-10-CM

## 2022-06-10 DIAGNOSIS — L03.115 CELLULITIS AND ABSCESS OF RIGHT LOWER EXTREMITY: ICD-10-CM

## 2022-06-10 DIAGNOSIS — L02.415 CELLULITIS AND ABSCESS OF RIGHT LOWER EXTREMITY: ICD-10-CM

## 2022-06-10 DIAGNOSIS — E66.9 OBESITY (BMI 30-39.9): ICD-10-CM

## 2022-06-13 ENCOUNTER — OFFICE VISIT (OUTPATIENT)
Dept: PODIATRY CLINIC | Facility: CLINIC | Age: 72
End: 2022-06-13
Payer: MEDICARE

## 2022-06-13 DIAGNOSIS — I83.018 VENOUS STASIS ULCER OF OTHER PART OF RIGHT LOWER LEG WITH FAT LAYER EXPOSED WITH VARICOSE VEINS (HCC): Primary | ICD-10-CM

## 2022-06-13 DIAGNOSIS — I87.2 VENOUS INSUFFICIENCY OF RIGHT LOWER EXTREMITY: ICD-10-CM

## 2022-06-13 DIAGNOSIS — L97.812 VENOUS STASIS ULCER OF OTHER PART OF RIGHT LOWER LEG WITH FAT LAYER EXPOSED WITH VARICOSE VEINS (HCC): Primary | ICD-10-CM

## 2022-06-13 PROCEDURE — 1125F AMNT PAIN NOTED PAIN PRSNT: CPT | Performed by: PODIATRIST

## 2022-06-13 PROCEDURE — 11042 DBRDMT SUBQ TIS 1ST 20SQCM/<: CPT | Performed by: PODIATRIST

## 2022-06-18 ENCOUNTER — HOSPITAL ENCOUNTER (INPATIENT)
Facility: HOSPITAL | Age: 72
LOS: 8 days | Discharge: NURSING, REHAB OR LONG-TERM CARE | End: 2022-06-27
Admitting: HOSPITALIST
Payer: MEDICARE

## 2022-06-18 ENCOUNTER — APPOINTMENT (OUTPATIENT)
Dept: GENERAL RADIOLOGY | Facility: HOSPITAL | Age: 72
End: 2022-06-18
Payer: MEDICARE

## 2022-06-18 ENCOUNTER — APPOINTMENT (OUTPATIENT)
Dept: GENERAL RADIOLOGY | Facility: HOSPITAL | Age: 72
End: 2022-06-18
Attending: EMERGENCY MEDICINE
Payer: MEDICARE

## 2022-06-18 DIAGNOSIS — S72.009A FEMORAL NECK FRACTURE (HCC): ICD-10-CM

## 2022-06-18 DIAGNOSIS — S72.002A CLOSED FRACTURE OF LEFT HIP, INITIAL ENCOUNTER (HCC): Primary | ICD-10-CM

## 2022-06-18 LAB
ANION GAP SERPL CALC-SCNC: 7 MMOL/L (ref 0–18)
ANTIBODY SCREEN: NEGATIVE
BASOPHILS # BLD AUTO: 0.03 X10(3) UL (ref 0–0.2)
BASOPHILS NFR BLD AUTO: 0.5 %
BILIRUB UR QL: NEGATIVE
BUN BLD-MCNC: 16 MG/DL (ref 7–18)
BUN/CREAT SERPL: 16.8 (ref 10–20)
CALCIUM BLD-MCNC: 9.1 MG/DL (ref 8.5–10.1)
CHLORIDE SERPL-SCNC: 111 MMOL/L (ref 98–112)
CLARITY UR: CLEAR
CO2 SERPL-SCNC: 24 MMOL/L (ref 21–32)
COLOR UR: YELLOW
CREAT BLD-MCNC: 0.95 MG/DL
DEPRECATED RDW RBC AUTO: 49.7 FL (ref 35.1–46.3)
EOSINOPHIL # BLD AUTO: 0.1 X10(3) UL (ref 0–0.7)
EOSINOPHIL NFR BLD AUTO: 1.7 %
ERYTHROCYTE [DISTWIDTH] IN BLOOD BY AUTOMATED COUNT: 14.6 % (ref 11–15)
GLUCOSE BLD-MCNC: 94 MG/DL (ref 70–99)
GLUCOSE UR-MCNC: NEGATIVE MG/DL
HCT VFR BLD AUTO: 33.7 %
HGB BLD-MCNC: 10.9 G/DL
IMM GRANULOCYTES # BLD AUTO: 0.02 X10(3) UL (ref 0–1)
IMM GRANULOCYTES NFR BLD: 0.3 %
LYMPHOCYTES # BLD AUTO: 1.04 X10(3) UL (ref 1–4)
LYMPHOCYTES NFR BLD AUTO: 17.5 %
MCH RBC QN AUTO: 30.1 PG (ref 26–34)
MCHC RBC AUTO-ENTMCNC: 32.3 G/DL (ref 31–37)
MCV RBC AUTO: 93.1 FL
MONOCYTES # BLD AUTO: 0.43 X10(3) UL (ref 0.1–1)
MONOCYTES NFR BLD AUTO: 7.2 %
NEUTROPHILS # BLD AUTO: 4.33 X10 (3) UL (ref 1.5–7.7)
NEUTROPHILS # BLD AUTO: 4.33 X10(3) UL (ref 1.5–7.7)
NEUTROPHILS NFR BLD AUTO: 72.8 %
NITRITE UR QL STRIP.AUTO: NEGATIVE
OSMOLALITY SERPL CALC.SUM OF ELEC: 295 MOSM/KG (ref 275–295)
PH UR: 5.5 [PH] (ref 5–8)
PLATELET # BLD AUTO: 124 10(3)UL (ref 150–450)
POTASSIUM SERPL-SCNC: 3.4 MMOL/L (ref 3.5–5.1)
PROT UR-MCNC: NEGATIVE MG/DL
RBC # BLD AUTO: 3.62 X10(6)UL
RH BLOOD TYPE: POSITIVE
SARS-COV-2 RNA RESP QL NAA+PROBE: NOT DETECTED
SODIUM SERPL-SCNC: 142 MMOL/L (ref 136–145)
SP GR UR STRIP: 1.02 (ref 1–1.03)
UROBILINOGEN UR STRIP-ACNC: 0.2
WBC # BLD AUTO: 6 X10(3) UL (ref 4–11)

## 2022-06-18 PROCEDURE — 71045 X-RAY EXAM CHEST 1 VIEW: CPT | Performed by: EMERGENCY MEDICINE

## 2022-06-18 PROCEDURE — 73502 X-RAY EXAM HIP UNI 2-3 VIEWS: CPT

## 2022-06-18 PROCEDURE — 99223 1ST HOSP IP/OBS HIGH 75: CPT | Performed by: HOSPITALIST

## 2022-06-18 RX ORDER — MORPHINE SULFATE 4 MG/ML
4 INJECTION, SOLUTION INTRAMUSCULAR; INTRAVENOUS ONCE
Status: COMPLETED | OUTPATIENT
Start: 2022-06-18 | End: 2022-06-18

## 2022-06-19 ENCOUNTER — APPOINTMENT (OUTPATIENT)
Dept: GENERAL RADIOLOGY | Facility: HOSPITAL | Age: 72
End: 2022-06-19
Attending: ORTHOPAEDIC SURGERY
Payer: MEDICARE

## 2022-06-19 ENCOUNTER — APPOINTMENT (OUTPATIENT)
Dept: CT IMAGING | Facility: HOSPITAL | Age: 72
End: 2022-06-19
Payer: MEDICARE

## 2022-06-19 ENCOUNTER — ANESTHESIA (OUTPATIENT)
Dept: SURGERY | Facility: HOSPITAL | Age: 72
End: 2022-06-19
Payer: MEDICARE

## 2022-06-19 ENCOUNTER — ANESTHESIA EVENT (OUTPATIENT)
Dept: SURGERY | Facility: HOSPITAL | Age: 72
End: 2022-06-19
Payer: MEDICARE

## 2022-06-19 PROBLEM — E78.00 PURE HYPERCHOLESTEROLEMIA: Status: RESOLVED | Noted: 2021-06-16 | Resolved: 2022-06-19

## 2022-06-19 PROBLEM — S72.002A CLOSED FRACTURE OF LEFT HIP, INITIAL ENCOUNTER (HCC): Status: ACTIVE | Noted: 2022-06-19

## 2022-06-19 PROBLEM — M80.052A PATHOLOGICAL FRACTURE OF LEFT HIP DUE TO AGE-RELATED OSTEOPOROSIS (HCC): Status: ACTIVE | Noted: 2022-06-19

## 2022-06-19 PROBLEM — E66.09 CLASS 1 OBESITY DUE TO EXCESS CALORIES WITH SERIOUS COMORBIDITY AND BODY MASS INDEX (BMI) OF 32.0 TO 32.9 IN ADULT: Status: ACTIVE | Noted: 2017-05-24

## 2022-06-19 PROBLEM — G62.9 PERIPHERAL POLYNEUROPATHY: Status: ACTIVE | Noted: 2022-06-19

## 2022-06-19 PROBLEM — S72.002A CLOSED FRACTURE OF LEFT HIP (HCC): Status: ACTIVE | Noted: 2022-06-19

## 2022-06-19 PROBLEM — S72.002D CLOSED DISPLACED FRACTURE OF LEFT FEMORAL NECK WITH ROUTINE HEALING: Status: ACTIVE | Noted: 2022-06-19

## 2022-06-19 PROBLEM — L03.115 CELLULITIS AND ABSCESS OF RIGHT LOWER EXTREMITY: Status: RESOLVED | Noted: 2022-05-25 | Resolved: 2022-06-19

## 2022-06-19 PROBLEM — E66.811 CLASS 1 OBESITY DUE TO EXCESS CALORIES WITH SERIOUS COMORBIDITY AND BODY MASS INDEX (BMI) OF 32.0 TO 32.9 IN ADULT: Status: ACTIVE | Noted: 2017-05-24

## 2022-06-19 PROBLEM — L02.415 CELLULITIS AND ABSCESS OF RIGHT LOWER EXTREMITY: Status: RESOLVED | Noted: 2022-05-25 | Resolved: 2022-06-19

## 2022-06-19 PROBLEM — S72.002P: Status: ACTIVE | Noted: 2022-06-19

## 2022-06-19 LAB
HCT VFR BLD AUTO: 33.7 %
HGB BLD-MCNC: 10.7 G/DL
MRSA DNA SPEC QL NAA+PROBE: NEGATIVE
MRSA NASAL: NEGATIVE
RH BLOOD TYPE: POSITIVE
STAPH A BY PCR: NEGATIVE

## 2022-06-19 PROCEDURE — 3078F DIAST BP <80 MM HG: CPT | Performed by: HOSPITALIST

## 2022-06-19 PROCEDURE — 0SRB049 REPLACEMENT OF LEFT HIP JOINT WITH CERAMIC ON POLYETHYLENE SYNTHETIC SUBSTITUTE, CEMENTED, OPEN APPROACH: ICD-10-PCS | Performed by: ORTHOPAEDIC SURGERY

## 2022-06-19 PROCEDURE — 3008F BODY MASS INDEX DOCD: CPT | Performed by: HOSPITALIST

## 2022-06-19 PROCEDURE — 73502 X-RAY EXAM HIP UNI 2-3 VIEWS: CPT | Performed by: ORTHOPAEDIC SURGERY

## 2022-06-19 PROCEDURE — 73700 CT LOWER EXTREMITY W/O DYE: CPT

## 2022-06-19 PROCEDURE — 73501 X-RAY EXAM HIP UNI 1 VIEW: CPT | Performed by: ORTHOPAEDIC SURGERY

## 2022-06-19 PROCEDURE — 99233 SBSQ HOSP IP/OBS HIGH 50: CPT | Performed by: HOSPITALIST

## 2022-06-19 PROCEDURE — 3074F SYST BP LT 130 MM HG: CPT | Performed by: HOSPITALIST

## 2022-06-19 DEVICE — IMPLANTABLE DEVICE
Type: IMPLANTABLE DEVICE | Site: HIP | Status: FUNCTIONAL
Brand: G7® DUAL MOBILITY ACETABULAR SYSTEM

## 2022-06-19 DEVICE — IMPLANTABLE DEVICE
Type: IMPLANTABLE DEVICE | Site: HIP | Status: FUNCTIONAL
Brand: REFOBACIN® BONE CEMENT R

## 2022-06-19 DEVICE — KIT FEM BONE CEMENT RESTRICTOR: Type: IMPLANTABLE DEVICE | Site: HIP | Status: FUNCTIONAL

## 2022-06-19 DEVICE — VERSYS DISTAL CENTRALIZER 14MM: Type: IMPLANTABLE DEVICE | Site: HIP | Status: FUNCTIONAL

## 2022-06-19 DEVICE — IMPLANTABLE DEVICE
Type: IMPLANTABLE DEVICE | Site: HIP | Status: FUNCTIONAL
Brand: G7® ACETABULAR SYSTEM

## 2022-06-19 DEVICE — IMPLANTABLE DEVICE: Type: IMPLANTABLE DEVICE | Site: HIP | Status: FUNCTIONAL

## 2022-06-19 DEVICE — BONE SCREW 6.5X30 SELF-TAP: Type: IMPLANTABLE DEVICE | Site: HIP | Status: FUNCTIONAL

## 2022-06-19 DEVICE — BIOLOX® DELTA, CERAMIC FEMORAL HEAD, L, Ø 28/+3.5, TAPER 12/14
Type: IMPLANTABLE DEVICE | Site: HIP | Status: FUNCTIONAL
Brand: BIOLOX® DELTA

## 2022-06-19 RX ORDER — NALOXONE HYDROCHLORIDE 0.4 MG/ML
80 INJECTION, SOLUTION INTRAMUSCULAR; INTRAVENOUS; SUBCUTANEOUS AS NEEDED
Status: DISCONTINUED | OUTPATIENT
Start: 2022-06-19 | End: 2022-06-19 | Stop reason: HOSPADM

## 2022-06-19 RX ORDER — MULTIPLE VITAMINS W/ MINERALS TAB 9MG-400MCG
1 TAB ORAL DAILY
Status: DISCONTINUED | OUTPATIENT
Start: 2022-06-19 | End: 2022-06-27

## 2022-06-19 RX ORDER — LIDOCAINE HYDROCHLORIDE 10 MG/ML
INJECTION, SOLUTION INFILTRATION; PERINEURAL
Status: COMPLETED | OUTPATIENT
Start: 2022-06-19 | End: 2022-06-19

## 2022-06-19 RX ORDER — PHENYLEPHRINE HCL 10 MG/ML
VIAL (ML) INJECTION AS NEEDED
Status: DISCONTINUED | OUTPATIENT
Start: 2022-06-19 | End: 2022-06-19 | Stop reason: SURG

## 2022-06-19 RX ORDER — HYDROMORPHONE HYDROCHLORIDE 1 MG/ML
0.4 INJECTION, SOLUTION INTRAMUSCULAR; INTRAVENOUS; SUBCUTANEOUS
Status: ACTIVE | OUTPATIENT
Start: 2022-06-19 | End: 2022-06-20

## 2022-06-19 RX ORDER — CEFAZOLIN SODIUM/WATER 2 G/20 ML
2 SYRINGE (ML) INTRAVENOUS
Status: COMPLETED | OUTPATIENT
Start: 2022-06-19 | End: 2022-06-19

## 2022-06-19 RX ORDER — HYDROCODONE BITARTRATE AND ACETAMINOPHEN 10; 325 MG/1; MG/1
1 TABLET ORAL EVERY 4 HOURS PRN
Status: DISCONTINUED | OUTPATIENT
Start: 2022-06-19 | End: 2022-06-20

## 2022-06-19 RX ORDER — DIPHENHYDRAMINE HYDROCHLORIDE 50 MG/ML
25 INJECTION INTRAMUSCULAR; INTRAVENOUS ONCE AS NEEDED
Status: COMPLETED | OUTPATIENT
Start: 2022-06-19 | End: 2022-06-19

## 2022-06-19 RX ORDER — SODIUM PHOSPHATE, DIBASIC AND SODIUM PHOSPHATE, MONOBASIC 7; 19 G/133ML; G/133ML
1 ENEMA RECTAL ONCE AS NEEDED
Status: DISCONTINUED | OUTPATIENT
Start: 2022-06-19 | End: 2022-06-27

## 2022-06-19 RX ORDER — PANTOPRAZOLE SODIUM 40 MG/1
40 TABLET, DELAYED RELEASE ORAL
Status: DISCONTINUED | OUTPATIENT
Start: 2022-06-19 | End: 2022-06-27

## 2022-06-19 RX ORDER — MORPHINE SULFATE 4 MG/ML
2 INJECTION, SOLUTION INTRAMUSCULAR; INTRAVENOUS EVERY 10 MIN PRN
Status: DISCONTINUED | OUTPATIENT
Start: 2022-06-19 | End: 2022-06-19 | Stop reason: HOSPADM

## 2022-06-19 RX ORDER — PROCHLORPERAZINE EDISYLATE 5 MG/ML
10 INJECTION INTRAMUSCULAR; INTRAVENOUS EVERY 6 HOURS PRN
Status: DISPENSED | OUTPATIENT
Start: 2022-06-19 | End: 2022-06-21

## 2022-06-19 RX ORDER — EPHEDRINE SULFATE 50 MG/ML
INJECTION INTRAVENOUS AS NEEDED
Status: DISCONTINUED | OUTPATIENT
Start: 2022-06-19 | End: 2022-06-19 | Stop reason: SURG

## 2022-06-19 RX ORDER — MORPHINE SULFATE 4 MG/ML
4 INJECTION, SOLUTION INTRAMUSCULAR; INTRAVENOUS EVERY 2 HOUR PRN
Status: DISCONTINUED | OUTPATIENT
Start: 2022-06-19 | End: 2022-06-27

## 2022-06-19 RX ORDER — NALOXONE HYDROCHLORIDE 0.4 MG/ML
0.08 INJECTION, SOLUTION INTRAMUSCULAR; INTRAVENOUS; SUBCUTANEOUS
Status: DISPENSED | OUTPATIENT
Start: 2022-06-19 | End: 2022-06-20

## 2022-06-19 RX ORDER — BUPIVACAINE HYDROCHLORIDE 7.5 MG/ML
INJECTION, SOLUTION INTRASPINAL
Status: COMPLETED | OUTPATIENT
Start: 2022-06-19 | End: 2022-06-19

## 2022-06-19 RX ORDER — DIPHENHYDRAMINE HCL 25 MG
25 CAPSULE ORAL EVERY 4 HOURS PRN
Status: DISCONTINUED | OUTPATIENT
Start: 2022-06-19 | End: 2022-06-27

## 2022-06-19 RX ORDER — HYDROMORPHONE HYDROCHLORIDE 1 MG/ML
0.4 INJECTION, SOLUTION INTRAMUSCULAR; INTRAVENOUS; SUBCUTANEOUS EVERY 5 MIN PRN
Status: DISCONTINUED | OUTPATIENT
Start: 2022-06-19 | End: 2022-06-19 | Stop reason: HOSPADM

## 2022-06-19 RX ORDER — HYDROCODONE BITARTRATE AND ACETAMINOPHEN 5; 325 MG/1; MG/1
1 TABLET ORAL EVERY 4 HOURS PRN
Status: DISCONTINUED | OUTPATIENT
Start: 2022-06-19 | End: 2022-06-20

## 2022-06-19 RX ORDER — ALBUMIN, HUMAN INJ 5% 5 %
500 SOLUTION INTRAVENOUS ONCE
Status: COMPLETED | OUTPATIENT
Start: 2022-06-19 | End: 2022-06-19

## 2022-06-19 RX ORDER — SODIUM CHLORIDE, SODIUM LACTATE, POTASSIUM CHLORIDE, CALCIUM CHLORIDE 600; 310; 30; 20 MG/100ML; MG/100ML; MG/100ML; MG/100ML
INJECTION, SOLUTION INTRAVENOUS CONTINUOUS
Status: DISCONTINUED | OUTPATIENT
Start: 2022-06-19 | End: 2022-06-19 | Stop reason: HOSPADM

## 2022-06-19 RX ORDER — TRANEXAMIC ACID 10 MG/ML
1000 INJECTION, SOLUTION INTRAVENOUS
Status: COMPLETED | OUTPATIENT
Start: 2022-06-19 | End: 2022-06-19

## 2022-06-19 RX ORDER — MORPHINE SULFATE 2 MG/ML
1 INJECTION, SOLUTION INTRAMUSCULAR; INTRAVENOUS EVERY 2 HOUR PRN
Status: DISCONTINUED | OUTPATIENT
Start: 2022-06-19 | End: 2022-06-27

## 2022-06-19 RX ORDER — NALBUPHINE HCL 10 MG/ML
2.5 AMPUL (ML) INJECTION EVERY 4 HOURS PRN
Status: ACTIVE | OUTPATIENT
Start: 2022-06-19 | End: 2022-06-20

## 2022-06-19 RX ORDER — ONDANSETRON 2 MG/ML
4 INJECTION INTRAMUSCULAR; INTRAVENOUS ONCE AS NEEDED
Status: ACTIVE | OUTPATIENT
Start: 2022-06-19 | End: 2022-06-19

## 2022-06-19 RX ORDER — HYDROMORPHONE HYDROCHLORIDE 1 MG/ML
0.2 INJECTION, SOLUTION INTRAMUSCULAR; INTRAVENOUS; SUBCUTANEOUS EVERY 5 MIN PRN
Status: DISCONTINUED | OUTPATIENT
Start: 2022-06-19 | End: 2022-06-19 | Stop reason: HOSPADM

## 2022-06-19 RX ORDER — DIPHENHYDRAMINE HYDROCHLORIDE 50 MG/ML
12.5 INJECTION INTRAMUSCULAR; INTRAVENOUS EVERY 4 HOURS PRN
Status: DISCONTINUED | OUTPATIENT
Start: 2022-06-19 | End: 2022-06-27

## 2022-06-19 RX ORDER — ONDANSETRON 2 MG/ML
4 INJECTION INTRAMUSCULAR; INTRAVENOUS EVERY 6 HOURS PRN
Status: DISCONTINUED | OUTPATIENT
Start: 2022-06-19 | End: 2022-06-19

## 2022-06-19 RX ORDER — CHOLECALCIFEROL (VITAMIN D3) 125 MCG
1000 CAPSULE ORAL DAILY
Status: DISCONTINUED | OUTPATIENT
Start: 2022-06-19 | End: 2022-06-27

## 2022-06-19 RX ORDER — MORPHINE SULFATE 2 MG/ML
2 INJECTION, SOLUTION INTRAMUSCULAR; INTRAVENOUS EVERY 2 HOUR PRN
Status: DISCONTINUED | OUTPATIENT
Start: 2022-06-19 | End: 2022-06-27

## 2022-06-19 RX ORDER — POLYETHYLENE GLYCOL 3350 17 G/17G
17 POWDER, FOR SOLUTION ORAL DAILY PRN
Status: DISCONTINUED | OUTPATIENT
Start: 2022-06-19 | End: 2022-06-27

## 2022-06-19 RX ORDER — GABAPENTIN 300 MG/1
300 CAPSULE ORAL DAILY
Status: DISCONTINUED | OUTPATIENT
Start: 2022-06-19 | End: 2022-06-20

## 2022-06-19 RX ORDER — SENNOSIDES 8.6 MG
17.2 TABLET ORAL NIGHTLY
Status: DISCONTINUED | OUTPATIENT
Start: 2022-06-19 | End: 2022-06-27

## 2022-06-19 RX ORDER — HYDROMORPHONE HYDROCHLORIDE 1 MG/ML
0.6 INJECTION, SOLUTION INTRAMUSCULAR; INTRAVENOUS; SUBCUTANEOUS EVERY 5 MIN PRN
Status: DISCONTINUED | OUTPATIENT
Start: 2022-06-19 | End: 2022-06-19 | Stop reason: HOSPADM

## 2022-06-19 RX ORDER — BISACODYL 10 MG
10 SUPPOSITORY, RECTAL RECTAL
Status: DISCONTINUED | OUTPATIENT
Start: 2022-06-19 | End: 2022-06-27

## 2022-06-19 RX ORDER — MORPHINE SULFATE 10 MG/ML
6 INJECTION, SOLUTION INTRAMUSCULAR; INTRAVENOUS EVERY 10 MIN PRN
Status: DISCONTINUED | OUTPATIENT
Start: 2022-06-19 | End: 2022-06-19 | Stop reason: HOSPADM

## 2022-06-19 RX ORDER — DIPHENHYDRAMINE HCL 25 MG
25 CAPSULE ORAL EVERY 4 HOURS PRN
Status: DISCONTINUED | OUTPATIENT
Start: 2022-06-19 | End: 2022-06-19

## 2022-06-19 RX ORDER — SENNOSIDES 8.6 MG
17.2 TABLET ORAL NIGHTLY PRN
Status: DISCONTINUED | OUTPATIENT
Start: 2022-06-19 | End: 2022-06-27

## 2022-06-19 RX ORDER — HYDROMORPHONE HYDROCHLORIDE 1 MG/ML
0.6 INJECTION, SOLUTION INTRAMUSCULAR; INTRAVENOUS; SUBCUTANEOUS
Status: ACTIVE | OUTPATIENT
Start: 2022-06-19 | End: 2022-06-20

## 2022-06-19 RX ORDER — LIDOCAINE HYDROCHLORIDE 10 MG/ML
INJECTION, SOLUTION EPIDURAL; INFILTRATION; INTRACAUDAL; PERINEURAL AS NEEDED
Status: DISCONTINUED | OUTPATIENT
Start: 2022-06-19 | End: 2022-06-19 | Stop reason: SURG

## 2022-06-19 RX ORDER — HYDROCODONE BITARTRATE AND ACETAMINOPHEN 7.5; 325 MG/1; MG/1
2 TABLET ORAL EVERY 6 HOURS PRN
Status: ACTIVE | OUTPATIENT
Start: 2022-06-19 | End: 2022-06-20

## 2022-06-19 RX ORDER — ALBUTEROL SULFATE 90 UG/1
2 AEROSOL, METERED RESPIRATORY (INHALATION) EVERY 4 HOURS PRN
Status: DISCONTINUED | OUTPATIENT
Start: 2022-06-19 | End: 2022-06-27

## 2022-06-19 RX ORDER — SODIUM CHLORIDE 9 MG/ML
INJECTION, SOLUTION INTRAVENOUS CONTINUOUS
Status: DISCONTINUED | OUTPATIENT
Start: 2022-06-19 | End: 2022-06-20

## 2022-06-19 RX ORDER — ONDANSETRON 2 MG/ML
INJECTION INTRAMUSCULAR; INTRAVENOUS AS NEEDED
Status: DISCONTINUED | OUTPATIENT
Start: 2022-06-19 | End: 2022-06-19 | Stop reason: SURG

## 2022-06-19 RX ORDER — ACETAMINOPHEN 325 MG/1
650 TABLET ORAL EVERY 4 HOURS PRN
Status: DISCONTINUED | OUTPATIENT
Start: 2022-06-19 | End: 2022-06-27

## 2022-06-19 RX ORDER — ASCORBIC ACID 500 MG
1000 TABLET ORAL DAILY
Status: DISCONTINUED | OUTPATIENT
Start: 2022-06-19 | End: 2022-06-27

## 2022-06-19 RX ORDER — HALOPERIDOL 5 MG/ML
0.5 INJECTION INTRAMUSCULAR ONCE AS NEEDED
Status: ACTIVE | OUTPATIENT
Start: 2022-06-19 | End: 2022-06-19

## 2022-06-19 RX ORDER — CEFAZOLIN SODIUM/WATER 2 G/20 ML
2 SYRINGE (ML) INTRAVENOUS EVERY 8 HOURS
Status: COMPLETED | OUTPATIENT
Start: 2022-06-19 | End: 2022-06-20

## 2022-06-19 RX ORDER — ONDANSETRON 2 MG/ML
4 INJECTION INTRAMUSCULAR; INTRAVENOUS EVERY 4 HOURS PRN
Status: DISCONTINUED | OUTPATIENT
Start: 2022-06-19 | End: 2022-06-27

## 2022-06-19 RX ORDER — HEPARIN SODIUM 5000 [USP'U]/ML
5000 INJECTION, SOLUTION INTRAVENOUS; SUBCUTANEOUS EVERY 12 HOURS SCHEDULED
Status: DISCONTINUED | OUTPATIENT
Start: 2022-06-19 | End: 2022-06-19

## 2022-06-19 RX ORDER — MORPHINE SULFATE 4 MG/ML
4 INJECTION, SOLUTION INTRAMUSCULAR; INTRAVENOUS EVERY 10 MIN PRN
Status: DISCONTINUED | OUTPATIENT
Start: 2022-06-19 | End: 2022-06-19 | Stop reason: HOSPADM

## 2022-06-19 RX ORDER — ACETAMINOPHEN 325 MG/1
650 TABLET ORAL EVERY 6 HOURS PRN
Status: ACTIVE | OUTPATIENT
Start: 2022-06-19 | End: 2022-06-20

## 2022-06-19 RX ORDER — PROCHLORPERAZINE EDISYLATE 5 MG/ML
5 INJECTION INTRAMUSCULAR; INTRAVENOUS ONCE AS NEEDED
Status: ACTIVE | OUTPATIENT
Start: 2022-06-19 | End: 2022-06-19

## 2022-06-19 RX ORDER — MORPHINE SULFATE 1 MG/ML
INJECTION, SOLUTION EPIDURAL; INTRATHECAL; INTRAVENOUS
Status: COMPLETED | OUTPATIENT
Start: 2022-06-19 | End: 2022-06-19

## 2022-06-19 RX ORDER — HYDROCODONE BITARTRATE AND ACETAMINOPHEN 7.5; 325 MG/1; MG/1
1 TABLET ORAL EVERY 6 HOURS PRN
Status: DISPENSED | OUTPATIENT
Start: 2022-06-19 | End: 2022-06-20

## 2022-06-19 RX ORDER — DIPHENHYDRAMINE HYDROCHLORIDE 50 MG/ML
12.5 INJECTION INTRAMUSCULAR; INTRAVENOUS EVERY 4 HOURS PRN
Status: DISCONTINUED | OUTPATIENT
Start: 2022-06-19 | End: 2022-06-19

## 2022-06-19 RX ORDER — CALCIUM CARBONATE/VITAMIN D3 250-3.125
1 TABLET ORAL 2 TIMES DAILY WITH MEALS
Status: DISCONTINUED | OUTPATIENT
Start: 2022-06-19 | End: 2022-06-27

## 2022-06-19 RX ORDER — FLUTICASONE FUROATE AND VILANTEROL 200; 25 UG/1; UG/1
1 POWDER RESPIRATORY (INHALATION) DAILY
Status: DISCONTINUED | OUTPATIENT
Start: 2022-06-19 | End: 2022-06-27

## 2022-06-19 RX ORDER — HYDROCODONE BITARTRATE AND ACETAMINOPHEN 5; 325 MG/1; MG/1
2 TABLET ORAL EVERY 4 HOURS PRN
Status: DISCONTINUED | OUTPATIENT
Start: 2022-06-19 | End: 2022-06-20

## 2022-06-19 RX ORDER — TOPIRAMATE 25 MG/1
25 TABLET ORAL 2 TIMES DAILY
Status: DISCONTINUED | OUTPATIENT
Start: 2022-06-20 | End: 2022-06-27

## 2022-06-19 RX ORDER — SODIUM CHLORIDE, SODIUM LACTATE, POTASSIUM CHLORIDE, CALCIUM CHLORIDE 600; 310; 30; 20 MG/100ML; MG/100ML; MG/100ML; MG/100ML
INJECTION, SOLUTION INTRAVENOUS CONTINUOUS PRN
Status: DISCONTINUED | OUTPATIENT
Start: 2022-06-19 | End: 2022-06-19 | Stop reason: SURG

## 2022-06-19 RX ORDER — NAPROXEN 500 MG/1
500 TABLET ORAL 2 TIMES DAILY WITH MEALS
Status: DISCONTINUED | OUTPATIENT
Start: 2022-06-19 | End: 2022-06-27

## 2022-06-19 RX ORDER — DOCUSATE SODIUM 100 MG/1
100 CAPSULE, LIQUID FILLED ORAL 2 TIMES DAILY
Status: DISCONTINUED | OUTPATIENT
Start: 2022-06-19 | End: 2022-06-27

## 2022-06-19 RX ADMIN — LIDOCAINE HYDROCHLORIDE 50 MG: 10 INJECTION, SOLUTION EPIDURAL; INFILTRATION; INTRACAUDAL; PERINEURAL at 09:52:00

## 2022-06-19 RX ADMIN — TRANEXAMIC ACID 1000 MG: 10 INJECTION, SOLUTION INTRAVENOUS at 13:24:00

## 2022-06-19 RX ADMIN — PHENYLEPHRINE HCL 100 MCG: 10 MG/ML VIAL (ML) INJECTION at 11:28:00

## 2022-06-19 RX ADMIN — LIDOCAINE HYDROCHLORIDE 5 ML: 10 INJECTION, SOLUTION INFILTRATION; PERINEURAL at 10:10:00

## 2022-06-19 RX ADMIN — MORPHINE SULFATE 0.25 MG: 1 INJECTION, SOLUTION EPIDURAL; INTRATHECAL; INTRAVENOUS at 10:10:00

## 2022-06-19 RX ADMIN — PHENYLEPHRINE HCL 100 MCG: 10 MG/ML VIAL (ML) INJECTION at 10:44:00

## 2022-06-19 RX ADMIN — EPHEDRINE SULFATE 5 MG: 50 INJECTION INTRAVENOUS at 10:08:00

## 2022-06-19 RX ADMIN — CEFAZOLIN SODIUM/WATER 2 G: 2 G/20 ML SYRINGE (ML) INTRAVENOUS at 10:05:00

## 2022-06-19 RX ADMIN — SODIUM CHLORIDE, SODIUM LACTATE, POTASSIUM CHLORIDE, CALCIUM CHLORIDE: 600; 310; 30; 20 INJECTION, SOLUTION INTRAVENOUS at 11:57:00

## 2022-06-19 RX ADMIN — BUPIVACAINE HYDROCHLORIDE 1.6 ML: 7.5 INJECTION, SOLUTION INTRASPINAL at 10:10:00

## 2022-06-19 RX ADMIN — ONDANSETRON 4 MG: 2 INJECTION INTRAMUSCULAR; INTRAVENOUS at 13:22:00

## 2022-06-19 RX ADMIN — PHENYLEPHRINE HCL 100 MCG: 10 MG/ML VIAL (ML) INJECTION at 11:39:00

## 2022-06-19 RX ADMIN — TRANEXAMIC ACID 1000 MG: 10 INJECTION, SOLUTION INTRAVENOUS at 10:05:00

## 2022-06-19 RX ADMIN — EPHEDRINE SULFATE 5 MG: 50 INJECTION INTRAVENOUS at 10:35:00

## 2022-06-19 RX ADMIN — SODIUM CHLORIDE, SODIUM LACTATE, POTASSIUM CHLORIDE, CALCIUM CHLORIDE: 600; 310; 30; 20 INJECTION, SOLUTION INTRAVENOUS at 09:52:00

## 2022-06-19 RX ADMIN — CEFAZOLIN SODIUM/WATER 2 G: 2 G/20 ML SYRINGE (ML) INTRAVENOUS at 13:54:00

## 2022-06-19 RX ADMIN — PHENYLEPHRINE HCL 100 MCG: 10 MG/ML VIAL (ML) INJECTION at 11:33:00

## 2022-06-19 NOTE — BRIEF OP NOTE
Pre-Operative Diagnosis: 1) left femoral neck closed displaced fracture ([address] [S72.009A], 2) age-related osteoporosis with pathological left hip fracture, 3) obesity (BMI 32.28), 4) intermittent peripheral neuropathy     Post-Operative Diagnosis: Same     Procedure Performed: Left posterior total hip arthroplasty    Surgeon(s) and Role: Gaby Alvarez MD - Primary    Assistant(s): Lisa Juarez MD; Susan Craig PA-C    Anesthesia: Dr. Ann-Marie Santana with spinal using Duramorph/MAC     Surgical Findings: Posterior approach left [place]. Biomet G7 dual mobility cup size 50 mm with single 6.5 x 30 mm screw, Aman Advocate stem size 15 mm with 14 mm distal centralizer and extended neck offset, Biolox ceramic 28 mm femoral head with +3.5 mm neck, 40 mm Dual Mobility polyethylene Vivacit-E. Drain: None (patient already has Gracia to gravity. Specimen: Left femoral head to pathology   Complications: None  Estimated Blood Loss: 400 cc  Stable to PACU. Tranexamic acid 1 g IV second dose given in OR at end of case.     Trish Collado MD  6/19/2022  9:53 AM

## 2022-06-19 NOTE — ANESTHESIA POSTPROCEDURE EVALUATION
Patient: Swapna Grace    Procedure Summary     Date: 06/19/22 Room / Location: [address] [place] [address] MAIN OR 04 / [address] [place] [address] MAIN OR    Anesthesia Start: 5557 Anesthesia Stop: 0638    Procedure: LEFT TOTAL HIP ARTHROPLASTY (Left ) Diagnosis:       Femoral neck fracture ([address]      (Femoral neck fracture ([address] Josy Drake)    Surgeons: Lior Zamora MD Anesthesiologist: Mi Tirado MD    Anesthesia Type: spinal ASA Status: 3          Anesthesia Type: spinal    Vitals Value Taken Time   BP 90/52 06/19/22 1420   Temp 97 06/19/22 1420   Pulse 87 06/19/22 1420   Resp 14 06/19/22 1420   SpO2 90 06/19/22 1420       [address] [place] [address] AN Post Evaluation:   Patient Evaluated in PACU  Patient Participation: complete - patient participated  Level of Consciousness: awake  Pain Management: adequate  Airway Patency:patent  Yes    Cardiovascular Status: acceptable  Respiratory Status: acceptable  Postoperative Hydration acceptable      Alyson Sweet MD  6/19/2022 2:20 PM

## 2022-06-19 NOTE — ANESTHESIA PROCEDURE NOTES
Spinal Block    Date/Time: 6/19/2022 10:10 AM  Performed by: Claudia Cooper MD  Authorized by: Claudia Cooper MD       General Information and Staff    Start Time:  6/19/2022 10:05 AM  End Time:  6/19/2022 10:10 AM  Anesthesiologist:  Claudia Cooper MD  Performed by:   Anesthesiologist  Patient Location:  OR  Site identification: surface landmarks    Preanesthetic Checklist: patient identified, IV checked, risks and benefits discussed, monitors and equipment checked, pre-op evaluation, timeout performed, anesthesia consent and sterile technique used      Procedure Details    Patient Position:  Sitting  Prep: ChloraPrep    Monitoring:  Cardiac monitor  Approach:  Midline  Location:  L3-4  Injection Technique:  Single-shot    Needle    Needle Type:  Pencil-tip  Needle Gauge:  24 G  Needle Length:  3.5 in    Assessment    Sensory Level:   Events: clear CSF, CSF aspirated, well tolerated and blood negative     Additional Comments

## 2022-06-19 NOTE — PROGRESS NOTES
Spoke with Dr. Jorge Chaudhari in the ED. Patient is a 70-year-old female with a mechanical fall this evening. X-ray showed impacted left femoral neck fracture. She denied tingling or numbness in the leg and had strong pulses distally. Pertinent medical history includes COPD and a BMI of 32. History of DVT. She is not diabetic. She is not taking blood thinners. Put in an order for NPO as well as a stat CT scan left hip, called her nurse Juan Manuel Turner and informed him of the orders.

## 2022-06-19 NOTE — ED INITIAL ASSESSMENT (HPI)
Patient brought in by EMS after falling outside of [address]. Patient had fallen backwards, denies head injury, denies any blood thinners. Patient now having L hip and thigh pain.

## 2022-06-19 NOTE — CONSULTS
Dx: Left displaced femoral neck fx in 69 y/o female with BMI 32.28. Plan is left hip hemiarthroplasty or total hip arthroplasty Sunday morning around 9am. No anticoagulation for now and NPO. Full consult to be dictated.

## 2022-06-19 NOTE — ED QUICK NOTES
Orders for admission, patient is aware of plan and ready to go upstairs.  Any questions, please call ED RN Monet at extension   [phone]  Patient Covid vaccination status: Fully vaccinated     COVID Test Ordered in ED: Rapid SARS-CoV-2 by PCR    COVID Suspicion at Admission: N/A    Running Infusions:  None    Mental Status/LOC at time of transport: ax4    Other pertinent information:   CIWA score: N/A   NIH score:  N/A

## 2022-06-19 NOTE — PLAN OF CARE
Pt admitted to unit around 0100. Daughter at bedside. Pt admitted with 2 ulcers to her RLE which were traced and entered into chart. O2 sats maintained on room air. IVF started at 75ml/hr. Morphine for pain. Plan is for surgery today on L  hip. Bed rest maintained. Blue boots applied as well as SCDs. Bed alarm on. Problem: Patient Centered Care  Goal: Patient preferences are identified and integrated in the patient's plan of care  Description: Interventions:  - What would you like us to know as we care for you?   - Provide timely, complete, and accurate information to patient/family  - Incorporate patient and family knowledge, values, beliefs, and cultural backgrounds into the planning and delivery of care  - Encourage patient/family to participate in care and decision-making at the level they choose  - Honor patient and family perspectives and choices  Outcome: Progressing     Problem: PAIN - ADULT  Goal: Verbalizes/displays adequate comfort level or patient's stated pain goal  Description: INTERVENTIONS:  - Encourage pt to monitor pain and request assistance  - Assess pain using appropriate pain scale  - Administer analgesics based on type and severity of pain and evaluate response  - Implement non-pharmacological measures as appropriate and evaluate response  - Consider cultural and social influences on pain and pain management  - Manage/alleviate anxiety  - Utilize distraction and/or relaxation techniques  - Monitor for opioid side effects  - Notify MD/LIP if interventions unsuccessful or patient reports new pain  - Anticipate increased pain with activity and pre-medicate as appropriate  Outcome: Progressing     Problem: SAFETY ADULT - FALL  Goal: Free from fall injury  Description: INTERVENTIONS:  - Assess pt frequently for physical needs  - Identify cognitive and physical deficits and behaviors that affect risk of falls.   - [place] fall precautions as indicated by assessment.  - Educate pt/family on patient safety including physical limitations  - Instruct pt to call for assistance with activity based on assessment  - Modify environment to reduce risk of injury  - Provide assistive devices as appropriate  - Consider OT/PT consult to assist with strengthening/mobility  - Encourage toileting schedule  Outcome: Progressing     Problem: DISCHARGE PLANNING  Goal: Discharge to home or other facility with appropriate resources  Description: INTERVENTIONS:  - Identify barriers to discharge w/pt and caregiver  - Include patient/family/discharge partner in discharge planning  - Arrange for needed discharge resources and transportation as appropriate  - Identify discharge learning needs (meds, wound care, etc)  - Arrange for interpreters to assist at discharge as needed  - Consider post-discharge preferences of patient/family/discharge partner  - Complete POLST form as appropriate  - Assess patient's ability to be responsible for managing their own health  - Refer to Case Management Department for coordinating discharge planning if the patient needs post-hospital services based on physician/LIP order or complex needs related to functional status, cognitive ability or social support system  Outcome: Progressing     Problem: MUSCULOSKELETAL - ADULT  Goal: Return mobility to safest level of function  Description: INTERVENTIONS:  - Assess patient stability and activity tolerance for standing, transferring and ambulating w/ or w/o assistive devices  - Assist with transfers and ambulation using safe patient handling equipment as needed  - Ensure adequate protection for wounds/incisions during mobilization  - Obtain PT/OT consults as needed  - Advance activity as appropriate  - Communicate ordered activity level and limitations with patient/family  Outcome: Progressing  Goal: Maintain proper alignment of affected body part  Description: INTERVENTIONS:  - Support and protect limb and body alignment per provider's orders  - Instruct and reinforce with patient and family use of appropriate assistive device and precautions (e.g. spinal or hip dislocation precautions)  Outcome: Progressing     Problem: Impaired Functional Mobility  Goal: Achieve highest/safest level of mobility/gait  Description: Interventions:  - Assess patient's functional ability and stability  - Promote increasing activity/tolerance for mobility and gait  - Educate and engage patient/family in tolerated activity level and precautions  Outcome: Progressing

## 2022-06-19 NOTE — PLAN OF CARE
Problem: Patient Centered Care  Goal: Patient preferences are identified and integrated in the patient's plan of care  Description: Interventions:  - What would you like us to know as we care for you?   - Provide timely, complete, and accurate information to patient/family  - Incorporate patient and family knowledge, values, beliefs, and cultural backgrounds into the planning and delivery of care  - Encourage patient/family to participate in care and decision-making at the level they choose  - Honor patient and family perspectives and choices  Outcome: Progressing     Problem: Patient/Family Goals  Goal: Patient/Family Long Term Goal  Description: Patient's Long Term Goal:     Interventions:  -   - See additional Care Plan goals for specific interventions  Outcome: Progressing  Goal: Patient/Family Short Term Goal  Description: Patient's Short Term Goal:     Interventions:   -   - See additional Care Plan goals for specific interventions  Outcome: Progressing     Problem: PAIN - ADULT  Goal: Verbalizes/displays adequate comfort level or patient's stated pain goal  Description: INTERVENTIONS:  - Encourage pt to monitor pain and request assistance  - Assess pain using appropriate pain scale  - Administer analgesics based on type and severity of pain and evaluate response  - Implement non-pharmacological measures as appropriate and evaluate response  - Consider cultural and social influences on pain and pain management  - Manage/alleviate anxiety  - Utilize distraction and/or relaxation techniques  - Monitor for opioid side effects  - Notify MD/LIP if interventions unsuccessful or patient reports new pain  - Anticipate increased pain with activity and pre-medicate as appropriate  Outcome: Progressing     Problem: SAFETY ADULT - FALL  Goal: Free from fall injury  Description: INTERVENTIONS:  - Assess pt frequently for physical needs  - Identify cognitive and physical deficits and behaviors that affect risk of falls.  - [place] fall precautions as indicated by assessment.  - Educate pt/family on patient safety including physical limitations  - Instruct pt to call for assistance with activity based on assessment  - Modify environment to reduce risk of injury  - Provide assistive devices as appropriate  - Consider OT/PT consult to assist with strengthening/mobility  - Encourage toileting schedule  Outcome: Progressing     Problem: DISCHARGE PLANNING  Goal: Discharge to home or other facility with appropriate resources  Description: INTERVENTIONS:  - Identify barriers to discharge w/pt and caregiver  - Include patient/family/discharge partner in discharge planning  - Arrange for needed discharge resources and transportation as appropriate  - Identify discharge learning needs (meds, wound care, etc)  - Arrange for interpreters to assist at discharge as needed  - Consider post-discharge preferences of patient/family/discharge partner  - Complete POLST form as appropriate  - Assess patient's ability to be responsible for managing their own health  - Refer to Case Management Department for coordinating discharge planning if the patient needs post-hospital services based on physician/LIP order or complex needs related to functional status, cognitive ability or social support system  Outcome: Progressing     Problem: MUSCULOSKELETAL - ADULT  Goal: Return mobility to safest level of function  Description: INTERVENTIONS:  - Assess patient stability and activity tolerance for standing, transferring and ambulating w/ or w/o assistive devices  - Assist with transfers and ambulation using safe patient handling equipment as needed  - Ensure adequate protection for wounds/incisions during mobilization  - Obtain PT/OT consults as needed  - Advance activity as appropriate  - Communicate ordered activity level and limitations with patient/family  Outcome: Progressing  Goal: Maintain proper alignment of affected body part  Description: INTERVENTIONS:  - Support and protect limb and body alignment per provider's orders  - Instruct and reinforce with patient and family use of appropriate assistive device and precautions (e.g. spinal or hip dislocation precautions)  Outcome: Progressing     Problem: Impaired Functional Mobility  Goal: Achieve highest/safest level of mobility/gait  Description: Interventions:  - Assess patient's functional ability and stability  - Promote increasing activity/tolerance for mobility and gait  - Educate and engage patient/family in tolerated activity level and precautions  - Recommend use of  RW for transfers and ambulation  - Recommend patient transfer to bedside chair toward strongest side  - Recommend use of chair position in bed 3 times per day  Outcome: Zak Pererasture is stable, Cms intact denies any numbness or tingling, medicated for pain prn, Breathing on room air, unable to void  alcala inserted as ordered. Iv fluids infusion. NPO for surgery. scds in use. Safety measures applied and reviewed, Non skid socks in use. bed and chair exit alarm is in use,call light within reach. Bed is in lowest position. Dr. Aleja David explained surgery  at bedside consent for surgery and blood signed. Antibiotics sent with pt to preop. Updated daughter Jeanna Samuels with plan of care ,Dentures removed and cell phone with daughter. Donte Mcmullen returned from [place], breathing on 3 liters o2 via nasal canula, complaint of  Numbness and tingling due to Duramorph spinal. denies pain at this time. Dressing to left hip prevena wound vac cdi, ice packs in use. Complaint of  Nausea and vomiting, vomited x3 bile color, medicated with zofran and compazine and tried Benadryl. Encouraged not to eat till nausea and vomiting subsides. Ice chips provided.

## 2022-06-20 ENCOUNTER — APPOINTMENT (OUTPATIENT)
Dept: GENERAL RADIOLOGY | Facility: HOSPITAL | Age: 72
End: 2022-06-20
Attending: HOSPITALIST
Payer: MEDICARE

## 2022-06-20 ENCOUNTER — APPOINTMENT (OUTPATIENT)
Dept: CT IMAGING | Facility: HOSPITAL | Age: 72
End: 2022-06-20
Attending: HOSPITALIST
Payer: MEDICARE

## 2022-06-20 LAB
ANION GAP SERPL CALC-SCNC: 8 MMOL/L (ref 0–18)
BASE EXCESS BLD CALC-SCNC: -0.8 MMOL/L (ref ?–2)
BASOPHILS # BLD AUTO: 0.01 X10(3) UL (ref 0–0.2)
BASOPHILS # BLD AUTO: 0.02 X10(3) UL (ref 0–0.2)
BASOPHILS NFR BLD AUTO: 0.2 %
BASOPHILS NFR BLD AUTO: 0.4 %
BUN BLD-MCNC: 10 MG/DL (ref 7–18)
BUN/CREAT SERPL: 12 (ref 10–20)
CALCIUM BLD-MCNC: 8.3 MG/DL (ref 8.5–10.1)
CHLORIDE SERPL-SCNC: 111 MMOL/L (ref 98–112)
CHOLEST SERPL-MCNC: 116 MG/DL (ref ?–200)
CO2 SERPL-SCNC: 24 MMOL/L (ref 21–32)
CREAT BLD-MCNC: 0.83 MG/DL
DEPRECATED RDW RBC AUTO: 52.2 FL (ref 35.1–46.3)
DEPRECATED RDW RBC AUTO: 53.9 FL (ref 35.1–46.3)
EOSINOPHIL # BLD AUTO: 0.05 X10(3) UL (ref 0–0.7)
EOSINOPHIL # BLD AUTO: 0.11 X10(3) UL (ref 0–0.7)
EOSINOPHIL NFR BLD AUTO: 1.1 %
EOSINOPHIL NFR BLD AUTO: 2.1 %
ERYTHROCYTE [DISTWIDTH] IN BLOOD BY AUTOMATED COUNT: 15 % (ref 11–15)
ERYTHROCYTE [DISTWIDTH] IN BLOOD BY AUTOMATED COUNT: 15.3 % (ref 11–15)
GLUCOSE BLD-MCNC: 111 MG/DL (ref 70–99)
GLUCOSE BLDC GLUCOMTR-MCNC: 100 MG/DL (ref 70–99)
HCO3 BLDA-SCNC: 24.2 MEQ/L (ref 21–27)
HCT VFR BLD AUTO: 29.6 %
HCT VFR BLD AUTO: 29.6 %
HDLC SERPL-MCNC: 61 MG/DL (ref 40–59)
HGB BLD-MCNC: 9.4 G/DL
HGB BLD-MCNC: 9.5 G/DL
IMM GRANULOCYTES # BLD AUTO: 0.01 X10(3) UL (ref 0–1)
IMM GRANULOCYTES # BLD AUTO: 0.02 X10(3) UL (ref 0–1)
IMM GRANULOCYTES NFR BLD: 0.2 %
IMM GRANULOCYTES NFR BLD: 0.4 %
LACTATE SERPL-SCNC: 2 MMOL/L (ref 0.4–2)
LDLC SERPL CALC-MCNC: 42 MG/DL (ref ?–100)
LYMPHOCYTES # BLD AUTO: 0.47 X10(3) UL (ref 1–4)
LYMPHOCYTES # BLD AUTO: 0.63 X10(3) UL (ref 1–4)
LYMPHOCYTES NFR BLD AUTO: 10.7 %
LYMPHOCYTES NFR BLD AUTO: 12.1 %
MCH RBC QN AUTO: 30.4 PG (ref 26–34)
MCH RBC QN AUTO: 30.6 PG (ref 26–34)
MCHC RBC AUTO-ENTMCNC: 31.8 G/DL (ref 31–37)
MCHC RBC AUTO-ENTMCNC: 32.1 G/DL (ref 31–37)
MCV RBC AUTO: 94.9 FL
MCV RBC AUTO: 96.4 FL
MODIFIED ALLEN TEST: POSITIVE
MONOCYTES # BLD AUTO: 0.4 X10(3) UL (ref 0.1–1)
MONOCYTES # BLD AUTO: 0.55 X10(3) UL (ref 0.1–1)
MONOCYTES NFR BLD AUTO: 10.5 %
MONOCYTES NFR BLD AUTO: 9.1 %
NEUTROPHILS # BLD AUTO: 3.46 X10 (3) UL (ref 1.5–7.7)
NEUTROPHILS # BLD AUTO: 3.46 X10(3) UL (ref 1.5–7.7)
NEUTROPHILS # BLD AUTO: 3.89 X10 (3) UL (ref 1.5–7.7)
NEUTROPHILS # BLD AUTO: 3.89 X10(3) UL (ref 1.5–7.7)
NEUTROPHILS NFR BLD AUTO: 74.5 %
NEUTROPHILS NFR BLD AUTO: 78.7 %
NONHDLC SERPL-MCNC: 55 MG/DL (ref ?–130)
O2 CT BLD-SCNC: 12 VOL% (ref 15–23)
OSMOLALITY SERPL CALC.SUM OF ELEC: 296 MOSM/KG (ref 275–295)
OXYGEN LITERS/MINUTE: 3 L/MIN
PCO2 BLDA: 41 MM HG (ref 35–45)
PH BLDA: 7.38 [PH] (ref 7.35–7.45)
PLATELET # BLD AUTO: 81 10(3)UL (ref 150–450)
PLATELET # BLD AUTO: 89 10(3)UL (ref 150–450)
PO2 BLDA: 58 MM HG (ref 80–100)
POTASSIUM SERPL-SCNC: 3.6 MMOL/L (ref 3.5–5.1)
POTASSIUM SERPL-SCNC: 3.6 MMOL/L (ref 3.5–5.1)
PUNCTURE CHARGE: YES
RBC # BLD AUTO: 3.07 X10(6)UL
RBC # BLD AUTO: 3.12 X10(6)UL
SAO2 % BLDA: 93.4 % (ref 94–100)
SODIUM SERPL-SCNC: 143 MMOL/L (ref 136–145)
TRIGL SERPL-MCNC: 55 MG/DL (ref 30–149)
VLDLC SERPL CALC-MCNC: 8 MG/DL (ref 0–30)
WBC # BLD AUTO: 4.4 X10(3) UL (ref 4–11)
WBC # BLD AUTO: 5.2 X10(3) UL (ref 4–11)

## 2022-06-20 PROCEDURE — 99232 SBSQ HOSP IP/OBS MODERATE 35: CPT | Performed by: HOSPITALIST

## 2022-06-20 PROCEDURE — 70450 CT HEAD/BRAIN W/O DYE: CPT | Performed by: HOSPITALIST

## 2022-06-20 PROCEDURE — 71045 X-RAY EXAM CHEST 1 VIEW: CPT | Performed by: HOSPITALIST

## 2022-06-20 RX ORDER — ONDANSETRON 2 MG/ML
4 INJECTION INTRAMUSCULAR; INTRAVENOUS EVERY 6 HOURS PRN
Status: DISCONTINUED | OUTPATIENT
Start: 2022-06-20 | End: 2022-06-27

## 2022-06-20 RX ORDER — SODIUM CHLORIDE 9 MG/ML
INJECTION, SOLUTION INTRAVENOUS CONTINUOUS
Status: ACTIVE | OUTPATIENT
Start: 2022-06-20 | End: 2022-06-22

## 2022-06-20 RX ORDER — NALOXONE HYDROCHLORIDE 0.4 MG/ML
0.4 INJECTION, SOLUTION INTRAMUSCULAR; INTRAVENOUS; SUBCUTANEOUS ONCE
Status: COMPLETED | OUTPATIENT
Start: 2022-06-20 | End: 2022-06-20

## 2022-06-20 RX ORDER — ACETAMINOPHEN 650 MG/1
650 SUPPOSITORY RECTAL EVERY 4 HOURS PRN
Status: DISCONTINUED | OUTPATIENT
Start: 2022-06-20 | End: 2022-06-27

## 2022-06-20 RX ORDER — ATORVASTATIN CALCIUM 40 MG/1
40 TABLET, FILM COATED ORAL NIGHTLY
Status: DISCONTINUED | OUTPATIENT
Start: 2022-06-20 | End: 2022-06-27

## 2022-06-20 RX ORDER — HYDRALAZINE HYDROCHLORIDE 20 MG/ML
10 INJECTION INTRAMUSCULAR; INTRAVENOUS EVERY 2 HOUR PRN
Status: DISCONTINUED | OUTPATIENT
Start: 2022-06-20 | End: 2022-06-27

## 2022-06-20 RX ORDER — SODIUM CHLORIDE 9 MG/ML
INJECTION, SOLUTION INTRAVENOUS CONTINUOUS
Status: DISCONTINUED | OUTPATIENT
Start: 2022-06-20 | End: 2022-06-27

## 2022-06-20 RX ORDER — ACETAMINOPHEN 325 MG/1
650 TABLET ORAL EVERY 4 HOURS PRN
Status: DISCONTINUED | OUTPATIENT
Start: 2022-06-20 | End: 2022-06-27

## 2022-06-20 RX ORDER — ASPIRIN 325 MG
325 TABLET ORAL DAILY
Status: DISCONTINUED | OUTPATIENT
Start: 2022-06-20 | End: 2022-06-21

## 2022-06-20 RX ORDER — ASPIRIN 300 MG/1
300 SUPPOSITORY RECTAL DAILY
Status: DISCONTINUED | OUTPATIENT
Start: 2022-06-20 | End: 2022-06-21

## 2022-06-20 RX ORDER — LABETALOL HYDROCHLORIDE 5 MG/ML
10 INJECTION, SOLUTION INTRAVENOUS EVERY 10 MIN PRN
Status: DISCONTINUED | OUTPATIENT
Start: 2022-06-20 | End: 2022-06-27

## 2022-06-20 NOTE — PLAN OF CARE
Problem: Patient Centered Care  Goal: Patient preferences are identified and integrated in the patient's plan of care  Description: Interventions:  - What would you like us to know as we care for you?   - Provide timely, complete, and accurate information to patient/family  - Incorporate patient and family knowledge, values, beliefs, and cultural backgrounds into the planning and delivery of care  - Encourage patient/family to participate in care and decision-making at the level they choose  - Honor patient and family perspectives and choices  Outcome: Progressing     Problem: Patient/Family Goals  Goal: Patient/Family Long Term Goal  Description: Patient's Long Term Goal: go home    Interventions:  -   - See additional Care Plan goals for specific interventions  Outcome: Progressing  Goal: Patient/Family Short Term Goal  Description: Patient's Short Term Goal: able to walk better    Interventions:   -   - See additional Care Plan goals for specific interventions  Outcome: Progressing     Problem: PAIN - ADULT  Goal: Verbalizes/displays adequate comfort level or patient's stated pain goal  Description: INTERVENTIONS:  - Encourage pt to monitor pain and request assistance  - Assess pain using appropriate pain scale  - Administer analgesics based on type and severity of pain and evaluate response  - Implement non-pharmacological measures as appropriate and evaluate response  - Consider cultural and social influences on pain and pain management  - Manage/alleviate anxiety  - Utilize distraction and/or relaxation techniques  - Monitor for opioid side effects  - Notify MD/LIP if interventions unsuccessful or patient reports new pain  - Anticipate increased pain with activity and pre-medicate as appropriate  Outcome: Progressing     Problem: SAFETY ADULT - FALL  Goal: Free from fall injury  Description: INTERVENTIONS:  - Assess pt frequently for physical needs  - Identify cognitive and physical deficits and behaviors that affect risk of falls.   - [place] fall precautions as indicated by assessment.  - Educate pt/family on patient safety including physical limitations  - Instruct pt to call for assistance with activity based on assessment  - Modify environment to reduce risk of injury  - Provide assistive devices as appropriate  - Consider OT/PT consult to assist with strengthening/mobility  - Encourage toileting schedule  Outcome: Progressing     Problem: DISCHARGE PLANNING  Goal: Discharge to home or other facility with appropriate resources  Description: INTERVENTIONS:  - Identify barriers to discharge w/pt and caregiver  - Include patient/family/discharge partner in discharge planning  - Arrange for needed discharge resources and transportation as appropriate  - Identify discharge learning needs (meds, wound care, etc)  - Arrange for interpreters to assist at discharge as needed  - Consider post-discharge preferences of patient/family/discharge partner  - Complete POLST form as appropriate  - Assess patient's ability to be responsible for managing their own health  - Refer to Case Management Department for coordinating discharge planning if the patient needs post-hospital services based on physician/LIP order or complex needs related to functional status, cognitive ability or social support system  Outcome: Progressing     Problem: MUSCULOSKELETAL - ADULT  Goal: Return mobility to safest level of function  Description: INTERVENTIONS:  - Assess patient stability and activity tolerance for standing, transferring and ambulating w/ or w/o assistive devices  - Assist with transfers and ambulation using safe patient handling equipment as needed  - Ensure adequate protection for wounds/incisions during mobilization  - Obtain PT/OT consults as needed  - Advance activity as appropriate  - Communicate ordered activity level and limitations with patient/family  Outcome: Progressing  Goal: Maintain proper alignment of affected body part  Description: INTERVENTIONS:  - Support and protect limb and body alignment per provider's orders  - Instruct and reinforce with patient and family use of appropriate assistive device and precautions (e.g. spinal or hip dislocation precautions)  Outcome: Progressing     Problem: Impaired Functional Mobility  Goal: Achieve highest/safest level of mobility/gait  Description: Interventions:  - Assess patient's functional ability and stability  - Promote increasing activity/tolerance for mobility and gait  - Educate and engage patient/family in tolerated activity level and precautions    Outcome: Progressing     Pt is alert and oriented x4. PRN Norco given for pain. Pt is on O2 , reminded pt to use IS every hour. SCD/Eliquis for DVT prophylaxis. Meet Perez ordered to keep alcala for today, and re-eval tomorrow. Pt is x2 person form the chair and x1 walking with walker. Prevena wound vac in place, working properly. Pt recommends rehab. BP 89/45, Md notified and bolus given, bp 92/43 after bolus, O2 -91% on 3L, Md made aware, Md ordered STAT ABG and  chest x-ray  Call light in reach. Update: called RRT , pt was not responsive, did sternum rub, after sternum rub pt was continuous to be lethargic . Another 500ml of 0.9 Nacl given due to bp, blood pressure went up after second bolus . Pt is on continues 0.9 Nacl @ 100 ml. O2  going down ON 2-3 L  when pt fall a sleep and O2 going up when ask pt to take some deep breaths. Encouraging pt to use IS when awake. Md aware, MD ordered CT of brain/head. Call light in reach.

## 2022-06-20 NOTE — CM/SW NOTE
Department  notified of request for [facility] referrals started. Assigned CM/SW to follow up with pt/family on further discharge planning.      Sonya Mcbride   June 20, 2022   10:08
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
## 2022-06-20 NOTE — OPERATIVE REPORT
[facility]    PATIENT'S NAME: Fani Potter   ATTENDING PHYSICIAN: Narayan Quesada MD   OPERATING PHYSICIAN: Mayda Hill MD   PATIENT ACCOUNT#:   [id number]    LOCATION:  [facility] [address] PACU 1 [facility] 10  MEDICAL RECORD #:   [id number]       YOB: 1950  ADMISSION DATE:       06/18/2022      OPERATION DATE:  06/19/2022    OPERATIVE REPORT    PREOPERATIVE DIAGNOSIS:    1. Left femoral neck closed displaced fracture. 2.   Age-related osteoporosis with pathological left hip fracture. 3.   Obesity (body mass index) 32.28.  4. Intermittent peripheral neuropathy. POSTOPERATIVE DIAGNOSIS:    1. Left femoral neck closed displaced fracture. 2.   Age-related osteoporosis with pathological left hip fracture. 3.   Obesity (body mass index) 32.28.  4. Intermittent peripheral neuropathy. PROCEDURE:  Left posterior total hip arthroplasty. ASSISTANTS:  Susan Craig PA-C and La Ward MD.    ANESTHESIA:  Dr. Ann-Marie Santana with spinal using Duramorph/MAC. INDICATIONS:  Patient is a 80-year-old woman who does ambulate and does drive a little. She was at a [facility] and fell while waiting for her daughter to bring the car. She sustained the isolated left femoral neck fracture. CT scan confirmed it was displaced after she was admitted to the [facility]. The risks and complications of surgery were discussed and no guarantees were given. The consent was signed. Due to the patient's obesity code with a BMI of 32.28 as well as carrying a lot of that weight in her left thigh and hip, 2 surgical assistants were medically necessary for the proper retraction necessary for the completion of the case. Without 2 surgical assistants in this particular case, the surgery would been far more difficult and less safe.   In addition, they provided safe positioning on the operative table in the lateral position, superficial closure, and safe transfer off the operative table frame to the recovery room safely. Again, 2 surgical assistants were medically necessary for this case as she had a BMI of 32.28. OPERATIVE TECHNIQUE:  Patient was brought to the operating room and placed in supine position. Appropriate IV access and monitors were placed. Ancef 2 g IV and tranexamic acid 1 g IV were both given as prophylaxis at the appropriate interval.  SCD boot was on the right leg. Spinal anesthesia with Duramorph was done by Dr. Farhan Decker followed by monitored sedation. The patient was placed in the right lateral decubitus position on the pegboard. Axillary roll was then placed and the right leg was well padded with the SCD boot to protect the peroneal nerve. With the pegs in place, she had good pulses distally. Her left hip, lower abdomen, groin, and leg were provisionally prepped with Betadine and formally prepped and draped in usual sterile fashion with ChloraPrep. Posterolateral approach was used to gain access to the left hip. Again, the 2 surgical assistants were made necessary by the thick layer of fat overlying the left hip. The popliteus tendon and capsule were taken in 1 layer for closure later in the case. They were tagged. Femoral neck fracture was confirmed. The femoral neck was cut about a fingerbreadth above the lesser trochanter and the femoral head was removed using a corkscrew. It measured 50.5 mm in both planes. A #7 retractor was placed anterior to the acetabulum and bent Hohmann placed posterior to the acetabulum. The labrum and ligamentum were removed and any bleeding treated with Aquamantys. Reaming went from 45 mm to 49 mm with good subchondral bleeding bone. Cup impacted was a Biomet G7. Trial liner for the dual mobility was screwed in place. A single 6.5 mm x 30 mm screw had been placed with good purchase. The osteophytes inferiorly were removed. The leg was internally rotated and the stem prepared for Aman Advocate cemented stem.  followed by a T-handle reamer, followed by power reamer, followed by broaches were used up to size 15 mm. Trial reduction with a 0 mm femoral neck and a 40 mm dual mobility showed good stability. The stem was then prepared using Pulsavac lavage and then dried thoroughly. Two batches of Biomet methylmethacrylate cement with gentamicin premixed were then mixed in a vacuum container. They were then pressurized in the canal and a Aman Advocate 15 mm stem with a 14 mm distal centralizer and extended neck offset was cemented with proper anteversion. Excess cement was removed and packed around the stem. Trial reduction showed a +3.5 mm femoral neck was more stable than the 0 mm or -3.5mm. The hip was able to be flexed to 90 degrees and internally rotated to 70 degrees. The hip was able be extended 10 degrees and actually rotated 60 degrees. Pistoning did not clear the cup. The 28mm ceramic head with +3.5 mm femoral neck and 40 mm dual mobility polyethylene were assembled. It was impacted on the [facility] after it was cleaned and dried. It was reduced, and again, the hip was stable in all directions and pistoning did not clear. The hip had been intermittently irrigated with dilute Betadine solution as well as Pulsavac throughout the case. This was done again. No significant bleeding was noted and no drain was needed. The posterior capsule and piriformis were repaired with multiple #1 Vicryl sutures passed through the bone. The fascia was then closed with #1 Vicryl suture as well as the deep fat. Subcutaneous fat and skin were closed with a combination of #1 Vicryl suture, 2-0 Vicryl suture, and staples. Prevena disposable wound VAC was then placed for dressing. She was taken off the operative table and brought to the recovery room in stable condition.   Second tranexamic acid 1 g IV was given in the OR at the end of the case and before she went to the recovery room, she received another 2 g of Ancef as this was 4 hours later. The patient was not awake enough at the time of this dictation for neurologic dictation. Blood loss was 400 mL. Gracia output was 300 mL. It was already in place on the floor. There were no complications, and the specimen was the left femoral head to Pathology. Patient tolerated procedure well. Dictated By Aguila Linder. Cam Otero MD  d: 06/19/2022 14:04:00  t: 06/19/2022 15:34:19  Job [id number]  [facility]/    cc: Neri Gallo MD

## 2022-06-20 NOTE — PHYSICAL THERAPY NOTE
Attempted p.m. PT session. Spoke to RN prior to get clearance for activity. Per RN, hold at this time 2* pt with low BP: 89/46mmHg  Will reschedule.      Thank you,  Analia Jeff, PT, DPT

## 2022-06-20 NOTE — PLAN OF CARE
Patient rolls side to side in bed. Denies pain during shift, patient was given duramorph. No nausea or vomiting during shift. SCDs for DVT prophylaxis. Patient voiding with alcala, alcala in d/t retention. Surgical dressing clean, dry,and intact. Prevena wound vac on, no output. No acute changes. Vitals signs as charted. Patient in lowest position, bed alarm on, call light within reach, needs reinforcement.  PT/OT eval this AM.    Problem: Patient Centered Care  Goal: Patient preferences are identified and integrated in the patient's plan of care  Description: Interventions:  - What would you like us to know as we care for you?   - Provide timely, complete, and accurate information to patient/family  - Incorporate patient and family knowledge, values, beliefs, and cultural backgrounds into the planning and delivery of care  - Encourage patient/family to participate in care and decision-making at the level they choose  - Honor patient and family perspectives and choices  Outcome: Progressing     Problem: PAIN - ADULT  Goal: Verbalizes/displays adequate comfort level or patient's stated pain goal  Description: INTERVENTIONS:  - Encourage pt to monitor pain and request assistance  - Assess pain using appropriate pain scale  - Administer analgesics based on type and severity of pain and evaluate response  - Implement non-pharmacological measures as appropriate and evaluate response  - Consider cultural and social influences on pain and pain management  - Manage/alleviate anxiety  - Utilize distraction and/or relaxation techniques  - Monitor for opioid side effects  - Notify MD/LIP if interventions unsuccessful or patient reports new pain  - Anticipate increased pain with activity and pre-medicate as appropriate  Outcome: Progressing     Problem: SAFETY ADULT - FALL  Goal: Free from fall injury  Description: INTERVENTIONS:  - Assess pt frequently for physical needs  - Identify cognitive and physical deficits and behaviors that affect risk of falls.   - [place] fall precautions as indicated by assessment.  - Educate pt/family on patient safety including physical limitations  - Instruct pt to call for assistance with activity based on assessment  - Modify environment to reduce risk of injury  - Provide assistive devices as appropriate  - Consider OT/PT consult to assist with strengthening/mobility  - Encourage toileting schedule  Outcome: Progressing     Problem: DISCHARGE PLANNING  Goal: Discharge to home or other facility with appropriate resources  Description: INTERVENTIONS:  - Identify barriers to discharge w/pt and caregiver  - Include patient/family/discharge partner in discharge planning  - Arrange for needed discharge resources and transportation as appropriate  - Identify discharge learning needs (meds, wound care, etc)  - Arrange for interpreters to assist at discharge as needed  - Consider post-discharge preferences of patient/family/discharge partner  - Complete POLST form as appropriate  - Assess patient's ability to be responsible for managing their own health  - Refer to Case Management Department for coordinating discharge planning if the patient needs post-hospital services based on physician/LIP order or complex needs related to functional status, cognitive ability or social support system  Outcome: Progressing     Problem: MUSCULOSKELETAL - ADULT  Goal: Return mobility to safest level of function  Description: INTERVENTIONS:  - Assess patient stability and activity tolerance for standing, transferring and ambulating w/ or w/o assistive devices  - Assist with transfers and ambulation using safe patient handling equipment as needed  - Ensure adequate protection for wounds/incisions during mobilization  - Obtain PT/OT consults as needed  - Advance activity as appropriate  - Communicate ordered activity level and limitations with patient/family  Outcome: Progressing  Goal: Maintain proper alignment of affected body part  Description: INTERVENTIONS:  - Support and protect limb and body alignment per provider's orders  - Instruct and reinforce with patient and family use of appropriate assistive device and precautions (e.g. spinal or hip dislocation precautions)  Outcome: Progressing     Problem: Impaired Functional Mobility  Goal: Achieve highest/safest level of mobility/gait  Description: Interventions:  - Assess patient's functional ability and stability  - Promote increasing activity/tolerance for mobility and gait  - Educate and engage patient/family in tolerated activity level and precautions  Outcome: Progressing

## 2022-06-21 ENCOUNTER — APPOINTMENT (OUTPATIENT)
Dept: CT IMAGING | Facility: HOSPITAL | Age: 72
End: 2022-06-21
Attending: Other
Payer: MEDICARE

## 2022-06-21 ENCOUNTER — APPOINTMENT (OUTPATIENT)
Dept: CV DIAGNOSTICS | Facility: HOSPITAL | Age: 72
End: 2022-06-21
Attending: Other
Payer: MEDICARE

## 2022-06-21 LAB
ALBUMIN SERPL-MCNC: 2.3 G/DL (ref 3.4–5)
ALBUMIN/GLOB SERPL: 0.8 {RATIO} (ref 1–2)
ALP LIVER SERPL-CCNC: 52 U/L
ALT SERPL-CCNC: 10 U/L
ANION GAP SERPL CALC-SCNC: 7 MMOL/L (ref 0–18)
AST SERPL-CCNC: 29 U/L (ref 15–37)
BASOPHILS # BLD AUTO: 0.02 X10(3) UL (ref 0–0.2)
BASOPHILS NFR BLD AUTO: 0.4 %
BILIRUB SERPL-MCNC: 0.4 MG/DL (ref 0.1–2)
BUN BLD-MCNC: 8 MG/DL (ref 7–18)
BUN/CREAT SERPL: 12.7 (ref 10–20)
CALCIUM BLD-MCNC: 7.6 MG/DL (ref 8.5–10.1)
CHLORIDE SERPL-SCNC: 118 MMOL/L (ref 98–112)
CO2 SERPL-SCNC: 23 MMOL/L (ref 21–32)
CREAT BLD-MCNC: 0.63 MG/DL
DEPRECATED RDW RBC AUTO: 52.8 FL (ref 35.1–46.3)
EOSINOPHIL # BLD AUTO: 0.15 X10(3) UL (ref 0–0.7)
EOSINOPHIL NFR BLD AUTO: 3.2 %
ERYTHROCYTE [DISTWIDTH] IN BLOOD BY AUTOMATED COUNT: 15.2 % (ref 11–15)
GLOBULIN PLAS-MCNC: 2.8 G/DL (ref 2.8–4.4)
GLUCOSE BLD-MCNC: 98 MG/DL (ref 70–99)
GLUCOSE BLDC GLUCOMTR-MCNC: 105 MG/DL (ref 70–99)
GLUCOSE BLDC GLUCOMTR-MCNC: 134 MG/DL (ref 70–99)
GLUCOSE BLDC GLUCOMTR-MCNC: 93 MG/DL (ref 70–99)
HCT VFR BLD AUTO: 25.9 %
HGB BLD-MCNC: 8.4 G/DL
IMM GRANULOCYTES # BLD AUTO: 0.03 X10(3) UL (ref 0–1)
IMM GRANULOCYTES NFR BLD: 0.6 %
LYMPHOCYTES # BLD AUTO: 0.65 X10(3) UL (ref 1–4)
LYMPHOCYTES NFR BLD AUTO: 13.9 %
MCH RBC QN AUTO: 30.8 PG (ref 26–34)
MCHC RBC AUTO-ENTMCNC: 32.4 G/DL (ref 31–37)
MCV RBC AUTO: 94.9 FL
MONOCYTES # BLD AUTO: 0.51 X10(3) UL (ref 0.1–1)
MONOCYTES NFR BLD AUTO: 10.9 %
NEUTROPHILS # BLD AUTO: 3.33 X10 (3) UL (ref 1.5–7.7)
NEUTROPHILS # BLD AUTO: 3.33 X10(3) UL (ref 1.5–7.7)
NEUTROPHILS NFR BLD AUTO: 71 %
NT-PROBNP SERPL-MCNC: 641 PG/ML (ref ?–125)
OSMOLALITY SERPL CALC.SUM OF ELEC: 304 MOSM/KG (ref 275–295)
PLATELET # BLD AUTO: 77 10(3)UL (ref 150–450)
POTASSIUM SERPL-SCNC: 3.4 MMOL/L (ref 3.5–5.1)
PROT SERPL-MCNC: 5.1 G/DL (ref 6.4–8.2)
RBC # BLD AUTO: 2.73 X10(6)UL
SODIUM SERPL-SCNC: 148 MMOL/L (ref 136–145)
WBC # BLD AUTO: 4.7 X10(3) UL (ref 4–11)

## 2022-06-21 PROCEDURE — 93306 TTE W/DOPPLER COMPLETE: CPT | Performed by: OTHER

## 2022-06-21 PROCEDURE — 99233 SBSQ HOSP IP/OBS HIGH 50: CPT | Performed by: HOSPITALIST

## 2022-06-21 PROCEDURE — 99223 1ST HOSP IP/OBS HIGH 75: CPT | Performed by: OTHER

## 2022-06-21 PROCEDURE — 70498 CT ANGIOGRAPHY NECK: CPT | Performed by: OTHER

## 2022-06-21 PROCEDURE — 70496 CT ANGIOGRAPHY HEAD: CPT | Performed by: OTHER

## 2022-06-21 RX ORDER — ASPIRIN 81 MG/1
81 TABLET, CHEWABLE ORAL DAILY
Status: DISCONTINUED | OUTPATIENT
Start: 2022-06-22 | End: 2022-06-23

## 2022-06-21 NOTE — CM/SW NOTE
06/21/22 1800   CM/SW Referral Data   Referral Source Physician   Reason for Referral Discharge planning   Pertinent Medical Hx   Does patient have an established PCP? Yes   Patient Info   Advanced directives? (CM to leave form 6/22)   Patient's Current Mental Status at Time of Assessment Alert   Patient's Home Environment Condo/Apt no elevator   Number of Stair in Home   (5)   Patient Status Prior to Admission   Independent with ADLs and Mobility Yes   Discharge Needs   Anticipated D/C needs Subacute rehab   Services Requested   PASRR Level 1 Submitted Yes   Choice of Post-Acute Provider   Informed patient of right to choose their preferred provider Yes   Information given to   (list given to patient and daughter via email.)     Patient has a walker currentyl at home. Has 5 stairs to enter. Lives with daughter. [facility] Accepting facility list given to both patient and daughter via e-mail. Explained that [facility] 12 facilities that have accepted can in network with patient's insurance. Daughter wants to review list and find a facility that is close to her home. CM to follow-up with daughter tomorrow. / to remain available for support and/or discharge planning.      Lucila Nurse KOTA BSN RN [address]  RN Case Manager  [phone]

## 2022-06-21 NOTE — PLAN OF CARE
Pt A&O x4, but very lethargic and difficult to arouse at times. Pt on 1L O2 due oxygenation levels being labile. Eliquis and SCD's on board for DVT prophylaxis. Pt voiding via [place]. IVF's continued. Prevena wound dressing, CDI. Call light within reach, bed at lowest position. Per MD, pt transferred to ICU for additional monitoring d/t CT results. [facility] Family updated on pt plan of care and new pt location. All questions answered. Problem: Patient Centered Care  Goal: Patient preferences are identified and integrated in the patient's plan of care  Description: Interventions:  - What would you like us to know as we care for you?  I live with my daughter.   - Provide timely, complete, and accurate information to patient/family  - Incorporate patient and family knowledge, values, beliefs, and cultural backgrounds into the planning and delivery of care  - Encourage patient/family to participate in care and decision-making at the level they choose  - Honor patient and family perspectives and choices  Outcome: Progressing     Problem: Patient/Family Goals  Goal: Patient/Family Long Term Goal  Description: Patient's Long Term Goal: Return home     Interventions:  - Pain management   -Resolve stroke   -Increase mobility  -Transfer to ICU  -Neurology consult    - See additional Care Plan goals for specific interventions  Outcome: Progressing  Goal: Patient/Family Short Term Goal  Description: Patient's Short Term Goal: Pain control     Interventions:   -Pain medications  -Ice therapy   - See additional Care Plan goals for specific interventions  Outcome: Progressing     Problem: PAIN - ADULT  Goal: Verbalizes/displays adequate comfort level or patient's stated pain goal  Description: INTERVENTIONS:  - Encourage pt to monitor pain and request assistance  - Assess pain using appropriate pain scale  - Administer analgesics based on type and severity of pain and evaluate response  - Implement non-pharmacological measures as appropriate and evaluate response  - Consider cultural and social influences on pain and pain management  - Manage/alleviate anxiety  - Utilize distraction and/or relaxation techniques  - Monitor for opioid side effects  - Notify MD/LIP if interventions unsuccessful or patient reports new pain  - Anticipate increased pain with activity and pre-medicate as appropriate  Outcome: Progressing     Problem: SAFETY ADULT - FALL  Goal: Free from fall injury  Description: INTERVENTIONS:  - Assess pt frequently for physical needs  - Identify cognitive and physical deficits and behaviors that affect risk of falls.   - [place] fall precautions as indicated by assessment.  - Educate pt/family on patient safety including physical limitations  - Instruct pt to call for assistance with activity based on assessment  - Modify environment to reduce risk of injury  - Provide assistive devices as appropriate  - Consider OT/PT consult to assist with strengthening/mobility  - Encourage toileting schedule  Outcome: Progressing     Problem: DISCHARGE PLANNING  Goal: Discharge to home or other facility with appropriate resources  Description: INTERVENTIONS:  - Identify barriers to discharge w/pt and caregiver  - Include patient/family/discharge partner in discharge planning  - Arrange for needed discharge resources and transportation as appropriate  - Identify discharge learning needs (meds, wound care, etc)  - Arrange for interpreters to assist at discharge as needed  - Consider post-discharge preferences of patient/family/discharge partner  - Complete POLST form as appropriate  - Assess patient's ability to be responsible for managing their own health  - Refer to Case Management Department for coordinating discharge planning if the patient needs post-hospital services based on physician/LIP order or complex needs related to functional status, cognitive ability or social support system  Outcome: Progressing     Problem: MUSCULOSKELETAL - ADULT  Goal: Return mobility to safest level of function  Description: INTERVENTIONS:  - Assess patient stability and activity tolerance for standing, transferring and ambulating w/ or w/o assistive devices  - Assist with transfers and ambulation using safe patient handling equipment as needed  - Ensure adequate protection for wounds/incisions during mobilization  - Obtain PT/OT consults as needed  - Advance activity as appropriate  - Communicate ordered activity level and limitations with patient/family  Outcome: Not Progressing  Goal: Maintain proper alignment of affected body part  Description: INTERVENTIONS:  - Support and protect limb and body alignment per provider's orders  - Instruct and reinforce with patient and family use of appropriate assistive device and precautions (e.g. spinal or hip dislocation precautions)  Outcome: Not Progressing     Problem: Impaired Functional Mobility  Goal: Achieve highest/safest level of mobility/gait  Description: Interventions:  - Assess patient's functional ability and stability  - Promote increasing activity/tolerance for mobility and gait  - Educate and engage patient/family in tolerated activity level and precautions  - Recommend use of  RW for transfers and ambulation  Outcome: Not Progressing

## 2022-06-21 NOTE — PROGRESS NOTES
Patient has been somnolent during the day. We obtained a chest x-ray which was normal.  ABG shows mild hypoxia. I suspect the hypoxia is from lack of deep breathing as her O2 sats do improve when she is encouraged to take deep breaths. Her somnolence was of concern, somewhat improved with naloxone during RRT. CT of the brain ordered which shows possible right cerebellar stroke. In retrospect this may have been the cause of some of her imbalance that caused her mechanical fall and subsequent hip fracture. Less likely would be a postoperative stroke in that area. I have consulted neurology. Her platelet count is around 80, will hold Eliquis in anticipation of starting antiplatelet. I discussed the case with the patient's daughter by phone at length. Case discussed with neurology. We will update orthopedic service in the morning.

## 2022-06-21 NOTE — PLAN OF CARE
Pt had CT and echo completed today. MRI ordered and MRI screening complete. Speech therapy saw pt today and gave diet recommendation, see orders. PT/OT had pt up in chair. Potassium replaced per protocol. Per Dr. Shiraz Hyatt pt to transfer to medical floor with remote tele. Pt safety maintained, call light in reach and bed in lowest position. Pt family updated on plan of care, all questions answered.        Problem: Patient Centered Care  Goal: Patient preferences are identified and integrated in the patient's plan of care  Description: Interventions:    - Provide timely, complete, and accurate information to patient/family  - Incorporate patient and family knowledge, values, beliefs, and cultural backgrounds into the planning and delivery of care  - Encourage patient/family to participate in care and decision-making at the level they choose  - Honor patient and family perspectives and choices  Outcome: Progressing     Problem: Patient/Family Goals  Goal: Patient/Family Long Term Goal  Description: Patient's Long Term Goal: Improved mobility    Interventions:  - PT/OT  - See additional Care Plan goals for specific interventions  Outcome: Progressing  Goal: Patient/Family Short Term Goal  Description: Patient's Short Term Goal: go to rehab facility    Interventions:   - See additional Care Plan goals for specific interventions  Outcome: Progressing     Problem: PAIN - ADULT  Goal: Verbalizes/displays adequate comfort level or patient's stated pain goal  Description: INTERVENTIONS:  - Encourage pt to monitor pain and request assistance  - Assess pain using appropriate pain scale  - Administer analgesics based on type and severity of pain and evaluate response  - Implement non-pharmacological measures as appropriate and evaluate response  - Consider cultural and social influences on pain and pain management  - Manage/alleviate anxiety  - Utilize distraction and/or relaxation techniques  - Monitor for opioid side effects  - Notify MD/LIP if interventions unsuccessful or patient reports new pain  - Anticipate increased pain with activity and pre-medicate as appropriate  Outcome: Progressing     Problem: SAFETY ADULT - FALL  Goal: Free from fall injury  Description: INTERVENTIONS:  - Assess pt frequently for physical needs  - Identify cognitive and physical deficits and behaviors that affect risk of falls.   - [place] fall precautions as indicated by assessment.  - Educate pt/family on patient safety including physical limitations  - Instruct pt to call for assistance with activity based on assessment  - Modify environment to reduce risk of injury  - Provide assistive devices as appropriate  - Consider OT/PT consult to assist with strengthening/mobility  - Encourage toileting schedule  Outcome: Progressing     Problem: DISCHARGE PLANNING  Goal: Discharge to home or other facility with appropriate resources  Description: INTERVENTIONS:  - Identify barriers to discharge w/pt and caregiver  - Include patient/family/discharge partner in discharge planning  - Arrange for needed discharge resources and transportation as appropriate  - Identify discharge learning needs (meds, wound care, etc)  - Arrange for interpreters to assist at discharge as needed  - Consider post-discharge preferences of patient/family/discharge partner  - Complete POLST form as appropriate  - Assess patient's ability to be responsible for managing their own health  - Refer to Case Management Department for coordinating discharge planning if the patient needs post-hospital services based on physician/LIP order or complex needs related to functional status, cognitive ability or social support system  Outcome: Progressing     Problem: MUSCULOSKELETAL - ADULT  Goal: Return mobility to safest level of function  Description: INTERVENTIONS:  - Assess patient stability and activity tolerance for standing, transferring and ambulating w/ or w/o assistive devices  - Assist with transfers and ambulation using safe patient handling equipment as needed  - Ensure adequate protection for wounds/incisions during mobilization  - Obtain PT/OT consults as needed  - Advance activity as appropriate  - Communicate ordered activity level and limitations with patient/family  Outcome: Progressing  Goal: Maintain proper alignment of affected body part  Description: INTERVENTIONS:  - Support and protect limb and body alignment per provider's orders  - Instruct and reinforce with patient and family use of appropriate assistive device and precautions (e.g. spinal or hip dislocation precautions)  Outcome: Progressing     Problem: Impaired Functional Mobility  Goal: Achieve highest/safest level of mobility/gait  Description: Interventions:  - Assess patient's functional ability and stability  - Promote increasing activity/tolerance for mobility and gait  - Educate and engage patient/family in tolerated activity level and precautions    Outcome: Progressing     Problem: NEUROLOGICAL - ADULT  Goal: Achieves stable or improved neurological status  Description: INTERVENTIONS  - Assess for and report changes in neurological status  - Initiate measures to prevent increased intracranial pressure  - Maintain blood pressure and fluid volume within ordered parameters to optimize cerebral perfusion and minimize risk of hemorrhage  - Monitor temperature, glucose, and sodium.  Initiate appropriate interventions as ordered  Outcome: Progressing  Goal: Absence of seizures  Description: INTERVENTIONS  - Monitor for seizure activity  - Administer anti-seizure medications as ordered  - Monitor neurological status  Outcome: Progressing  Goal: Remains free of injury related to seizure activity  Description: INTERVENTIONS:  - Maintain airway, patient safety  and administer oxygen as ordered  - Monitor patient for seizure activity, document and report duration and description of seizure to MD/LIP  - If seizure occurs, turn patient to side and suction secretions as needed  - Reorient patient post seizure  - Seizure pads on all 4 side rails  - Instruct patient/family to notify RN of any seizure activity  - Instruct patient/family to call for assistance with activity based on assessment  Outcome: Progressing  Goal: Achieves maximal functionality and self care  Description: INTERVENTIONS  - Monitor swallowing and airway patency with patient fatigue and changes in neurological status  - Encourage and assist patient to increase activity and self care with guidance from PT/OT  - Encourage visually impaired, hearing impaired and aphasic patients to use assistive/communication devices  Outcome: Progressing

## 2022-06-21 NOTE — PLAN OF CARE
Patient transferred because of unresponsive episodes on the floor, patients ct showed an acute infarct, patient is A and o x 4 status post hip surgery day one with dressing in place, family at bedside, repeat ct and bubble study today. Problem: PAIN - ADULT  Goal: Verbalizes/displays adequate comfort level or patient's stated pain goal  Description: INTERVENTIONS:  - Encourage pt to monitor pain and request assistance  - Assess pain using appropriate pain scale  - Administer analgesics based on type and severity of pain and evaluate response  - Implement non-pharmacological measures as appropriate and evaluate response  - Consider cultural and social influences on pain and pain management  - Manage/alleviate anxiety  - Utilize distraction and/or relaxation techniques  - Monitor for opioid side effects  - Notify MD/LIP if interventions unsuccessful or patient reports new pain  - Anticipate increased pain with activity and pre-medicate as appropriate  Outcome: Progressing     Problem: SAFETY ADULT - FALL  Goal: Free from fall injury  Description: INTERVENTIONS:  - Assess pt frequently for physical needs  - Identify cognitive and physical deficits and behaviors that affect risk of falls.   - [place] fall precautions as indicated by assessment.  - Educate pt/family on patient safety including physical limitations  - Instruct pt to call for assistance with activity based on assessment  - Modify environment to reduce risk of injury  - Provide assistive devices as appropriate  - Consider OT/PT consult to assist with strengthening/mobility  - Encourage toileting schedule  Outcome: Progressing     Problem: MUSCULOSKELETAL - ADULT  Goal: Return mobility to safest level of function  Description: INTERVENTIONS:  - Assess patient stability and activity tolerance for standing, transferring and ambulating w/ or w/o assistive devices  - Assist with transfers and ambulation using safe patient handling equipment as needed  - Ensure adequate protection for wounds/incisions during mobilization  - Obtain PT/OT consults as needed  - Advance activity as appropriate  - Communicate ordered activity level and limitations with patient/family  Outcome: Progressing  Goal: Maintain proper alignment of affected body part  Description: INTERVENTIONS:  - Support and protect limb and body alignment per provider's orders  - Instruct and reinforce with patient and family use of appropriate assistive device and precautions (e.g. spinal or hip dislocation precautions)  Outcome: Progressing     Problem: Impaired Functional Mobility  Goal: Achieve highest/safest level of mobility/gait  Description: Interventions:  - Assess patient's functional ability and stability  - Promote increasing activity/tolerance for mobility and gait  - Educate and engage patient/family in tolerated activity level and precautions    Outcome: Progressing     Problem: NEUROLOGICAL - ADULT  Goal: Achieves stable or improved neurological status  Description: INTERVENTIONS  - Assess for and report changes in neurological status  - Initiate measures to prevent increased intracranial pressure  - Maintain blood pressure and fluid volume within ordered parameters to optimize cerebral perfusion and minimize risk of hemorrhage  - Monitor temperature, glucose, and sodium.  Initiate appropriate interventions as ordered  Outcome: Progressing  Goal: Absence of seizures  Description: INTERVENTIONS  - Monitor for seizure activity  - Administer anti-seizure medications as ordered  - Monitor neurological status  Outcome: Progressing  Goal: Remains free of injury related to seizure activity  Description: INTERVENTIONS:  - Maintain airway, patient safety  and administer oxygen as ordered  - Monitor patient for seizure activity, document and report duration and description of seizure to MD/LIP  - If seizure occurs, turn patient to side and suction secretions as needed  - Reorient patient post seizure  - Seizure pads on all 4 side rails  - Instruct patient/family to notify RN of any seizure activity  - Instruct patient/family to call for assistance with activity based on assessment  Outcome: Progressing  Goal: Achieves maximal functionality and self care  Description: INTERVENTIONS  - Monitor swallowing and airway patency with patient fatigue and changes in neurological status  - Encourage and assist patient to increase activity and self care with guidance from PT/OT  - Encourage visually impaired, hearing impaired and aphasic patients to use assistive/communication devices  Outcome: Progressing     Problem: Impaired Functional Mobility  Goal: Achieve highest/safest level of mobility/gait  Description: Interventions:  - Assess patient's functional ability and stability  - Promote increasing activity/tolerance for mobility and gait  - Educate and engage patient/family in tolerated activity level and precautions    6/21/2022 0513 by Barbra Johnson RN  Outcome: Progressing  6/21/2022 0512 by Barbra Johnson RN  Outcome: Progressing

## 2022-06-22 ENCOUNTER — APPOINTMENT (OUTPATIENT)
Dept: MRI IMAGING | Facility: HOSPITAL | Age: 72
End: 2022-06-22
Attending: Other
Payer: MEDICARE

## 2022-06-22 LAB
ANION GAP SERPL CALC-SCNC: 6 MMOL/L (ref 0–18)
BASOPHILS # BLD AUTO: 0.02 X10(3) UL (ref 0–0.2)
BASOPHILS NFR BLD AUTO: 0.4 %
BUN BLD-MCNC: 6 MG/DL (ref 7–18)
BUN/CREAT SERPL: 10.9 (ref 10–20)
CALCIUM BLD-MCNC: 8.3 MG/DL (ref 8.5–10.1)
CHLORIDE SERPL-SCNC: 114 MMOL/L (ref 98–112)
CO2 SERPL-SCNC: 23 MMOL/L (ref 21–32)
CREAT BLD-MCNC: 0.55 MG/DL
DEPRECATED RDW RBC AUTO: 52.8 FL (ref 35.1–46.3)
EOSINOPHIL # BLD AUTO: 0.17 X10(3) UL (ref 0–0.7)
EOSINOPHIL NFR BLD AUTO: 3.4 %
ERYTHROCYTE [DISTWIDTH] IN BLOOD BY AUTOMATED COUNT: 15.1 % (ref 11–15)
GLUCOSE BLD-MCNC: 107 MG/DL (ref 70–99)
HCT VFR BLD AUTO: 27.4 %
HGB BLD-MCNC: 8.9 G/DL
IMM GRANULOCYTES # BLD AUTO: 0.01 X10(3) UL (ref 0–1)
IMM GRANULOCYTES NFR BLD: 0.2 %
LYMPHOCYTES # BLD AUTO: 0.69 X10(3) UL (ref 1–4)
LYMPHOCYTES NFR BLD AUTO: 13.9 %
MCH RBC QN AUTO: 30.5 PG (ref 26–34)
MCHC RBC AUTO-ENTMCNC: 32.5 G/DL (ref 31–37)
MCV RBC AUTO: 93.8 FL
MONOCYTES # BLD AUTO: 0.61 X10(3) UL (ref 0.1–1)
MONOCYTES NFR BLD AUTO: 12.3 %
NEUTROPHILS # BLD AUTO: 3.47 X10 (3) UL (ref 1.5–7.7)
NEUTROPHILS # BLD AUTO: 3.47 X10(3) UL (ref 1.5–7.7)
NEUTROPHILS NFR BLD AUTO: 69.8 %
OSMOLALITY SERPL CALC.SUM OF ELEC: 294 MOSM/KG (ref 275–295)
PLATELET # BLD AUTO: 100 10(3)UL (ref 150–450)
POTASSIUM SERPL-SCNC: 3.3 MMOL/L (ref 3.5–5.1)
POTASSIUM SERPL-SCNC: 3.3 MMOL/L (ref 3.5–5.1)
POTASSIUM SERPL-SCNC: 4.1 MMOL/L (ref 3.5–5.1)
RBC # BLD AUTO: 2.92 X10(6)UL
SODIUM SERPL-SCNC: 143 MMOL/L (ref 136–145)
WBC # BLD AUTO: 5 X10(3) UL (ref 4–11)

## 2022-06-22 PROCEDURE — 70553 MRI BRAIN STEM W/O & W/DYE: CPT | Performed by: OTHER

## 2022-06-22 PROCEDURE — 99233 SBSQ HOSP IP/OBS HIGH 50: CPT | Performed by: HOSPITALIST

## 2022-06-22 RX ORDER — POTASSIUM CHLORIDE 20 MEQ/1
40 TABLET, EXTENDED RELEASE ORAL EVERY 4 HOURS
Status: COMPLETED | OUTPATIENT
Start: 2022-06-22 | End: 2022-06-22

## 2022-06-22 NOTE — PLAN OF CARE
Problem: Patient Centered Care  Goal: Patient preferences are identified and integrated in the patient's plan of care  Description: Interventions:  - What would you like us to know as we care for you?   - Provide timely, complete, and accurate information to patient/family  - Incorporate patient and family knowledge, values, beliefs, and cultural backgrounds into the planning and delivery of care  - Encourage patient/family to participate in care and decision-making at the level they choose  - Honor patient and family perspectives and choices  Outcome: Progressing     Problem: PAIN - ADULT  Goal: Verbalizes/displays adequate comfort level or patient's stated pain goal  Description: INTERVENTIONS:  - Encourage pt to monitor pain and request assistance  - Assess pain using appropriate pain scale  - Administer analgesics based on type and severity of pain and evaluate response  - Implement non-pharmacological measures as appropriate and evaluate response  - Consider cultural and social influences on pain and pain management  - Manage/alleviate anxiety  - Utilize distraction and/or relaxation techniques  - Monitor for opioid side effects  - Notify MD/LIP if interventions unsuccessful or patient reports new pain  - Anticipate increased pain with activity and pre-medicate as appropriate  Outcome: Progressing     Problem: SAFETY ADULT - FALL  Goal: Free from fall injury  Description: INTERVENTIONS:  - Assess pt frequently for physical needs  - Identify cognitive and physical deficits and behaviors that affect risk of falls.   - [place] fall precautions as indicated by assessment.  - Educate pt/family on patient safety including physical limitations  - Instruct pt to call for assistance with activity based on assessment  - Modify environment to reduce risk of injury  - Provide assistive devices as appropriate  - Consider OT/PT consult to assist with strengthening/mobility  - Encourage toileting schedule  Outcome: Progressing     Problem: DISCHARGE PLANNING  Goal: Discharge to home or other facility with appropriate resources  Description: INTERVENTIONS:  - Identify barriers to discharge w/pt and caregiver  - Include patient/family/discharge partner in discharge planning  - Arrange for needed discharge resources and transportation as appropriate  - Identify discharge learning needs (meds, wound care, etc)  - Arrange for interpreters to assist at discharge as needed  - Consider post-discharge preferences of patient/family/discharge partner  - Complete POLST form as appropriate  - Assess patient's ability to be responsible for managing their own health  - Refer to Case Management Department for coordinating discharge planning if the patient needs post-hospital services based on physician/LIP order or complex needs related to functional status, cognitive ability or social support system  Outcome: Progressing     Problem: MUSCULOSKELETAL - ADULT  Goal: Return mobility to safest level of function  Description: INTERVENTIONS:  - Assess patient stability and activity tolerance for standing, transferring and ambulating w/ or w/o assistive devices  - Assist with transfers and ambulation using safe patient handling equipment as needed  - Ensure adequate protection for wounds/incisions during mobilization  - Obtain PT/OT consults as needed  - Advance activity as appropriate  - Communicate ordered activity level and limitations with patient/family  Outcome: Progressing  Goal: Maintain proper alignment of affected body part  Description: INTERVENTIONS:  - Support and protect limb and body alignment per provider's orders  - Instruct and reinforce with patient and family use of appropriate assistive device and precautions (e.g. spinal or hip dislocation precautions)  Outcome: Progressing     Problem: Impaired Functional Mobility  Goal: Achieve highest/safest level of mobility/gait  Description: Interventions:  - Assess patient's functional ability and stability  - Promote increasing activity/tolerance for mobility and gait  - Educate and engage patient/family in tolerated activity level and precautions  Outcome: Progressing     Problem: NEUROLOGICAL - ADULT  Goal: Achieves stable or improved neurological status  Description: INTERVENTIONS  - Assess for and report changes in neurological status  - Initiate measures to prevent increased intracranial pressure  - Maintain blood pressure and fluid volume within ordered parameters to optimize cerebral perfusion and minimize risk of hemorrhage  - Monitor temperature, glucose, and sodium. Initiate appropriate interventions as ordered  Outcome: Progressing  Goal: Absence of seizures  Description: INTERVENTIONS  - Monitor for seizure activity  - Administer anti-seizure medications as ordered  - Monitor neurological status  Outcome: Progressing  Goal: Remains free of injury related to seizure activity  Description: INTERVENTIONS:  - Maintain airway, patient safety  and administer oxygen as ordered  - Monitor patient for seizure activity, document and report duration and description of seizure to MD/LIP  - If seizure occurs, turn patient to side and suction secretions as needed  - Reorient patient post seizure  - Seizure pads on all 4 side rails  - Instruct patient/family to notify RN of any seizure activity  - Instruct patient/family to call for assistance with activity based on assessment  Outcome: Progressing  Goal: Achieves maximal functionality and self care  Description: INTERVENTIONS  - Monitor swallowing and airway patency with patient fatigue and changes in neurological status  - Encourage and assist patient to increase activity and self care with guidance from PT/OT  - Encourage visually impaired, hearing impaired and aphasic patients to use assistive/communication devices  Outcome: Progressing      Pt is aox4, ambulating with max 2 assist. Voiding with [place] . SCD for DVT prophylaxis.  Prevena dressing CDI. Denies pain. Pt plans to d/c SNF. Disease process discussed with pt. Bed in lowest position, call light and personal possessions within reach. Pt instructed to call for assistance before getting up.

## 2022-06-22 NOTE — CM/SW NOTE
Department  notified of request for [facility], aidin referrals started. Assigned CM/SW to follow up with pt/family on further discharge planning.        Clau Walker   June 22, 2022   15:56

## 2022-06-22 NOTE — PLAN OF CARE
Problem: Patient Centered Care  Goal: Patient preferences are identified and integrated in the patient's plan of care  Description: Interventions:  - What would you like us to know as we care for you?   - Provide timely, complete, and accurate information to patient/family  - Incorporate patient and family knowledge, values, beliefs, and cultural backgrounds into the planning and delivery of care  - Encourage patient/family to participate in care and decision-making at the level they choose  - Honor patient and family perspectives and choices  Outcome: Progressing     Problem: PAIN - ADULT  Goal: Verbalizes/displays adequate comfort level or patient's stated pain goal  Description: INTERVENTIONS:  - Encourage pt to monitor pain and request assistance  - Assess pain using appropriate pain scale  - Administer analgesics based on type and severity of pain and evaluate response  - Implement non-pharmacological measures as appropriate and evaluate response  - Consider cultural and social influences on pain and pain management  - Manage/alleviate anxiety  - Utilize distraction and/or relaxation techniques  - Monitor for opioid side effects  - Notify MD/LIP if interventions unsuccessful or patient reports new pain  - Anticipate increased pain with activity and pre-medicate as appropriate  Outcome: Progressing     Problem: SAFETY ADULT - FALL  Goal: Free from fall injury  Description: INTERVENTIONS:  - Assess pt frequently for physical needs  - Identify cognitive and physical deficits and behaviors that affect risk of falls.   - [place] fall precautions as indicated by assessment.  - Educate pt/family on patient safety including physical limitations  - Instruct pt to call for assistance with activity based on assessment  - Modify environment to reduce risk of injury  - Provide assistive devices as appropriate  - Consider OT/PT consult to assist with strengthening/mobility  - Encourage toileting schedule  Outcome: Progressing     Problem: DISCHARGE PLANNING  Goal: Discharge to home or other facility with appropriate resources  Description: INTERVENTIONS:  - Identify barriers to discharge w/pt and caregiver  - Include patient/family/discharge partner in discharge planning  - Arrange for needed discharge resources and transportation as appropriate  - Identify discharge learning needs (meds, wound care, etc)  - Arrange for interpreters to assist at discharge as needed  - Consider post-discharge preferences of patient/family/discharge partner  - Complete POLST form as appropriate  - Assess patient's ability to be responsible for managing their own health  - Refer to Case Management Department for coordinating discharge planning if the patient needs post-hospital services based on physician/LIP order or complex needs related to functional status, cognitive ability or social support system  Outcome: Progressing     Problem: MUSCULOSKELETAL - ADULT  Goal: Return mobility to safest level of function  Description: INTERVENTIONS:  - Assess patient stability and activity tolerance for standing, transferring and ambulating w/ or w/o assistive devices  - Assist with transfers and ambulation using safe patient handling equipment as needed  - Ensure adequate protection for wounds/incisions during mobilization  - Obtain PT/OT consults as needed  - Advance activity as appropriate  - Communicate ordered activity level and limitations with patient/family  Outcome: Progressing  Goal: Maintain proper alignment of affected body part  Description: INTERVENTIONS:  - Support and protect limb and body alignment per provider's orders  - Instruct and reinforce with patient and family use of appropriate assistive device and precautions (e.g. spinal or hip dislocation precautions)  Outcome: Progressing     Problem: Impaired Functional Mobility  Goal: Achieve highest/safest level of mobility/gait  Description: Interventions:  - Assess patient's functional ability and stability  - Promote increasing activity/tolerance for mobility and gait  - Educate and engage patient/family in tolerated activity level and precautions  Outcome: Progressing     Problem: NEUROLOGICAL - ADULT  Goal: Achieves stable or improved neurological status  Description: INTERVENTIONS  - Assess for and report changes in neurological status  - Initiate measures to prevent increased intracranial pressure  - Maintain blood pressure and fluid volume within ordered parameters to optimize cerebral perfusion and minimize risk of hemorrhage  - Monitor temperature, glucose, and sodium. Initiate appropriate interventions as ordered  Outcome: Progressing  Goal: Absence of seizures  Description: INTERVENTIONS  - Monitor for seizure activity  - Administer anti-seizure medications as ordered  - Monitor neurological status  Outcome: Progressing  Goal: Remains free of injury related to seizure activity  Description: INTERVENTIONS:  - Maintain airway, patient safety  and administer oxygen as ordered  - Monitor patient for seizure activity, document and report duration and description of seizure to MD/LIP  - If seizure occurs, turn patient to side and suction secretions as needed  - Reorient patient post seizure  - Seizure pads on all 4 side rails  - Instruct patient/family to notify RN of any seizure activity  - Instruct patient/family to call for assistance with activity based on assessment  Outcome: Progressing  Goal: Achieves maximal functionality and self care  Description: INTERVENTIONS  - Monitor swallowing and airway patency with patient fatigue and changes in neurological status  - Encourage and assist patient to increase activity and self care with guidance from PT/OT  - Encourage visually impaired, hearing impaired and aphasic patients to use assistive/communication devices  Outcome: Progressing     Received pt from [facility]. Alert and oriented. Daughter at bedside.  Surgical dressing CDI, prevena wound vac in place. On remote tele. Tolerating diet. Gracia cath in place. Denies pain/discomfort. Call light within reach. Bed in lowest and locked position. MRI to be done; checklist completed. Plan for rehab pending facility choice.

## 2022-06-22 NOTE — CM/SW NOTE
OMARI spoke with pts daughter Ramírez Coello regarding rehab choice. Ramírez Coello still undecided on choice. She is states she is waiting to hear from -[facility] and [facility]. CM sent messages to both facilities requesting they reach out via Aidin and spoke with [facility] liaison and  [facility] liaison and Dameon from [facility]. Both state they have attempted to reach out to daughter and will call again. Ramírez Coello is under the impression that pt will remain in the hospital until next week. OMARI informed her that pt is ready for dc today, a rehab choice is needed as soon as possible. Ins approval is still needed. Koby Herrera also followed up with pt and daughter at bedside regarding rehab choice. Ernie Baez.  Jose Manuel Matthews RN, BSN  Nurse   [phone]

## 2022-06-23 LAB
ANION GAP SERPL CALC-SCNC: 9 MMOL/L (ref 0–18)
BUN BLD-MCNC: 10 MG/DL (ref 7–18)
BUN/CREAT SERPL: 16.1 (ref 10–20)
CALCIUM BLD-MCNC: 8.6 MG/DL (ref 8.5–10.1)
CHLORIDE SERPL-SCNC: 115 MMOL/L (ref 98–112)
CO2 SERPL-SCNC: 22 MMOL/L (ref 21–32)
CREAT BLD-MCNC: 0.62 MG/DL
DEPRECATED RDW RBC AUTO: 52.6 FL (ref 35.1–46.3)
ERYTHROCYTE [DISTWIDTH] IN BLOOD BY AUTOMATED COUNT: 15.2 % (ref 11–15)
GLUCOSE BLD-MCNC: 106 MG/DL (ref 70–99)
HCT VFR BLD AUTO: 26.8 %
HGB BLD-MCNC: 8.7 G/DL
MCH RBC QN AUTO: 30.4 PG (ref 26–34)
MCHC RBC AUTO-ENTMCNC: 32.5 G/DL (ref 31–37)
MCV RBC AUTO: 93.7 FL
OSMOLALITY SERPL CALC.SUM OF ELEC: 301 MOSM/KG (ref 275–295)
PLATELET # BLD AUTO: 124 10(3)UL (ref 150–450)
POTASSIUM SERPL-SCNC: 3.6 MMOL/L (ref 3.5–5.1)
RBC # BLD AUTO: 2.86 X10(6)UL
SODIUM SERPL-SCNC: 146 MMOL/L (ref 136–145)
WBC # BLD AUTO: 4.6 X10(3) UL (ref 4–11)

## 2022-06-23 PROCEDURE — 99233 SBSQ HOSP IP/OBS HIGH 50: CPT | Performed by: HOSPITALIST

## 2022-06-23 RX ORDER — ASPIRIN 81 MG/1
324 TABLET, CHEWABLE ORAL 2 TIMES DAILY WITH MEALS
Qty: 60 TABLET | Refills: 0 | Status: SHIPPED | OUTPATIENT
Start: 2022-06-23 | End: 2022-06-24

## 2022-06-23 RX ORDER — ATORVASTATIN CALCIUM 40 MG/1
40 TABLET, FILM COATED ORAL NIGHTLY
Qty: 30 TABLET | Refills: 0 | Status: SHIPPED | OUTPATIENT
Start: 2022-06-23 | End: 2022-06-24

## 2022-06-23 RX ORDER — ASPIRIN 81 MG/1
324 TABLET, CHEWABLE ORAL 2 TIMES DAILY WITH MEALS
Status: DISCONTINUED | OUTPATIENT
Start: 2022-06-23 | End: 2022-06-27

## 2022-06-23 NOTE — SPIRITUAL CARE NOTE
Pt was sitting up in chair in a partially lit room. Pt is being treated for Fx L hip.  introduced herself, while offering radical hospitality, which the pt declined. Pt requested Mormon communion. Pt expressed her disappointment in the procedure of the transfer to rehab. Visit was interrupted by phone call.  will revisit pt.

## 2022-06-23 NOTE — CM/SW NOTE
SW followed up on DC planning. [address] was pt's first choice - not able to accept. After further discussion, SW explained a choice was needed to today to start authorization. Pt and dtr agreeable to [facility].     SW spoke with [facility] who confirmed they can accept and start authorization     Insurance Auth needed for Rehab    PLAN: [facility] pending insurance authorization     Mic Dawson, LSW, MSW ext.  [phone]

## 2022-06-23 NOTE — PLAN OF CARE
VSS, no acute events overnight. POD #4 of a L [place] with Dr. Aylin Lopez s/p fall at home. Pt is aox4, ambulating x2a to stand-pivot. Voiding per [place] to remain in place at discharge. SCD's, finesse hose, and aspirin for DVT prophylaxis. Dressing Prevena wound vac, CDI. Tylenol/Norco available for pain. Pt found to be tearful overnight and stated \"leave me alone\" at the time. Continuing to monitor. Pt plans to d/c to rehab pending daughter choice. Disease process discussed with pt. Bed in lowest position, call light and personal possessions within reach. Pt instructed to call for assistance before getting up. Problem: Patient Centered Care  Goal: Patient preferences are identified and integrated in the patient's plan of care  Description: Interventions:  - What would you like us to know as we care for you?  From home with daughter  - Provide timely, complete, and accurate information to patient/family  - Incorporate patient and family knowledge, values, beliefs, and cultural backgrounds into the planning and delivery of care  - Encourage patient/family to participate in care and decision-making at the level they choose  - Honor patient and family perspectives and choices  Outcome: Progressing     Problem: Patient/Family Goals  Goal: Patient/Family Long Term Goal  Description: Patient's Long Term Goal: Discharge to rehab    Interventions:  - Work with physical/occupational therapy, administer medications as ordered, follow plan of care  - See additional Care Plan goals for specific interventions  Outcome: Progressing  Goal: Patient/Family Short Term Goal  Description: Patient's Short Term Goal: Pain < 4    Interventions:   - Administer medications as ordered, utilize non-pharm pain management strategies such as positioning and ice therapy  - See additional Care Plan goals for specific interventions  Outcome: Progressing     Problem: PAIN - ADULT  Goal: Verbalizes/displays adequate comfort level or patient's stated pain goal  Description: INTERVENTIONS:  - Encourage pt to monitor pain and request assistance  - Assess pain using appropriate pain scale  - Administer analgesics based on type and severity of pain and evaluate response  - Implement non-pharmacological measures as appropriate and evaluate response  - Consider cultural and social influences on pain and pain management  - Manage/alleviate anxiety  - Utilize distraction and/or relaxation techniques  - Monitor for opioid side effects  - Notify MD/LIP if interventions unsuccessful or patient reports new pain  - Anticipate increased pain with activity and pre-medicate as appropriate  Outcome: Progressing     Problem: SAFETY ADULT - FALL  Goal: Free from fall injury  Description: INTERVENTIONS:  - Assess pt frequently for physical needs  - Identify cognitive and physical deficits and behaviors that affect risk of falls.   - [place] fall precautions as indicated by assessment.  - Educate pt/family on patient safety including physical limitations  - Instruct pt to call for assistance with activity based on assessment  - Modify environment to reduce risk of injury  - Provide assistive devices as appropriate  - Consider OT/PT consult to assist with strengthening/mobility  - Encourage toileting schedule  Outcome: Progressing     Problem: DISCHARGE PLANNING  Goal: Discharge to home or other facility with appropriate resources  Description: INTERVENTIONS:  - Identify barriers to discharge w/pt and caregiver  - Include patient/family/discharge partner in discharge planning  - Arrange for needed discharge resources and transportation as appropriate  - Identify discharge learning needs (meds, wound care, etc)  - Arrange for interpreters to assist at discharge as needed  - Consider post-discharge preferences of patient/family/discharge partner  - Complete POLST form as appropriate  - Assess patient's ability to be responsible for managing their own health  - Refer to Case Management Department for coordinating discharge planning if the patient needs post-hospital services based on physician/LIP order or complex needs related to functional status, cognitive ability or social support system  Outcome: Progressing     Problem: MUSCULOSKELETAL - ADULT  Goal: Return mobility to safest level of function  Description: INTERVENTIONS:  - Assess patient stability and activity tolerance for standing, transferring and ambulating w/ or w/o assistive devices  - Assist with transfers and ambulation using safe patient handling equipment as needed  - Ensure adequate protection for wounds/incisions during mobilization  - Obtain PT/OT consults as needed  - Advance activity as appropriate  - Communicate ordered activity level and limitations with patient/family  Outcome: Progressing  Goal: Maintain proper alignment of affected body part  Description: INTERVENTIONS:  - Support and protect limb and body alignment per provider's orders  - Instruct and reinforce with patient and family use of appropriate assistive device and precautions (e.g. spinal or hip dislocation precautions)  Outcome: Progressing     Problem: Impaired Functional Mobility  Goal: Achieve highest/safest level of mobility/gait  Description: Interventions:  - Assess patient's functional ability and stability  - Promote increasing activity/tolerance for mobility and gait  - Educate and engage patient/family in tolerated activity level and precautions  - Recommend use of  RW for transfers and ambulation  Outcome: Progressing     Problem: NEUROLOGICAL - ADULT  Goal: Achieves stable or improved neurological status  Description: INTERVENTIONS  - Assess for and report changes in neurological status  - Initiate measures to prevent increased intracranial pressure  - Maintain blood pressure and fluid volume within ordered parameters to optimize cerebral perfusion and minimize risk of hemorrhage  - Monitor temperature, glucose, and sodium.  Initiate appropriate interventions as ordered  Outcome: Progressing  Goal: Absence of seizures  Description: INTERVENTIONS  - Monitor for seizure activity  - Administer anti-seizure medications as ordered  - Monitor neurological status  Outcome: Progressing  Goal: Remains free of injury related to seizure activity  Description: INTERVENTIONS:  - Maintain airway, patient safety  and administer oxygen as ordered  - Monitor patient for seizure activity, document and report duration and description of seizure to MD/LIP  - If seizure occurs, turn patient to side and suction secretions as needed  - Reorient patient post seizure  - Seizure pads on all 4 side rails  - Instruct patient/family to notify RN of any seizure activity  - Instruct patient/family to call for assistance with activity based on assessment  Outcome: Progressing  Goal: Achieves maximal functionality and self care  Description: INTERVENTIONS  - Monitor swallowing and airway patency with patient fatigue and changes in neurological status  - Encourage and assist patient to increase activity and self care with guidance from PT/OT  - Encourage visually impaired, hearing impaired and aphasic patients to use assistive/communication devices  Outcome: Progressing

## 2022-06-24 LAB
ANION GAP SERPL CALC-SCNC: 8 MMOL/L (ref 0–18)
BUN BLD-MCNC: 11 MG/DL (ref 7–18)
BUN/CREAT SERPL: 18 (ref 10–20)
CALCIUM BLD-MCNC: 8.4 MG/DL (ref 8.5–10.1)
CHLORIDE SERPL-SCNC: 117 MMOL/L (ref 98–112)
CO2 SERPL-SCNC: 22 MMOL/L (ref 21–32)
CREAT BLD-MCNC: 0.61 MG/DL
DEPRECATED RDW RBC AUTO: 51 FL (ref 35.1–46.3)
ERYTHROCYTE [DISTWIDTH] IN BLOOD BY AUTOMATED COUNT: 15 % (ref 11–15)
GLUCOSE BLD-MCNC: 101 MG/DL (ref 70–99)
HCT VFR BLD AUTO: 26.8 %
HGB BLD-MCNC: 8.8 G/DL
MCH RBC QN AUTO: 30.2 PG (ref 26–34)
MCHC RBC AUTO-ENTMCNC: 32.8 G/DL (ref 31–37)
MCV RBC AUTO: 92.1 FL
OSMOLALITY SERPL CALC.SUM OF ELEC: 304 MOSM/KG (ref 275–295)
PLATELET # BLD AUTO: 138 10(3)UL (ref 150–450)
POTASSIUM SERPL-SCNC: 3.4 MMOL/L (ref 3.5–5.1)
RBC # BLD AUTO: 2.91 X10(6)UL
SODIUM SERPL-SCNC: 147 MMOL/L (ref 136–145)
WBC # BLD AUTO: 4.1 X10(3) UL (ref 4–11)

## 2022-06-24 PROCEDURE — 99233 SBSQ HOSP IP/OBS HIGH 50: CPT | Performed by: HOSPITALIST

## 2022-06-24 RX ORDER — ASPIRIN 81 MG/1
324 TABLET, CHEWABLE ORAL 2 TIMES DAILY WITH MEALS
Qty: 720 TABLET | Refills: 0 | Status: SHIPPED | OUTPATIENT
Start: 2022-06-24 | End: 2022-09-22

## 2022-06-24 RX ORDER — ATORVASTATIN CALCIUM 40 MG/1
40 TABLET, FILM COATED ORAL NIGHTLY
Qty: 90 TABLET | Refills: 3 | Status: SHIPPED | OUTPATIENT
Start: 2022-06-24 | End: 2023-06-24

## 2022-06-24 RX ORDER — POTASSIUM CHLORIDE 20 MEQ/1
40 TABLET, EXTENDED RELEASE ORAL ONCE
Status: COMPLETED | OUTPATIENT
Start: 2022-06-24 | End: 2022-06-24

## 2022-06-24 NOTE — PLAN OF CARE
Problem: Patient Centered Care  Goal: Patient preferences are identified and integrated in the patient's plan of care  Description: Interventions:  - What would you like us to know as we care for you? From home with daughter  - Provide timely, complete, and accurate information to patient/family  - Incorporate patient and family knowledge, values, beliefs, and cultural backgrounds into the planning and delivery of care  - Encourage patient/family to participate in care and decision-making at the level they choose  - Honor patient and family perspectives and choices  Outcome: Progressing     Problem: Patient/Family Goals  Goal: Patient/Family Long Term Goal  Description: Patient's Long Term Goal: Home from rehab    Interventions:  - Work with physical/occupational therapy, administer medications as ordered, follow plan of care  - Up as tolerated with walker, to maintain WB status as ordered. - Pain management with oral medications.  - Oral anticoagulation to prevent blood clots. - Maintain hip precautions as recommended. - Monitor incision for any signs of infection.  - May use ice to incision to prevent swelling or help reduce pain.  - PT/OT as ordered. - See additional Care Plan goals for specific interventions  Outcome: Progressing  Goal: Patient/Family Short Term Goal  Description: Patient's Short Term Goal: Pain < 4    Interventions:   - Pain management with oral medications.  - Oral anticoagulation to prevent blood clots. - Maintain hip precautions as recommended. - Monitor incision for any signs of infection.  - May use ice to incision to prevent swelling or help reduce pain.  - PT/OT as ordered.   - See additional Care Plan goals for specific interventions  Outcome: Progressing     Problem: PAIN - ADULT  Goal: Verbalizes/displays adequate comfort level or patient's stated pain goal  Description: INTERVENTIONS:  - Encourage pt to monitor pain and request assistance  - Assess pain using appropriate pain scale  - Administer analgesics based on type and severity of pain and evaluate response  - Implement non-pharmacological measures as appropriate and evaluate response  - Consider cultural and social influences on pain and pain management  - Manage/alleviate anxiety  - Utilize distraction and/or relaxation techniques  - Monitor for opioid side effects  - Notify MD/LIP if interventions unsuccessful or patient reports new pain  - Anticipate increased pain with activity and pre-medicate as appropriate  Outcome: Progressing     Problem: SAFETY ADULT - FALL  Goal: Free from fall injury  Description: INTERVENTIONS:  - Assess pt frequently for physical needs  - Identify cognitive and physical deficits and behaviors that affect risk of falls.   - [place] fall precautions as indicated by assessment.  - Educate pt/family on patient safety including physical limitations  - Instruct pt to call for assistance with activity based on assessment  - Modify environment to reduce risk of injury  - Provide assistive devices as appropriate  - Consider OT/PT consult to assist with strengthening/mobility  - Encourage toileting schedule  Outcome: Progressing     Problem: DISCHARGE PLANNING  Goal: Discharge to home or other facility with appropriate resources  Description: INTERVENTIONS:  - Identify barriers to discharge w/pt and caregiver  - Include patient/family/discharge partner in discharge planning  - Arrange for needed discharge resources and transportation as appropriate  - Identify discharge learning needs (meds, wound care, etc)  - Arrange for interpreters to assist at discharge as needed  - Consider post-discharge preferences of patient/family/discharge partner  - Complete POLST form as appropriate  - Assess patient's ability to be responsible for managing their own health  - Refer to Case Management Department for coordinating discharge planning if the patient needs post-hospital services based on physician/LIP order or complex needs related to functional status, cognitive ability or social support system  Outcome: Progressing     Problem: MUSCULOSKELETAL - ADULT  Goal: Return mobility to safest level of function  Description: INTERVENTIONS:  - Assess patient stability and activity tolerance for standing, transferring and ambulating w/ or w/o assistive devices  - Assist with transfers and ambulation using safe patient handling equipment as needed  - Ensure adequate protection for wounds/incisions during mobilization  - Obtain PT/OT consults as needed  - Advance activity as appropriate  - Communicate ordered activity level and limitations with patient/family  Outcome: Progressing  Goal: Maintain proper alignment of affected body part  Description: INTERVENTIONS:  - Support and protect limb and body alignment per provider's orders  - Instruct and reinforce with patient and family use of appropriate assistive device and precautions (e.g. spinal or hip dislocation precautions)  Outcome: Progressing     Problem: Impaired Functional Mobility  Goal: Achieve highest/safest level of mobility/gait  Description: Interventions:  - Assess patient's functional ability and stability  - Promote increasing activity/tolerance for mobility and gait  - Educate and engage patient/family in tolerated activity level and precautions  - Recommend use of  RW for transfers and ambulation  Outcome: Progressing     Problem: NEUROLOGICAL - ADULT  Goal: Achieves stable or improved neurological status  Description: INTERVENTIONS  - Assess for and report changes in neurological status  - Initiate measures to prevent increased intracranial pressure  - Maintain blood pressure and fluid volume within ordered parameters to optimize cerebral perfusion and minimize risk of hemorrhage  - Monitor temperature, glucose, and sodium.  Initiate appropriate interventions as ordered  Outcome: Progressing  Goal: Absence of seizures  Description: INTERVENTIONS  - Monitor for seizure activity  - Administer anti-seizure medications as ordered  - Monitor neurological status  Outcome: Progressing  Goal: Remains free of injury related to seizure activity  Description: INTERVENTIONS:  - Maintain airway, patient safety  and administer oxygen as ordered  - Monitor patient for seizure activity, document and report duration and description of seizure to MD/LIP  - If seizure occurs, turn patient to side and suction secretions as needed  - Reorient patient post seizure  - Seizure pads on all 4 side rails  - Instruct patient/family to notify RN of any seizure activity  - Instruct patient/family to call for assistance with activity based on assessment  Outcome: Progressing  Goal: Achieves maximal functionality and self care  Description: INTERVENTIONS  - Monitor swallowing and airway patency with patient fatigue and changes in neurological status  - Encourage and assist patient to increase activity and self care with guidance from PT/OT  - Encourage visually impaired, hearing impaired and aphasic patients to use assistive/communication devices  Outcome: Progressing

## 2022-06-25 LAB — POTASSIUM SERPL-SCNC: 3.7 MMOL/L (ref 3.5–5.1)

## 2022-06-25 PROCEDURE — 99233 SBSQ HOSP IP/OBS HIGH 50: CPT | Performed by: HOSPITALIST

## 2022-06-25 NOTE — CM/SW NOTE
6/25/2022 2pm:  Please see below on insurance determination:     The MD reviewed this request and is intending to deny SNF admission stating pt can receive services at a lower level of care. There is an option for a peer to peer review. Phone # is [phone], option 4. Deadline to schedule P2P is by 4:30pm on Monday 6/27. Reference #[id number]. Dr. Nilay Mackey and RN Berkley Patterson notified of need for peer to peer via secure chat. Dr. Nilay Mackey to call [facility] tomorrow 6/26. Addendum, 6/27/2022  Peer to Peer scheduled for 2pm with Dr. Jocelyn Jackson. Dr. Nilay Mackey notified of time. Spoke with [employer]. [facility] @4pm. If ins Mariola Trevor is obtained.  arranged for 330pm pickup. **Will cancel if ins is denied. PCS done. 1117: Met with pt and daughter at bedside to discuss dc plan. CM informed both today is discharge day pending ins auth. IM given. PT agrees to dc to [facility] if ins approves. PT agrees to dc home with [facility] if ins denies. HH ref sent via Aidin. **need choice. Carmen Delgado.  Larry Jauregui RN, BSN  Nurse   [phone]

## 2022-06-25 NOTE — PLAN OF CARE
Patient is A/Ox4 on room air. Remote tele. Gracia in place. X1 assist with a walker. SCDs and TEDs for DVT prophylaxis. Tylenol for pain management. Prevena wound vac intact. No straw. Plan is to got to [facility] pending authorization from insurance. Problem: Patient Centered Care  Goal: Patient preferences are identified and integrated in the patient's plan of care  Description: Interventions:  - What would you like us to know as we care for you? From home with daughter  - Provide timely, complete, and accurate information to patient/family  - Incorporate patient and family knowledge, values, beliefs, and cultural backgrounds into the planning and delivery of care  - Encourage patient/family to participate in care and decision-making at the level they choose  - Honor patient and family perspectives and choices  Outcome: Progressing     Problem: Patient/Family Goals  Goal: Patient/Family Long Term Goal  Description: Patient's Long Term Goal: Home from rehab    Interventions:  - Work with physical/occupational therapy, administer medications as ordered, follow plan of care  - Up as tolerated with walker, to maintain WB status as ordered. - Pain management with oral medications.  - Oral anticoagulation to prevent blood clots. - Maintain hip precautions as recommended. - Monitor incision for any signs of infection.  - May use ice to incision to prevent swelling or help reduce pain.  - PT/OT as ordered. - See additional Care Plan goals for specific interventions  Outcome: Progressing  Goal: Patient/Family Short Term Goal  Description: Patient's Short Term Goal: Pain < 4    Interventions:   - Pain management with oral medications.  - Oral anticoagulation to prevent blood clots. - Maintain hip precautions as recommended. - Monitor incision for any signs of infection.  - May use ice to incision to prevent swelling or help reduce pain.  - PT/OT as ordered.   - See additional Care Plan goals for specific interventions  Outcome: Progressing     Problem: PAIN - ADULT  Goal: Verbalizes/displays adequate comfort level or patient's stated pain goal  Description: INTERVENTIONS:  - Encourage pt to monitor pain and request assistance  - Assess pain using appropriate pain scale  - Administer analgesics based on type and severity of pain and evaluate response  - Implement non-pharmacological measures as appropriate and evaluate response  - Consider cultural and social influences on pain and pain management  - Manage/alleviate anxiety  - Utilize distraction and/or relaxation techniques  - Monitor for opioid side effects  - Notify MD/LIP if interventions unsuccessful or patient reports new pain  - Anticipate increased pain with activity and pre-medicate as appropriate  Outcome: Progressing     Problem: SAFETY ADULT - FALL  Goal: Free from fall injury  Description: INTERVENTIONS:  - Assess pt frequently for physical needs  - Identify cognitive and physical deficits and behaviors that affect risk of falls.   - [place] fall precautions as indicated by assessment.  - Educate pt/family on patient safety including physical limitations  - Instruct pt to call for assistance with activity based on assessment  - Modify environment to reduce risk of injury  - Provide assistive devices as appropriate  - Consider OT/PT consult to assist with strengthening/mobility  - Encourage toileting schedule  Outcome: Progressing     Problem: DISCHARGE PLANNING  Goal: Discharge to home or other facility with appropriate resources  Description: INTERVENTIONS:  - Identify barriers to discharge w/pt and caregiver  - Include patient/family/discharge partner in discharge planning  - Arrange for needed discharge resources and transportation as appropriate  - Identify discharge learning needs (meds, wound care, etc)  - Arrange for interpreters to assist at discharge as needed  - Consider post-discharge preferences of patient/family/discharge partner  - Complete POLST form as appropriate  - Assess patient's ability to be responsible for managing their own health  - Refer to Case Management Department for coordinating discharge planning if the patient needs post-hospital services based on physician/LIP order or complex needs related to functional status, cognitive ability or social support system  Outcome: Progressing     Problem: MUSCULOSKELETAL - ADULT  Goal: Return mobility to safest level of function  Description: INTERVENTIONS:  - Assess patient stability and activity tolerance for standing, transferring and ambulating w/ or w/o assistive devices  - Assist with transfers and ambulation using safe patient handling equipment as needed  - Ensure adequate protection for wounds/incisions during mobilization  - Obtain PT/OT consults as needed  - Advance activity as appropriate  - Communicate ordered activity level and limitations with patient/family  Outcome: Progressing  Goal: Maintain proper alignment of affected body part  Description: INTERVENTIONS:  - Support and protect limb and body alignment per provider's orders  - Instruct and reinforce with patient and family use of appropriate assistive device and precautions (e.g. spinal or hip dislocation precautions)  Outcome: Progressing     Problem: Impaired Functional Mobility  Goal: Achieve highest/safest level of mobility/gait  Description: Interventions:  - Assess patient's functional ability and stability  - Promote increasing activity/tolerance for mobility and gait  - Educate and engage patient/family in tolerated activity level and precautions  - Recommend use of  RW for transfers and ambulation  Outcome: Progressing     Problem: NEUROLOGICAL - ADULT  Goal: Achieves stable or improved neurological status  Description: INTERVENTIONS  - Assess for and report changes in neurological status  - Initiate measures to prevent increased intracranial pressure  - Maintain blood pressure and fluid volume within ordered parameters to optimize cerebral perfusion and minimize risk of hemorrhage  - Monitor temperature, glucose, and sodium.  Initiate appropriate interventions as ordered  Outcome: Progressing  Goal: Absence of seizures  Description: INTERVENTIONS  - Monitor for seizure activity  - Administer anti-seizure medications as ordered  - Monitor neurological status  Outcome: Progressing  Goal: Remains free of injury related to seizure activity  Description: INTERVENTIONS:  - Maintain airway, patient safety  and administer oxygen as ordered  - Monitor patient for seizure activity, document and report duration and description of seizure to MD/LIP  - If seizure occurs, turn patient to side and suction secretions as needed  - Reorient patient post seizure  - Seizure pads on all 4 side rails  - Instruct patient/family to notify RN of any seizure activity  - Instruct patient/family to call for assistance with activity based on assessment  Outcome: Progressing  Goal: Achieves maximal functionality and self care  Description: INTERVENTIONS  - Monitor swallowing and airway patency with patient fatigue and changes in neurological status  - Encourage and assist patient to increase activity and self care with guidance from PT/OT  - Encourage visually impaired, hearing impaired and aphasic patients to use assistive/communication devices  Outcome: Progressing

## 2022-06-26 LAB
ANION GAP SERPL CALC-SCNC: 4 MMOL/L (ref 0–18)
BUN BLD-MCNC: 16 MG/DL (ref 7–18)
BUN/CREAT SERPL: 22.5 (ref 10–20)
CALCIUM BLD-MCNC: 8.3 MG/DL (ref 8.5–10.1)
CHLORIDE SERPL-SCNC: 116 MMOL/L (ref 98–112)
CO2 SERPL-SCNC: 25 MMOL/L (ref 21–32)
CREAT BLD-MCNC: 0.71 MG/DL
DEPRECATED RDW RBC AUTO: 52.1 FL (ref 35.1–46.3)
ERYTHROCYTE [DISTWIDTH] IN BLOOD BY AUTOMATED COUNT: 15.2 % (ref 11–15)
GLUCOSE BLD-MCNC: 103 MG/DL (ref 70–99)
HCT VFR BLD AUTO: 27.5 %
HGB BLD-MCNC: 9 G/DL
MCH RBC QN AUTO: 30.6 PG (ref 26–34)
MCHC RBC AUTO-ENTMCNC: 32.7 G/DL (ref 31–37)
MCV RBC AUTO: 93.5 FL
OSMOLALITY SERPL CALC.SUM OF ELEC: 301 MOSM/KG (ref 275–295)
PLATELET # BLD AUTO: 181 10(3)UL (ref 150–450)
POTASSIUM SERPL-SCNC: 3.7 MMOL/L (ref 3.5–5.1)
RBC # BLD AUTO: 2.94 X10(6)UL
SODIUM SERPL-SCNC: 145 MMOL/L (ref 136–145)
WBC # BLD AUTO: 5 X10(3) UL (ref 4–11)

## 2022-06-26 PROCEDURE — 99233 SBSQ HOSP IP/OBS HIGH 50: CPT | Performed by: HOSPITALIST

## 2022-06-26 RX ORDER — POTASSIUM CHLORIDE 20 MEQ/1
40 TABLET, EXTENDED RELEASE ORAL ONCE
Status: COMPLETED | OUTPATIENT
Start: 2022-06-26 | End: 2022-06-26

## 2022-06-26 NOTE — PLAN OF CARE
Pt is A&O x4. Breathing on room air. CMS intact. SCDs overnight for DVT prophylaxis. Remote tele in place, no calls overnight. Up with 1-2 assist and walker. Voiding via Gracia. One bowel movement overnight. Given Tylenol prn for pain. D.C. plan is SNF. Call light within reach. Safety precaution in place. Problem: Patient Centered Care  Goal: Patient preferences are identified and integrated in the patient's plan of care  Description: Interventions:  - What would you like us to know as we care for you? From home with daughter  - Provide timely, complete, and accurate information to patient/family  - Incorporate patient and family knowledge, values, beliefs, and cultural backgrounds into the planning and delivery of care  - Encourage patient/family to participate in care and decision-making at the level they choose  - Honor patient and family perspectives and choices  Outcome: Progressing     Problem: Patient/Family Goals  Goal: Patient/Family Long Term Goal  Description: Patient's Long Term Goal: Home from rehab    Interventions:  - Work with physical/occupational therapy, administer medications as ordered, follow plan of care  - Up as tolerated with walker, to maintain WB status as ordered. - Pain management with oral medications.  - Oral anticoagulation to prevent blood clots. - Maintain hip precautions as recommended. - Monitor incision for any signs of infection.  - May use ice to incision to prevent swelling or help reduce pain.  - PT/OT as ordered. - See additional Care Plan goals for specific interventions  Outcome: Progressing  Goal: Patient/Family Short Term Goal  Description: Patient's Short Term Goal: Pain < 4    Interventions:   - Pain management with oral medications.  - Oral anticoagulation to prevent blood clots. - Maintain hip precautions as recommended. - Monitor incision for any signs of infection.  - May use ice to incision to prevent swelling or help reduce pain.  - PT/OT as ordered.   - See additional Care Plan goals for specific interventions  Outcome: Progressing     Problem: PAIN - ADULT  Goal: Verbalizes/displays adequate comfort level or patient's stated pain goal  Description: INTERVENTIONS:  - Encourage pt to monitor pain and request assistance  - Assess pain using appropriate pain scale  - Administer analgesics based on type and severity of pain and evaluate response  - Implement non-pharmacological measures as appropriate and evaluate response  - Consider cultural and social influences on pain and pain management  - Manage/alleviate anxiety  - Utilize distraction and/or relaxation techniques  - Monitor for opioid side effects  - Notify MD/LIP if interventions unsuccessful or patient reports new pain  - Anticipate increased pain with activity and pre-medicate as appropriate  Outcome: Progressing     Problem: SAFETY ADULT - FALL  Goal: Free from fall injury  Description: INTERVENTIONS:  - Assess pt frequently for physical needs  - Identify cognitive and physical deficits and behaviors that affect risk of falls.   - [place] fall precautions as indicated by assessment.  - Educate pt/family on patient safety including physical limitations  - Instruct pt to call for assistance with activity based on assessment  - Modify environment to reduce risk of injury  - Provide assistive devices as appropriate  - Consider OT/PT consult to assist with strengthening/mobility  - Encourage toileting schedule  Outcome: Progressing     Problem: DISCHARGE PLANNING  Goal: Discharge to home or other facility with appropriate resources  Description: INTERVENTIONS:  - Identify barriers to discharge w/pt and caregiver  - Include patient/family/discharge partner in discharge planning  - Arrange for needed discharge resources and transportation as appropriate  - Identify discharge learning needs (meds, wound care, etc)  - Arrange for interpreters to assist at discharge as needed  - Consider post-discharge preferences of patient/family/discharge partner  - Complete POLST form as appropriate  - Assess patient's ability to be responsible for managing their own health  - Refer to Case Management Department for coordinating discharge planning if the patient needs post-hospital services based on physician/LIP order or complex needs related to functional status, cognitive ability or social support system  Outcome: Progressing     Problem: MUSCULOSKELETAL - ADULT  Goal: Return mobility to safest level of function  Description: INTERVENTIONS:  - Assess patient stability and activity tolerance for standing, transferring and ambulating w/ or w/o assistive devices  - Assist with transfers and ambulation using safe patient handling equipment as needed  - Ensure adequate protection for wounds/incisions during mobilization  - Obtain PT/OT consults as needed  - Advance activity as appropriate  - Communicate ordered activity level and limitations with patient/family  Outcome: Progressing  Goal: Maintain proper alignment of affected body part  Description: INTERVENTIONS:  - Support and protect limb and body alignment per provider's orders  - Instruct and reinforce with patient and family use of appropriate assistive device and precautions (e.g. spinal or hip dislocation precautions)  Outcome: Progressing     Problem: Impaired Functional Mobility  Goal: Achieve highest/safest level of mobility/gait  Description: Interventions:  - Assess patient's functional ability and stability  - Promote increasing activity/tolerance for mobility and gait  - Educate and engage patient/family in tolerated activity level and precautions  - Recommend use of  RW for transfers and ambulation  Outcome: Progressing     Problem: NEUROLOGICAL - ADULT  Goal: Achieves stable or improved neurological status  Description: INTERVENTIONS  - Assess for and report changes in neurological status  - Initiate measures to prevent increased intracranial pressure  - Maintain blood pressure and fluid volume within ordered parameters to optimize cerebral perfusion and minimize risk of hemorrhage  - Monitor temperature, glucose, and sodium.  Initiate appropriate interventions as ordered  Outcome: Progressing  Goal: Absence of seizures  Description: INTERVENTIONS  - Monitor for seizure activity  - Administer anti-seizure medications as ordered  - Monitor neurological status  Outcome: Progressing  Goal: Remains free of injury related to seizure activity  Description: INTERVENTIONS:  - Maintain airway, patient safety  and administer oxygen as ordered  - Monitor patient for seizure activity, document and report duration and description of seizure to MD/LIP  - If seizure occurs, turn patient to side and suction secretions as needed  - Reorient patient post seizure  - Seizure pads on all 4 side rails  - Instruct patient/family to notify RN of any seizure activity  - Instruct patient/family to call for assistance with activity based on assessment  Outcome: Progressing  Goal: Achieves maximal functionality and self care  Description: INTERVENTIONS  - Monitor swallowing and airway patency with patient fatigue and changes in neurological status  - Encourage and assist patient to increase activity and self care with guidance from PT/OT  - Encourage visually impaired, hearing impaired and aphasic patients to use assistive/communication devices  Outcome: Progressing

## 2022-06-27 VITALS
WEIGHT: 208 LBS | HEART RATE: 91 BPM | SYSTOLIC BLOOD PRESSURE: 106 MMHG | HEIGHT: 66 IN | DIASTOLIC BLOOD PRESSURE: 66 MMHG | OXYGEN SATURATION: 93 % | TEMPERATURE: 98 F | BODY MASS INDEX: 33.43 KG/M2 | RESPIRATION RATE: 18 BRPM

## 2022-06-27 LAB — POTASSIUM SERPL-SCNC: 4.1 MMOL/L (ref 3.5–5.1)

## 2022-06-27 PROCEDURE — 99239 HOSP IP/OBS DSCHRG MGMT >30: CPT | Performed by: HOSPITALIST

## 2022-06-27 NOTE — PLAN OF CARE
VSS, no acute events overnight. POD #8 of a left total hip s/p fall by Dr. Connie Cronin. Pt is aox4, ambulating x1a with RW but but mobility is limited. Voiding per [place] to remain in place at discharge. SCD's and aspirin for DVT prophylaxis. Dressing prevena wound vac. Tylenol available for pain. Pt plans to d/c to rehab per PT recommendation pending insurance authorization. Disease process discussed with pt. Bed in lowest position, call light and personal possessions within reach. Pt instructed to call for assistance before getting up. Problem: Patient Centered Care  Goal: Patient preferences are identified and integrated in the patient's plan of care  Description: Interventions:  - What would you like us to know as we care for you? From home with daughter  - Provide timely, complete, and accurate information to patient/family  - Incorporate patient and family knowledge, values, beliefs, and cultural backgrounds into the planning and delivery of care  - Encourage patient/family to participate in care and decision-making at the level they choose  - Honor patient and family perspectives and choices  Outcome: Progressing     Problem: Patient/Family Goals  Goal: Patient/Family Long Term Goal  Description: Patient's Long Term Goal: Home from rehab    Interventions:  - Work with physical/occupational therapy, administer medications as ordered, follow plan of care  - Up as tolerated with walker, to maintain WB status as ordered. - Pain management with oral medications.  - Oral anticoagulation to prevent blood clots. - Maintain hip precautions as recommended. - Monitor incision for any signs of infection.  - May use ice to incision to prevent swelling or help reduce pain.  - PT/OT as ordered.   - See additional Care Plan goals for specific interventions  Outcome: Progressing  Goal: Patient/Family Short Term Goal  Description: Patient's Short Term Goal: Pain < 4    Interventions:   - Pain management with oral medications.  - Oral anticoagulation to prevent blood clots. - Maintain hip precautions as recommended. - Monitor incision for any signs of infection.  - May use ice to incision to prevent swelling or help reduce pain.  - PT/OT as ordered. - See additional Care Plan goals for specific interventions  Outcome: Progressing     Problem: PAIN - ADULT  Goal: Verbalizes/displays adequate comfort level or patient's stated pain goal  Description: INTERVENTIONS:  - Encourage pt to monitor pain and request assistance  - Assess pain using appropriate pain scale  - Administer analgesics based on type and severity of pain and evaluate response  - Implement non-pharmacological measures as appropriate and evaluate response  - Consider cultural and social influences on pain and pain management  - Manage/alleviate anxiety  - Utilize distraction and/or relaxation techniques  - Monitor for opioid side effects  - Notify MD/LIP if interventions unsuccessful or patient reports new pain  - Anticipate increased pain with activity and pre-medicate as appropriate  Outcome: Progressing     Problem: SAFETY ADULT - FALL  Goal: Free from fall injury  Description: INTERVENTIONS:  - Assess pt frequently for physical needs  - Identify cognitive and physical deficits and behaviors that affect risk of falls.   - [place] fall precautions as indicated by assessment.  - Educate pt/family on patient safety including physical limitations  - Instruct pt to call for assistance with activity based on assessment  - Modify environment to reduce risk of injury  - Provide assistive devices as appropriate  - Consider OT/PT consult to assist with strengthening/mobility  - Encourage toileting schedule  Outcome: Progressing     Problem: DISCHARGE PLANNING  Goal: Discharge to home or other facility with appropriate resources  Description: INTERVENTIONS:  - Identify barriers to discharge w/pt and caregiver  - Include patient/family/discharge partner in discharge planning  - Arrange for needed discharge resources and transportation as appropriate  - Identify discharge learning needs (meds, wound care, etc)  - Arrange for interpreters to assist at discharge as needed  - Consider post-discharge preferences of patient/family/discharge partner  - Complete POLST form as appropriate  - Assess patient's ability to be responsible for managing their own health  - Refer to Case Management Department for coordinating discharge planning if the patient needs post-hospital services based on physician/LIP order or complex needs related to functional status, cognitive ability or social support system  Outcome: Progressing     Problem: MUSCULOSKELETAL - ADULT  Goal: Return mobility to safest level of function  Description: INTERVENTIONS:  - Assess patient stability and activity tolerance for standing, transferring and ambulating w/ or w/o assistive devices  - Assist with transfers and ambulation using safe patient handling equipment as needed  - Ensure adequate protection for wounds/incisions during mobilization  - Obtain PT/OT consults as needed  - Advance activity as appropriate  - Communicate ordered activity level and limitations with patient/family  Outcome: Progressing  Goal: Maintain proper alignment of affected body part  Description: INTERVENTIONS:  - Support and protect limb and body alignment per provider's orders  - Instruct and reinforce with patient and family use of appropriate assistive device and precautions (e.g. spinal or hip dislocation precautions)  Outcome: Progressing     Problem: Impaired Functional Mobility  Goal: Achieve highest/safest level of mobility/gait  Description: Interventions:  - Assess patient's functional ability and stability  - Promote increasing activity/tolerance for mobility and gait  - Educate and engage patient/family in tolerated activity level and precautions  - Recommend use of  RW for transfers and ambulation  Outcome: Progressing     Problem: NEUROLOGICAL - ADULT  Goal: Achieves stable or improved neurological status  Description: INTERVENTIONS  - Assess for and report changes in neurological status  - Initiate measures to prevent increased intracranial pressure  - Maintain blood pressure and fluid volume within ordered parameters to optimize cerebral perfusion and minimize risk of hemorrhage  - Monitor temperature, glucose, and sodium.  Initiate appropriate interventions as ordered  Outcome: Progressing  Goal: Absence of seizures  Description: INTERVENTIONS  - Monitor for seizure activity  - Administer anti-seizure medications as ordered  - Monitor neurological status  Outcome: Progressing  Goal: Remains free of injury related to seizure activity  Description: INTERVENTIONS:  - Maintain airway, patient safety  and administer oxygen as ordered  - Monitor patient for seizure activity, document and report duration and description of seizure to MD/LIP  - If seizure occurs, turn patient to side and suction secretions as needed  - Reorient patient post seizure  - Seizure pads on all 4 side rails  - Instruct patient/family to notify RN of any seizure activity  - Instruct patient/family to call for assistance with activity based on assessment  Outcome: Progressing  Goal: Achieves maximal functionality and self care  Description: INTERVENTIONS  - Monitor swallowing and airway patency with patient fatigue and changes in neurological status  - Encourage and assist patient to increase activity and self care with guidance from PT/OT  - Encourage visually impaired, hearing impaired and aphasic patients to use assistive/communication devices  Outcome: Progressing

## 2022-06-27 NOTE — CM/SW NOTE
06/27/22 1400   Discharge disposition   Expected discharge disposition [id number] Provider   (Svetlana Irvin)   Discharge transportation [facility]       Peer to Peer overturned. Auth approved for pt to go to rehab. Bed available at [facility] today. RN to call report to /[facility] at [phone]. Plan: [facility] today at 430pm via [address] PCS done. Pt and daughter updated at bedside of ins approval and dc time. Malorie Valentino.  Azeb Sharpe RN, BSN  Nurse   [phone]

## 2022-06-27 NOTE — PLAN OF CARE
Patient is ambulating 1 assist w/walker. No nausea or vomiting reported. Has been tolerating general diet. Is voiding ambulating to the bathroom, had small bowel movement this morning per pt. Pain is being controlled w/ tylenol. Prevena wound vac intact and on. Plan is for pt to dc to rehab today via [employer] Dc and rx instructions given. Problem: Patient Centered Care  Goal: Patient preferences are identified and integrated in the patient's plan of care  Description: Interventions:  - What would you like us to know as we care for you? From home with daughter  - Provide timely, complete, and accurate information to patient/family  - Incorporate patient and family knowledge, values, beliefs, and cultural backgrounds into the planning and delivery of care  - Encourage patient/family to participate in care and decision-making at the level they choose  - Honor patient and family perspectives and choices  Outcome: Adequate for Discharge     Problem: Patient/Family Goals  Goal: Patient/Family Long Term Goal  Description: Patient's Long Term Goal: Home from rehab    Interventions:  - Work with physical/occupational therapy, administer medications as ordered, follow plan of care  - Up as tolerated with walker, to maintain WB status as ordered. - Pain management with oral medications.  - Oral anticoagulation to prevent blood clots. - Maintain hip precautions as recommended. - Monitor incision for any signs of infection.  - May use ice to incision to prevent swelling or help reduce pain.  - PT/OT as ordered. - See additional Care Plan goals for specific interventions  Outcome: Adequate for Discharge  Goal: Patient/Family Short Term Goal  Description: Patient's Short Term Goal: Pain < 4    Interventions:   - Pain management with oral medications.  - Oral anticoagulation to prevent blood clots. - Maintain hip precautions as recommended.   - Monitor incision for any signs of infection.  - May use ice to incision to prevent swelling or help reduce pain.  - PT/OT as ordered. - See additional Care Plan goals for specific interventions  Outcome: Adequate for Discharge     Problem: PAIN - ADULT  Goal: Verbalizes/displays adequate comfort level or patient's stated pain goal  Description: INTERVENTIONS:  - Encourage pt to monitor pain and request assistance  - Assess pain using appropriate pain scale  - Administer analgesics based on type and severity of pain and evaluate response  - Implement non-pharmacological measures as appropriate and evaluate response  - Consider cultural and social influences on pain and pain management  - Manage/alleviate anxiety  - Utilize distraction and/or relaxation techniques  - Monitor for opioid side effects  - Notify MD/LIP if interventions unsuccessful or patient reports new pain  - Anticipate increased pain with activity and pre-medicate as appropriate  Outcome: Adequate for Discharge     Problem: SAFETY ADULT - FALL  Goal: Free from fall injury  Description: INTERVENTIONS:  - Assess pt frequently for physical needs  - Identify cognitive and physical deficits and behaviors that affect risk of falls.   - Emeigh fall precautions as indicated by assessment.  - Educate pt/family on patient safety including physical limitations  - Instruct pt to call for assistance with activity based on assessment  - Modify environment to reduce risk of injury  - Provide assistive devices as appropriate  - Consider OT/PT consult to assist with strengthening/mobility  - Encourage toileting schedule  Outcome: Adequate for Discharge     Problem: DISCHARGE PLANNING  Goal: Discharge to home or other facility with appropriate resources  Description: INTERVENTIONS:  - Identify barriers to discharge w/pt and caregiver  - Include patient/family/discharge partner in discharge planning  - Arrange for needed discharge resources and transportation as appropriate  - Identify discharge learning needs (meds, wound care, etc)  - Arrange for interpreters to assist at discharge as needed  - Consider post-discharge preferences of patient/family/discharge partner  - Complete POLST form as appropriate  - Assess patient's ability to be responsible for managing their own health  - Refer to Case Management Department for coordinating discharge planning if the patient needs post-hospital services based on physician/LIP order or complex needs related to functional status, cognitive ability or social support system  Outcome: Adequate for Discharge     Problem: MUSCULOSKELETAL - ADULT  Goal: Return mobility to safest level of function  Description: INTERVENTIONS:  - Assess patient stability and activity tolerance for standing, transferring and ambulating w/ or w/o assistive devices  - Assist with transfers and ambulation using safe patient handling equipment as needed  - Ensure adequate protection for wounds/incisions during mobilization  - Obtain PT/OT consults as needed  - Advance activity as appropriate  - Communicate ordered activity level and limitations with patient/family  Outcome: Adequate for Discharge  Goal: Maintain proper alignment of affected body part  Description: INTERVENTIONS:  - Support and protect limb and body alignment per provider's orders  - Instruct and reinforce with patient and family use of appropriate assistive device and precautions (e.g. spinal or hip dislocation precautions)  Outcome: Adequate for Discharge     Problem: Impaired Functional Mobility  Goal: Achieve highest/safest level of mobility/gait  Description: Interventions:  - Assess patient's functional ability and stability  - Promote increasing activity/tolerance for mobility and gait  - Educate and engage patient/family in tolerated activity level and precautions  - Recommend use of  RW for transfers and ambulation  Outcome: Adequate for Discharge     Problem: NEUROLOGICAL - ADULT  Goal: Achieves stable or improved neurological status  Description: INTERVENTIONS  - Assess for and report changes in neurological status  - Initiate measures to prevent increased intracranial pressure  - Maintain blood pressure and fluid volume within ordered parameters to optimize cerebral perfusion and minimize risk of hemorrhage  - Monitor temperature, glucose, and sodium.  Initiate appropriate interventions as ordered  Outcome: Adequate for Discharge  Goal: Absence of seizures  Description: INTERVENTIONS  - Monitor for seizure activity  - Administer anti-seizure medications as ordered  - Monitor neurological status  Outcome: Adequate for Discharge  Goal: Remains free of injury related to seizure activity  Description: INTERVENTIONS:  - Maintain airway, patient safety  and administer oxygen as ordered  - Monitor patient for seizure activity, document and report duration and description of seizure to MD/LIP  - If seizure occurs, turn patient to side and suction secretions as needed  - Reorient patient post seizure  - Seizure pads on all 4 side rails  - Instruct patient/family to notify RN of any seizure activity  - Instruct patient/family to call for assistance with activity based on assessment  Outcome: Adequate for Discharge  Goal: Achieves maximal functionality and self care  Description: INTERVENTIONS  - Monitor swallowing and airway patency with patient fatigue and changes in neurological status  - Encourage and assist patient to increase activity and self care with guidance from PT/OT  - Encourage visually impaired, hearing impaired and aphasic patients to use assistive/communication devices  Outcome: Adequate for Discharge

## 2022-06-27 NOTE — SPIRITUAL CARE NOTE
Pt sitting up in chair, grandchild at bedside in a sun filled room. Pt is being treated for Fx L hip and is being DC today.  left the family with a blessing.

## 2022-06-29 PROCEDURE — 99306 1ST NF CARE HIGH MDM 50: CPT | Performed by: INTERNAL MEDICINE

## 2022-06-30 ENCOUNTER — INITIAL APN SNF VISIT (OUTPATIENT)
Dept: INTERNAL MEDICINE CLINIC | Facility: SKILLED NURSING FACILITY | Age: 72
End: 2022-06-30

## 2022-06-30 DIAGNOSIS — I63.441 STROKE DUE TO EMBOLISM OF RIGHT CEREBELLAR ARTERY (HCC): ICD-10-CM

## 2022-06-30 DIAGNOSIS — E78.00 PURE HYPERCHOLESTEROLEMIA: ICD-10-CM

## 2022-06-30 DIAGNOSIS — S72.002D CLOSED FRACTURE OF LEFT HIP WITH ROUTINE HEALING, SUBSEQUENT ENCOUNTER: ICD-10-CM

## 2022-06-30 DIAGNOSIS — R33.9 URINARY RETENTION: ICD-10-CM

## 2022-06-30 DIAGNOSIS — K21.9 GASTROESOPHAGEAL REFLUX DISEASE WITHOUT ESOPHAGITIS: ICD-10-CM

## 2022-06-30 PROCEDURE — 99310 SBSQ NF CARE HIGH MDM 45: CPT | Performed by: NURSE PRACTITIONER

## 2022-06-30 PROCEDURE — 1123F ACP DISCUSS/DSCN MKR DOCD: CPT | Performed by: NURSE PRACTITIONER

## 2022-06-30 PROCEDURE — 1111F DSCHRG MED/CURRENT MED MERGE: CPT | Performed by: NURSE PRACTITIONER

## 2022-07-01 PROCEDURE — 99308 SBSQ NF CARE LOW MDM 20: CPT | Performed by: INTERNAL MEDICINE

## 2022-07-05 ENCOUNTER — TELEPHONE (OUTPATIENT)
Dept: ORTHOPEDICS CLINIC | Facility: CLINIC | Age: 72
End: 2022-07-05

## 2022-07-05 ENCOUNTER — SNF VISIT (OUTPATIENT)
Dept: INTERNAL MEDICINE CLINIC | Facility: SKILLED NURSING FACILITY | Age: 72
End: 2022-07-05

## 2022-07-05 VITALS
WEIGHT: 200 LBS | DIASTOLIC BLOOD PRESSURE: 67 MMHG | BODY MASS INDEX: 32 KG/M2 | TEMPERATURE: 98 F | SYSTOLIC BLOOD PRESSURE: 126 MMHG | OXYGEN SATURATION: 98 % | RESPIRATION RATE: 20 BRPM | HEART RATE: 67 BPM

## 2022-07-05 DIAGNOSIS — I63.441 STROKE DUE TO EMBOLISM OF RIGHT CEREBELLAR ARTERY (HCC): ICD-10-CM

## 2022-07-05 DIAGNOSIS — M80.052A PATHOLOGICAL FRACTURE OF LEFT HIP DUE TO AGE-RELATED OSTEOPOROSIS, INITIAL ENCOUNTER (HCC): ICD-10-CM

## 2022-07-05 DIAGNOSIS — S72.002D CLOSED FRACTURE OF LEFT HIP WITH ROUTINE HEALING, SUBSEQUENT ENCOUNTER: ICD-10-CM

## 2022-07-05 PROCEDURE — 99309 SBSQ NF CARE MODERATE MDM 30: CPT | Performed by: NURSE PRACTITIONER

## 2022-07-05 PROCEDURE — 3074F SYST BP LT 130 MM HG: CPT | Performed by: NURSE PRACTITIONER

## 2022-07-05 PROCEDURE — 1126F AMNT PAIN NOTED NONE PRSNT: CPT | Performed by: NURSE PRACTITIONER

## 2022-07-05 PROCEDURE — 3078F DIAST BP <80 MM HG: CPT | Performed by: NURSE PRACTITIONER

## 2022-07-05 PROCEDURE — 1111F DSCHRG MED/CURRENT MED MERGE: CPT | Performed by: NURSE PRACTITIONER

## 2022-07-06 ENCOUNTER — EXTERNAL FACILITY (OUTPATIENT)
Dept: PULMONOLOGY | Facility: CLINIC | Age: 72
End: 2022-07-06

## 2022-07-06 DIAGNOSIS — J45.909 ASTHMA, UNSPECIFIED ASTHMA SEVERITY, UNSPECIFIED WHETHER COMPLICATED, UNSPECIFIED WHETHER PERSISTENT: Primary | ICD-10-CM

## 2022-07-06 PROCEDURE — 99305 1ST NF CARE MODERATE MDM 35: CPT | Performed by: PHYSICIAN ASSISTANT

## 2022-07-06 PROCEDURE — 1111F DSCHRG MED/CURRENT MED MERGE: CPT | Performed by: PHYSICIAN ASSISTANT

## 2022-07-06 PROCEDURE — 99308 SBSQ NF CARE LOW MDM 20: CPT | Performed by: INTERNAL MEDICINE

## 2022-07-07 ENCOUNTER — SNF VISIT (OUTPATIENT)
Dept: INTERNAL MEDICINE CLINIC | Facility: SKILLED NURSING FACILITY | Age: 72
End: 2022-07-07

## 2022-07-07 DIAGNOSIS — R33.9 URINARY RETENTION: ICD-10-CM

## 2022-07-07 DIAGNOSIS — I63.441 STROKE DUE TO EMBOLISM OF RIGHT CEREBELLAR ARTERY (HCC): ICD-10-CM

## 2022-07-07 DIAGNOSIS — K21.9 GASTROESOPHAGEAL REFLUX DISEASE WITHOUT ESOPHAGITIS: ICD-10-CM

## 2022-07-07 DIAGNOSIS — S72.002D CLOSED FRACTURE OF LEFT HIP WITH ROUTINE HEALING, SUBSEQUENT ENCOUNTER: ICD-10-CM

## 2022-07-07 DIAGNOSIS — E78.00 PURE HYPERCHOLESTEROLEMIA: ICD-10-CM

## 2022-07-07 PROCEDURE — 1111F DSCHRG MED/CURRENT MED MERGE: CPT | Performed by: NURSE PRACTITIONER

## 2022-07-07 PROCEDURE — 99309 SBSQ NF CARE MODERATE MDM 30: CPT | Performed by: NURSE PRACTITIONER

## 2022-07-08 PROCEDURE — 99308 SBSQ NF CARE LOW MDM 20: CPT | Performed by: INTERNAL MEDICINE

## 2022-07-11 ENCOUNTER — PATIENT OUTREACH (OUTPATIENT)
Dept: CASE MANAGEMENT | Age: 72
End: 2022-07-11

## 2022-07-11 DIAGNOSIS — K21.9 GASTROESOPHAGEAL REFLUX DISEASE WITHOUT ESOPHAGITIS: ICD-10-CM

## 2022-07-11 DIAGNOSIS — E78.00 PURE HYPERCHOLESTEROLEMIA: ICD-10-CM

## 2022-07-11 DIAGNOSIS — E66.09 CLASS 1 OBESITY DUE TO EXCESS CALORIES WITH SERIOUS COMORBIDITY AND BODY MASS INDEX (BMI) OF 32.0 TO 32.9 IN ADULT: ICD-10-CM

## 2022-07-11 NOTE — PROGRESS NOTES
Contacting patient to follow up on CCM for the month.  LMCB    Total time -3  min  Total Monthly time- 3 min

## 2022-07-13 NOTE — TELEPHONE ENCOUNTER
Per pt missed her appointment 7/12 for her post op and asking to reschedule as soon as possible.  Please advise

## 2022-07-17 ENCOUNTER — TELEPHONE (OUTPATIENT)
Dept: INTERNAL MEDICINE CLINIC | Facility: CLINIC | Age: 72
End: 2022-07-17

## 2022-07-18 ENCOUNTER — OFFICE VISIT (OUTPATIENT)
Dept: ORTHOPEDICS CLINIC | Facility: CLINIC | Age: 72
End: 2022-07-18
Payer: MEDICARE

## 2022-07-18 ENCOUNTER — HOSPITAL ENCOUNTER (OUTPATIENT)
Dept: GENERAL RADIOLOGY | Facility: HOSPITAL | Age: 72
Discharge: HOME | End: 2022-07-18
Attending: ORTHOPAEDIC SURGERY
Payer: MEDICARE

## 2022-07-18 DIAGNOSIS — Z96.642 S/P TOTAL LEFT HIP ARTHROPLASTY: ICD-10-CM

## 2022-07-18 DIAGNOSIS — E66.09 CLASS 1 OBESITY DUE TO EXCESS CALORIES WITH SERIOUS COMORBIDITY AND BODY MASS INDEX (BMI) OF 32.0 TO 32.9 IN ADULT: ICD-10-CM

## 2022-07-18 DIAGNOSIS — Z47.89 ORTHOPEDIC AFTERCARE: ICD-10-CM

## 2022-07-18 DIAGNOSIS — S72.002D CLOSED FRACTURE OF LEFT HIP WITH ROUTINE HEALING, SUBSEQUENT ENCOUNTER: Primary | ICD-10-CM

## 2022-07-18 DIAGNOSIS — M80.052D PATHOLOGICAL FRACTURE OF LEFT HIP DUE TO AGE-RELATED OSTEOPOROSIS WITH ROUTINE HEALING, SUBSEQUENT ENCOUNTER: ICD-10-CM

## 2022-07-18 PROCEDURE — 73502 X-RAY EXAM HIP UNI 2-3 VIEWS: CPT | Performed by: ORTHOPAEDIC SURGERY

## 2022-07-18 NOTE — TELEPHONE ENCOUNTER
On call   Was paged by pts daughter ana Smith 18- 36  States mom (pt) is confused   Pt states she fell in June and went to rehab and daughter is states that she thinks that mom may be more confused   Advised to take her to the ER if she thinks her mom is confused   She states that her mom had a stroke   Daughter would like to know her f/ u apts   Did tell pt she has an apt with ortho tomorrow   Advised to f/u pcp

## 2022-07-31 ENCOUNTER — EXTERNAL FACILITY (OUTPATIENT)
Dept: INTERNAL MEDICINE CLINIC | Facility: CLINIC | Age: 72
End: 2022-07-31

## 2022-07-31 DIAGNOSIS — E78.00 PURE HYPERCHOLESTEROLEMIA: ICD-10-CM

## 2022-07-31 DIAGNOSIS — I63.441 STROKE DUE TO EMBOLISM OF RIGHT CEREBELLAR ARTERY (HCC): ICD-10-CM

## 2022-07-31 DIAGNOSIS — M80.052D PATHOLOGICAL FRACTURE OF LEFT HIP DUE TO AGE-RELATED OSTEOPOROSIS WITH ROUTINE HEALING, SUBSEQUENT ENCOUNTER: ICD-10-CM

## 2022-07-31 DIAGNOSIS — J44.9 ASTHMA WITH COPD (CHRONIC OBSTRUCTIVE PULMONARY DISEASE) (HCC): Chronic | ICD-10-CM

## 2022-07-31 DIAGNOSIS — G62.9 PERIPHERAL POLYNEUROPATHY: ICD-10-CM

## 2022-07-31 DIAGNOSIS — S72.002D CLOSED FRACTURE OF LEFT HIP WITH ROUTINE HEALING, SUBSEQUENT ENCOUNTER: ICD-10-CM

## 2022-07-31 DIAGNOSIS — K21.9 GASTROESOPHAGEAL REFLUX DISEASE WITHOUT ESOPHAGITIS: ICD-10-CM

## 2022-07-31 NOTE — PROGRESS NOTES
pt seen 6/29/22 at [facility]    seen in room, no distress noted     will follow     hospital notes noted     HPI   70year old female with a history of asthma, remote hx of dvt, who presented to [address] [place] [address] ED 6/18/22 with left hip pain after a mechanical fall. Pt was walking unassisted from [employer] to car when she lost her balance and fell backwards onto left side. No LOC.   c/o left hip pain and left leg shorter than right per pt. No CP or SOB. No f/c. No n/v/c/d. Pt reports she is able to walk several blocks without CP or SOB. Patient was admitted for further workup, S/P ISRAEL on 06/19 by Dr. Grace Eid. Alcala placed due to urinary retention , hx of cerebellar stroke with MRI brain on 06/22 large subacute infarction involving right cerebellar hemisphere predominant in the vascular territory . Patient was stabilized and sent to rehab for further conditioning . ALLERGIES:   No Known Allergies   CODE STATUS: Full Code   ADVANCED CARE PLANNING TEAM: will need family care plan        subj  no cp  no sob  no abd pain  + gen weakness    vit : stable     obj  cv s1 s2  chest clear  abd soft  ext : from, wound stable, covered          A/P     Left Femoral Neck Fracture, closed, displaced   - S/PTHA on 06/19 by Dr. Ramin Oquendo   - continue ASA 81mg daily   - continue tylenol 500mg Q6PRN   - PT/OT   -to see Dr. Ramin Oquendo this week for xray and staple removal       Urinary retention   - alcala placed on 06/19   -will need to start voiding trial when up and moving better   - monitor       Cerebellar stroke   - CT on 06/20 shows acute right cerebellar stroke in the posterior inferior cerebellar artery territory   - CTA on 06/21 shows arteries patent.    - MRI brain on 06/22 large subacute infarction involving rightcerebellar hemisphere predominant in the vascular territory   - Echo 06/21 - EF 60-65%   - continue topiramate 25mg daily   - continue ASA 81mg daily   - monitor           HLD   - continue atorvastain 40mg HS   - monitor       GERD   - continue omeprazole 20mg daily   - monitor       Supplements   - continue Calcium carbonate - vit D daily   - continue vitamin C daily   - continue cyanocobalamin daily

## 2022-07-31 NOTE — PROGRESS NOTES
Pt seen 7/8/22 at  [facility]    Seen in room, no distress    Working with pt         Nursing notes noted    Continue with current care      subj  no cp  no sob  no abd pain  + gen weakness    vit : stable     obj  cv s1 s2  chest clear  abd soft  ext : from, wound stable, covered          A/P     Left Femoral Neck Fracture, closed, displaced   - S/PTHA on 06/19 by Dr. Connie Cronin   - continue ASA 81mg daily   - continue tylenol 500mg Q6PRN   - PT/OT   -to see Dr. Connie Cronin this week for xray and staple removal       Urinary retention   - alcala placed on 06/19   -will need to start voiding trial when up and moving better   - monitor       Cerebellar stroke   - CT on 06/20 shows acute right cerebellar stroke in the posterior inferior cerebellar artery territory   - CTA on 06/21 shows arteries patent.    - MRI brain on 06/22 large subacute infarction involving rightcerebellar hemisphere predominant in the vascular territory   - Echo 06/21 - EF 60-65%   - continue topiramate 25mg daily   - continue ASA 81mg daily   - monitor           HLD   - continue atorvastain 40mg HS   - monitor       GERD   - continue omeprazole 20mg daily   - monitor       Supplements   - continue Calcium carbonate - vit D daily   - continue vitamin C daily   - continue cyanocobalamin daily

## 2022-07-31 NOTE — PROGRESS NOTES
Pt seen 7/6/22 at  [facility]    Seen in room, no distress    Working with pt     latest labs noted       subj  no cp  no sob  no abd pain  + gen weakness    vit : stable     obj  cv s1 s2  chest clear  abd soft  ext : from, wound stable, covered          A/P     Left Femoral Neck Fracture, closed, displaced   - S/PTHA on 06/19 by Dr. Dann Angelo   - continue ASA 81mg daily   - continue tylenol 500mg Q6PRN   - PT/OT   -to see Dr. Dann Angelo this week for xray and staple removal       Urinary retention   - alcala placed on 06/19   -will need to start voiding trial when up and moving better   - monitor       Cerebellar stroke   - CT on 06/20 shows acute right cerebellar stroke in the posterior inferior cerebellar artery territory   - CTA on 06/21 shows arteries patent.    - MRI brain on 06/22 large subacute infarction involving rightcerebellar hemisphere predominant in the vascular territory   - Echo 06/21 - EF 60-65%   - continue topiramate 25mg daily   - continue ASA 81mg daily   - monitor           HLD   - continue atorvastain 40mg HS   - monitor       GERD   - continue omeprazole 20mg daily   - monitor       Supplements   - continue Calcium carbonate - vit D daily   - continue vitamin C daily   - continue cyanocobalamin daily

## 2022-07-31 NOTE — PROGRESS NOTES
Pt seen 7/1/22 at  [facility]    Seen in room, no distress    Working with pt       subj  no cp  no sob  no abd pain  + gen weakness    vit : stable     obj  cv s1 s2  chest clear  abd soft  ext : from, wound stable, covered          A/P     Left Femoral Neck Fracture, closed, displaced   - S/PTHA on 06/19 by Dr. Kam New   - continue ASA 81mg daily   - continue tylenol 500mg Q6PRN   - PT/OT   -to see Dr. Kam New this week for xray and staple removal       Urinary retention   - alcala placed on 06/19   -will need to start voiding trial when up and moving better   - monitor       Cerebellar stroke   - CT on 06/20 shows acute right cerebellar stroke in the posterior inferior cerebellar artery territory   - CTA on 06/21 shows arteries patent.    - MRI brain on 06/22 large subacute infarction involving rightcerebellar hemisphere predominant in the vascular territory   - Echo 06/21 - EF 60-65%   - continue topiramate 25mg daily   - continue ASA 81mg daily   - monitor           HLD   - continue atorvastain 40mg HS   - monitor       GERD   - continue omeprazole 20mg daily   - monitor       Supplements   - continue Calcium carbonate - vit D daily   - continue vitamin C daily   - continue cyanocobalamin daily

## 2022-08-12 ENCOUNTER — PATIENT OUTREACH (OUTPATIENT)
Dept: CASE MANAGEMENT | Age: 72
End: 2022-08-12

## 2022-08-12 DIAGNOSIS — G62.9 PERIPHERAL POLYNEUROPATHY: ICD-10-CM

## 2022-08-12 DIAGNOSIS — E66.09 CLASS 1 OBESITY DUE TO EXCESS CALORIES WITH SERIOUS COMORBIDITY AND BODY MASS INDEX (BMI) OF 32.0 TO 32.9 IN ADULT: ICD-10-CM

## 2022-08-12 DIAGNOSIS — E78.00 PURE HYPERCHOLESTEROLEMIA: ICD-10-CM

## 2022-08-15 ENCOUNTER — OFFICE VISIT (OUTPATIENT)
Dept: INTERNAL MEDICINE CLINIC | Facility: CLINIC | Age: 72
End: 2022-08-15
Payer: MEDICARE

## 2022-08-15 VITALS
WEIGHT: 189 LBS | HEART RATE: 92 BPM | BODY MASS INDEX: 30.37 KG/M2 | SYSTOLIC BLOOD PRESSURE: 117 MMHG | HEIGHT: 66 IN | DIASTOLIC BLOOD PRESSURE: 71 MMHG

## 2022-08-15 DIAGNOSIS — R26.9 GAIT ABNORMALITY: ICD-10-CM

## 2022-08-15 DIAGNOSIS — K21.9 GASTROESOPHAGEAL REFLUX DISEASE WITHOUT ESOPHAGITIS: ICD-10-CM

## 2022-08-15 DIAGNOSIS — E66.09 CLASS 1 OBESITY DUE TO EXCESS CALORIES WITH SERIOUS COMORBIDITY AND BODY MASS INDEX (BMI) OF 30.0 TO 30.9 IN ADULT: ICD-10-CM

## 2022-08-15 DIAGNOSIS — M17.0 PRIMARY OSTEOARTHRITIS OF BOTH KNEES: Primary | ICD-10-CM

## 2022-08-15 DIAGNOSIS — J44.9 ASTHMA WITH COPD (CHRONIC OBSTRUCTIVE PULMONARY DISEASE) (HCC): ICD-10-CM

## 2022-08-15 DIAGNOSIS — Z87.81 HISTORY OF FRACTURE OF LEFT HIP: ICD-10-CM

## 2022-08-15 DIAGNOSIS — E78.00 PURE HYPERCHOLESTEROLEMIA: ICD-10-CM

## 2022-08-15 DIAGNOSIS — Z86.73 HISTORY OF CVA (CEREBROVASCULAR ACCIDENT): ICD-10-CM

## 2022-08-15 DIAGNOSIS — G62.9 PERIPHERAL POLYNEUROPATHY: ICD-10-CM

## 2022-08-15 PROBLEM — M80.052A PATHOLOGICAL FRACTURE OF LEFT HIP DUE TO AGE-RELATED OSTEOPOROSIS (HCC): Status: RESOLVED | Noted: 2022-06-19 | Resolved: 2022-08-15

## 2022-08-15 PROBLEM — S72.002A CLOSED FRACTURE OF LEFT HIP (HCC): Status: RESOLVED | Noted: 2022-06-19 | Resolved: 2022-08-15

## 2022-08-15 PROCEDURE — 3078F DIAST BP <80 MM HG: CPT | Performed by: INTERNAL MEDICINE

## 2022-08-15 PROCEDURE — 1126F AMNT PAIN NOTED NONE PRSNT: CPT | Performed by: INTERNAL MEDICINE

## 2022-08-15 PROCEDURE — 3074F SYST BP LT 130 MM HG: CPT | Performed by: INTERNAL MEDICINE

## 2022-08-15 PROCEDURE — 99214 OFFICE O/P EST MOD 30 MIN: CPT | Performed by: INTERNAL MEDICINE

## 2022-08-15 PROCEDURE — 3008F BODY MASS INDEX DOCD: CPT | Performed by: INTERNAL MEDICINE

## 2022-08-15 RX ORDER — TOPIRAMATE 25 MG/1
25 TABLET ORAL 2 TIMES DAILY
Qty: 180 TABLET | Refills: 1 | Status: SHIPPED | OUTPATIENT
Start: 2022-08-15

## 2022-08-15 RX ORDER — ATORVASTATIN CALCIUM 40 MG/1
40 TABLET, FILM COATED ORAL NIGHTLY
Qty: 90 TABLET | Refills: 1 | Status: SHIPPED | OUTPATIENT
Start: 2022-08-15 | End: 2023-08-15

## 2022-08-15 RX ORDER — OMEPRAZOLE 20 MG/1
CAPSULE, DELAYED RELEASE ORAL
Qty: 90 CAPSULE | Refills: 1 | Status: SHIPPED | OUTPATIENT
Start: 2022-08-15

## 2022-08-15 RX ORDER — CALCIUM CARBONATE/VITAMIN D3 250-3.125
1 TABLET ORAL DAILY
Qty: 90 TABLET | Refills: 1 | Status: SHIPPED | OUTPATIENT
Start: 2022-08-15

## 2022-08-26 ENCOUNTER — OFFICE VISIT (OUTPATIENT)
Dept: NEUROLOGY | Facility: CLINIC | Age: 72
End: 2022-08-26
Payer: MEDICARE

## 2022-08-26 VITALS
SYSTOLIC BLOOD PRESSURE: 114 MMHG | WEIGHT: 189 LBS | BODY MASS INDEX: 30.37 KG/M2 | HEART RATE: 90 BPM | HEIGHT: 66 IN | DIASTOLIC BLOOD PRESSURE: 68 MMHG

## 2022-08-26 DIAGNOSIS — I63.541 STROKE DUE TO OCCLUSION OF RIGHT CEREBELLAR ARTERY (HCC): Primary | ICD-10-CM

## 2022-08-26 PROCEDURE — 3008F BODY MASS INDEX DOCD: CPT | Performed by: OTHER

## 2022-08-26 PROCEDURE — 3074F SYST BP LT 130 MM HG: CPT | Performed by: OTHER

## 2022-08-26 PROCEDURE — 3078F DIAST BP <80 MM HG: CPT | Performed by: OTHER

## 2022-08-26 PROCEDURE — 99214 OFFICE O/P EST MOD 30 MIN: CPT | Performed by: OTHER

## 2022-08-26 RX ORDER — ASPIRIN 81 MG/1
81 TABLET, CHEWABLE ORAL DAILY
Qty: 90 TABLET | Refills: 3 | Status: SHIPPED | OUTPATIENT
Start: 2022-08-26 | End: 2023-08-26

## 2022-08-26 NOTE — PATIENT INSTRUCTIONS
1. Please get the 30 day heart monitor  2. Please decrease the dose of aspirin to 81mg daily. 3. Decrease the dose of  Atorvastatin to 20mg. You can cut it in half. 4. Consider stopping the  Topamax, which can cause kidney stones. We can do something else for your headaches. To lower your risk of another stroke, you need to take your  Aspirin 81 mg daily as previously prescribed. If for any reason you have difficulty taking the medication as directed, please call our office and explain your difficulty. Do not stop taking your medications until you have been instructed to do so as this can increase your stroke risk. Risk factors that can be controlled with medications and lifestyle changes include:     High blood pressure (hypertension): It is recommended that your blood pressure be less than  130/80 to lower your risk of stroke. Check your blood pressure twice a day. Once in the morning, right when you wake up and before you take your morning medications. Check it again once in the evening after you have taken any evening medications. Write down these values for 2 weeks. Bring them to your primary care physician and your next stroke follow-up visit. High cholesterol: It is recommended that your low-density lipoprotein cholesterol (LDL-C) should be less than 70. You have been prescribed Atorvastatin to be taken at bedtime to manage your cholesterol. Diabetes: To prevent further strokes, your fasting blood sugar should be , and your HbA1c should be less than 6.5. Smoking: It is recommended that you ABSTAIN from smoking. If necessary, there are resources available to assist you with smoking cessation. If you smoke or use tobacco products, discuss alternatives with your doctor.   [facility] Tobacco Quit Line: 9-162-065-1224  [place] Tobacco Quit Line: 3-716.237.5759     Diet: We recommend the Mediterranean/DASH diet -- high in fresh fruits (apples, blueberries) and vegetables (dark green such as spinach, kale). Fish and nuts (walnuts, almonds), olive oil or canola oil. Reduced red meat, cheese/dairy, and eggs. Also recommended is moderate sodium intake. Sedentary lifestyle: Try to engage in routine exercise (3-5 sessions of aerobic exercise per week, 30 minutes per session). Talk with your primary care physician about what type of exercise might be best for you. Snoring: If snoring, consider referral for possible obstructive sleep apnea (MARY KATE). Treating obstructive sleep apnea will decrease your risk of developing dementia, can lower your blood pressure, will improve your energy levels, may treat headache, and may decrease your risk of stroke. Stroke symptoms include the following, and 911 should be called immediately if you experience any of these:       B.E. F.A.S.T.    B: Balance-- sudden loss  of balance, staggering gait, severe vertigo        E: Eyes-- sudden loss of vision in one or both eyes, onset of double vision        F: Face-- uneven or drooping face, drooling, ask the patient to smile        A: Arm (leg)-- loss of strength or sensation on one side of the body in the arm and/or leg        S: Speech-- slurring of speech, difficulty  saying words or understanding what is being said, sudden confusion        T: Terrible headache (time*)-- very severe headache which has maximum intensity within seconds to a minute        * Time of symptom onset and last known well times are important when determining what treatment is appropriate for an individual's stroke, particularly since treatment is time limited. Time is not a symptom or sign of stroke. Traditionally, Time was used for the \"T\" in the FAST and BEFAST acronyms for stroke awareness. Since terrible headache can be a symptom of stroke this is substituted. However, as the acronym B.E. F.A.S.T. suggests, acting quickly is of critical importance after stroke is suspected.  Knowing the symptom onset time or time when last well will have an impact on what treatments can be offered safely.

## 2022-08-31 ENCOUNTER — HOSPITAL ENCOUNTER (OUTPATIENT)
Dept: CV DIAGNOSTICS | Facility: HOSPITAL | Age: 72
Discharge: HOME | End: 2022-08-31
Attending: Other
Payer: MEDICARE

## 2022-08-31 DIAGNOSIS — I63.541 STROKE DUE TO OCCLUSION OF RIGHT CEREBELLAR ARTERY (HCC): ICD-10-CM

## 2022-08-31 PROCEDURE — 93271 ECG/MONITORING AND ANALYSIS: CPT | Performed by: OTHER

## 2022-09-02 ENCOUNTER — PATIENT OUTREACH (OUTPATIENT)
Dept: CASE MANAGEMENT | Age: 72
End: 2022-09-02

## 2022-09-02 DIAGNOSIS — E66.09 CLASS 1 OBESITY DUE TO EXCESS CALORIES WITH SERIOUS COMORBIDITY AND BODY MASS INDEX (BMI) OF 32.0 TO 32.9 IN ADULT: ICD-10-CM

## 2022-09-02 DIAGNOSIS — I63.541 STROKE DUE TO OCCLUSION OF RIGHT CEREBELLAR ARTERY (HCC): ICD-10-CM

## 2022-09-02 DIAGNOSIS — E78.00 PURE HYPERCHOLESTEROLEMIA: ICD-10-CM

## 2022-09-26 NOTE — PROGRESS NOTES
RRT    *See RRT Documentation Record*    Reason the RRT was called: 1550  Assessment of patient leading up to RRT: unresponsive, implanted sternum rub , pt was continue to be lethargic   Interventions/Testing: Narcan given, lactic acid ordered, 500 ml of bolus  Patient Outcome/Disposition: pt became responsive and oriented x4.   Family Notified: yes  Name of family notified: 80 [FreeTextEntry1] : Increasing SOb with exertion. She has trouble even walking in the supermarket. Cannot load and unload the groceries. She notes this to be above what she has noted in the past.CCTA to investigate last year showed non-obstructive. \par \par Adrenal adenoma - to follow up with endocrinology\par \par Not always taking her cholesterol medications. \par \par In PT for various MSK complaints\par \par AT the end of the visit she endorses consistent exertional upper ab pain.

## 2022-10-06 ENCOUNTER — PATIENT OUTREACH (OUTPATIENT)
Dept: CASE MANAGEMENT | Age: 72
End: 2022-10-06

## 2022-10-06 DIAGNOSIS — E78.00 PURE HYPERCHOLESTEROLEMIA: ICD-10-CM

## 2022-10-06 DIAGNOSIS — I63.541 STROKE DUE TO OCCLUSION OF RIGHT CEREBELLAR ARTERY (HCC): ICD-10-CM

## 2022-10-06 DIAGNOSIS — E66.09 CLASS 1 OBESITY DUE TO EXCESS CALORIES WITH SERIOUS COMORBIDITY AND BODY MASS INDEX (BMI) OF 32.0 TO 32.9 IN ADULT: ICD-10-CM

## 2022-11-09 ENCOUNTER — PATIENT OUTREACH (OUTPATIENT)
Dept: CASE MANAGEMENT | Age: 72
End: 2022-11-09

## 2022-11-09 DIAGNOSIS — E78.00 PURE HYPERCHOLESTEROLEMIA: ICD-10-CM

## 2022-11-09 DIAGNOSIS — E66.09 CLASS 1 OBESITY DUE TO EXCESS CALORIES WITH SERIOUS COMORBIDITY AND BODY MASS INDEX (BMI) OF 32.0 TO 32.9 IN ADULT: ICD-10-CM

## 2022-11-09 DIAGNOSIS — G62.9 PERIPHERAL POLYNEUROPATHY: ICD-10-CM

## 2022-12-06 ENCOUNTER — PATIENT OUTREACH (OUTPATIENT)
Dept: CASE MANAGEMENT | Age: 72
End: 2022-12-06

## 2022-12-06 DIAGNOSIS — I63.541 STROKE DUE TO OCCLUSION OF RIGHT CEREBELLAR ARTERY (HCC): ICD-10-CM

## 2022-12-06 DIAGNOSIS — E66.09 CLASS 1 OBESITY DUE TO EXCESS CALORIES WITH SERIOUS COMORBIDITY AND BODY MASS INDEX (BMI) OF 32.0 TO 32.9 IN ADULT: ICD-10-CM

## 2022-12-06 DIAGNOSIS — E78.00 PURE HYPERCHOLESTEROLEMIA: ICD-10-CM

## 2022-12-17 ENCOUNTER — HOSPITAL ENCOUNTER (EMERGENCY)
Facility: HOSPITAL | Age: 72
Discharge: HOME | End: 2022-12-17
Attending: EMERGENCY MEDICINE
Payer: MEDICARE

## 2022-12-17 ENCOUNTER — APPOINTMENT (OUTPATIENT)
Dept: CT IMAGING | Facility: HOSPITAL | Age: 72
End: 2022-12-17
Attending: EMERGENCY MEDICINE
Payer: MEDICARE

## 2022-12-17 VITALS
HEART RATE: 53 BPM | TEMPERATURE: 97 F | RESPIRATION RATE: 16 BRPM | DIASTOLIC BLOOD PRESSURE: 59 MMHG | OXYGEN SATURATION: 97 % | SYSTOLIC BLOOD PRESSURE: 101 MMHG
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
## 2022-12-17 DIAGNOSIS — S16.1XXA STRAIN OF NECK MUSCLE, INITIAL ENCOUNTER: Primary | ICD-10-CM

## 2022-12-17 DIAGNOSIS — R51.9 NONINTRACTABLE HEADACHE, UNSPECIFIED CHRONICITY PATTERN, UNSPECIFIED HEADACHE TYPE: ICD-10-CM

## 2022-12-17 PROCEDURE — 72125 CT NECK SPINE W/O DYE: CPT | Performed by: EMERGENCY MEDICINE

## 2022-12-17 PROCEDURE — 70450 CT HEAD/BRAIN W/O DYE: CPT | Performed by: EMERGENCY MEDICINE

## 2022-12-17 PROCEDURE — 99284 EMERGENCY DEPT VISIT MOD MDM: CPT

## 2022-12-17 RX ORDER — ACETAMINOPHEN 500 MG
1000 TABLET ORAL ONCE
Status: COMPLETED | OUTPATIENT
Start: 2022-12-17 | End: 2022-12-17
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
## 2023-01-04 ENCOUNTER — PATIENT OUTREACH (OUTPATIENT)
Dept: CASE MANAGEMENT | Age: 73
End: 2023-01-04

## 2023-01-04 DIAGNOSIS — I63.541 STROKE DUE TO OCCLUSION OF RIGHT CEREBELLAR ARTERY (HCC): ICD-10-CM

## 2023-01-04 DIAGNOSIS — E66.09 CLASS 1 OBESITY DUE TO EXCESS CALORIES WITH SERIOUS COMORBIDITY AND BODY MASS INDEX (BMI) OF 32.0 TO 32.9 IN ADULT: ICD-10-CM

## 2023-01-04 DIAGNOSIS — E78.00 PURE HYPERCHOLESTEROLEMIA: ICD-10-CM

## 2023-02-08 RX ORDER — OMEPRAZOLE 20 MG/1
CAPSULE, DELAYED RELEASE ORAL
Qty: 90 CAPSULE | Refills: 1 | Status: SHIPPED | OUTPATIENT
Start: 2023-02-08

## 2023-02-08 RX ORDER — CALCIUM CARBONATE-CHOLECALCIFEROL TAB 250 MG-125 UNIT 250-125 MG-UNIT
TAB ORAL
Qty: 90 TABLET | Refills: 1 | Status: SHIPPED | OUTPATIENT
Start: 2023-02-08

## 2023-02-08 NOTE — TELEPHONE ENCOUNTER
Please review. Protocol failed / No protocol. Requested Prescriptions   Pending Prescriptions Disp Refills    CALCIUM-VITAMIN D3 250-3. 125 MG-MCG Oral Tab [Pharmacy Med Name: CALCIUM 250-VIT D3 125 TABLET] 90 tablet 1     Sig: TAKE 1 TABLET BY MOUTH EVERY DAY       There is no refill protocol information for this order      Signed Prescriptions Disp Refills    omeprazole 20 MG Oral Capsule Delayed Release 90 capsule 1     Sig: TAKE 1 CAPSULE BY MOUTH EVERY DAY IN THE MORNING BEFORE BREAKFAST       Gastrointestional Medication Protocol Passed - 2/7/2023  4:49 PM        Passed - In person appointment or virtual visit in the past 12 mos or appointment in next 3 mos     Recent Outpatient Visits              5 months ago 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    [address], [facility],[address], [facility], DO    Office Visit    5 months ago Primary osteoarthritis of both Vernia Lamonte, [address], Daryl Vázquez MD    Office Visit    6 months ago Closed fracture of left hip with routine healing, subsequent encounter    [address], [facility],[address], Karime Renner MD    Office Visit    8 months ago Venous stasis ulcer of other part of right lower leg with fat layer exposed with varicose veins ([facility]    [address], [address], Miguelangel Bro DPM    Office Visit    8 months ago [employer] annual wellness visit, subsequent    Mono Son MD    Office Visit                           Recent Outpatient Visits              5 months ago 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 [facility], [address] [facility],[address], [facility], DO    Office Visit    5 months ago Primary osteoarthritis of both Annemarie Rodriguez, Daryl Vázquez MD    Office Visit    6 months ago Closed fracture of left hip with routine healing, subsequent encounter    James Leyva MD    Office Visit    8 months ago Venous stasis ulcer of other part of right lower leg with fat layer exposed with varicose veins ([facility]    [address], [facility], Delmar Bro DPM    Office Visit    8 months ago [employer] annual wellness visit, subsequent    Avni Ramos, Juancho Cook MD    Office Visit

## 2023-02-08 NOTE — TELEPHONE ENCOUNTER
Refill passed per [facility] protocol. .  Requested Prescriptions   Pending Prescriptions Disp Refills    CALCIUM-VITAMIN D3 250-3. 125 MG-MCG Oral Tab [Pharmacy Med Name: CALCIUM 250-VIT D3 125 TABLET] 90 tablet 1     Sig: TAKE 1 TABLET BY MOUTH EVERY DAY       There is no refill protocol information for this order       OMEPRAZOLE 20 MG Oral Capsule Delayed Release [Pharmacy Med Name: OMEPRAZOLE DR 20 MG CAPSULE] 90 capsule 1     Sig: TAKE 1 CAPSULE BY MOUTH EVERY DAY IN THE MORNING BEFORE BREAKFAST       Gastrointestional Medication Protocol Passed - 2/7/2023  4:49 PM        Passed - In person appointment or virtual visit in the past 12 mos or appointment in next 3 mos     Recent Outpatient Visits              5 months ago 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 ([facility] [address] [facility]    Holger Gonzales, [address] [facility],[address], [facility], DO    Office Visit    5 months ago Primary osteoarthritis of both [address], Alicia Garcia MD    Office Visit    6 months ago Closed fracture of left hip with routine healing, subsequent encounter    Holger Gonzales, [address] [facility] ,[address], Melanie Chapin MD    Office Visit    8 months ago Venous stasis ulcer of other part of right lower leg with fat layer exposed with varicose veins ([facility]    [facility], [address] Villegas, Patel Persons, DPM    Office Visit    8 months ago [employer] annual wellness visit, subsequent    [address] [facility], Alicia Garcia MD    Office Visit                           Recent Outpatient Visits              5 months ago 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)    Holger Gonzales, [address] [facility],[address], [facility], DO    Office Visit    5 months ago Primary osteoarthritis of both [address] Alicia Elizabeth MD    Office Visit    6 months ago Closed fracture of left hip with routine healing, subsequent encounter    Becka Jordan MD    Office Visit    8 months ago Venous stasis ulcer of other part of right lower leg with fat layer exposed with varicose veins ([facility]    [address], [address], Jyoti Bro DPM    Office Visit    8 months ago [employer] annual wellness visit, subsequent    [address], Edgar Bishop MD    Office Visit

## 2023-02-09 ENCOUNTER — TELEPHONE (OUTPATIENT)
Dept: INTERNAL MEDICINE CLINIC | Facility: CLINIC | Age: 73
End: 2023-02-09

## 2023-02-09 ENCOUNTER — PATIENT OUTREACH (OUTPATIENT)
Dept: CASE MANAGEMENT | Age: 73
End: 2023-02-09

## 2023-02-09 DIAGNOSIS — E78.00 PURE HYPERCHOLESTEROLEMIA: ICD-10-CM

## 2023-02-09 DIAGNOSIS — I63.541 STROKE DUE TO OCCLUSION OF RIGHT CEREBELLAR ARTERY (HCC): ICD-10-CM

## 2023-02-09 DIAGNOSIS — E66.09 CLASS 1 OBESITY DUE TO EXCESS CALORIES WITH SERIOUS COMORBIDITY AND BODY MASS INDEX (BMI) OF 32.0 TO 32.9 IN ADULT: ICD-10-CM

## 2023-02-09 DIAGNOSIS — Z12.31 VISIT FOR SCREENING MAMMOGRAM: Primary | ICD-10-CM

## 2023-02-10 NOTE — TELEPHONE ENCOUNTER
Reviewed 2/10/23 message below with pt. Provided Scheduling Instructions: Test for mammogram  To schedule a test at any [facility], call Central Scheduling at [phone], Monday through Friday between 7:30am to 6pm and on Saturday between 8am and 1pm. Pt verbalized understanding and will call to schedule her appt.      Please reply to pool: EM RN Parley Nageotte

## 2023-02-10 NOTE — TELEPHONE ENCOUNTER
[address] [facility] When patient returns call please let her know her order  for her mammogram has been signed and she can make appointment

## 2023-03-06 ENCOUNTER — PATIENT OUTREACH (OUTPATIENT)
Dept: CASE MANAGEMENT | Age: 73
End: 2023-03-06

## 2023-03-06 DIAGNOSIS — E78.00 PURE HYPERCHOLESTEROLEMIA: ICD-10-CM

## 2023-03-06 DIAGNOSIS — E66.09 CLASS 1 OBESITY DUE TO EXCESS CALORIES WITH SERIOUS COMORBIDITY AND BODY MASS INDEX (BMI) OF 32.0 TO 32.9 IN ADULT: ICD-10-CM

## 2023-03-24 RX ORDER — TOPIRAMATE 25 MG/1
25 TABLET ORAL 2 TIMES DAILY
Qty: 180 TABLET | Refills: 3 | Status: SHIPPED | OUTPATIENT
Start: 2023-03-24

## 2023-03-24 RX ORDER — ATORVASTATIN CALCIUM 40 MG/1
40 TABLET, FILM COATED ORAL NIGHTLY
Qty: 90 TABLET | Refills: 3 | Status: SHIPPED | OUTPATIENT
Start: 2023-03-24

## 2023-03-24 NOTE — TELEPHONE ENCOUNTER
Refill passed per [place] [employer], [facility] protocol.   Requested Prescriptions   Pending Prescriptions Disp Refills    ATORVASTATIN 40 MG Oral Tab [Pharmacy Med Name: ATORVASTATIN 40 MG TABLET] 90 tablet 1     Sig: TAKE 1 TABLET BY MOUTH EVERY DAY AT NIGHT       Cholesterol Medication Protocol Passed - 3/24/2023 12:12 AM        Passed - ALT in past 12 months        Passed - LDL in past 12 months        Passed - Last ALT < 80     Lab Results   Component Value Date    ALT 10 (L) 06/21/2022             Passed - Last LDL < 130     Lab Results   Component Value Date    LDL 42 06/20/2022             Passed - In person appointment or virtual visit in the past 12 mos or appointment in next 3 mos     Recent Outpatient Visits              7 months ago 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 ([facility] [address]    [address], [address], [facility]    Office Visit    7 months ago Primary osteoarthritis of both knees    Radha Mcgarry MD    Office Visit    8 months ago Closed fracture of left hip with routine healing, subsequent encounter    [address], [address], Ollie Chamorro MD    Office Visit    9 months ago Venous stasis ulcer of other part of right lower leg with fat layer exposed with varicose veins ([facility] [address] [facility]    [address], [address], Jacinda Bro DPM    Office Visit    9 months ago [employer] annual wellness visit, subsequent    Radha Mcgarry MD    Office Visit          Future Appointments         Provider Department Appt Notes    In 2 weeks MD Michael Flores Addison                   Recent Outpatient Visits              7 months ago 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 [facility])    [address], [address], [place], [place]    Office Visit 7 months ago Primary osteoarthritis of both knees    Willie Burch MD    Office Visit    8 months ago Closed fracture of left hip with routine healing, subsequent encounter    [address], Perry Smalls MD    Office Visit    9 months ago Venous stasis ulcer of other part of right lower leg with fat layer exposed with varicose veins ([facility]    [address], [address], Juliane Bro DPM    Office Visit    9 months ago [employer] annual wellness visit, subsequent    [address], Tru Gonzáles MD    Office Visit          Future Appointments         Provider Department Appt Notes    In 2 [place]ballo [place] [address]

## 2023-03-24 NOTE — TELEPHONE ENCOUNTER
Last office visit 8/15/22; Follow up in 3 months         Future Appointments   Date Time Provider Almita Carmen   4/12/2023 11:40 AM Ramos Domingo MD [facility]         Refill passed per [place] [employer], [facility] protocol.      Requested Prescriptions   Pending Prescriptions Disp Refills    TOPIRAMATE 25 MG Oral Tab [Pharmacy Med Name: TOPIRAMATE 25 MG TABLET] 180 tablet 1     Sig: TAKE 1 TABLET BY MOUTH TWICE A DAY       Neurology Medications Passed - 3/24/2023 11:01 AM        Passed - In person appointment or virtual visit in the past 6 mos or appointment in next 3 mos     Recent Outpatient Visits              7 months ago 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 ([facility] [address]    [address], [address] [facility],[address], [facility],     Office Visit    7 months ago Primary osteoarthritis of both Will Bronx, Carmella Boas, MD    Office Visit    8 months ago Closed fracture of left hip with routine healing, subsequent encounter    [address] [place] [address], Barbra Marie MD    Office Visit    9 months ago Venous stasis ulcer of other part of right lower leg with fat layer exposed with varicose veins ([facility] [address]    [address], [address], [address] Villeg[address], Inna Cali DPM    Office Visit    9 months ago [employer] annual wellness visit, subsequent    [address] [facility], Carmella Boas, MD    Office Visit          Future Appointments         Provider Department Appt Notes    In 2 weeks Ramos Domingo MD [address], [address], [place]                      Future Appointments         Provider Department Appt Notes    In 2 weeks Ramos Domingo MD [address] [place] [address]              Recent Outpatient Visits              7 months ago 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 [facility])    [address], [address], [facility],     Office Visit    7 months ago Primary osteoarthritis of both Enedina Finn MD    Office Visit    8 months ago Closed fracture of left hip with routine healing, subsequent encounter    [address] [facility], Logan Perdue MD    Office Visit    9 months ago Venous stasis ulcer of other part of right lower leg with fat layer exposed with varicose veins ([facility] [address]    Samantha Abbott, [address], Irais Bro DPM    Office Visit    9 months ago [employer] annual wellness visit, subsequent    [address] [facility], Alex Galaviz MD    Office Visit

## 2023-04-12 ENCOUNTER — PATIENT OUTREACH (OUTPATIENT)
Dept: CASE MANAGEMENT | Age: 73
End: 2023-04-12

## 2023-04-12 ENCOUNTER — OFFICE VISIT (OUTPATIENT)
Dept: INTERNAL MEDICINE CLINIC | Facility: CLINIC | Age: 73
End: 2023-04-12

## 2023-04-12 VITALS
WEIGHT: 174 LBS | HEIGHT: 66 IN | DIASTOLIC BLOOD PRESSURE: 69 MMHG | BODY MASS INDEX: 27.97 KG/M2 | SYSTOLIC BLOOD PRESSURE: 117 MMHG | HEART RATE: 70 BPM

## 2023-04-12 DIAGNOSIS — Z00.00 MEDICARE ANNUAL WELLNESS VISIT, SUBSEQUENT: Primary | ICD-10-CM

## 2023-04-12 DIAGNOSIS — M17.0 PRIMARY OSTEOARTHRITIS OF BOTH KNEES: ICD-10-CM

## 2023-04-12 DIAGNOSIS — Z86.73 HISTORY OF CVA (CEREBROVASCULAR ACCIDENT): ICD-10-CM

## 2023-04-12 DIAGNOSIS — Z87.81 HISTORY OF FRACTURE OF LEFT HIP: ICD-10-CM

## 2023-04-12 DIAGNOSIS — G62.9 PERIPHERAL POLYNEUROPATHY: ICD-10-CM

## 2023-04-12 DIAGNOSIS — Z87.81 HISTORY OF HIP FRACTURE: ICD-10-CM

## 2023-04-12 DIAGNOSIS — J44.9 ASTHMA WITH COPD (CHRONIC OBSTRUCTIVE PULMONARY DISEASE) (HCC): ICD-10-CM

## 2023-04-12 DIAGNOSIS — E66.09 CLASS 1 OBESITY DUE TO EXCESS CALORIES WITH SERIOUS COMORBIDITY AND BODY MASS INDEX (BMI) OF 32.0 TO 32.9 IN ADULT: ICD-10-CM

## 2023-04-12 DIAGNOSIS — K21.9 GASTROESOPHAGEAL REFLUX DISEASE WITHOUT ESOPHAGITIS: ICD-10-CM

## 2023-04-12 DIAGNOSIS — E78.5 HYPERLIPIDEMIA, UNSPECIFIED HYPERLIPIDEMIA TYPE: ICD-10-CM

## 2023-04-12 DIAGNOSIS — Z00.00 ENCOUNTER FOR ANNUAL HEALTH EXAMINATION: ICD-10-CM

## 2023-04-12 DIAGNOSIS — E66.3 OVERWEIGHT (BMI 25.0-29.9): ICD-10-CM

## 2023-04-12 DIAGNOSIS — R26.9 GAIT ABNORMALITY: ICD-10-CM

## 2023-04-12 NOTE — PROGRESS NOTES
Pt called states she is on her way to her providers appointment, she would like a call back tomorrow.

## 2023-04-13 ENCOUNTER — PATIENT OUTREACH (OUTPATIENT)
Dept: CASE MANAGEMENT | Age: 73
End: 2023-04-13

## 2023-04-13 DIAGNOSIS — E78.00 PURE HYPERCHOLESTEROLEMIA: ICD-10-CM

## 2023-04-13 DIAGNOSIS — E66.09 CLASS 1 OBESITY DUE TO EXCESS CALORIES WITH SERIOUS COMORBIDITY AND BODY MASS INDEX (BMI) OF 32.0 TO 32.9 IN ADULT: ICD-10-CM

## 2023-04-17 PROBLEM — E66.09 CLASS 1 OBESITY DUE TO EXCESS CALORIES WITH SERIOUS COMORBIDITY AND BODY MASS INDEX (BMI) OF 32.0 TO 32.9 IN ADULT: Status: RESOLVED | Noted: 2017-05-24 | Resolved: 2023-04-17

## 2023-04-17 PROBLEM — I63.541 STROKE DUE TO OCCLUSION OF RIGHT CEREBELLAR ARTERY (HCC): Status: RESOLVED | Noted: 2022-08-26 | Resolved: 2023-04-17

## 2023-04-17 PROBLEM — H25.13 AGE-RELATED NUCLEAR CATARACT OF BOTH EYES: Status: RESOLVED | Noted: 2017-03-28 | Resolved: 2023-04-17

## 2023-04-17 PROBLEM — E66.811 CLASS 1 OBESITY DUE TO EXCESS CALORIES WITH SERIOUS COMORBIDITY AND BODY MASS INDEX (BMI) OF 32.0 TO 32.9 IN ADULT: Status: RESOLVED | Noted: 2017-05-24 | Resolved: 2023-04-17

## 2023-05-03 ENCOUNTER — PATIENT OUTREACH (OUTPATIENT)
Dept: CASE MANAGEMENT | Age: 73
End: 2023-05-03

## 2023-05-03 DIAGNOSIS — J44.9 ASTHMA WITH COPD (CHRONIC OBSTRUCTIVE PULMONARY DISEASE) (HCC): Chronic | ICD-10-CM

## 2023-05-03 DIAGNOSIS — E78.00 PURE HYPERCHOLESTEROLEMIA: ICD-10-CM

## 2023-06-06 ENCOUNTER — PATIENT OUTREACH (OUTPATIENT)
Dept: CASE MANAGEMENT | Age: 73
End: 2023-06-06

## 2023-06-13 ENCOUNTER — PATIENT OUTREACH (OUTPATIENT)
Dept: CASE MANAGEMENT | Age: 73
End: 2023-06-13

## 2023-06-13 NOTE — PROGRESS NOTES
Contacting patient to follow up on CCM for the month.  LMCB    Total time - 2 min  Total Monthly time- 4 min

## 2023-07-04 RX ORDER — OMEPRAZOLE 20 MG/1
CAPSULE, DELAYED RELEASE ORAL
Qty: 90 CAPSULE | Refills: 3 | Status: SHIPPED | OUTPATIENT
Start: 2023-07-04

## 2023-07-04 NOTE — TELEPHONE ENCOUNTER
Refill passed per [place] [employer], [facility] protocol.     Requested Prescriptions   Pending Prescriptions Disp Refills    OMEPRAZOLE 20 MG Oral Capsule Delayed Release [Pharmacy Med Name: OMEPRAZOLE DR 20 MG CAPSULE] 90 capsule 1     Sig: TAKE 1 CAPSULE BY MOUTH EVERY DAY IN THE MORNING BEFORE BREAKFAST       Gastrointestional Medication Protocol Passed - 7/3/2023  3:53 PM        Passed - In person appointment or virtual visit in the past 12 mos or appointment in next 3 mos     Recent Outpatient Visits              2 months ago Medicare annual wellness visit, subsequent    Herrera Simpson MD    Office Visit    10 months ago Stroke due to occlusion of right [facility] artery [facility])    Codi Alicia, [address], [facility]    Office Visit    10 months ago Primary osteoarthritis of both knees    Herrera Simpson MD    Office Visit    11 months ago Closed fracture of left hip with routine healing, subsequent encounter    Codi Alicia, [address], Kristine Ledesma MD    Office Visit    1 year ago Venous stasis ulcer of other part of right lower leg with fat layer exposed with varicose veins ([facility] [address]    Codi Alicia, [address], [facility], [place], [place]    Office Visit          Future Appointments         Provider Department Appt Notes    Tomorrow [facility] [address] [facility]                   Recent Outpatient Visits              2 months ago Medicare annual wellness visit, subsequent    David Mane MD    Office Visit    10 months ago Stroke due to occlusion of right [facility] artery [facility])    Codi Alicia, [address] [facility], [place], [place]    Office Visit    10 months ago Primary osteoarthritis of both knees    [facility] Mahesh Morales MD    Office Visit    11 months ago Closed fracture of left hip with routine healing, subsequent encounter    [address] [facility], Oscar Renae MD    Office Visit    1 year ago Venous stasis ulcer of other part of right lower leg with fat layer exposed with varicose veins ([facility] [address]    [address], [address], Ju[address] Bro [place]    Office Visit          Future Appointments         Provider Department Appt Notes    Tomorrow [facility] [address] [facility]

## 2023-07-05 ENCOUNTER — PATIENT OUTREACH (OUTPATIENT)
Dept: CASE MANAGEMENT | Age: 73
End: 2023-07-05

## 2023-07-05 ENCOUNTER — HOSPITAL ENCOUNTER (OUTPATIENT)
Dept: MAMMOGRAPHY | Facility: HOSPITAL | Age: 73
Discharge: HOME | End: 2023-07-05
Attending: INTERNAL MEDICINE
Payer: MEDICARE

## 2023-07-05 ENCOUNTER — TELEPHONE (OUTPATIENT)
Dept: INTERNAL MEDICINE CLINIC | Facility: CLINIC | Age: 73
End: 2023-07-05

## 2023-07-05 DIAGNOSIS — G62.9 PERIPHERAL POLYNEUROPATHY: Primary | ICD-10-CM

## 2023-07-05 DIAGNOSIS — Z12.31 VISIT FOR SCREENING MAMMOGRAM: ICD-10-CM

## 2023-07-05 DIAGNOSIS — E78.00 PURE HYPERCHOLESTEROLEMIA: ICD-10-CM

## 2023-07-05 NOTE — PROGRESS NOTES
7/5/2023  Spoke to Reece luu for CCM. Updates to patient care team/ comments: UTD  Patient reported changes in medications: None  Med Adherence  Comment: Taking as directed    Health Maintenance:  Reviewed with patient. 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 Daughter was discharged from [facility] on June 28th after being admitted for 3 weeks. Daughter is in the background talking at a very fast speed, recurrent tangents. Pt is attempting to calm her down. Pt reports she's like this all the time. Spoke at length in regards to daughter mental history. Pt reports her biggest concern is the state taking over  her daughter care. Spoke at length in regards to concerns. States she is okay, feels safe at home. Self Management Goals/Action Plan: Active goal from previous outreach:                 Start an exercise routine by joining a health club or exercise classes for senior                    Patient reported progress toward goal: States she is trying to stay busy. Walks outdoors 2-3 times a week as tolerated by hip. She walks 10 minutes each ways. Spent a lot of time cleaning and de cluttering her home this past month. Update to previous barriers: States she is not feeling guilty anymore. Daughter has returned home. Patient Reported New Barriers And Concerns Stressed                    - Plan for overcoming all barriers: States she will take one day at a time and make decisions based on her best interest  and safety.     Care Manager Follow Up: One month    Reason For Follow Up: review progress and or barriers towards patients goals. Care managers interventions: Continue to provided encouragement, support and education for healthy cooping and dx. Encouraged patient to seek help for her daughter mental health. Advised patient to call  or police if she ever fears for her safety. Suggested she try to stay as busy as possible. Go for walks, read a book, try some new recipes to help with stress and relaxation. Reviewed healthy eating habits, regular exercise and preventative guidelines. Reinforced healthy diet, lifestyle, and exercise. Reviewed future appointments with patient. Future Appointments   Date Time Provider Almita Morales   7/5/2023  3:20 PM [facility]      Time Spent This Encounter Total:33 min medical record review, telephone communication, care plan updates where needed, education, goals and action plan recreation/update. Provided acknowledgment and validation to patient's concerns. Monthly Minute Total including today 33  Physical assessment, complete health history, and care plan established by April Velásquez MD, need for CCM determined from these assessments and data.

## 2023-07-05 NOTE — TELEPHONE ENCOUNTER
Patient called requesting for a diagnostic mammogram requesting a new mammogram to then go taken the lab     [address] [facility]

## 2023-07-06 NOTE — TELEPHONE ENCOUNTER
Spoke to Pt notified mammogram order was placed . Number to scheduling provided to Pt . Pt verbalized understanding denied questions .

## 2023-07-19 ENCOUNTER — HOSPITAL ENCOUNTER (OUTPATIENT)
Dept: MAMMOGRAPHY | Facility: HOSPITAL | Age: 73
Discharge: HOME | End: 2023-07-19
Attending: INTERNAL MEDICINE
Payer: MEDICARE

## 2023-07-19 ENCOUNTER — HOSPITAL ENCOUNTER (OUTPATIENT)
Dept: ULTRASOUND IMAGING | Facility: HOSPITAL | Age: 73
Discharge: HOME | End: 2023-07-19
Attending: INTERNAL MEDICINE
Payer: MEDICARE

## 2023-07-19 DIAGNOSIS — R92.8 ABNORMAL MAMMOGRAM: ICD-10-CM

## 2023-07-19 PROCEDURE — 77062 BREAST TOMOSYNTHESIS BI: CPT | Performed by: INTERNAL MEDICINE

## 2023-07-19 PROCEDURE — 76642 ULTRASOUND BREAST LIMITED: CPT | Performed by: INTERNAL MEDICINE

## 2023-07-19 PROCEDURE — 77066 DX MAMMO INCL CAD BI: CPT | Performed by: INTERNAL MEDICINE

## 2023-07-28 NOTE — DISCHARGE INSTRUCTIONS
The Doctor (Radiologist) who performed your procedure was: DR. Teto Bee an ice pack over the biopsy site on top of your bra or on top of the ACE wrap (never apply ice directly over skin) for 10-15 minutes of every hour until bedtime for your comfort and to decrease bleeding. Keep your sports bra or the ACE wrap (stereotactic and MRI biopsy) in place for 24 hours after your biopsy. This compression decreases bleeding and breast movement for your comfort. Wear a supportive bra for the next couple of days for comfort (sports bra for sleep). Continue to wear, preferably, a sports bra or good supportive bra for 1 week and take off only to shower. No baths or showers during the first 24 hours after biopsy. After this time you may take a shower. It's okay if the strips get wet but do not soak them. NO saunas, hot tubs or swimming until steri-strips fall off (approx. 5 days). This prevents infection and allows time for them to completely close and heal.  DO NOT remove the steri-strips. They will fall off in 5 days. If any type of irritation (redness, itching or blisters) develops in the area around the steri-strips, remove them gently. If the steri-strips do not fall off after 5 days, gently remove them. Keep the area clean and dry. It is normal to have mild discomfort and bruising at the biopsy site. You may take Tylenol as needed for discomfort, as long as you have no allergies to Tylenol. Do not take aspirin, motrin, ibuprofen or any medication containing NSAID (non-steroidal anti-inflammatory drug) product for 48 hours. DO NOT participate in strenuous activity (aerobics, heavy lifting, housework, gardening, etc.) 48 hours after your biopsy to prevent bleeding. You will receive results in 2-3 business days. If you are having an MRI breast biopsy or an Ultrasound guided breast biopsy, you will be billed for the biopsy and unilateral mammogram separately.   If you have any questions about the procedure or your results, please contact the Breast Care Coordinator Nurse at [phone]. Notify your ordering physician or primary physician for increased bleeding, pain or fever over 100. Or contact a Radiology Nurse at [phone] between 8am-4pm (after 4pm, your call will be directed to the [place] [address] Emergency Room).

## 2023-08-01 ENCOUNTER — HOSPITAL ENCOUNTER (OUTPATIENT)
Dept: ULTRASOUND IMAGING | Facility: HOSPITAL | Age: 73
Discharge: HOME | End: 2023-08-01
Attending: INTERNAL MEDICINE
Payer: MEDICARE

## 2023-08-01 ENCOUNTER — HOSPITAL ENCOUNTER (OUTPATIENT)
Dept: MAMMOGRAPHY | Facility: HOSPITAL | Age: 73
Discharge: HOME | End: 2023-08-01
Attending: INTERNAL MEDICINE
Payer: MEDICARE

## 2023-08-01 DIAGNOSIS — N63.10 BREAST MASS, RIGHT: ICD-10-CM

## 2023-08-01 PROCEDURE — 19083 BX BREAST 1ST LESION US IMAG: CPT | Performed by: INTERNAL MEDICINE

## 2023-08-01 PROCEDURE — 77065 DX MAMMO INCL CAD UNI: CPT | Performed by: INTERNAL MEDICINE

## 2023-08-01 PROCEDURE — 88305 TISSUE EXAM BY PATHOLOGIST: CPT | Performed by: INTERNAL MEDICINE

## 2023-08-01 NOTE — IMAGING NOTE
0800  Pt arrived to room #4 .  scans taken by DENY [facility]    ultrasound technologist    5963   Hx taken and is as follows: PER IMAGING ORDER   CONCLUSION:   0.9 x 0.4 x 0.6 centimeter mass in the right breast at 09:00 o'clock 10 centimeters from the nipple is suspicious. Ultrasound-guided biopsy is recommended. [id number]  Consent verified and obtained     0814  Imaging Completed by Dr Jeb Smith    0762 Time out taken     0815 Skin prep with chloro prep sterile towels to site. Site marked RIGHT BREAST 9 O'CLOCK 10 CFN    0816  Lidocaine  1% 10 milligrams per ml given from kit  total amount  3ml given. 2448  Lidocaine 1% with epinephrine  1:100,000 units 200 milligrams per  20 ml given total amount 5 ml given. 214 Quantifeed biopsy device   to be used for core samples     Core # 1 at 0817 all cores to be placed in sterile cup with 10 ml ns     Total amount cores taken 3 placed in formalin at [address] placed      0819  Procedure completed. Pressure to site . No active bleeding noted. Area cleaned steri strips to site. Ice pack to site . 6952 Post instructions given verbal et written. Avs summary sheet provided to patient. Patient verbalizes understanding and agreement . 9510 Specimen taken to pathology by Nani Morgan     0830 To mammography department  for post clip images . Report to Sparkle William 150 technologist. Mammography department to discharge patient after images completed.

## 2023-08-03 NOTE — IMAGING NOTE
Valerie Carranza is s/p biopsy . Phoned and introduced myself as breast coordinator . Reinforced to patient  post biopsy care and instructions . Informed  and shared the pathology results as well as the recommendations from Dr. Jere Paul for her breast imaging  as follows:      Pathology results shared (see epic for dictated pathology and radiology procedure report)  and recommendations are as follows:    CONCLUSION:   Uneventful ultrasound-guided biopsy of the RIGHT breast mass. Post procedure mammogram demonstrates the clip in the appropriate position. RECOMMENDATION:    Pathology results are benign and concordant. Surgical excision is recommended for possible intraductal papilloma. Dictated by (CST): Elena Otero DO on 8/01/2023 at 8:54 AM      Finalized by (CST): Elena Otero DO on 8/03/2023 at 11:28 AM           Per order, patient referred to Dr. Keya Louise. MD information provided to patient to make appt. Valerie Carranza acknowledges the above and denies questions. Valerie Carranza was also instructed to perform breast self exams and if any changes  develops any changes to contact ordering  physician immediately  for re evaluation. Krishna Iglesias verbalizes understanding and agreement.

## 2023-08-04 NOTE — PROCEDURES
[facility]  Procedure Note    Unice Merlin Patient Status:  Outpatient    1950 MRN [id number]   Location [address] Attending No att. providers found   [facility] Day # 0 PCP Hernando Kirk MD     Procedure: ultrasound guided biopsy of the right breast    Pre-Procedure Diagnosis:  right breast mass at 9:00 10 cmfn    Post-Procedure Diagnosis: right breast mass at 9:00 10 cmfn    Anesthesia:  Local    Findings:  right breast mass at 9:00 10 cmfn    Specimens: 3    Blood Loss:  minimal    Kenn,   2023

## 2023-08-10 RX ORDER — CALCIUM CARBONATE-CHOLECALCIFEROL TAB 250 MG-125 UNIT 250-125 MG-UNIT
1 TAB ORAL DAILY
Qty: 90 TABLET | Refills: 1 | Status: SHIPPED | OUTPATIENT
Start: 2023-08-10

## 2023-08-10 NOTE — TELEPHONE ENCOUNTER
Please review. Protocol failed / Has no protocol. Requested Prescriptions   Pending Prescriptions Disp Refills    CALCIUM-VITAMIN D3 250-3. 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 [facility])    [address], [address], [facility]    Office Visit    12 months ago Primary osteoarthritis of both [place], Jamar Lawrence MD    Office Visit    1 year ago Closed fracture of left hip with routine healing, subsequent encounter    [address] [facility], Bharath Amos MD    Office Visit    1 year ago Venous stasis ulcer of other part of right lower leg with fat layer exposed with varicose veins ([facility] [address] [facility]    [address], [address], [place], [place]    Office Visit

## 2023-08-14 ENCOUNTER — PATIENT OUTREACH (OUTPATIENT)
Dept: CASE MANAGEMENT | Age: 73
End: 2023-08-14

## 2023-08-14 DIAGNOSIS — K21.9 GASTROESOPHAGEAL REFLUX DISEASE WITHOUT ESOPHAGITIS: Primary | ICD-10-CM

## 2023-08-14 DIAGNOSIS — E78.00 PURE HYPERCHOLESTEROLEMIA: ICD-10-CM

## 2023-08-14 NOTE — H&P
Spoke to Reece luu for CCM. Updates to patient care team/comments: UTD  Patient reported changes in medications: None  Med   Comment: taking as directed    Health Maintenance: Zoster Vaccines(1 of 2) Never done Pt declined  COVID-19 Vaccine(3 - Pfizer series) due on 01/04/2024 Pt declined  Influenza Vaccine(1) due on 10/01/2023  Mammogram due on 07/19/2024  Colorectal Cancer Screening due on 11/26/2024  DEXA Scan Completed  MA Annual Health Assessment Completed  Annual Depression Screening Completed  Fall Risk Screening (Annual) Completed  Pneumococcal Vaccine: 65+ Years Completed    Patient updates/concerns:    Pt reports she had a mammogram it was abnormal. She proceeded with biopsy, however there was something wrong with it and needs to proceed with a procedure. Stated being confused about what is next. She is waiting to hear back from someone but is not sure who. Stress at home decreased this month. Daughter is receiving mental health necessary. Goals/Action Plan: Active goal from previous outreach:   Start an exercise routine by joining a health club or exercise classes for senior                  Patient reported progress towards goals: She continues to work towards goal.               - What:  States she is getting out several times a week to either walk outdoors  or at shopping centers. - When: When ever she has times. - How: n/a           - How Often: at least 3 days a week            - Where: in her neighborhood or at a shopping center or grocery store. Patient Reported Barriers and Concerns: States she does not have concerns associated to active goal. She would like help finding out of she needs to follow up with something after mammogram.                   - Plan for overcoming barriers: CCM will follow up with PCP office.     Care Managers Interventions: TE routed to MMP to verify if patient needs follow up treatment post mammogram.    Reviewed results with patient. Send letter and copy of test result. Notify pt  breast biopsy bailee     I called her no answer  . .. Written by Yony Amezcua MD on 8/2/2023  9:26 PM CDT View Full Comments    Future Appointments: No future appointments. Next Care Manager Follow Up Date: One month    Reason For Follow Up: review progress and or barriers towards patient's goals. Time Spent This Encounter Total: 28 min medical record review, telephone communication, care plan updates where needed, education, goals, and action plan recreation/update. Provided acknowledgment and validation to patient's concerns.    Monthly Minute Total including today: 28  Physical assessment, complete health history, and need for CCM established by Yony Amezcua MD.

## 2023-08-15 ENCOUNTER — TELEPHONE (OUTPATIENT)
Dept: INTERNAL MEDICINE CLINIC | Facility: CLINIC | Age: 73
End: 2023-08-15

## 2023-08-15 NOTE — TELEPHONE ENCOUNTER
Patient called on this again. Informed of benign pathology but she is questioning this recommendation     RECOMMENDATION:    Pathology results are benign and concordant. Surgical excision is recommended for possible intraductal papilloma. Dr. Brad Vigil please advise.

## 2023-08-15 NOTE — TELEPHONE ENCOUNTER
Pt reports receiving a call from someone at [facility] indicating she needs to under go surgery because of abnormal mammogram. Pt underwent biopsy of Rt breast on 8/1/2023. Pt is very confused needs clarification. States person who called her wanted to schedule an appointment for her to have surgery. Please advise patient. Specimen to Pathology Tissue: Result Notes     April Joceline Norris RN  8/3/2023 10:42 AM CDT       Patient notified, verbalized understanding. Festus Ren MD  8/2/2023  9:26 PM CDT       Send letter and copy of test result.   Notify pt  breast biopsy bailee     I called her no answer  Let he know

## 2023-08-15 NOTE — TELEPHONE ENCOUNTER
Spoke to mammography department. No one from this department or mammography department called the patient in regards to needing surgery. Left message to call back on patient's voicemail.

## 2023-08-16 NOTE — TELEPHONE ENCOUNTER
No cancer cells  Pt is being referral to  surgery for possible  There is a lump in the mild duct  this is not cancer  but needs further evaluation  Follow up with DR Seferino Armenta  breast surgery  And for breast cancer prevention as well  Call pt

## 2023-08-16 NOTE — TELEPHONE ENCOUNTER
Referred to Provider Information:  Provider Address Phone   Dudley Durbin MD [address] [facility].  [address] [facility] [phone]

## 2023-08-16 NOTE — TELEPHONE ENCOUNTER
Patient contacted (name and date of birth verified). Provider's results, recommendations, and referral information reviewed with patient. Patient verbalizes understanding of the information, agrees with plan of care and offers no further questions at this time. Transferred to Dr. Tyrell Gaucher office to schedule appointment.

## 2023-09-12 ENCOUNTER — PATIENT OUTREACH (OUTPATIENT)
Dept: CASE MANAGEMENT | Age: 73
End: 2023-09-12

## 2023-09-12 DIAGNOSIS — K21.00 GASTROESOPHAGEAL REFLUX DISEASE WITH ESOPHAGITIS WITHOUT HEMORRHAGE: ICD-10-CM

## 2023-09-12 DIAGNOSIS — E78.00 PURE HYPERCHOLESTEROLEMIA: ICD-10-CM

## 2023-09-12 DIAGNOSIS — G62.9 PERIPHERAL POLYNEUROPATHY: Primary | ICD-10-CM

## 2023-09-20 ENCOUNTER — OFFICE VISIT (OUTPATIENT)
Dept: SURGERY | Facility: CLINIC | Age: 73
End: 2023-09-20
Payer: MEDICARE

## 2023-09-20 VITALS
OXYGEN SATURATION: 96 % | WEIGHT: 177.63 LBS | SYSTOLIC BLOOD PRESSURE: 130 MMHG | DIASTOLIC BLOOD PRESSURE: 71 MMHG | TEMPERATURE: 98 F | HEIGHT: 66 IN | BODY MASS INDEX: 28.55 KG/M2 | RESPIRATION RATE: 16 BRPM | HEART RATE: 74 BPM

## 2023-09-20 DIAGNOSIS — D24.1 INTRADUCTAL PAPILLOMA OF RIGHT BREAST: Primary | ICD-10-CM

## 2023-09-20 PROCEDURE — 3078F DIAST BP <80 MM HG: CPT | Performed by: SURGERY

## 2023-09-20 PROCEDURE — 1160F RVW MEDS BY RX/DR IN RCRD: CPT | Performed by: SURGERY

## 2023-09-20 PROCEDURE — 3008F BODY MASS INDEX DOCD: CPT | Performed by: SURGERY

## 2023-09-20 PROCEDURE — 3075F SYST BP GE 130 - 139MM HG: CPT | Performed by: SURGERY

## 2023-09-20 PROCEDURE — 1159F MED LIST DOCD IN RCRD: CPT | Performed by: SURGERY

## 2023-09-20 PROCEDURE — 99205 OFFICE O/P NEW HI 60 MIN: CPT | Performed by: SURGERY

## 2023-09-20 PROCEDURE — 1126F AMNT PAIN NOTED NONE PRSNT: CPT | Performed by: SURGERY

## 2023-09-20 NOTE — PATIENT INSTRUCTIONS
Dr. Rosa Elena Zhou  Tel: [phone]  Fax: [address]  [address] [place], [place] [address]  [phone]     Surgery/Procedure: Right breast wire localized excisional biopsy     Anesthesia:   MAC  Surgery Length:   45 minutes CPT:  52075   Wire LOC:   Yes Nuc Med:   No   Sara Seed:  No       Dx & ICD-10: Intraductal papilloma of right breast (D24.1)   Radiology Instructions: Right breast, 9 o'clock position, 10 cm from the nipple, vision shaped clip, biopsy demonstrates intraductal papilloma.    _______________________________________________________________________________    Someone must accompany you the day of the procedure to drive you home safely, because of anesthesia. You will need an adult  to stay with you the first night following your surgery. You must remove any kind of makeup, acrylic nails, lotions, powders, creams or deodorant. [place] ONLY: Pre-admission will give instruct you on when to take Gatorade and Tylenol/acetaminophen prior to your surgery, purchase 2 - 12oz bottles of regular Gatorade (NOT RED/SUGAR FREE). Otherwise, you may not eat or drink anything else after 11PM the night before surgery. [place] ONLY: You may not eat or drink anything after midnight the day of your surgery. Wear comfortable clothing that can be easily removed. If you wear dentures, contacts lenses, or any prosthesis, you will be asked to remove them. Do not drink alcohol or smoke 24 hours prior to your procedure. Bring a picture ID and your insurance card. Covid-19 testing is no longer required before surgery unless you are experiencing symptoms such as fever, cough, congestion, etc.   The Pre-Admission Testing Department will call the day before to confirm your procedure, give you the time you need to arrive by and directions on where to go. They begin making calls after 2pm, if you are not contacted by 4pm, please call the surgeon's office listed above.   Do not take any blood thinners at least one week prior to the procedure/surgery. This includes aspirin, baby aspirin, Ibuprofen products, herbal supplements, diet medications, vitamin E, fish oil and green tea supplements. Please check other supplements for these ingredients. *TYLENOL or acetaminophen is acceptable*  If you take Coumadin, Plavix, Xarelto, or Eliquis, please contact your prescribing physician for special instructions on how long to hold. If you take insulin contact your primary care physician for special instructions. Our surgery scheduler, Bakari Brown, will be contacting you to discuss surgery dates. If you have any questions related to scheduling your surgery, please reach out to her at [phone].  _____________________________________________________________________  PRE-OPERATIVE TESTING IF INDICATED BELOW  PLEASE COMPLETE ASAP (AT LEAST 14-21 DAYS PRIOR TO SURGERY)  [] CBC [x] BMP [] CMP [x] EKG    [] PT, PTT, INR [] Cardiac Clearance  [x] H&P Medical Clearance [] Chest X-ray     Please call Central Scheduling to schedule an appointment for pre-operative labs/tests @ [phone]  Does the patient have a pacemaker or ICD? Does the patient have sleep apnea?   [] Yes   [x] No                               [] Yes   [x] No

## 2023-09-22 PROBLEM — D24.1 INTRADUCTAL PAPILLOMA OF RIGHT BREAST: Status: ACTIVE | Noted: 2023-09-22

## 2023-10-02 ENCOUNTER — TELEPHONE (OUTPATIENT)
Dept: INTERNAL MEDICINE CLINIC | Facility: CLINIC | Age: 73
End: 2023-10-02

## 2023-10-02 ENCOUNTER — TELEPHONE (OUTPATIENT)
Dept: SURGERY | Facility: CLINIC | Age: 73
End: 2023-10-02

## 2023-10-02 DIAGNOSIS — D24.1 INTRADUCTAL PAPILLOMA OF RIGHT BREAST: Primary | ICD-10-CM

## 2023-10-02 NOTE — TELEPHONE ENCOUNTER
Patient is requesting a pre-op appointment before her surgery scheduled on 10/12 with Dr. Mikael Lyons. Patient is requesting an appointment on 10/4 or 10/6 in the morning. No appointments are available. Please advise.

## 2023-10-02 NOTE — TELEPHONE ENCOUNTER
Calling pt in regards to scheduling surgery. Informed pt that I have 10/12/2023 available at [facility] with Dr. Sacha Cobb. Also advised patient to call PCP right away for medical clearance  Pt verbalized understanding and in agreement with date and location. All questions answered. Encouraged pt to call or MyChart message office with any other questions or concerns.

## 2023-10-03 NOTE — TELEPHONE ENCOUNTER
Call center please contact Pt and schedule an appointment with available IM provider or Margie Kim .

## 2023-10-06 ENCOUNTER — LAB ENCOUNTER (OUTPATIENT)
Dept: LAB | Age: 73
End: 2023-10-06
Attending: NURSE PRACTITIONER
Payer: MEDICARE

## 2023-10-06 ENCOUNTER — OFFICE VISIT (OUTPATIENT)
Dept: INTERNAL MEDICINE CLINIC | Facility: CLINIC | Age: 73
End: 2023-10-06

## 2023-10-06 VITALS
HEART RATE: 67 BPM | WEIGHT: 181 LBS | DIASTOLIC BLOOD PRESSURE: 64 MMHG | HEIGHT: 66 IN | SYSTOLIC BLOOD PRESSURE: 112 MMHG | RESPIRATION RATE: 16 BRPM | BODY MASS INDEX: 29.09 KG/M2

## 2023-10-06 DIAGNOSIS — E78.5 HYPERLIPIDEMIA, UNSPECIFIED HYPERLIPIDEMIA TYPE: ICD-10-CM

## 2023-10-06 DIAGNOSIS — J44.89 ASTHMA WITH COPD (CHRONIC OBSTRUCTIVE PULMONARY DISEASE): ICD-10-CM

## 2023-10-06 DIAGNOSIS — Z01.818 PREOPERATIVE EXAMINATION: Primary | ICD-10-CM

## 2023-10-06 DIAGNOSIS — Z01.818 PREOPERATIVE EXAMINATION: ICD-10-CM

## 2023-10-06 DIAGNOSIS — R74.8 ELEVATED LIVER ENZYMES: ICD-10-CM

## 2023-10-06 PROBLEM — L97.812 VENOUS STASIS ULCER OF OTHER PART OF RIGHT LOWER LEG WITH FAT LAYER EXPOSED WITH VARICOSE VEINS (HCC): Status: ACTIVE | Noted: 2023-10-06

## 2023-10-06 PROBLEM — I83.018 VENOUS STASIS ULCER OF OTHER PART OF RIGHT LOWER LEG WITH FAT LAYER EXPOSED WITH VARICOSE VEINS (HCC): Status: ACTIVE | Noted: 2023-10-06

## 2023-10-06 LAB
ALBUMIN SERPL-MCNC: 3.6 G/DL (ref 3.4–5)
ALBUMIN/GLOB SERPL: 1 {RATIO} (ref 1–2)
ALP LIVER SERPL-CCNC: 173 U/L
ALT SERPL-CCNC: 52 U/L
ANION GAP SERPL CALC-SCNC: 6 MMOL/L (ref 0–18)
AST SERPL-CCNC: 38 U/L (ref 15–37)
BILIRUB SERPL-MCNC: 0.4 MG/DL (ref 0.1–2)
BUN BLD-MCNC: 21 MG/DL (ref 7–18)
BUN/CREAT SERPL: 28.4 (ref 10–20)
CALCIUM BLD-MCNC: 9.2 MG/DL (ref 8.5–10.1)
CHLORIDE SERPL-SCNC: 116 MMOL/L (ref 98–112)
CHOLEST SERPL-MCNC: 128 MG/DL (ref ?–200)
CO2 SERPL-SCNC: 24 MMOL/L (ref 21–32)
CREAT BLD-MCNC: 0.74 MG/DL
DEPRECATED HBV CORE AB SER IA-ACNC: 271.8 NG/ML
DEPRECATED RDW RBC AUTO: 49.2 FL (ref 35.1–46.3)
EGFRCR SERPLBLD CKD-EPI 2021: 85 ML/MIN/1.73M2 (ref 60–?)
ERYTHROCYTE [DISTWIDTH] IN BLOOD BY AUTOMATED COUNT: 14.2 % (ref 11–15)
FASTING STATUS PATIENT QL REPORTED: NO
GLOBULIN PLAS-MCNC: 3.6 G/DL (ref 2.8–4.4)
GLUCOSE BLD-MCNC: 74 MG/DL (ref 70–99)
HCT VFR BLD AUTO: 35.8 %
HDLC SERPL-MCNC: 72 MG/DL (ref 40–59)
HGB BLD-MCNC: 11.6 G/DL
IRON SATN MFR SERPL: 18 %
IRON SERPL-MCNC: 50 UG/DL
LDLC SERPL CALC-MCNC: 43 MG/DL (ref ?–100)
MCH RBC QN AUTO: 30.7 PG (ref 26–34)
MCHC RBC AUTO-ENTMCNC: 32.4 G/DL (ref 31–37)
MCV RBC AUTO: 94.7 FL
NONHDLC SERPL-MCNC: 56 MG/DL (ref ?–130)
OSMOLALITY SERPL CALC.SUM OF ELEC: 304 MOSM/KG (ref 275–295)
PLATELET # BLD AUTO: 160 10(3)UL (ref 150–450)
POTASSIUM SERPL-SCNC: 3.6 MMOL/L (ref 3.5–5.1)
PROT SERPL-MCNC: 7.2 G/DL (ref 6.4–8.2)
RBC # BLD AUTO: 3.78 X10(6)UL
SODIUM SERPL-SCNC: 146 MMOL/L (ref 136–145)
T4 FREE SERPL-MCNC: 0.7 NG/DL (ref 0.8–1.7)
TIBC SERPL-MCNC: 280 UG/DL (ref 240–450)
TRANSFERRIN SERPL-MCNC: 188 MG/DL (ref 200–360)
TRIGL SERPL-MCNC: 62 MG/DL (ref 30–149)
TSI SER-ACNC: 1.6 MIU/ML (ref 0.36–3.74)
VIT B12 SERPL-MCNC: 300 PG/ML (ref 193–986)
VLDLC SERPL CALC-MCNC: 9 MG/DL (ref 0–30)
WBC # BLD AUTO: 4.4 X10(3) UL (ref 4–11)

## 2023-10-06 PROCEDURE — 83540 ASSAY OF IRON: CPT

## 2023-10-06 PROCEDURE — 84439 ASSAY OF FREE THYROXINE: CPT

## 2023-10-06 PROCEDURE — 80061 LIPID PANEL: CPT

## 2023-10-06 PROCEDURE — 36415 COLL VENOUS BLD VENIPUNCTURE: CPT

## 2023-10-06 PROCEDURE — 1159F MED LIST DOCD IN RCRD: CPT | Performed by: NURSE PRACTITIONER

## 2023-10-06 PROCEDURE — 93010 ELECTROCARDIOGRAM REPORT: CPT | Performed by: NURSE PRACTITIONER

## 2023-10-06 PROCEDURE — 93005 ELECTROCARDIOGRAM TRACING: CPT

## 2023-10-06 PROCEDURE — 84443 ASSAY THYROID STIM HORMONE: CPT

## 2023-10-06 PROCEDURE — 82607 VITAMIN B-12: CPT

## 2023-10-06 PROCEDURE — 85027 COMPLETE CBC AUTOMATED: CPT

## 2023-10-06 PROCEDURE — 3008F BODY MASS INDEX DOCD: CPT | Performed by: NURSE PRACTITIONER

## 2023-10-06 PROCEDURE — 1160F RVW MEDS BY RX/DR IN RCRD: CPT | Performed by: NURSE PRACTITIONER

## 2023-10-06 PROCEDURE — 84466 ASSAY OF TRANSFERRIN: CPT

## 2023-10-06 PROCEDURE — 80053 COMPREHEN METABOLIC PANEL: CPT

## 2023-10-06 PROCEDURE — 82728 ASSAY OF FERRITIN: CPT

## 2023-10-06 PROCEDURE — 1170F FXNL STATUS ASSESSED: CPT | Performed by: NURSE PRACTITIONER

## 2023-10-06 PROCEDURE — 3078F DIAST BP <80 MM HG: CPT | Performed by: NURSE PRACTITIONER

## 2023-10-06 PROCEDURE — 3074F SYST BP LT 130 MM HG: CPT | Performed by: NURSE PRACTITIONER

## 2023-10-06 PROCEDURE — 99214 OFFICE O/P EST MOD 30 MIN: CPT | Performed by: NURSE PRACTITIONER

## 2023-10-06 NOTE — PATIENT INSTRUCTIONS
Hold all supplements 7 days prior to surgery. Do not take ibuprofen, Advil or Aspirin 7 days prior to surgery. If you have pain you can take Tylenol.

## 2023-10-08 LAB
ATRIAL RATE: 59 BPM
P AXIS: 55 DEGREES
P-R INTERVAL: 184 MS
Q-T INTERVAL: 446 MS
QRS DURATION: 96 MS
QTC CALCULATION (BEZET): 441 MS
R AXIS: 23 DEGREES
T AXIS: 55 DEGREES
VENTRICULAR RATE: 59 BPM

## 2023-10-09 ENCOUNTER — PATIENT OUTREACH (OUTPATIENT)
Dept: CASE MANAGEMENT | Age: 73
End: 2023-10-09

## 2023-10-09 DIAGNOSIS — G62.9 PERIPHERAL POLYNEUROPATHY: ICD-10-CM

## 2023-10-09 DIAGNOSIS — E78.00 PURE HYPERCHOLESTEROLEMIA: Primary | ICD-10-CM

## 2023-10-09 DIAGNOSIS — D24.1 INTRADUCTAL PAPILLOMA OF RIGHT BREAST: ICD-10-CM

## 2023-10-09 NOTE — H&P
Spoke to Reece luu for CCM. Updates to patient care team/comments: Dr. Seferino Armenta added to care team  Patient reported changes in medications: None  Med Adherence  Comment: as directed     Health Maintenance: Pt states she is not sure she wants to have Flu vaccine, Shingles or COVID vaccine. Influenza Vaccine(1) due on 10/01/2023  COVID-19 Vaccine(3 - Pfizer series) due on 01/04/2024  Zoster Vaccines(1 of 2) due on 08/14/2024  Mammogram due on 07/19/2024  Colorectal Cancer Screening due on 11/26/2024  DEXA Scan Completed  MA Annual Health Assessment Completed  Annual Depression Screening Completed  Fall Risk Screening (Annual) Completed  Pneumococcal Vaccine: 65+ Years Completed    Patient updates/concerns:    Pt will be undergoing a right breast wire localized excisional biopsy 10/12/2023. She completed pre-op clearance with Matthew MANLEY 10/6/2023 Stated having labs drawn. States she still has some previous pending labs MMP ordered she plans on having  labs drawn in the next week. She continues to work on eating healthy. States she is focusing on eating plenty of fruits, veggies and chicken. She eats red meat once a week. She purchased ground beef this weekend she is working on increasing her protein intake to help improved iron levels. Goals/Action Plan: Active goal from previous outreach:   Start an exercise routine by joining a health club or exercise classes for senior          Patient reported progress towards goals: she continues to work towards goal               - What: She is walking 4-5 days a week            - Where/When/How: She ambulates with her cane up and down her street 2-3 times at least 4 days a week. She is also making an effort to walk up and down the stairs in her building 1-2 times a day.   Patient Reported Barriers and Concerns: It's getting cold outside.                   - Plan for overcoming barriers: States once her byopsi and US is taken care of she will start looking into her silver sneakers plan. Care Managers Interventions: Conference call central scheduling assisted in scheduling US of the abdomen for 10/21/23 per patients request.    Reviewed a list of natural iron rich foods such as spinach, tofu,l lentils, greens, navy and kidney beans. Encouraged staying hydrated to prevent constipation. Future Appointments:   Future Appointments   Date Time Provider Almita Carmen   10/12/2023  9:00 AM [facility] RM1 [facility] [facility]   10/18/2023  3:00 PM Jose Wade MD [facility]     Next Care Manager Follow Up Date: One month    Reason For Follow Up: review progress and or barriers towards patient's goals. Time Spent This Encounter Total: 34 min medical record review, telephone communication, care plan updates where needed, education, goals, and action plan recreation/update. Provided acknowledgment and validation to patient's concerns.    Monthly Minute Total including today: 34  Physical assessment, complete health history, and need for CCM established by Mary Hdz MD.

## 2023-10-12 ENCOUNTER — HOSPITAL ENCOUNTER (OUTPATIENT)
Facility: HOSPITAL | Age: 73
Setting detail: HOSPITAL OUTPATIENT SURGERY
Discharge: HOME | End: 2023-10-12
Attending: SURGERY | Admitting: SURGERY
Payer: MEDICARE

## 2023-10-12 ENCOUNTER — APPOINTMENT (OUTPATIENT)
Dept: MAMMOGRAPHY | Facility: HOSPITAL | Age: 73
End: 2023-10-12
Attending: SURGERY
Payer: MEDICARE

## 2023-10-12 ENCOUNTER — HOSPITAL ENCOUNTER (OUTPATIENT)
Dept: MAMMOGRAPHY | Facility: HOSPITAL | Age: 73
Discharge: HOME | End: 2023-10-12
Attending: SURGERY
Payer: MEDICARE

## 2023-10-12 ENCOUNTER — ANESTHESIA (OUTPATIENT)
Dept: SURGERY | Facility: HOSPITAL | Age: 73
End: 2023-10-12
Payer: MEDICARE

## 2023-10-12 ENCOUNTER — ANESTHESIA EVENT (OUTPATIENT)
Dept: SURGERY | Facility: HOSPITAL | Age: 73
End: 2023-10-12
Payer: MEDICARE

## 2023-10-12 VITALS
TEMPERATURE: 98 F | SYSTOLIC BLOOD PRESSURE: 112 MMHG | WEIGHT: 180 LBS | HEART RATE: 57 BPM | BODY MASS INDEX: 35.34 KG/M2 | OXYGEN SATURATION: 97 % | RESPIRATION RATE: 18 BRPM | HEIGHT: 60 IN | DIASTOLIC BLOOD PRESSURE: 58 MMHG

## 2023-10-12 DIAGNOSIS — D24.1 INTRADUCTAL PAPILLOMA OF RIGHT BREAST: Primary | ICD-10-CM

## 2023-10-12 DIAGNOSIS — D24.1 INTRADUCTAL PAPILLOMA OF RIGHT BREAST: ICD-10-CM

## 2023-10-12 PROCEDURE — 19281 PERQ DEVICE BREAST 1ST IMAG: CPT | Performed by: SURGERY

## 2023-10-12 PROCEDURE — BH00ZZZ PLAIN RADIOGRAPHY OF RIGHT BREAST: ICD-10-PCS | Performed by: SURGERY

## 2023-10-12 PROCEDURE — 0HBT0ZZ EXCISION OF RIGHT BREAST, OPEN APPROACH: ICD-10-PCS | Performed by: SURGERY

## 2023-10-12 PROCEDURE — 88307 TISSUE EXAM BY PATHOLOGIST: CPT | Performed by: SURGERY

## 2023-10-12 PROCEDURE — 76098 X-RAY EXAM SURGICAL SPECIMEN: CPT | Performed by: SURGERY

## 2023-10-12 PROCEDURE — 88300 SURGICAL PATH GROSS: CPT | Performed by: SURGERY

## 2023-10-12 RX ORDER — GLYCOPYRROLATE 0.2 MG/ML
INJECTION, SOLUTION INTRAMUSCULAR; INTRAVENOUS AS NEEDED
Status: DISCONTINUED | OUTPATIENT
Start: 2023-10-12 | End: 2023-10-12 | Stop reason: SURG

## 2023-10-12 RX ORDER — MORPHINE SULFATE 4 MG/ML
2 INJECTION, SOLUTION INTRAMUSCULAR; INTRAVENOUS EVERY 10 MIN PRN
Status: DISCONTINUED | OUTPATIENT
Start: 2023-10-12 | End: 2023-10-12

## 2023-10-12 RX ORDER — SODIUM CHLORIDE, SODIUM LACTATE, POTASSIUM CHLORIDE, CALCIUM CHLORIDE 600; 310; 30; 20 MG/100ML; MG/100ML; MG/100ML; MG/100ML
INJECTION, SOLUTION INTRAVENOUS CONTINUOUS
Status: DISCONTINUED | OUTPATIENT
Start: 2023-10-12 | End: 2023-10-12

## 2023-10-12 RX ORDER — HYDROCODONE BITARTRATE AND ACETAMINOPHEN 5; 325 MG/1; MG/1
1-2 TABLET ORAL EVERY 6 HOURS PRN
Qty: 15 TABLET | Refills: 0 | Status: SHIPPED | OUTPATIENT
Start: 2023-10-12

## 2023-10-12 RX ORDER — HYDROMORPHONE HYDROCHLORIDE 1 MG/ML
0.6 INJECTION, SOLUTION INTRAMUSCULAR; INTRAVENOUS; SUBCUTANEOUS EVERY 5 MIN PRN
Status: DISCONTINUED | OUTPATIENT
Start: 2023-10-12 | End: 2023-10-12

## 2023-10-12 RX ORDER — NALOXONE HYDROCHLORIDE 0.4 MG/ML
0.08 INJECTION, SOLUTION INTRAMUSCULAR; INTRAVENOUS; SUBCUTANEOUS AS NEEDED
Status: DISCONTINUED | OUTPATIENT
Start: 2023-10-12 | End: 2023-10-12

## 2023-10-12 RX ORDER — MORPHINE SULFATE 10 MG/ML
6 INJECTION, SOLUTION INTRAMUSCULAR; INTRAVENOUS EVERY 10 MIN PRN
Status: DISCONTINUED | OUTPATIENT
Start: 2023-10-12 | End: 2023-10-12

## 2023-10-12 RX ORDER — MORPHINE SULFATE 4 MG/ML
4 INJECTION, SOLUTION INTRAMUSCULAR; INTRAVENOUS EVERY 10 MIN PRN
Status: DISCONTINUED | OUTPATIENT
Start: 2023-10-12 | End: 2023-10-12

## 2023-10-12 RX ORDER — ONDANSETRON 2 MG/ML
INJECTION INTRAMUSCULAR; INTRAVENOUS AS NEEDED
Status: DISCONTINUED | OUTPATIENT
Start: 2023-10-12 | End: 2023-10-12 | Stop reason: SURG

## 2023-10-12 RX ORDER — BUPIVACAINE HYDROCHLORIDE 5 MG/ML
INJECTION, SOLUTION EPIDURAL; INTRACAUDAL AS NEEDED
Status: DISCONTINUED | OUTPATIENT
Start: 2023-10-12 | End: 2023-10-12 | Stop reason: HOSPADM

## 2023-10-12 RX ORDER — DEXAMETHASONE SODIUM PHOSPHATE 4 MG/ML
VIAL (ML) INJECTION AS NEEDED
Status: DISCONTINUED | OUTPATIENT
Start: 2023-10-12 | End: 2023-10-12 | Stop reason: SURG

## 2023-10-12 RX ORDER — CEFAZOLIN SODIUM/WATER 2 G/20 ML
2 SYRINGE (ML) INTRAVENOUS ONCE
Status: COMPLETED | OUTPATIENT
Start: 2023-10-12 | End: 2023-10-12

## 2023-10-12 RX ORDER — HYDROMORPHONE HYDROCHLORIDE 1 MG/ML
0.4 INJECTION, SOLUTION INTRAMUSCULAR; INTRAVENOUS; SUBCUTANEOUS EVERY 5 MIN PRN
Status: DISCONTINUED | OUTPATIENT
Start: 2023-10-12 | End: 2023-10-12

## 2023-10-12 RX ORDER — LIDOCAINE HYDROCHLORIDE AND EPINEPHRINE 10; 10 MG/ML; UG/ML
INJECTION, SOLUTION INFILTRATION; PERINEURAL AS NEEDED
Status: DISCONTINUED | OUTPATIENT
Start: 2023-10-12 | End: 2023-10-12 | Stop reason: HOSPADM

## 2023-10-12 RX ORDER — ACETAMINOPHEN 500 MG
1000 TABLET ORAL ONCE
Status: COMPLETED | OUTPATIENT
Start: 2023-10-12 | End: 2023-10-12

## 2023-10-12 RX ORDER — HYDROMORPHONE HYDROCHLORIDE 1 MG/ML
0.2 INJECTION, SOLUTION INTRAMUSCULAR; INTRAVENOUS; SUBCUTANEOUS EVERY 5 MIN PRN
Status: DISCONTINUED | OUTPATIENT
Start: 2023-10-12 | End: 2023-10-12

## 2023-10-12 RX ORDER — LIDOCAINE HYDROCHLORIDE 10 MG/ML
INJECTION, SOLUTION EPIDURAL; INFILTRATION; INTRACAUDAL; PERINEURAL AS NEEDED
Status: DISCONTINUED | OUTPATIENT
Start: 2023-10-12 | End: 2023-10-12 | Stop reason: SURG

## 2023-10-12 RX ADMIN — GLYCOPYRROLATE 0.2 MG: 0.2 INJECTION, SOLUTION INTRAMUSCULAR; INTRAVENOUS at 08:34:00

## 2023-10-12 RX ADMIN — DEXAMETHASONE SODIUM PHOSPHATE 4 MG: 4 MG/ML VIAL (ML) INJECTION at 08:34:00

## 2023-10-12 RX ADMIN — ONDANSETRON 4 MG: 2 INJECTION INTRAMUSCULAR; INTRAVENOUS at 08:34:00

## 2023-10-12 RX ADMIN — SODIUM CHLORIDE, SODIUM LACTATE, POTASSIUM CHLORIDE, CALCIUM CHLORIDE: 600; 310; 30; 20 INJECTION, SOLUTION INTRAVENOUS at 09:11:00

## 2023-10-12 RX ADMIN — CEFAZOLIN SODIUM/WATER 2 G: 2 G/20 ML SYRINGE (ML) INTRAVENOUS at 08:40:00

## 2023-10-12 RX ADMIN — LIDOCAINE HYDROCHLORIDE 25 MG: 10 INJECTION, SOLUTION EPIDURAL; INFILTRATION; INTRACAUDAL; PERINEURAL at 08:36:00

## 2023-10-12 NOTE — BRIEF OP NOTE
Pre-Operative Diagnosis: Intraductal papilloma of right breast [D24.1]     Post-Operative Diagnosis: Intraductal papilloma of right breast [D24.1]      Procedure Performed:   Right breast wire localized excisional biopsy    Surgeon(s) and Role:     Joni Sanabria MD - Primary    Assistant(s):  Surgical Assistant.: Memory Bingham, CSA     Surgical Findings: Clip visualized on specimen radiogram     Specimen: Right breast lumpectomy     Estimated Blood Loss: Yogesh Shell MD  10/12/2023  9:01 AM

## 2023-10-12 NOTE — ANESTHESIA PROCEDURE NOTES
Airway  Date/Time: 10/12/2023 8:38 AM  Urgency: Elective      General Information and Staff    Patient location during procedure: OR  Resident/CRNA: Richar Collado CRNA  Performed: CRNA   Performed by: Richar Collado CRNA  Authorized by: Rachel Blue MD      Indications and Patient Condition  Indications for airway management: anesthesia  Spontaneous Ventilation: absent  Sedation level: deep  Preoxygenated: yes  Patient position: sniffing  MILS maintained throughout  Mask difficulty assessment: 1 - vent by mask  No planned trial extubation    Final Airway Details  Final airway type: supraglottic airway      Successful airway: classic  Size 4       Number of attempts at approach: 2 (large leak with LMA 3)  Ventilation between attempts: BVM  Number of other approaches attempted: 0

## 2023-10-12 NOTE — OPERATIVE REPORT
[facility]    PATIENT'S NAME: Pankaj Beasley   ATTENDING PHYSICIAN: Ryan Collier. Purvi Palafox MD   OPERATING PHYSICIAN: Ryan Collier. Purvi Palafox MD   PATIENT ACCOUNT#:   [id number]    LOCATION:  [address]  MEDICAL RECORD #:   [id number]       YOB: 1950  ADMISSION DATE:       10/12/2023      OPERATION DATE:  10/12/2023    OPERATIVE REPORT    PREOPERATIVE DIAGNOSIS:  Intraductal papilloma of the right breast.  POSTOPERATIVE DIAGNOSIS:  Intraductal papilloma of the right breast.  PROCEDURE:  Right breast wire localized lumpectomy with right breast specimen radiography. ASSISTANT:  Deric Sy CSA    ANESTHESIA:  General anesthesia and local.    ESTIMATED BLOOD LOSS:  5 mL. DRAINS:  None. COMPLICATIONS:  None. DISPOSITION:  Stable on transfer to recovery room. INDICATIONS:  The patient is a 54-year-old female presenting with imaging-detected concern of the right breast.  She was found to have a suspicious mass and biopsy confirmed papilloma in this area. We discussed possible association with atypia and intraductal malignancy, and I recommended primary surgical excision to exclude these issues and remove the remainder of the lesion. Risks and possible complications were discussed with the patient including, but not limited to, infection, bleeding, injury to surrounding structures, possible need for reoperation. She agreed to the proposed surgery. OPERATIVE TECHNIQUE:  Patient was brought to the imaging suite. She underwent a wire localization in the area of concern in the right breast.  She was then brought to the OR, placed in supine position, properly padded and secured, given a dose of IV antibiotics, and sequential compression devices were applied to the legs for DVT prophylaxis. General anesthesia was induced.   The right breast was then prepped and draped in the usual sterile fashion, 1% lidocaine with epinephrine was used to infiltrate the skin and subcutaneous tissue at the targeted incision site. A curvilinear incision was made along the lateral inferior areolar border with a 15 blade knife in the skin. The wire was identified and brought into the field; and using a sharp dissection electrocautery, a segment of breast tissue surrounding the tip of the wire was carefully excised and oriented with a short stitch and single clip superiorly, long stitch and double clip laterally in order to allow for appropriate pathological margin assessment review. It was then placed in the imaging device where a specimen x-ray confirmed the presence of a targeted clip and residual area with adequate margins as deemed by myself, and a clip was placed within the cavity to assist with subsequent surveillance. The wound was irrigated. Hemostasis assured with electrocautery. Closure was accomplished with interrupted 3-0 Vicryl for a deep layer running 4-0 subcuticular Monocryl for skin. Mastisol and Steri-Strips were applied, and 0.5% Marcaine was instilled in the cavity to assist with postoperative analgesia. A sterile dressing and compression bra were placed. Her blood loss was minimal.  All counts were correct at the conclusion of procedure. She tolerated the procedure well. She was transferred to recovery area in stable condition. Dictated By Dianna Jaquez. Fred Harris MD  d: 10/12/2023 09:04:52  t: 10/12/2023 11:26:42  [facility] [id number]  G/    cc: MD Colleen Cox APRN

## 2023-10-12 NOTE — IMAGING NOTE
0715  Pt  to ultrasound /mammography department scouts completed by  [facility]  mammography technologist     1667 Hx taken procedure explained questions answered. Iv patent no redness or swelling noted at site    0721  Consent signed and verified      Liz Duarte here scanning completed Order verified and signed by all  procedural staff members    2747 Time out taken       site marked RIGHT  Breast    0735 Chloro prep as skin prep to site. Lidocaine   1% 10 milligrams per ml given as anesthetic affect from kit  3 ml total given. 2497 [employer] needle  20 gauge  X 5 cm   placed . Pt re  images procedure complete. 6998 BB marker to site wire secured to breast  strips. A 4x4 dsg secured  over wire with tape by nurse  kelin LEO after images completed . 1161 To 4288 [employer]  department . after images completed.

## 2023-10-12 NOTE — ANESTHESIA POSTPROCEDURE EVALUATION
Patient: John Aguirre    Procedure Summary       Date: 10/12/23 Room / Location: [address] [place] [address] MAIN OR 08 / [address] [place] [address] MAIN OR    Anesthesia Start: 0830 Anesthesia Stop:     Procedure: Right breast wire localized excisional biopsy (Right) Diagnosis:       Intraductal papilloma of right breast      (Intraductal papilloma of right breast [D24.1])    Surgeons: Charles Britt MD Anesthesiologist: Amaya Solis MD    Anesthesia Type: general ASA Status: 3            Anesthesia Type: general    Vitals Value Taken Time   /60 10/12/23 0911   Temp 97 10/12/23 0911   Pulse 94 10/12/23 0911   Resp 16 10/12/23 0911   SpO2 92 % 10/12/23 0911   Vitals shown include unfiled device data.     [address] [place] [address] AN Post Evaluation:   Patient Evaluated in PACU  Patient Participation: complete - patient participated  Level of Consciousness: confused  Pain Score: 0  Pain Management: adequate  Airway Patency:patent  Dental exam unchanged from preop  Yes    Cardiovascular Status: acceptable  Respiratory Status: acceptable  Postoperative Hydration acceptable      Drea Stovall CRNA  10/12/2023 9:11 AM

## 2023-10-13 ENCOUNTER — TELEPHONE (OUTPATIENT)
Dept: SURGERY | Facility: CLINIC | Age: 73
End: 2023-10-13

## 2023-10-15 NOTE — TELEPHONE ENCOUNTER
Scheduled for:  Colonoscopy 68667  Provider Name: Dr. Michael Mayfield  Date:  11/26/19  Location:  [facility]  Sedation:  MAC  Time:  1:45 pm, arrival 12:45 pm  Prep: Trilyte  Meds/Allergies Reconciled?:  Janay reviewed  Diagnosis with codes:  Screening Z12.11;  Hx of polyps Z
Ambulatory

## 2023-10-16 ENCOUNTER — TELEPHONE (OUTPATIENT)
Dept: SURGERY | Facility: CLINIC | Age: 73
End: 2023-10-16

## 2023-10-16 NOTE — PROCEDURES
[facility]  Procedure Note    John Aguirre Patient Status:  Outpatient    1950 MRN [id number]   Location [address] Attending No att. providers found   [facility] Day # 0 PCP Maren Berumen MD     Procedure: mammographic guided wire localization    Pre-Procedure Diagnosis:  right breast papilloma at 9:00    Post-Procedure Diagnosis: right breast papilloma at 9:00    Anesthesia:  Local    Findings:  right breast papilloma at 9:00    Specimens: Apolinar Mata DO  10/16/2023
10-May-2023 20:30

## 2023-10-16 NOTE — TELEPHONE ENCOUNTER
Patient called back to office with condition update. Patient is overall reporting feeling well. Patient reports only occasionally taking pain medication. Patient aware of post op appointment with Dr Bee Cowart on 10/18/2023 @ 3:00pm at the [facility].

## 2023-10-17 ENCOUNTER — LAB ENCOUNTER (OUTPATIENT)
Dept: LAB | Age: 73
End: 2023-10-17
Attending: INTERNAL MEDICINE
Payer: MEDICARE

## 2023-10-17 DIAGNOSIS — R26.9 GAIT ABNORMALITY: ICD-10-CM

## 2023-10-17 DIAGNOSIS — E78.5 HYPERLIPIDEMIA, UNSPECIFIED HYPERLIPIDEMIA TYPE: ICD-10-CM

## 2023-10-17 DIAGNOSIS — R74.8 ELEVATED LIVER ENZYMES: ICD-10-CM

## 2023-10-17 DIAGNOSIS — G62.9 PERIPHERAL POLYNEUROPATHY: ICD-10-CM

## 2023-10-17 LAB
ALBUMIN SERPL-MCNC: 3.9 G/DL (ref 3.4–5)
ALBUMIN/GLOB SERPL: 1.1 {RATIO} (ref 1–2)
ALP LIVER SERPL-CCNC: 133 U/L
ALT SERPL-CCNC: 23 U/L
ANION GAP SERPL CALC-SCNC: 6 MMOL/L (ref 0–18)
AST SERPL-CCNC: 19 U/L (ref 15–37)
BASOPHILS # BLD AUTO: 0.04 X10(3) UL (ref 0–0.2)
BASOPHILS NFR BLD AUTO: 0.8 %
BILIRUB DIRECT SERPL-MCNC: 0.2 MG/DL (ref 0–0.2)
BILIRUB SERPL-MCNC: 0.9 MG/DL (ref 0.1–2)
BILIRUB UR QL: NEGATIVE
BUN BLD-MCNC: 15 MG/DL (ref 7–18)
BUN/CREAT SERPL: 17.6 (ref 10–20)
CALCIUM BLD-MCNC: 9.7 MG/DL (ref 8.5–10.1)
CHLORIDE SERPL-SCNC: 113 MMOL/L (ref 98–112)
CLARITY UR: CLEAR
CO2 SERPL-SCNC: 26 MMOL/L (ref 21–32)
COLOR UR: YELLOW
CREAT BLD-MCNC: 0.85 MG/DL
DEPRECATED RDW RBC AUTO: 49.1 FL (ref 35.1–46.3)
EGFRCR SERPLBLD CKD-EPI 2021: 72 ML/MIN/1.73M2 (ref 60–?)
EOSINOPHIL # BLD AUTO: 0.19 X10(3) UL (ref 0–0.7)
EOSINOPHIL NFR BLD AUTO: 3.6 %
ERYTHROCYTE [DISTWIDTH] IN BLOOD BY AUTOMATED COUNT: 14.3 % (ref 11–15)
FASTING STATUS PATIENT QL REPORTED: YES
GLOBULIN PLAS-MCNC: 3.4 G/DL (ref 2.8–4.4)
GLUCOSE BLD-MCNC: 90 MG/DL (ref 70–99)
GLUCOSE UR-MCNC: NORMAL MG/DL
HCT VFR BLD AUTO: 38.1 %
HGB BLD-MCNC: 12.4 G/DL
HYALINE CASTS #/AREA URNS AUTO: PRESENT /LPF
IMM GRANULOCYTES # BLD AUTO: 0.01 X10(3) UL (ref 0–1)
IMM GRANULOCYTES NFR BLD: 0.2 %
KETONES UR-MCNC: NEGATIVE MG/DL
LEUKOCYTE ESTERASE UR QL STRIP.AUTO: NEGATIVE
LYMPHOCYTES # BLD AUTO: 1.49 X10(3) UL (ref 1–4)
LYMPHOCYTES NFR BLD AUTO: 28 %
MCH RBC QN AUTO: 30.2 PG (ref 26–34)
MCHC RBC AUTO-ENTMCNC: 32.5 G/DL (ref 31–37)
MCV RBC AUTO: 92.9 FL
MONOCYTES # BLD AUTO: 0.57 X10(3) UL (ref 0.1–1)
MONOCYTES NFR BLD AUTO: 10.7 %
NEUTROPHILS # BLD AUTO: 3.03 X10 (3) UL (ref 1.5–7.7)
NEUTROPHILS # BLD AUTO: 3.03 X10(3) UL (ref 1.5–7.7)
NEUTROPHILS NFR BLD AUTO: 56.7 %
NITRITE UR QL STRIP.AUTO: NEGATIVE
OSMOLALITY SERPL CALC.SUM OF ELEC: 300 MOSM/KG (ref 275–295)
PH UR: 6.5 [PH] (ref 5–8)
PLATELET # BLD AUTO: 185 10(3)UL (ref 150–450)
POTASSIUM SERPL-SCNC: 4 MMOL/L (ref 3.5–5.1)
PROT SERPL-MCNC: 7.3 G/DL (ref 6.4–8.2)
PROT UR-MCNC: NEGATIVE MG/DL
RBC # BLD AUTO: 4.1 X10(6)UL
SODIUM SERPL-SCNC: 145 MMOL/L (ref 136–145)
SP GR UR STRIP: 1.02 (ref 1–1.03)
UROBILINOGEN UR STRIP-ACNC: NORMAL
WBC # BLD AUTO: 5.3 X10(3) UL (ref 4–11)

## 2023-10-17 PROCEDURE — 82248 BILIRUBIN DIRECT: CPT

## 2023-10-17 PROCEDURE — 81001 URINALYSIS AUTO W/SCOPE: CPT

## 2023-10-17 PROCEDURE — 80053 COMPREHEN METABOLIC PANEL: CPT

## 2023-10-17 PROCEDURE — 36415 COLL VENOUS BLD VENIPUNCTURE: CPT

## 2023-10-17 PROCEDURE — 85025 COMPLETE CBC W/AUTO DIFF WBC: CPT

## 2023-10-18 ENCOUNTER — OFFICE VISIT (OUTPATIENT)
Dept: SURGERY | Facility: CLINIC | Age: 73
End: 2023-10-18
Payer: MEDICARE

## 2023-10-18 VITALS
RESPIRATION RATE: 16 BRPM | DIASTOLIC BLOOD PRESSURE: 71 MMHG | HEART RATE: 68 BPM | OXYGEN SATURATION: 96 % | SYSTOLIC BLOOD PRESSURE: 126 MMHG | WEIGHT: 178.81 LBS | BODY MASS INDEX: 35 KG/M2 | TEMPERATURE: 98 F

## 2023-10-18 DIAGNOSIS — D24.1 INTRADUCTAL PAPILLOMA OF RIGHT BREAST: ICD-10-CM

## 2023-10-18 DIAGNOSIS — Z12.31 SCREENING MAMMOGRAM FOR BREAST CANCER: Primary | ICD-10-CM

## 2023-10-18 PROCEDURE — 3078F DIAST BP <80 MM HG: CPT | Performed by: SURGERY

## 2023-10-18 PROCEDURE — 1159F MED LIST DOCD IN RCRD: CPT | Performed by: SURGERY

## 2023-10-18 PROCEDURE — 1160F RVW MEDS BY RX/DR IN RCRD: CPT | Performed by: SURGERY

## 2023-10-18 PROCEDURE — 3074F SYST BP LT 130 MM HG: CPT | Performed by: SURGERY

## 2023-10-18 PROCEDURE — 1126F AMNT PAIN NOTED NONE PRSNT: CPT | Performed by: SURGERY

## 2023-10-18 PROCEDURE — 99024 POSTOP FOLLOW-UP VISIT: CPT | Performed by: SURGERY

## 2023-10-21 ENCOUNTER — HOSPITAL ENCOUNTER (OUTPATIENT)
Dept: ULTRASOUND IMAGING | Facility: HOSPITAL | Age: 73
Discharge: HOME | End: 2023-10-21
Attending: INTERNAL MEDICINE

## 2023-10-21 DIAGNOSIS — R79.89 ELEVATED LFTS: ICD-10-CM

## 2023-10-21 PROCEDURE — 76705 ECHO EXAM OF ABDOMEN: CPT | Performed by: INTERNAL MEDICINE

## 2023-11-04 ENCOUNTER — LAB ENCOUNTER (OUTPATIENT)
Dept: LAB | Age: 73
End: 2023-11-04
Attending: INTERNAL MEDICINE
Payer: MEDICARE

## 2023-11-04 DIAGNOSIS — R82.90 ABNORMAL URINALYSIS: ICD-10-CM

## 2023-11-04 LAB
BILIRUB UR QL: NEGATIVE
CLARITY UR: CLEAR
GLUCOSE UR-MCNC: NORMAL MG/DL
HGB UR QL STRIP.AUTO: NEGATIVE
KETONES UR-MCNC: NEGATIVE MG/DL
LEUKOCYTE ESTERASE UR QL STRIP.AUTO: NEGATIVE
NITRITE UR QL STRIP.AUTO: NEGATIVE
PH UR: 6.5 [PH] (ref 5–8)
PROT UR-MCNC: NEGATIVE MG/DL
SP GR UR STRIP: 1.01 (ref 1–1.03)
UROBILINOGEN UR STRIP-ACNC: NORMAL

## 2023-11-04 PROCEDURE — 81003 URINALYSIS AUTO W/O SCOPE: CPT

## 2023-11-06 ENCOUNTER — PATIENT OUTREACH (OUTPATIENT)
Dept: CASE MANAGEMENT | Age: 73
End: 2023-11-06

## 2023-11-06 NOTE — PROGRESS NOTES
Spoke to Reece luu for CCM. Updates to patient care team/comments: none  Patient reported changes in medications: none  Med Adherence  Comment: as directed    Health Maintenance:   COVID-19 Vaccine(3 - 2023-24 season) due on 09/01/2023 Pt declined  Influenza Vaccine(1) due on 10/01/2023 Plans to go local pharmacy. Zoster Vaccines(1 of 2) due on 08/14/2024  Mammogram due on 07/19/2024  Colorectal Cancer Screening due on 11/26/2024  DEXA Scan Completed  MA Annual Health Assessment Completed  Annual Depression Screening Completed  Fall Risk Screening (Annual) Completed  Pneumococcal Vaccine: 65+ Years Completed    Patient updates/concerns:     She has been eating cabbage rolls, broccoli, salads with every meals. Pt continues with PT exercises on her own. She is walking up and down the stairs in her building if its cold or raining. Goals/Action Plan: Active goal from previous outreach:     Patient reported progress towards goals:                - What:            - Where/When/How:  Patient Reported Barriers and Concerns:                    - Plan for overcoming barriers:     Care Managers Interventions:    Reviewed recent test results with patient. Send letter and copy of test result. Normal urinalysis   Written by Akil Silva MD on 11/5/2023  6:31 AM CST    Future Appointments:   Future Appointments   Date Time Provider Almita Morales   12/4/2023  2:00 PM Shantel Murrieta MD [facility] ADO   7/5/2024 12:00 PM [facility] RM[facility] Care Manager Follow Up Date: One month    Reason For Follow Up: review progress and or barriers towards patient's goals. Time Spent This Encounter Total: *** min medical record review, telephone communication, care plan updates where needed, education, goals, and action plan recreation/update. Provided acknowledgment and validation to patient's concerns.    Monthly Minute Total including today: ***  Physical assessment, complete health history, and need for CCM established by Romie Maynard MD.

## 2023-11-06 NOTE — PROGRESS NOTES
Reviewed pt's chart including recent visits. Attempted to contact pt for monthly outreach no answer left detailed message for pt to call back. Care everywhere updated. Immunization reconciliation completed. No updates available.     Pt is due for:     COVID-19 Vaccine(3 - 2023-24 season) due on 09/01/2023  Influenza Vaccine(1) due on 10/01/2023  Zoster Vaccines(1 of 2) due on 08/14/2024  Mammogram due on 07/19/2024  Colorectal Cancer Screening due on 11/26/2024  DEXA Scan Completed  MA Annual Health Assessment Completed  Annual Depression Screening Completed  Fall Risk Screening (Annual) Completed  Pneumococcal Vaccine: 65+ Years Completed    Future Appointments   Date Time Provider Almita Morales   12/4/2023  2:00 PM Joseph Davis MD [facility] ADO   7/5/2024 12:00 PM [facility] RM1 [facility] EM [facility]     Total time - 4 min  Total Monthly time- 4 min

## 2023-11-27 ENCOUNTER — OFFICE VISIT (OUTPATIENT)
Dept: INTERNAL MEDICINE CLINIC | Facility: CLINIC | Age: 73
End: 2023-11-27
Payer: MEDICARE

## 2023-11-27 ENCOUNTER — OFFICE VISIT (OUTPATIENT)
Dept: DERMATOLOGY CLINIC | Facility: CLINIC | Age: 73
End: 2023-11-27

## 2023-11-27 ENCOUNTER — LAB ENCOUNTER (OUTPATIENT)
Dept: LAB | Age: 73
End: 2023-11-27
Attending: PHYSICIAN ASSISTANT
Payer: MEDICARE

## 2023-11-27 VITALS
WEIGHT: 185 LBS | SYSTOLIC BLOOD PRESSURE: 135 MMHG | BODY MASS INDEX: 36 KG/M2 | HEART RATE: 66 BPM | DIASTOLIC BLOOD PRESSURE: 73 MMHG

## 2023-11-27 DIAGNOSIS — L65.9 HAIR LOSS: ICD-10-CM

## 2023-11-27 DIAGNOSIS — L65.9 HAIR THINNING: Primary | ICD-10-CM

## 2023-11-27 DIAGNOSIS — L65.9 HAIR LOSS: Primary | ICD-10-CM

## 2023-11-27 DIAGNOSIS — R79.89 ABNORMAL SERUM THYROXINE (T4) LEVEL: ICD-10-CM

## 2023-11-27 LAB
TSI SER-ACNC: 2.52 MIU/ML (ref 0.55–4.78)
VIT D+METAB SERPL-MCNC: 36.4 NG/ML (ref 30–100)

## 2023-11-27 PROCEDURE — 82306 VITAMIN D 25 HYDROXY: CPT

## 2023-11-27 PROCEDURE — 84443 ASSAY THYROID STIM HORMONE: CPT

## 2023-11-27 PROCEDURE — 3078F DIAST BP <80 MM HG: CPT | Performed by: NURSE PRACTITIONER

## 2023-11-27 PROCEDURE — 84439 ASSAY OF FREE THYROXINE: CPT

## 2023-11-27 PROCEDURE — 36415 COLL VENOUS BLD VENIPUNCTURE: CPT

## 2023-11-27 PROCEDURE — 99213 OFFICE O/P EST LOW 20 MIN: CPT | Performed by: NURSE PRACTITIONER

## 2023-11-27 PROCEDURE — 3075F SYST BP GE 130 - 139MM HG: CPT | Performed by: NURSE PRACTITIONER

## 2023-11-27 NOTE — PROGRESS NOTES
Ashely Maguire is a 68year old female. Chief Complaint   Patient presents with    Hair/Scalp Problem     Hair loss     HPI:   She presents with hair loss. She states this has been going on for about one year. She is taking a biotin supplement and not noticing a difference. She has noticed when she brushes her hair she is having hair loss. She states her hair use to be thicker. Current Outpatient Medications   Medication Sig Dispense Refill    Calcium Carb-Cholecalciferol (CALCIUM-VITAMIN D3) 250-3. 125 MG-MCG Oral Tab Take 1 tablet by mouth daily. 90 tablet 1    atorvastatin 40 MG Oral Tab Take 1 tablet (40 mg total) by mouth nightly. (Patient taking differently: Take 1 tablet (40 mg total) by mouth daily.) 90 tablet 3    topiramate 25 MG Oral Tab Take 1 tablet (25 mg total) by mouth 2 (two) times daily. 180 tablet 3    albuterol (VENTOLIN HFA) 108 (90 Base) MCG/ACT Inhalation Aero Soln Inhale 2 puffs into the lungs every 4 (four) hours as needed. For wheezing 54 g 5    SYMBICORT 160-4.5 MCG/ACT Inhalation Aerosol INHALE 2 PUFFS INTO THE LUNGS TWO TIMES DAILY. 3 g 5    Nystatin 310355 UNIT/GM External Powder Apply 1 Application topically 2 (two) times daily.  60 g 1    Ascorbic Acid (VITAMIN C) 1000 MG Oral Tab       FERROUS SULFATE CR OR       Acetaminophen 500 MG Oral Cap       Multiple Vitamins-Minerals (WOMENS MULTIVITAMIN PLUS OR)       Vitamin B-12 1000 MCG Oral Tab         Past Medical History:   Diagnosis Date    Age-related nuclear cataract of both eyes 03/28/2017    Anemia     FeSo4    Asthma     Bilateral amblyopia 10/10/2019    Closed fracture of left hip ([address] 06/19/2022    Colon polyp     Degeneration of lumbar or lumbosacral intervertebral disc 06/20/2014    DVT of lower extremity (deep venous thrombosis) ([address]     post delivery of last child 33 years ago    Esophageal reflux     Fracture 12/2016    KNEE NO SURGERY    High cholesterol     Hyperlipidemia     Diet    Hyperopia of both eyes with astigmatism and presbyopia 10/10/2019    Lipid screening 2014    OA (osteoarthritis) 2017    OA (osteoarthritis) 2017    Obesity (BMI 30-39. 9)     Osteoporosis screening 2014    Overweight (BMI 25.0-29.9) 2019    Pathological fracture of left hip due to age-related osteoporosis ([address] 2022    Postmenopausal status (age-related) (natural) 2014    Shortness of breath     Stroke due to embolism of right cerebellar artery (HCC)     Thrombocytopenia ([address] 05/15/2017      Past Surgical History:   Procedure Laterality Date    CATARACT EXTRACTION W/  INTRAOCULAR LENS IMPLANT Right 2020    Dr. Kiarra Proctor @ [facility] Day surgery    CATARACT EXTRACTION W/  INTRAOCULAR LENS IMPLANT Left 2020    Dr. Celeste Ibarra Day surgery     COLONOSCOPY      COLONOSCOPY N/A 2019    Procedure: COLONOSCOPY;  Surgeon: Dale Guillory MD;  Location: [address] [place] [address] ENDOSCOPY    HIP REPLACEMENT SURGERY Left 2022    NEEDLE BIOPSY RIGHT  2023      Social History:  Social History     Socioeconomic History    Marital status:    Tobacco Use    Smoking status: Former     Packs/day: 0.30     Years: 30.00     Additional pack years: 0.00     Total pack years: 9.00     Types: Cigarettes     Quit date: 2004     Years since quittin.8     Passive exposure: Past    Smokeless tobacco: Never   Vaping Use    Vaping Use: Never used   Substance and Sexual Activity    Alcohol use:  Yes     Alcohol/week: 0.0 standard drinks of alcohol     Comment: holidays    Drug use: No    Sexual activity: Never   Other Topics Concern    Caffeine Concern No    Pt has a pacemaker No    Pt has a defibrillator No    Reaction to local anesthetic No   Social History Narrative    Live with daughter    Work retired    -     -     No pets no    H/o birds > 10 yrs     Social Determinants of Health     Financial Resource Strain: Low Risk  (3/6/2023)    Financial Resource Strain Difficulty of Paying Living Expenses: Not very hard     Med Affordability: No   Food Insecurity: No Food Insecurity (3/6/2023)    Food Insecurity     Food Insecurity: Never true   Transportation Needs: No Transportation Needs (3/6/2023)    Transportation Needs     Lack of Transportation: No   Physical Activity: Insufficiently Active (3/6/2023)    Exercise Vital Sign     Days of Exercise per Week: 2 days     Minutes of Exercise per Session: 10 min   Stress: No Stress Concern Present (3/6/2023)    Stress     Feeling of Stress : No   Social Connections: Socially Integrated (3/6/2023)    Social Connections     Frequency of Socialization with Friends and Family: 3   Recent Concern: Social Connections - Socially Isolated (3/6/2023)    Social Connections     Frequency of Socialization with Friends and Family: 0   Housing Stability: Low Risk  (3/6/2023)    Housing Stability     Housing Instability: No      Family History   Problem Relation Age of Onset    Diabetes Mother 58    Cancer Mother         unknown type    Other (Other) Mother     Stroke Father 79        CVA    Diabetes Father     Glaucoma Neg     Macular degeneration Neg     Breast Cancer Neg     Ovarian Cancer Neg       No Known Allergies     REVIEW OF SYSTEMS:     Review of Systems   Constitutional:  Negative for fever. HENT:          Hair loss   Respiratory:  Negative for cough, shortness of breath and wheezing. Cardiovascular:  Negative for chest pain. Gastrointestinal:  Negative for abdominal pain. Genitourinary: Negative. Musculoskeletal: Negative. Skin: Negative. Neurological: Negative. Psychiatric/Behavioral: Negative. Wt Readings from Last 5 Encounters:   10/18/23 178 lb 12.8 oz (81.1 kg)   10/12/23 180 lb (81.6 kg)   10/06/23 181 lb (82.1 kg)   09/20/23 177 lb 9.6 oz (80.6 kg)   04/12/23 174 lb (78.9 kg)     There is no height or weight on file to calculate BMI.       EXAM:   /73 (BP Location: Right arm, Patient Position: Sitting, Cuff Size: large)   Pulse 66     Physical Exam  Vitals reviewed. Constitutional:       Appearance: Normal appearance. HENT:      Head: Normocephalic. Cardiovascular:      Rate and Rhythm: Normal rate and regular rhythm. Pulses: Normal pulses. Pulmonary:      Breath sounds: Normal breath sounds. No wheezing. Musculoskeletal:         General: No swelling. Normal range of motion. Skin:     General: Skin is warm and dry. Neurological:      Mental Status: She is alert and oriented to person, place, and time. Psychiatric:         Mood and Affect: Mood normal.         Behavior: Behavior normal.        Component      Latest Ref Rng 10/6/2023   Iron, Serum      50 - 170 ug/dL 50    Transferrin      200 - 360 mg/dL 188 (L)    Iron Bind. Cap.(TIBC)      240 - 450 ug/dL 280    Iron Saturation      15 - 50 % 18    T4,Free (Direct)      0.8 - 1.7 ng/dL 0.7 (L)    TSH      0.358 - 3.740 mIU/mL 1.600    Vitamin B12      193 - 986 pg/mL 300    FERRITIN      18.0 - 340.0 ng/mL 271.8       Legend:  (L) Low      ASSESSMENT AND PLAN:   1. Hair thinning  - Derm Referral - Ronny Evans)    2. Hair loss  - recent lab work completed (see above)  - T4 slightly abnormal will recheck today, TSH WNL  - will refer to derm for further recommendations   - Derm Referral - Ronny Evans)      The patient indicates understanding of these issues and agrees to the plan. Return for make an appointment with dermatology.

## 2023-11-27 NOTE — PROGRESS NOTES
HPI:    Patient ID: Chava Murphy is a 68year old female. Patient presents with hair loss concern for the past few months. States she has not noticed as much hair loss now. Denies any itching or pain. No scaling noted. She has not had her thyroid rechecked again as of yet. She does take collagen and biotin already. She wants recommendations for hair growth. No draining or tenderness noted. Hx of allergies to medications noted. She has not had any surgeries lately. No covid. No recent illnesses noted either. Review of Systems   Constitutional:  Negative for chills and fever. Musculoskeletal:  Negative for arthralgias and myalgias. Skin:  Positive for rash. Negative for color change and wound. Current Outpatient Medications   Medication Sig Dispense Refill    Calcium Carb-Cholecalciferol (CALCIUM-VITAMIN D3) 250-3. 125 MG-MCG Oral Tab Take 1 tablet by mouth daily. 90 tablet 1    atorvastatin 40 MG Oral Tab Take 1 tablet (40 mg total) by mouth nightly. (Patient taking differently: Take 1 tablet (40 mg total) by mouth daily.) 90 tablet 3    topiramate 25 MG Oral Tab Take 1 tablet (25 mg total) by mouth 2 (two) times daily. 180 tablet 3    albuterol (VENTOLIN HFA) 108 (90 Base) MCG/ACT Inhalation Aero Soln Inhale 2 puffs into the lungs every 4 (four) hours as needed. For wheezing 54 g 5    SYMBICORT 160-4.5 MCG/ACT Inhalation Aerosol INHALE 2 PUFFS INTO THE LUNGS TWO TIMES DAILY. 3 g 5    Nystatin 479107 UNIT/GM External Powder Apply 1 Application topically 2 (two) times daily. 60 g 1    Ascorbic Acid (VITAMIN C) 1000 MG Oral Tab       FERROUS SULFATE CR OR       Acetaminophen 500 MG Oral Cap       Multiple Vitamins-Minerals (WOMENS MULTIVITAMIN PLUS OR)       Vitamin B-12 1000 MCG Oral Tab        Allergies:No Known Allergies   There were no vitals taken for this visit. There is no height or weight on file to calculate BMI.   PHYSICAL EXAM:   Physical Exam  Constitutional:       General: She is not in acute distress. Appearance: Normal appearance. Skin:     General: Skin is warm and dry. Findings: Rash present. Comments: Generalized thinning noted throughout the scalp. No draining or tenderness noted. No erythema noted. Neurological:      Mental Status: She is alert and oriented to person, place, and time. ASSESSMENT/PLAN:   1. Hair loss  -After discussion with patient, advised the following:  -Will check vitamin D   -Will call with results.   -Will see if clobetasol is reasonable option.   -Will need to get her thyroid checked. -To call or follow-up with worsening symptoms or concerns.   -Pt was agreeable to plan and will comply with discussion above.      - Vitamin D; Future      Orders Placed This Encounter   Procedures    Vitamin D       Meds This Visit:  Requested Prescriptions      No prescriptions requested or ordered in this encounter       Imaging & Referrals:  None         JZORAIDA#[id number]

## 2023-11-28 LAB — T4 FREE SERPL-MCNC: 0.8 NG/DL (ref 0.8–1.7)

## 2023-11-28 NOTE — PROGRESS NOTES
Pt informed of lab results and recommendations for treatment with clobetasol solution. Pt agreeable to this treatment plan and would like [facility] [address] [facility]. Thank you.

## 2023-12-04 ENCOUNTER — TELEPHONE (OUTPATIENT)
Facility: CLINIC | Age: 73
End: 2023-12-04

## 2023-12-04 ENCOUNTER — PATIENT OUTREACH (OUTPATIENT)
Dept: CASE MANAGEMENT | Age: 73
End: 2023-12-04

## 2023-12-04 ENCOUNTER — OFFICE VISIT (OUTPATIENT)
Dept: INTERNAL MEDICINE CLINIC | Facility: CLINIC | Age: 73
End: 2023-12-04

## 2023-12-04 VITALS
HEIGHT: 60 IN | BODY MASS INDEX: 36.32 KG/M2 | SYSTOLIC BLOOD PRESSURE: 117 MMHG | DIASTOLIC BLOOD PRESSURE: 65 MMHG | WEIGHT: 185 LBS | HEART RATE: 59 BPM

## 2023-12-04 DIAGNOSIS — J44.89 ASTHMA WITH COPD (CHRONIC OBSTRUCTIVE PULMONARY DISEASE): Primary | Chronic | ICD-10-CM

## 2023-12-04 DIAGNOSIS — Z86.010 PERSONAL HISTORY OF COLONIC POLYPS: Primary | ICD-10-CM

## 2023-12-04 DIAGNOSIS — E78.5 HYPERLIPIDEMIA, UNSPECIFIED HYPERLIPIDEMIA TYPE: ICD-10-CM

## 2023-12-04 DIAGNOSIS — I83.018 VENOUS STASIS ULCER OF OTHER PART OF RIGHT LOWER LEG WITH FAT LAYER EXPOSED WITH VARICOSE VEINS (HCC): ICD-10-CM

## 2023-12-04 DIAGNOSIS — K21.00 GASTROESOPHAGEAL REFLUX DISEASE WITH ESOPHAGITIS WITHOUT HEMORRHAGE: ICD-10-CM

## 2023-12-04 DIAGNOSIS — D69.6 THROMBOCYTOPENIA, UNSPECIFIED (HCC): ICD-10-CM

## 2023-12-04 DIAGNOSIS — G62.9 PERIPHERAL POLYNEUROPATHY: ICD-10-CM

## 2023-12-04 DIAGNOSIS — R26.9 GAIT ABNORMALITY: ICD-10-CM

## 2023-12-04 DIAGNOSIS — E78.00 PURE HYPERCHOLESTEROLEMIA: Primary | ICD-10-CM

## 2023-12-04 DIAGNOSIS — L97.812 VENOUS STASIS ULCER OF OTHER PART OF RIGHT LOWER LEG WITH FAT LAYER EXPOSED WITH VARICOSE VEINS (HCC): ICD-10-CM

## 2023-12-04 PROCEDURE — 1159F MED LIST DOCD IN RCRD: CPT | Performed by: INTERNAL MEDICINE

## 2023-12-04 PROCEDURE — 3074F SYST BP LT 130 MM HG: CPT | Performed by: INTERNAL MEDICINE

## 2023-12-04 PROCEDURE — 1126F AMNT PAIN NOTED NONE PRSNT: CPT | Performed by: INTERNAL MEDICINE

## 2023-12-04 PROCEDURE — 99214 OFFICE O/P EST MOD 30 MIN: CPT | Performed by: INTERNAL MEDICINE

## 2023-12-04 PROCEDURE — 3008F BODY MASS INDEX DOCD: CPT | Performed by: INTERNAL MEDICINE

## 2023-12-04 PROCEDURE — 3078F DIAST BP <80 MM HG: CPT | Performed by: INTERNAL MEDICINE

## 2023-12-04 RX ORDER — ANTIARTHRITIC COMBINATION NO.2 900 MG
TABLET ORAL
COMMUNITY

## 2023-12-04 RX ORDER — MAGNESIUM 200 MG
TABLET ORAL
COMMUNITY

## 2023-12-04 RX ORDER — OMEPRAZOLE 20 MG/1
20 CAPSULE, DELAYED RELEASE ORAL
COMMUNITY

## 2023-12-04 RX ORDER — MULTIVIT WITH MINERALS/LUTEIN
250 TABLET ORAL DAILY
COMMUNITY

## 2023-12-04 RX ORDER — ALBUTEROL SULFATE 90 UG/1
2 AEROSOL, METERED RESPIRATORY (INHALATION) EVERY 4 HOURS PRN
Qty: 54 G | Refills: 5 | Status: SHIPPED | OUTPATIENT
Start: 2023-12-04

## 2023-12-04 NOTE — TELEPHONE ENCOUNTER
Pt is calling to schedule 5 year Recall CLN.   She states that she is due November 2024 and needs to schedule, so she arrange transportation in advance

## 2023-12-04 NOTE — PROGRESS NOTES
Spoke to Reece luu for CCM. Updates to patient care team/comments: None    Patient reported changes in medications: Pt states she is not taking Ferrous sulfate as this time. States she is not sure if she should be on it. She has an appointment with Dr. Nay Thibodeaux today and will inquire. Added Omeprazole 20 mg to active med list    OTC Hair skin nails biotin once daily. Pt started it on her own because of current hair loss. Ascorbic acid Vitamin C 1,000  OTC pt stopped it on her own. She is now taking Calcium 250 mg daily. Pt started herself on Magnesium 200 mg because her son recommended it. Med Adherence  Comment: Pt reports taking medication as directed. See Changes above. Health Maintenance:  Reviewed Health Maintenance with patient. Health Maintenance   Topic Date Due    Zoster Vaccines (1 of 2) 08/14/2024 (Originally 6/24/2000)    Influenza Vaccine (1) 11/06/2024 (Originally 10/1/2023)    COVID-19 Vaccine (3 - 2023-24 season) 12/12/2112 (Originally 9/1/2023)    Mammogram  07/19/2024    Colorectal Cancer Screening  11/26/2024    DEXA Scan  Completed    MA Annual Health Assessment  Completed Scheduled    Annual Depression Screening  Completed    Fall Risk Screening (Annual)  Completed    Pneumococcal Vaccine: 65+ Years  Completed     Patient updates/concerns:     She has noted increased hair loss while she showers or when she is combing her hair. She tries to follow a low carb diet, is not sure if this may be affecting her hair. States she is usually  under stress as she takes care of her adult daughter who is bipolar, has anxiety and depression. Spoke at length in regards to daughters mental health. She continues to work on stating active. She is walking on a treadmill at her friends home once a week for 30 minutes. Also stays active by walking insider stores and shopping centers.   She has not been successful finding a new [employer] club,     Goals/Action Plan:    Active goal from previous outreach:   Start an exercise routine by joining a health club or exercise classes for senior        Patient reported progress towards goals: She continues to work towards goal               - What: She started to a routine.           - Where/When/How: States she has not been successful locating a health club near her home who accepts [employer]. However she is walking  on a treadmill once a week for 30 minutes at her friends home. Patient Reported Barriers and Concerns: States she is not able to find a a [employer] health club. - Plan for overcoming barriers: States she will check with local [employer] district and will work on exercising at her friends home at least two times a week for 30 minutes. Care Managers Interventions:     Encouraged patient to take the bottles of all her Vitamin with her to her appointment today. Pt states she has many bottles and is not sure if she should be taking them. Assisted scheduling Medicare [address] for 2024    Discussed recent test results below with patient. Niya Carlton PA-C  11/28/2023  8:14 AM CST Back to Top      Clobetasol sent to the pharmacy. Karlie Campa RN  11/27/2023  6:25 PM CST       Pt informed of lab results and recommendations for treatment with clobetasol solution. Pt agreeable to this treatment plan and would like [facility] please. Thank you. Niya Carlton PA-C  11/27/2023  5:40 PM CST       Please call patient and tell her that her vitamin D is normal. We can start clobetasol solution for her. She can apply this nightly. Will take 6 months to 1 year for improvement to be seen. I can send to pharmacy if she is agreeable to this treatment plan.      Future Appointments:   Future Appointments   Date Time Provider Almita Morales   12/4/2023  2:00 PM Tereso Schwab, MD [facility]   7/5/2024 12:00 PM [facility] RM[facility] MARIO EM [facility] OF THE [facility] Care Manager Follow Up Date: Monthly    Reason For Follow Up: review progress and or barriers towards patient's goals. Time Spent This Encounter Total:  37  min medical record review, telephone communication, care plan updates where needed, education, goals, and action plan recreation/update. Provided acknowledgment and validation to patient's concerns.    Monthly Minute Total including today: 37  Physical assessment, complete health history, and need for CCM established by London Moran MD.

## 2023-12-05 NOTE — TELEPHONE ENCOUNTER
Last Procedure, Date, MD:  11/26/2019, Colonoscopy, Dr Alba Eubanks  Last Diagnosis:  hyperplastic polyp  Recalled (mth/yrs): 5 years  Sedation Used Previously:  MAC  Last Prep Used (if known):  trilyte  Quality Of Prep (if known): good  Anticoagulants: no  Diuretics: no  Diabetic Med's (PO/Injectables): no  Weight loss Med's: no  Iron/Herbal/Multivitamin Supplements (RX/OTC): ferrous sulfate,calcium +D, mg, multivitamins, B12  Marijuana/Vaping/CBD: no  Height & Weight: 5'/185  BMI:36.13  Hx of Cardiac/CVA Issues/(MI/Stroke):no  Devices Pacemaker/Defibrillator/Stents: no  Respiratory Issues/Oxygen Use/MARY KATE/COPD: COPD  Issues w/ Anesthesia: no    Symptoms (Y/N): no  Symptoms Details:     Special Comments/Notes:OV with Dr Kulwant Mariee 12/04/2023. Medications allergies and pharmacy confirmed with the pt    Please advise on orders and prep. Thank you! Randi Villareal MD   Physician  Gastroenterology     Operative Report  Signed     Date of Service: 11/26/2019  2:54 PM  Case Time: Procedures: Surgeons:   11/26/2019  2:23 PM COLONOSCOPY    Randi Villareal MD               Signed         COLONOSCOPY PROCEDURE REPORT     DATE OF PROCEDURE:  11/26/2019      PCP: Rona Betts MD     PREOPERATIVE DIAGNOSIS: Screening colonoscopy, history of colon polyp     POSTOPERATIVE DIAGNOSIS:  See impression. SURGEON[facility]  [facility] provided by the Anesthesia Service. MAC anesthesia requested due to anticipated intolerance of colonoscopy examination under safe doses conscious sedation medications     COLONOSCOPY PROCEDURE:   After the nature and risks of colonoscopy examination under MAC anesthesia were discussed with the patient and all questions answered, informed consent was obtained. The patient was sedated as above. Digital rectal exam was performed which showed no masses.   The Olympus pediatric video colonoscope was placed in the patient's rectum and advanced under direct visualization through the entire length of the colon up to the cecum and terminal ileum. Retroflex exam performed up the ascending colon to the hepatic flexure. The cecum was confirmed by landmarks including appendiceal orifice, cecal trifold, ileocecal valve. Retroflexion was performed in the rectum. The quality of the prep was good. COLONOSCOPY FINDINGS:    Small internal hemorrhoids only on good prep colonoscopy examination to the cecum and terminal ileum as above. RECOMMENDATIONS:  High fiber diet. Repeat colonoscopy examination in 7-10 years  No aspirin or NSAID medications for next 10 days to prevent bleeding               Electronically signed by Randi Villareal MD at 11/26/2019  2:55 PM  ================================  FINAL DIAGNOSIS                       Polyp, descending colon:     *  Hyperplastic polyp.

## 2023-12-07 RX ORDER — SODIUM, POTASSIUM,MAG SULFATES 17.5-3.13G
SOLUTION, RECONSTITUTED, ORAL ORAL
Qty: 1 EACH | Refills: 0 | Status: SHIPPED | OUTPATIENT
Start: 2023-12-07

## 2023-12-11 PROBLEM — E66.01 SEVERE OBESITY (BMI 35.0-39.9) WITH COMORBIDITY (HCC): Chronic | Status: ACTIVE | Noted: 2023-12-11

## 2023-12-11 RX ORDER — ATORVASTATIN CALCIUM 40 MG/1
40 TABLET, FILM COATED ORAL NIGHTLY
COMMUNITY
Start: 2023-12-11

## 2023-12-11 NOTE — TELEPHONE ENCOUNTER
Scheduled for:  Colonoscopy [id number]  Provider Name:  Dr. Noman Oglesby  Date:  4/18/24  Location:  [facility]  Sedation:  Mac  Time:  1:45 pm (pt is aware that [facility] will call the day before to confirm arrival time)    Prep:  Suprep  Meds/Allergies Reconciled?:  Physician reviewed      Diagnosis with codes:  history of colon polyps Z86.010  Was patient informed to call insurance with codes (Y/N): yes      Referral sent?:  Referral was sent at the time of electronic surgical scheduling. [facility] or [address] [place] [address] notified?: I sent an electronic request to Endo Scheduling and received a confirmation today. Medication Orders:  Pt is aware to NOT take iron pills, herbal meds and diet supplements for 7 days before exam. Also to NOT take any form of alcohol, recreational drugs and any forms of ED meds 24 hours before exam.     Misc Orders:       Further instructions given by staff:  I discussed prep instructions with the patient at the time of the appointment which she verbally understood and given the prep instructions at the time of the appointment. Patient was informed about the new cancellation policy for his/her procedure. Patient was also given a copy of the cancellation policy at the time of the appointment and verbalized understanding.

## 2024-01-03 ENCOUNTER — PATIENT OUTREACH (OUTPATIENT)
Dept: CASE MANAGEMENT | Age: 74
End: 2024-01-03

## 2024-01-03 DIAGNOSIS — E66.01 SEVERE OBESITY (BMI 35.0-39.9) WITH COMORBIDITY (HCC): ICD-10-CM

## 2024-01-03 DIAGNOSIS — L97.812 VENOUS STASIS ULCER OF OTHER PART OF RIGHT LOWER LEG WITH FAT LAYER EXPOSED WITH VARICOSE VEINS (HCC): ICD-10-CM

## 2024-01-03 DIAGNOSIS — I83.018 VENOUS STASIS ULCER OF OTHER PART OF RIGHT LOWER LEG WITH FAT LAYER EXPOSED WITH VARICOSE VEINS (HCC): ICD-10-CM

## 2024-01-03 DIAGNOSIS — E78.00 PURE HYPERCHOLESTEROLEMIA: Primary | ICD-10-CM

## 2024-01-03 NOTE — PROGRESS NOTES
Spoke to Bri for CCM.      Updates to patient care team/comments: None  Patient reported changes in medications: None  Med Adherence  Comment: Pt reports taking medications as directed. States she is keeping up with it.    Health Maintenance: Reviewed health maintenance with patient.  Health Maintenance   Topic Date Due    MA Annual Health Assessment  01/01/2024 Scheduled    Annual Depression Screening  01/01/2024 updated    Fall Risk Screening (Annual)  01/01/2024 updated    Zoster Vaccines (1 of 2) 08/14/2024 (Originally 6/24/2000)    Influenza Vaccine (1) 11/06/2024 (Originally 10/1/2023)    COVID-19 Vaccine (3 - 2023-24 season) 12/12/2112 (Originally 9/1/2023)    Mammogram  07/19/2024    Colorectal Cancer Screening  11/26/2024    DEXA Scan  Completed    Pneumococcal Vaccine: 65+ Years  Completed       Patient updates/concerns:     Ambulates with a cane, feels unsteady walking, standing or getting in and out of a car. She is not as active as she would like to be. Admits she has become more sedentary.     She is eating fruits, veggies and protein daily.States she gained weight since October she was down to 178 lbs  and currently she is at 185 lbs.States over the holidays she over indulged. Family was encouraging her to eat in excess over the holidays.    Today she is feeing quite irritated by her adult daughters behavior. She has a disability, patient is her full time caregiver. Pt spoke at length about her frustrations.     Goals/Action Plan:    Active goal from previous outreach:   Start an exercise routine by joining a health club or exercise classes for senior.    Patient reported progress towards goals: None, She has not signed up for [employer] states daughter does not want to go.                - What: Pt wants to keep goal. She will sign up for a silver sneakers gym this month.           - Where/When/How: Pt states she will make it a priority to start cutting back on carbs, aiming on eating  healthier and increasing her activity.  Patient Reported Barriers and Concerns: Daughter does not want to go to the health club.                   - Plan for overcoming barriers: Pt states she will do it on her own and stop waiting on her daughter.    Care Managers Interventions:     CCM  provided emotional support to family through empathic and reflective listening.   Suggested she follow up in the office with PCP if she continues to feel very anxious or stressed.    Assisted in scheduling Medicare Px    Fall and depression screen updated.    Encouraged patient to cut back on portions, over eating and try to slowly get back to doing exercises she learned at physical therapy.     Reviewed Future Appointments:   Future Appointments   Date Time Provider Department Center   4/15/2024  2:00 PM Rosa Abbott MD [facility]   4/18/2024  1:45 PM KWASI, PRATIBHA [facility] None   7/5/2024 12:00 PM [facility] RM[facility] EM [facility]     Next Care Manager Follow Up Date: One month    Reason For Follow Up: review progress and or barriers towards patient's goals.     Time Spent This Encounter Total: 33 min medical record review, telephone communication, care plan updates where needed, education, goals, and action plan recreation/update. Provided acknowledgment and validation to patient's concerns.   Monthly Minute Total including today: 33  Physical assessment, complete health history, and need for CCM established by Rosa Abbott MD.

## 2024-02-02 ENCOUNTER — PATIENT OUTREACH (OUTPATIENT)
Dept: CASE MANAGEMENT | Age: 74
End: 2024-02-02

## 2024-02-02 NOTE — PROGRESS NOTES
Reviewed pt's chart including recent visits.  Attempted to contact pt for monthly outreach no answer left detailed message for pt to call back.     Reconciled chart using care everywhere.     Pt is due for: Reviewed   Health Maintenance   Topic Date Due    MA Annual Health Assessment  01/01/2024    Zoster Vaccines (1 of 2) 08/14/2024 (Originally 6/24/2000)    COVID-19 Vaccine (3 - 2023-24 season) 12/12/2112 (Originally 9/1/2023)    Mammogram  07/19/2024    Colorectal Cancer Screening  11/26/2024    Influenza Vaccine  Completed    DEXA Scan  Completed    Annual Depression Screening  Completed    Fall Risk Screening (Annual)  Completed    Pneumococcal Vaccine: 65+ Years  Completed     Future Appointments   Date Time Provider Department Center   4/15/2024  2:00 PM Rosa Abbott MD [facility]   4/18/2024  1:45 PM KWASI, PROCEDURE [facility] None   7/5/2024 12:00 PM [facility] RM[facility] EM [facility]       Total time - 4 min  Total Monthly time- 4 min

## 2024-02-02 NOTE — PROGRESS NOTES
Spoke to Bri for CCM.      Updates to patient care team/comments: ***  Patient reported changes in medications: ***  Med Adherence  Comment: ***     Health Maintenance:   Health Maintenance   Topic Date Due    MA Annual Health Assessment  01/01/2024    Zoster Vaccines (1 of 2) 08/14/2024 (Originally 6/24/2000)    COVID-19 Vaccine (3 - 2023-24 season) 12/12/2112 (Originally 9/1/2023)    Mammogram  07/19/2024    Colorectal Cancer Screening  11/26/2024    Influenza Vaccine  Completed    DEXA Scan  Completed    Annual Depression Screening  Completed    Fall Risk Screening (Annual)  Completed    Pneumococcal Vaccine: 65+ Years  Completed       Patient updates/concerns: ***    Goals/Action Plan:    Active goal from previous outreach:     Patient reported progress towards goals: ***               - What: ***           - Where/When/How: ***  Patient Reported Barriers and Concerns: ***                   - Plan for overcoming barriers: ***    Care Managers Interventions: ***    Future Appointments:   Future Appointments   Date Time Provider Department Center   4/15/2024  2:00 PM Rosa Abbott MD [facility]   4/18/2024  1:45 PM [facility], PROCEDURE [facility] None   7/5/2024 12:00 PM [facility] Care Manager Follow Up Date:     Reason For Follow Up: review progress and or barriers towards patient's goals.     Time Spent This Encounter Total: *** min medical record review, telephone communication, care plan updates where needed, education, goals, and action plan recreation/update. Provided acknowledgment and validation to patient's concerns.   Monthly Minute Total including today: ***  Physical assessment, complete health history, and need for CCM established by Rosa Abbott MD.

## 2024-02-12 ENCOUNTER — TELEPHONE (OUTPATIENT)
Dept: INTERNAL MEDICINE CLINIC | Facility: CLINIC | Age: 74
End: 2024-02-12

## 2024-02-12 RX ORDER — CALCIUM CARBONATE-CHOLECALCIFEROL TAB 250 MG-125 UNIT 250-125 MG-UNIT
1 TAB ORAL DAILY
Qty: 90 TABLET | Refills: 3 | Status: SHIPPED | OUTPATIENT
Start: 2024-02-12

## 2024-02-12 NOTE — TELEPHONE ENCOUNTER
RN called patient. Patient's date of birth and full name both confirmed.   Asking if still taking CALCIUM-VITAMIN D3 250-3.125 MG-MCG Oral Tab   Per 12/4/23 Visit - noted that she was not taking it.   Patient said hold on, She was checking , and call was ended.   RN called - no answer. Left voicemail to call back office.

## 2024-02-15 NOTE — TELEPHONE ENCOUNTER
Spoke to Pt notified parking placard was ready for  at the [facility] office . Pt verbalized understanding denied questions .

## 2024-03-07 ENCOUNTER — PATIENT OUTREACH (OUTPATIENT)
Dept: CASE MANAGEMENT | Age: 74
End: 2024-03-07

## 2024-03-07 DIAGNOSIS — E66.01 SEVERE OBESITY (BMI 35.0-39.9) WITH COMORBIDITY (HCC): ICD-10-CM

## 2024-03-07 DIAGNOSIS — G62.9 PERIPHERAL POLYNEUROPATHY: ICD-10-CM

## 2024-03-07 DIAGNOSIS — K21.00 GASTROESOPHAGEAL REFLUX DISEASE WITH ESOPHAGITIS WITHOUT HEMORRHAGE: ICD-10-CM

## 2024-03-07 DIAGNOSIS — E78.00 PURE HYPERCHOLESTEROLEMIA: Primary | ICD-10-CM

## 2024-03-07 RX ORDER — ATORVASTATIN CALCIUM 40 MG/1
40 TABLET, FILM COATED ORAL NIGHTLY
Qty: 60 TABLET | Refills: 0 | Status: SHIPPED | OUTPATIENT
Start: 2024-03-07

## 2024-03-07 RX ORDER — TOPIRAMATE 25 MG/1
25 TABLET ORAL 2 TIMES DAILY
Qty: 60 TABLET | Refills: 0 | Status: SHIPPED | OUTPATIENT
Start: 2024-03-07

## 2024-03-07 NOTE — PROGRESS NOTES
Spoke to Bri for CCM.      Updates to patient care team/comments: None  Patient reported changes in medications: None    Med Adherence  Comment: Pt confirms she is taking medications as instructed by providers.     Health Maintenance: Reviewed and updated as able.  Health Maintenance   Topic Date Due    MA Annual Health Assessment  01/01/2024 (Scheduled)    Zoster Vaccines (1 of 2) 08/14/2024 (Originally 6/24/2000)    COVID-19 Vaccine (3 - 2023-24 season) 12/12/2112 (Originally 9/1/2023)    Mammogram  07/19/2024    Colorectal Cancer Screening  11/26/2024    Influenza Vaccine  Completed    DEXA Scan  Completed    Annual Depression Screening  Completed    Fall Risk Screening (Annual)  Completed    Pneumococcal Vaccine: 65+ Years  Completed     Patient updates/concerns:    The patient reports she's generally doing okay. She's been experiencing some knee pain, particularly after exercising. Stress levels have remained stable at home.    Goals/Action Plan:    Active goal from previous outreach:      - What: Exercise            - Where/When/How: 2-3 days a week for at least 45 minutes- 1 hours, by walking on a treadmill, using stationary bike at her friends home. This is short term until they sign up for a health club  Patient reported progress towards goals               - What: She joined the [facility]            - Where/When/How: She's exercising at the [facility] 1-2 times a week for an hour each session. She's actively working on increasing the frequency and duration of her workouts.  Patient Reported Barriers and Concerns: Bilateral knee pain after exercising.                    - Plan for overcoming barriers: She will ice her knees after exercising and follow up in office if pain progresses.    Care Managers Interventions:   Commended the patient for joining [facility]. Encouraged patient to aim for attending 3 times a week. Advised her to follow up in [facility] office if any pain persists or worsens.     Care every where  updated    Provided support. Encouraged patient to call as needed.    Reviewed Future Appointments:   Future Appointments   Date Time Provider Department Center   4/15/2024  2:00 PM Rosa Abbott MD [facility]   4/18/2024  1:45 PM [facility], PROCEDURE [facility] None   7/5/2024 12:00 PM [facility] RM1 [facility] EM [facility]     Next Care Manager Follow Up Date: One month. Encouraged patient to call as needed.    Reason For Follow Up: review progress and or barriers towards patient's goals.     Time Spent This Encounter Total: 27 min medical record review, telephone communication, care plan updates where needed, education, goals, and action plan recreation/update. Provided acknowledgment and validation to patient's concerns.   Monthly Minute Total including today: 27 min  Physical assessment, complete health history, and need for CCM established by Rosa Abbott MD.

## 2024-03-07 NOTE — TELEPHONE ENCOUNTER
Refill passed per protocol.    Requested Prescriptions   Pending Prescriptions Disp Refills    TOPIRAMATE 25 MG Oral Tab [Pharmacy Med Name: TOPIRAMATE 25 MG TABLET] 180 tablet 3     Sig: TAKE 1 TABLET BY MOUTH TWICE A DAY       Neurology Medications Passed - 3/6/2024 11:40 PM        Passed - In person appointment or virtual visit in the past 6 mos or appointment in next 3 mos     Recent Outpatient Visits              3 months ago Asthma with COPD (chronic obstructive pulmonary disease)    [facility] Rosa Abbott MD    Office Visit    3 months ago Hair loss    [facility] Lilliam Corcoran PA-C    Office Visit    3 months ago [place] thinning    [facility] [place]    Office Visit    4 months ago Screening mammogram for breast cancer    [facility], [facility] [place] Florinda Brown MD    Office Visit    5 months ago Preoperative examination    [facility] [place]    Office Visit          Future Appointments         Provider Department Appt Notes    In 1 month Rosa Abbott MD [facility]     In 1 month KWASI, PROCEDURE [facility] Colonoscopy @ [facility]    In 4 months [address] [facility]                  ATORVASTATIN 40 MG Oral Tab [Pharmacy Med Name: ATORVASTATIN 40 MG TABLET] 90 tablet 3     Sig: TAKE 1 TABLET BY MOUTH EVERY DAY AT NIGHT       Cholesterol Medication Protocol Passed - 3/6/2024 11:40 PM        Passed - ALT < 80     Lab Results   Component Value Date    ALT 23 10/17/2023             Passed - ALT resulted within past year        Passed - Lipid panel within past 12 months     Lab Results   Component Value Date    CHOLEST 128 10/06/2023    TRIG 62  10/06/2023    HDL 72 (H) 10/06/2023    LDL 43 10/06/2023    VLDL 9 10/06/2023    NONHDLC 56 10/06/2023             Passed - In person appointment or virtual visit in the past 12 mos or appointment in next 3 mos     Recent Outpatient Visits              3 months ago Asthma with COPD (chronic obstructive pulmonary disease)    [facility], Rosa Phillips MD    Office Visit    3 months ago [facility] loss    [facility] [place]    Office Visit    3 months ago Hair thinning    [facility] [place]    Office Visit    4 months ago Screening mammogram for breast cancer    [facility], [facility], Florinda Roman MD    Office Visit    5 months ago Preoperative examination    [facility] [place]    Office Visit          Future Appointments         Provider Department Appt Notes    In 1 month [facility]     In 1 month KWASI, PROCEDURE [facility] [place] Colonoscopy @ [facility]    In 4 months [address]                     Future Appointments         Provider Department Appt Notes    In 1 month [facility]     In 1 month KWASI, PROCEDURE [facility] [place] Colonoscopy @ [facility]    In 4 months [address]           Recent Outpatient Visits              3 months ago Asthma with COPD (chronic obstructive pulmonary disease)    [facility], Rosa Phillips MD    Office Visit    3 months ago Hair loss    [facility] [place],  CLAYTON Vyas    Office Visit    3 months ago Hair thinning    [facility], [facility] [place] Love Mccartney APRN    Office Visit    4 months ago Screening mammogram for breast cancer    [facility], [facility], Florinda Roman MD    Office Visit    5 months ago Preoperative examination    [facility], [facility] [place] Love Mccartney APRN    Office Visit

## 2024-03-12 ENCOUNTER — TELEPHONE (OUTPATIENT)
Facility: CLINIC | Age: 74
End: 2024-03-12

## 2024-03-12 RX ORDER — TOPIRAMATE 25 MG/1
25 TABLET ORAL 2 TIMES DAILY
Qty: 180 TABLET | Refills: 3 | OUTPATIENT
Start: 2024-03-12

## 2024-03-18 NOTE — TELEPHONE ENCOUNTER
Patient wanted to know if  had any earlier appointments I informed her  is booked she understood and will keep her appointment  patient also wanted her instruction mailed to her home

## 2024-04-04 ENCOUNTER — PATIENT OUTREACH (OUTPATIENT)
Dept: CASE MANAGEMENT | Age: 74
End: 2024-04-04

## 2024-04-04 DIAGNOSIS — E66.01 SEVERE OBESITY (BMI 35.0-39.9) WITH COMORBIDITY (HCC): Primary | ICD-10-CM

## 2024-04-04 DIAGNOSIS — K21.9 GASTROESOPHAGEAL REFLUX DISEASE WITHOUT ESOPHAGITIS: ICD-10-CM

## 2024-04-04 NOTE — PROGRESS NOTES
Spoke to Bri for CCM.      Updates to patient care team/comments: None  Patient reported changes in medications: None    Med Adherence  Comment: Pt confirms she is taking medications as instructed by providers.     Health Maintenance:   Health Maintenance   Topic Date Due    MA Annual Health Assessment  01/01/2024 Scheduled    Zoster Vaccines (1 of 2) 08/14/2024 (Originally 6/24/2000)    COVID-19 Vaccine (3 - 2023-24 season) 12/12/2112 (Originally 9/1/2023)    Mammogram  07/19/2024    Colorectal Cancer Screening  11/26/2024    Influenza Vaccine  Completed    DEXA Scan  Completed    Annual Depression Screening  Completed    Fall Risk Screening (Annual)  Completed    Pneumococcal Vaccine: 65+ Years  Completed     Patient updates/concerns:     The patient reports that overall. She's doing okay. She continues to work on her weight loss journey, making efforts to eat healthier and avoid processed foods, though admits to occasionally struggle. She mentions that her daughter often bring junk foods into the home, which makes it difficult for her at times. Additionally, she finds the lack of stability from her adult daughter to be stressful.    She also expressed concerns about her upcoming colonoscopy, particularly worried about its late scheduling and the challenge of fasting for an extended period.     Goals/Action Plan:    Active goal from previous outreach:   What: Exercise            - Where/When/How: 2-3 days a week for at least 45 minutes- 1 hours, by walking on a treadmill, using stationary bike at her friends home. This is short term until they sign up for a health club  Patient reported progress towards goals               - What: She continues to work towards maintaining an exercise routine.            - Where/When/How: She exercises consistently, going to the local [employer] twice a week for at least one hour each session.  Patient Reported Barriers and Concerns: She's uncertain if she's lost any weight and feels  she isn't exercising frequently enough.                     - Plan for overcoming barriers: She will make an effort to increase her exercise routine to three sessions a week this month.    Care Managers Interventions:     I discussed with her the importance of following a low carb- diet, staying hydrated, and maintaining her exercise routine. I commended her efforts in increasing her activity levels and stressed the importance of stretching before exercise.     Additionally I advised her her gastroenterology will contact her 1-2 days prior to her scheduled colonoscopy. I recommended she contact gastroenterology for further guidance or questions.    Care every where updated    Provided support. Encouraged patient to call as needed.    Reviewed Future Appointments:   Future Appointments   Date Time Provider Department Center   4/15/2024  2:00 PM Rosa Abbott MD [facility]   4/18/2024  1:45 PM [facility], PROCEDURE [facility] None   7/5/2024 12:00 PM [facility] RM1 [facility] EM [facility]     Next Care Manager Follow Up Date: One month. Encouraged patient to call as needed.    Reason For Follow Up: review progress and or barriers towards patient's goals.     Time Spent This Encounter Total: 28 min medical record review, telephone communication, care plan updates where needed, education, goals, and action plan recreation/update. Provided acknowledgment and validation to patient's concerns.   Monthly Minute Total including today: 28 min  Physical assessment, complete health history, and need for CCM established by Rosa Abbott MD.

## 2024-04-09 RX ORDER — ATORVASTATIN CALCIUM 40 MG/1
40 TABLET, FILM COATED ORAL NIGHTLY
Qty: 60 TABLET | Refills: 0 | OUTPATIENT
Start: 2024-04-09

## 2024-04-15 ENCOUNTER — OFFICE VISIT (OUTPATIENT)
Dept: INTERNAL MEDICINE CLINIC | Facility: CLINIC | Age: 74
End: 2024-04-15

## 2024-04-15 VITALS
HEART RATE: 63 BPM | WEIGHT: 191 LBS | SYSTOLIC BLOOD PRESSURE: 116 MMHG | HEIGHT: 66 IN | BODY MASS INDEX: 30.7 KG/M2 | DIASTOLIC BLOOD PRESSURE: 66 MMHG

## 2024-04-15 DIAGNOSIS — Z00.00 MEDICARE ANNUAL WELLNESS VISIT, SUBSEQUENT: Primary | ICD-10-CM

## 2024-04-15 DIAGNOSIS — E78.5 HYPERLIPIDEMIA, UNSPECIFIED HYPERLIPIDEMIA TYPE: ICD-10-CM

## 2024-04-15 DIAGNOSIS — Z78.0 POSTMENOPAUSAL: ICD-10-CM

## 2024-04-15 DIAGNOSIS — E66.09 CLASS 1 OBESITY DUE TO EXCESS CALORIES WITHOUT SERIOUS COMORBIDITY WITH BODY MASS INDEX (BMI) OF 30.0 TO 30.9 IN ADULT: ICD-10-CM

## 2024-04-15 PROBLEM — L97.812 VENOUS STASIS ULCER OF OTHER PART OF RIGHT LOWER LEG WITH FAT LAYER EXPOSED WITH VARICOSE VEINS (HCC): Status: RESOLVED | Noted: 2023-10-06 | Resolved: 2024-04-15

## 2024-04-15 PROBLEM — D24.1 INTRADUCTAL PAPILLOMA OF RIGHT BREAST: Status: RESOLVED | Noted: 2023-09-22 | Resolved: 2024-04-15

## 2024-04-15 PROBLEM — I83.018 VENOUS STASIS ULCER OF OTHER PART OF RIGHT LOWER LEG WITH FAT LAYER EXPOSED WITH VARICOSE VEINS (HCC): Status: RESOLVED | Noted: 2023-10-06 | Resolved: 2024-04-15

## 2024-04-15 PROBLEM — E66.811 CLASS 1 OBESITY DUE TO EXCESS CALORIES WITHOUT SERIOUS COMORBIDITY WITH BODY MASS INDEX (BMI) OF 30.0 TO 30.9 IN ADULT: Status: ACTIVE | Noted: 2023-12-11

## 2024-04-15 RX ORDER — ALBUTEROL SULFATE 90 UG/1
2 AEROSOL, METERED RESPIRATORY (INHALATION) EVERY 4 HOURS PRN
COMMUNITY
Start: 2024-04-15

## 2024-04-15 RX ORDER — ATORVASTATIN CALCIUM 40 MG/1
40 TABLET, FILM COATED ORAL NIGHTLY
COMMUNITY
Start: 2024-04-15

## 2024-04-15 NOTE — PROGRESS NOTES
Subjective:   Bri Padilla is a 73 year old female who presents for a Medicare Subsequent Annual Wellness visit (Pt already had Initial Annual Wellness) and scheduled follow up of multiple significant but stable problems.   Chronic lower leg edema  venous stasis not open wound    Varicosities present  and discoloration  Bilateral knee pain  Losing weight  continue to follow up with orthopedic    History/Other:   Fall Risk Assessment:   She has been screened for Falls and is High Risk. Fall Prevention information provided to patient in After Visit Summary.    Do you feel unsteady when standing or walking?: Yes  Do you worry about falling?: Yes  Have you fallen in the past year?: No     Cognitive Assessment:   Abnormal  What day of the week is this?: Correct  What month is it?: Correct  What year is it?: Correct  Recall \"Ball\": Correct  Recall \"Flag\": Incorrect  Recall \"Tree\": Correct      Functional Ability/Status:   Bri Padilla has some abnormal functions as listed below:  She has Walking problems based on screening of functional status.       Depression Screening (PHQ-2/PHQ-9): PHQ-2 SCORE: 0  , done 4/15/2024            Advanced Directives:   She does NOT have a Living Will. [Do you have a living will?: No]  She does NOT have a Power of  for Health Care. [Do you have a healthcare power of ?: No]  Discussed Advance Care Planning with patient (and family/surrogate if present). Standard forms made available to patient in After Visit Summary.      Patient Active Problem List   Diagnosis    GERD (gastroesophageal reflux disease)    Class 1 obesity due to excess calories without serious comorbidity with body mass index (BMI) of 30.0 to 30.9 in adult     Allergies:  She has No Known Allergies.    Current Medications:  Outpatient Medications Marked as Taking for the 4/15/24 encounter (Office Visit) with Rosa Abbott MD   Medication Sig    atorvastatin 40 MG Oral Tab Take 1 tablet (40 mg  total) by mouth nightly. **Please address further refills at appointment.    albuterol (VENTOLIN HFA) 108 (90 Base) MCG/ACT Inhalation Aero Soln Inhale 2 puffs into the lungs every 4 (four) hours as needed. For wheezing    Calcium Carb-Cholecalciferol (CALCIUM-VITAMIN D3) 250-3.125 MG-MCG Oral Tab Take 1 tablet by mouth daily.    omeprazole 20 MG Oral Capsule Delayed Release Take 1 capsule (20 mg total) by mouth every morning before breakfast.    Magnesium 200 MG Oral Tab Take by mouth.    Biotin 5000 MCG Oral Tab Take by mouth.    clobetasol 0.05 % External Solution Apply 1 mL topically daily as needed.    Nystatin 920731 UNIT/GM External Powder Apply 1 Application topically 2 (two) times daily.    Acetaminophen 500 MG Oral Cap     Multiple Vitamins-Minerals (WOMENS MULTIVITAMIN PLUS OR)        Medical History:  She  has a past medical history of Age-related nuclear cataract of both eyes (03/28/2017), Anemia, Asthma ([facility]), Bilateral amblyopia (10/10/2019), Closed fracture of left hip (HCC) (06/19/2022), Colon polyp, Degeneration of lumbar or lumbosacral intervertebral disc (06/20/2014), DVT of lower extremity (deep venous thrombosis) ([facility]), Esophageal reflux, Fracture (12/2016), High cholesterol, Hyperlipidemia, Hyperopia of both eyes with astigmatism and presbyopia (10/10/2019), Lipid screening (06/09/2014), OA (osteoarthritis) (05/24/2017), OA (osteoarthritis) (05/24/2017), Obesity (BMI 30-39.9), Osteoporosis screening (06/20/2014), Overweight (BMI 25.0-29.9) (02/25/2019), 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  Surgical History:  She  has a past surgical history that includes colonoscopy (2014); colonoscopy (N/A, 11/26/2019); Cataract extraction w/  intraocular lens implant (Right,  2020); Cataract extraction w/  intraocular lens implant (Left, 2020); hip replacement surgery (Left, 2022); and needle biopsy right (2023).   Family History:  Her family history includes Cancer in her mother; Diabetes in her father; Diabetes (age of onset: 62) in her mother; Other in her mother; Stroke (age of onset: 70) in her father.  Social History:  She  reports that she quit smoking about 20 years ago. Her smoking use included cigarettes. She started smoking about 50 years ago. She has a 9 pack-year smoking history. She has been exposed to tobacco smoke. She has never used smokeless tobacco. She reports current alcohol use. She reports that she does not use drugs.    Tobacco:  She smoked tobacco in the past but quit greater than 12 months ago.  Social History     Tobacco Use   Smoking Status Former    Current packs/day: 0.00    Average packs/day: 0.3 packs/day for 30.0 years (9.0 ttl pk-yrs)    Types: Cigarettes    Start date: 1974    Quit date: 2004    Years since quittin.1    Passive exposure: Past   Smokeless Tobacco Never        CAGE Alcohol Screen:   CAGE screening score of 0 on 4/15/2024, showing low risk of alcohol abuse.      Patient Care Team:  Rosa Abbott MD as PCP - General (Internal Medicine)  Ranjit Roman MD as Consulting Physician (Internal Medicine)  Massiel Canales MD (UROLOGY)  Lakeshia Baker MD (DERMATOLOGY)  Anmol Calhoun MD (SURGERY, ORTHOPEDIC)  Myra Sales CMA as [facility] Care Manager (Care Management)  Jaden Marquis MD (GASTROENTEROLOGY)  Danie Rodrigez MD (OPHTHALMOLOGY)  Florinda Borwn MD (Surgical Oncology)    Review of Systems   Constitutional:  Negative for activity change, chills, fatigue and fever.   HENT:  Negative for ear discharge, nosebleeds, postnasal drip, rhinorrhea, sinus pressure and sore throat.    Eyes:  Negative for pain, discharge and redness.   Respiratory:  Negative for cough, chest tightness,  shortness of breath and wheezing.    Cardiovascular:  Positive for leg swelling. Negative for chest pain and palpitations.   Gastrointestinal:  Negative for abdominal pain, blood in stool, constipation, diarrhea, nausea and vomiting.   Genitourinary:  Negative for difficulty urinating, dysuria, frequency, hematuria and urgency.   Musculoskeletal:  Positive for arthralgias and gait problem. Negative for back pain and joint swelling.   Skin:  Negative for rash.   Neurological:  Negative for syncope, weakness, light-headedness and headaches.   Psychiatric/Behavioral:  Negative for dysphoric mood. The patient is not nervous/anxious.          Objective:   Physical Exam  Constitutional:       Appearance: She is well-developed. She is not ill-appearing.   HENT:      Right Ear: Ear canal normal.      Left Ear: Ear canal normal.      Mouth/Throat:      Pharynx: Oropharynx is clear.   Eyes:      Extraocular Movements: Extraocular movements intact.      Conjunctiva/sclera: Conjunctivae normal.      Pupils: Pupils are equal, round, and reactive to light.   Cardiovascular:      Rate and Rhythm: Normal rate and regular rhythm.      Heart sounds: Normal heart sounds.   Pulmonary:      Effort: Pulmonary effort is normal.      Breath sounds: Normal breath sounds.   Abdominal:      General: Bowel sounds are normal.      Palpations: Abdomen is soft.   Musculoskeletal:      Right lower leg: Edema present.      Left lower leg: Edema present.   Skin:     General: Skin is warm and dry.      Comments: Bilateral lower extremities  discoloration stasis dermatititis with edema   Neurological:      Mental Status: She is alert.      Gait: Gait abnormal.   Psychiatric:         Mood and Affect: Mood normal.           /66 (BP Location: Right arm, Patient Position: Sitting, Cuff Size: adult)   Pulse 63   Ht 5' 6\" (1.676 m)   Wt 191 lb (86.6 kg)   BMI 30.83 kg/m²  Estimated body mass index is 30.83 kg/m² as calculated from the following:     Height as of this encounter: 5' 6\" (1.676 m).    Weight as of this encounter: 191 lb (86.6 kg).    Medicare Hearing Assessment:   Hearing Screening    Entry User: Tiffanie Zayas CMA  Screening Method: Questionnaire  I have a problem hearing over the telephone: No I have trouble following the conversations when two or more people are talking at the same time: No   I have trouble understanding things on the TV: No I have to strain to understand conversations: No   I have to worry about missing the telephone ring or doorbell: No I have trouble hearing conversations in a noisy background such as a crowded room or restaurant: No   I get confused about where sounds come from: No I misunderstand some words in a sentence and need to ask people to repeat themselves: No   I especially have trouble understanding the speech of women and children: No I have trouble understanding the speaker in a large room such as at a meeting or place of Protestant: No   Many people I talk to seem to mumble (or don't speak clearly): No People get annoyed because I misunderstand what they say: No   I misunderstand what others are saying and make inappropriate responses: No I avoid social activities because I cannot hear well and fear I will reply improperly: No   Family members and friends have told me they think I may have hearing loss: No                 Assessment & Plan:   Bri Padilla is a 73 year old female who presents for a Medicare Assessment.   (Z00.00) Medicare annual wellness visit, subsequent  (primary encounter diagnosis)  Plan: Urinalysis, Routine       Patient is independent with ADL.s    Health screening   Colonoscopy, mammography, dexa scan  And routine eye exam advised.    (E66.09,  Z68.30) Class 1 obesity due to excess calories without serious comorbidity with body mass index (BMI) of 30.0 to 30.9 in adult  Plan: losing wt      Wt Readings from Last 6 Encounters:   04/15/24 191 lb (86.6 kg)   04/08/24 185 lb (83.9 kg)    12/04/23 185 lb (83.9 kg)   11/27/23 185 lb (83.9 kg)   10/18/23 178 lb 12.8 oz (81.1 kg)   10/12/23 180 lb (81.6 kg)     Weight loss advised   limit overall caloric intake  Low diet  limit carbohydrate intake   increase activity  as tolerated  exercise       (E78.5) Hyperlipidemia, unspecified hyperlipidemia type  Plan: CBC With Differential With Platelet, Comp         Metabolic Panel (14), Lipid Panel, TSH and Free        T4        Low cholesterol diet advised  Avoid saturated and trans fats       (Z78.0) Postmenopausal  Plan: XR DEXA BONE DENSITOMETRY (CPT=77080)        Asymptomatic  monitor    Leg edema statis dermatitis  varicose veins   Wear support stocking  monitor  Leg edema  low salt diet    Medications and most recent test results reviewed  Refill medicaitons  as needed  Potential side effect discussed  Modification of risk for CAD advised    Dietary an lifestyle change  Pt voiced understanding and agrees with plan  Pt given time to ask questions  After Visit Summary handout    Discussed  And given to patient.          The patient indicates understanding of these issues and agrees to the plan.  Reinforced healthy diet, lifestyle, and exercise.      No follow-ups on file.     Rosa Abbott MD, 4/15/2024     Supplementary Documentation:   General Health:  In the past six months, have you lost more than 10 pounds without trying?: 2 - No  Has your appetite been poor?: No  Type of Diet: Low Salt  How does the patient maintain a good energy level?: Daily Walks;Stretching  How would you describe your daily physical activity?: Moderate  How would you describe your current health state?: Fair  How do you maintain positive mental well-being?: Puzzles;Visiting Family;Visiting Friends  On a scale of 0 to 10, with 0 being no pain and 10 being severe pain, what is your pain level?: 4 - (Moderate)  In the past six months, have you experienced urine leakage?: 0-No  At any time do you feel concerned for the  safety/well-being of yourself and/or your children, in your home or elsewhere?: No  Have you had any immunizations at another office such as Influenza, Hepatitis B, Tetanus, or Pneumococcal?: No       Bri Padilla's SCREENING SCHEDULE   Tests on this list are recommended by your physician but may not be covered, or covered at this frequency, by your insurer.   Please check with your insurance carrier before scheduling to verify coverage.   PREVENTATIVE SERVICES FREQUENCY &  COVERAGE DETAILS LAST COMPLETION DATE   Diabetes Screening    Fasting Blood Sugar /  Glucose    One screening every 12 months if never tested or if previously tested but not diagnosed with pre-diabetes   One screening every 6 months if diagnosed with pre-diabetes Lab Results   Component Value Date    GLU 90 10/17/2023        Cardiovascular Disease Screening    Lipid Panel  Cholesterol  Lipoprotein (HDL)  Triglycerides Covered every 5 years for all Medicare beneficiaries without apparent signs or symptoms of cardiovascular disease Lab Results   Component Value Date    CHOLEST 128 10/06/2023    HDL 72 (H) 10/06/2023    LDL 43 10/06/2023    TRIG 62 10/06/2023         Electrocardiogram (EKG)   Covered if needed at Welcome to Medicare, and non-screening if indicated for medical reasons 10/08/2023      Ultrasound Screening for Abdominal Aortic Aneurysm (AAA) Covered once in a lifetime for one of the following risk factors    Men who are 65-75 years old and have ever smoked    Anyone with a family history -     Colorectal Cancer Screening  Covered for ages 50-85; only need ONE of the following:    Colonoscopy   Covered every 10 years    Covered every 2 years if patient is at high risk or previous colonoscopy was abnormal 11/26/2019    Health Maintenance   Topic Date Due    Colorectal Cancer Screening  11/26/2024       Flexible Sigmoidoscopy   Covered every 4 years -    Fecal Occult Blood Test Covered annually -   Bone Density Screening    Bone  density screening    Covered every 2 years after age 65 if diagnosed with risk of osteoporosis or estrogen deficiency.    Covered yearly for long-term glucocorticoid medication use (Steroids) Last Dexa Scan:    XR DEXA BONE DENSITOMETRY (CPT=77080) 07/07/2021      No recommendations at this time   Pap and Pelvic    Pap   Covered every 2 years for women at normal risk; Annually if at high risk -  No recommendations at this time    Chlamydia Annually if high risk -  No recommendations at this time   Screening Mammogram    Mammogram     Recommend annually for all female patients aged 40 and older    One baseline mammogram covered for patients aged 35-39 08/01/2023    Health Maintenance   Topic Date Due    Mammogram  07/19/2024       Immunizations    Influenza Covered once per flu season  Please get every year 12/21/2023  No recommendations at this time    Pneumococcal Each vaccine (Pnvjaop50 & Wfcufolhv84) covered once after 65 Prevnar 13: 12/19/2016    Acyohwqad95: 08/11/2017     No recommendations at this time    Hepatitis B One screening covered for patients with certain risk factors   -  No recommendations at this time    Tetanus Toxoid Not covered by Medicare Part B unless medically necessary (cut with metal); may be covered with your pharmacy prescription benefits -    Tetanus, Diptheria and Pertusis TD and TDaP Not covered by Medicare Part B -  No recommendations at this time    Zoster Not covered by Medicare Part B; may be covered with your pharmacy  prescription benefits -  No recommendations at this time

## 2024-05-03 RX ORDER — ATORVASTATIN CALCIUM 40 MG/1
40 TABLET, FILM COATED ORAL NIGHTLY
Qty: 90 TABLET | Refills: 3 | Status: SHIPPED | OUTPATIENT
Start: 2024-05-03

## 2024-05-03 NOTE — TELEPHONE ENCOUNTER
Refill passed per [facility] protocols.    Requested Prescriptions   Pending Prescriptions Disp Refills    ATORVASTATIN 40 MG Oral Tab [Pharmacy Med Name: ATORVASTATIN 40 MG TABLET] 60 tablet 0     Sig: Take 1 tablet (40 mg total) by mouth nightly. **Please address further refills at appointment.       Cholesterol Medication Protocol Passed - 5/3/2024 12:07 AM        Passed - ALT < 80     Lab Results   Component Value Date    ALT 23 10/17/2023             Passed - ALT resulted within past year        Passed - Lipid panel within past 12 months     Lab Results   Component Value Date    CHOLEST 128 10/06/2023    TRIG 62 10/06/2023    HDL 72 (H) 10/06/2023    LDL 43 10/06/2023    VLDL 9 10/06/2023    NONHDLC 56 10/06/2023             Passed - In person appointment or virtual visit in the past 12 mos or appointment in next 3 mos     Recent Outpatient Visits              2 weeks ago Medicare annual wellness visit, subsequent    [facility], Rosa Phillips MD    Office Visit    5 months ago Asthma with COPD (chronic obstructive pulmonary disease)    [facility], Rosa Phillips MD    Office Visit    5 months ago Hair loss    [facility] Lilliam Corcoran PA-C    Office Visit    5 months ago Hair thinning    [facility] Love Mccartney APRN    Office Visit    6 months ago Screening mammogram for breast cancer    [facility] Florinda Brown MD    Office Visit          Future Appointments         Provider Department Appt Notes    In 2 months [facility] Mammography  Center for [facility]

## 2024-05-10 ENCOUNTER — PATIENT OUTREACH (OUTPATIENT)
Dept: CASE MANAGEMENT | Age: 74
End: 2024-05-10

## 2024-05-10 NOTE — PROGRESS NOTES
Reviewed pt's chart including recent visits.  Attempted to contact pt for monthly outreach no answer left detailed message for pt to call back.     Reconciled chart using care everywhere.     Pt is due for:     Health Maintenance   Topic Date Due    Mammogram  07/19/2024    Zoster Vaccines (1 of 2) 08/14/2024 (Originally 6/24/2000)    COVID-19 Vaccine (3 - 2023-24 season) 12/12/2112 (Originally 9/1/2023)    Colorectal Cancer Screening  04/18/2029    Influenza Vaccine  Completed    DEXA Scan  Completed    MA Annual Health Assessment  Completed    Annual Depression Screening  Completed    Fall Risk Screening (Annual)  Completed    Pneumococcal Vaccine: 65+ Years  Completed       Future Appointments   Date Time Provider Department Center   7/5/2024 12:00 PM [facility] RM1 [facility] EM [facility]     Total time - 3 min  Total Monthly time- 3 min

## 2024-06-04 ENCOUNTER — PATIENT OUTREACH (OUTPATIENT)
Dept: CASE MANAGEMENT | Age: 74
End: 2024-06-04
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
## 2024-06-04 NOTE — PROGRESS NOTES
Attempted to reach the patient to complete a monthly CCM outreach. The patient voicemail is not set up. I was unable to leave a message.I attempted to call two times 3 minutes a part.       Total time -6 min  Total Monthly time- 6 min

## 2024-07-05 ENCOUNTER — HOSPITAL ENCOUNTER (OUTPATIENT)
Dept: MAMMOGRAPHY | Facility: HOSPITAL | Age: 74
Discharge: HOME | End: 2024-07-05
Attending: SURGERY
Payer: MEDICARE

## 2024-07-05 DIAGNOSIS — Z12.31 SCREENING MAMMOGRAM FOR BREAST CANCER: ICD-10-CM

## 2024-07-05 PROCEDURE — 77067 SCR MAMMO BI INCL CAD: CPT | Performed by: SURGERY

## 2024-07-05 PROCEDURE — 77063 BREAST TOMOSYNTHESIS BI: CPT | Performed by: SURGERY

## 2024-07-09 ENCOUNTER — TELEPHONE (OUTPATIENT)
Dept: SURGERY | Facility: CLINIC | Age: 74
End: 2024-07-09
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
## 2024-07-11 ENCOUNTER — PATIENT OUTREACH (OUTPATIENT)
Dept: CASE MANAGEMENT | Age: 74
End: 2024-07-11

## 2024-07-11 DIAGNOSIS — E66.09 CLASS 1 OBESITY DUE TO EXCESS CALORIES WITHOUT SERIOUS COMORBIDITY WITH BODY MASS INDEX (BMI) OF 30.0 TO 30.9 IN ADULT: Primary | ICD-10-CM

## 2024-07-11 DIAGNOSIS — K21.9 GASTROESOPHAGEAL REFLUX DISEASE WITHOUT ESOPHAGITIS: ICD-10-CM

## 2024-07-11 NOTE — PROGRESS NOTES
Spoke to Bri for CCM.      Updates to patient care team/comments: None   Patient reported changes in medications:None     Med Adherence  Comment: Pt confirms she is taking medications as instructed by providers.     Health Maintenance:   Health Maintenance   Topic Date Due    Zoster Vaccines (1 of 2) 08/14/2024 (Originally 6/24/2000)    COVID-19 Vaccine (3 - 2023-24 season) 12/12/2112 (Originally 9/1/2023)    Influenza Vaccine (1) 10/01/2024    Mammogram  07/05/2025    Colorectal Cancer Screening  04/18/2029    DEXA Scan  Completed    MA Annual Health Assessment  Completed    Annual Depression Screening  Completed    Fall Risk Screening (Annual)  Completed    Pneumococcal Vaccine: 65+ Years  Completed     Patient updates/concerns:     She states that her knees are her biggest issue right now. She continues to ambulate with a walker and a cane, stating that she has a hard time walking up and down the stairs and sometimes experiences sharp pains. In the past,  Dr. Abbott and an orthopedic doctor advised her she needs to undergo bilateral knee surgery. However, the patient is very hesitant about surgery due to her friends past negative experience with knee surgery.     Goals/Action Plan:    Active goal from previous outreach:   What: Exercise            - Where/When/How: 2-3 days a week for at least 45 minutes- 1 hours, by walking on a treadmill, using stationary bike at her friends home. This is short term until they sign up for a health club  Patient reported progress towards goals:                - What: She continues to work towards meeting her goal.           - Where/When/How: She is doing PT exercises on her own 3 times a day for 1-15 minutes. Occasionally will go on walks as tolerated by her knee pain.  Patient Reported Barriers and Concerns: Increased knee pain.                   - Plan for overcoming barriers: She will consider following up with ortho as recommended by [facility]    Care Managers Interventions:      Encouraged three balanced meals along with 20-30 minutes of daily exercise and stretching as tolerated. We discussed pros and cons of waiting too long to proceed with recommended knee surgery.     Additionally, we spoke at length about follow a low carb, low carlorie diet aiming for healthy clean eating. Mostly focusing on lean protein, fruits and vegetables and avoiding processed foods.     Care every where updated    Immunization query completed. No updates available at this time.     Social determinants of health updated.    Provided support. Encouraged patient to call as needed.    Reviewed Future Appointments:   Future Appointments   Date Time Provider Department Center   7/19/2024  1:40 PM [facility] DEXA RM1 [facility] D[facility] EM [place]   8/12/2024  3:20 PM Rosa Abbott MD [facility] Care Manager Follow Up Date: One month. Encouraged patient to call as needed.    Reason For Follow Up: review progress and or barriers towards patient's goals.     Time Spent This Encounter Total: 29 min medical record review, telephone communication, care plan updates where needed, education, goals, and action plan recreation/update. Provided acknowledgment and validation to patient's concerns.   Monthly Minute Total including today: 29 min  Physical assessment, complete health history, and need for CCM established by Rosa Abbott MD.

## 2024-07-15 RX ORDER — OMEPRAZOLE 20 MG/1
20 CAPSULE, DELAYED RELEASE ORAL
Qty: 90 CAPSULE | Refills: 0 | Status: SHIPPED | OUTPATIENT
Start: 2024-07-15

## 2024-07-15 NOTE — TELEPHONE ENCOUNTER
Please Review. Protocol Failed; No Protocol   Recent Visits  Date Type Provider Dept   04/15/24 Office Visit Rosa Abbott MD [facility]   12/04/23 Office Visit Rosa Abbott MD [facility]   11/27/23 Office Visit Love Mccartney APRN [facility]   10/06/23 Office Visit Love Mccartney APRN [facility]   04/12/23 Office Visit Rosa Abbott MD [facility]   Showing recent visits within past 540 days with a meds authorizing provider and meeting all other requirements  Future Appointments  Date Type Provider Dept   08/12/24 Appointment Rosa Abbott MD [facility]   Showing future appointments within next 150 days with a meds authorizing provider and meeting all other requirements    Medication is patient external reported             Requested Prescriptions   Pending Prescriptions Disp Refills    OMEPRAZOLE 20 MG Oral Capsule Delayed Release [Pharmacy Med Name: OMEPRAZOLE DR 20 MG CAPSULE] 90 capsule 3     Sig: TAKE 1 CAPSULE BY MOUTH EVERY DAY IN THE MORNING BEFORE BREAKFAST       Gastrointestional Medication Protocol Passed - 7/11/2024 12:12 AM        Passed - In person appointment or virtual visit in the past 12 mos or appointment in next 3 mos     Recent Outpatient Visits              3 months ago Medicare annual wellness visit, subsequent    [facility], [facility], Rosa Phillips MD    Office Visit    7 months ago Asthma with COPD (chronic obstructive pulmonary disease)    [facility], [facility], Rosa Phillips MD    Office Visit    7 months ago Hair loss    [facility], [place] Lilliam Corcoran PA-C    Office Visit    7 months ago Hair thinning    [facility] Love Mccartney APRN    Office Visit    9 months ago Screening mammogram for breast cancer    [facility], [facility]  Florinda Brown MD    Office Visit          Future Appointments         Provider Department Appt Notes    In 4 days [facility]1; [facility] [place]     In 4 weeks Rosa Abbott MD [facility]                           Future Appointments         Provider Department Appt Notes    In 4 days [facility] MAR[facility] RM1; [facility] [place]     In 4 weeks Rosa Abbott MD [facility]          Recent Outpatient Visits              3 months ago Medicare annual wellness visit, subsequent    [facility] [place] [facility] [place] Rosa Abbott MD    Office Visit    7 months ago Asthma with COPD (chronic obstructive pulmonary disease)    [facility] Rosa Phillips MD    Office Visit    7 months ago Hair loss    [facility], [facility], [place] Lilliam Corcoran PA-C    Office Visit    7 months ago Hair thinning    [facility], [place] APRN    Office Visit    9 months ago Screening mammogram for breast cancer    [facility], [facility] Florinda Brown MD    Office Visit

## 2024-07-17 ENCOUNTER — PATIENT OUTREACH (OUTPATIENT)
Dept: CASE MANAGEMENT | Age: 74
End: 2024-07-17

## 2024-07-17 NOTE — PROGRESS NOTES
The patient called wanting to confirm her appointment for her upcoming Dexa Scan. She mentioned receiving a confirmation text which was accidentally deleted.  I reviewed information below with patient.  Patient Instructions    Visit Type: EE XR DEXA BONE DENSITOMETRY  It is recommended that you wear a 2 piece outfit that does not contain any metal such as snaps and zippers, etc.  Attention women: Underwire bras will need to be removed prior to the scan.     If you have the report from a prior DEXA scan performed elsewhere, please bring the report with you to your appointment.     Panel:      Directions    Location Instruction: Your appointment will be at the [facility] located at [address] in [place], [place]. The phone number for this location is [phone].    You will be asked to fill out a history form prior to the exam.    Please bring your insurance card and photo ID. You will also need to bring your doctor's order unless your physician's office submitted the order electronically or faxed the order. Without the order, your test may be delayed or postponed.    It is important to bring a list of any medications you are taking, including vitamins and supplements. For a helpful medication card to carry to your medical visits, please visit [email or web address].    The statements below are provided to all patients receiving an exam in our Imaging Department so that you can be made aware of our procedures that are designed for your safety and the best possible imaging.     Children: Children under the age of 12 must have another adult caregiver with them.  Please do not bring your child/children without a caregiver.  Because of the highly sensitive equipment and privacy of all our patients, children will not be permitted in the exam rooms, unless otherwise noted and in accordance with departmental policy.     MyChart:  Having an electronic [facility] account will allow you to view your Medical  Records, information regarding appointments, as well as reports of your imaging and lab results.    Patient Responsibility Estimate:  We now have the ability to provide you with your Patient Responsibility Estimate for your services at the time of check in.  This new process will inform you if you have any remaining deductible or coinsurance balances on your policy.  Please be aware that this service is performed for most of our commonly performed services and that you may be asked for a partial payment during your registration check in at the time of service.  If you would like more information about your Patient Responsibility Estimate in advance of your appointment, you can speak to a  who can explain the Patient Responsibility Estimate to you.     Because of the nature of the Emergency Department, please be advised of the possibility your appointment may be delayed.    Masks are optional for all patients and visitors, unless otherwise indicated.     Reviewed Future Appointments:   Future Appointments   Date Time Provider Department Center   7/19/2024  1:40 PM LMB DEXA 1 [facility] EM [place]   8/12/2024  3:20 PM Rosa Abbott MD [facility]     Total time - 8 min  Total Monthly time- 38 min

## 2024-07-19 ENCOUNTER — HOSPITAL ENCOUNTER (OUTPATIENT)
Dept: BONE DENSITY | Age: 74
Discharge: HOME | End: 2024-07-19
Attending: INTERNAL MEDICINE
Payer: MEDICARE

## 2024-07-19 DIAGNOSIS — Z78.0 POSTMENOPAUSAL: ICD-10-CM

## 2024-07-19 PROCEDURE — 77080 DXA BONE DENSITY AXIAL: CPT | Performed by: INTERNAL MEDICINE

## 2024-07-25 ENCOUNTER — PATIENT OUTREACH (OUTPATIENT)
Dept: CASE MANAGEMENT | Age: 74
End: 2024-07-25

## 2024-07-25 DIAGNOSIS — K21.9 GASTROESOPHAGEAL REFLUX DISEASE WITHOUT ESOPHAGITIS: Primary | ICD-10-CM

## 2024-07-25 DIAGNOSIS — M81.0 OSTEOPOROSIS, UNSPECIFIED OSTEOPOROSIS TYPE, UNSPECIFIED PATHOLOGICAL FRACTURE PRESENCE: ICD-10-CM

## 2024-07-25 NOTE — PROGRESS NOTES
The patient called indicating a nurse called her and advised her to follow up in the office over a serious matter. She does not recall the name of the provider to call and schedule the appointment.     I reviewed the patient chart and provided her with the information below. The patient is requesting assistance coordinating an appointment. Reports she is very confused about the situation. I took the time to explain in detail and will assist in coordination the appointment.     Rosa Abbott MD  7/22/2024  8:51 PM CDT       Notify pt  DEXA scan is abnormal  Low bone mass with high risk for  FOLLOW UP WITH ENDOCRINOLOGY FOR EVALUATION AND TREATMENT  DR BRAMBILA  [phone]  DR Madan Tracy  [address] ([facility])         Total time -11  min  Total Monthly time-48  min

## 2024-07-25 NOTE — PROGRESS NOTES
CCM intervention: I reached out to Endo scheduling and assisted the patient in scheduling an appointment. I provided the patient with the information below. She verbalized understanding.     Future Appointments   Date Time Provider Department Center   8/12/2024  3:20 PM Rosa Abbott MD [facility]   9/16/2024  3:15 PM [facility]       Total time -6  min  Total Monthly time-54  min

## 2024-07-30 RX ORDER — OMEPRAZOLE 20 MG/1
20 CAPSULE, DELAYED RELEASE ORAL
Qty: 90 CAPSULE | Refills: 0 | OUTPATIENT
Start: 2024-07-30

## 2024-07-31 RX ORDER — TOPIRAMATE 25 MG/1
25 TABLET ORAL 2 TIMES DAILY
Qty: 180 TABLET | Refills: 3 | OUTPATIENT
Start: 2024-07-31

## 2024-08-06 ENCOUNTER — PATIENT OUTREACH (OUTPATIENT)
Dept: CASE MANAGEMENT | Age: 74
End: 2024-08-06

## 2024-08-06 DIAGNOSIS — E66.09 CLASS 1 OBESITY DUE TO EXCESS CALORIES WITHOUT SERIOUS COMORBIDITY WITH BODY MASS INDEX (BMI) OF 30.0 TO 30.9 IN ADULT: ICD-10-CM

## 2024-08-06 DIAGNOSIS — K21.9 GASTROESOPHAGEAL REFLUX DISEASE WITHOUT ESOPHAGITIS: Primary | ICD-10-CM

## 2024-08-07 NOTE — PROGRESS NOTES
Spoke to Bri for CCM.      Updates to patient care team/comments: None   Patient reported changes in medications: None     Med Adherence  Comment: Pt confirms she is taking medications as instructed by providers.     Health Maintenance:   Health Maintenance   Topic Date Due    Zoster Vaccines (1 of 2) 08/14/2024 (Originally 6/24/2000)    COVID-19 Vaccine (3 - 2023-24 season) 12/12/2112 (Originally 9/1/2023)    Influenza Vaccine (1) 10/01/2024    Mammogram  07/05/2025    Colorectal Cancer Screening  04/18/2029    DEXA Scan  Completed    MA Annual Health Assessment  Completed    Annual Depression Screening  Completed    Fall Risk Screening (Annual)  Completed    Pneumococcal Vaccine: 65+ Years  Completed     Patient updates/concerns:    The patient reports she continues to have pain in both of her knees, the back of her legs feels sore. In addition she experiences numbness in her hands. She tries to exercise by walking or using a stationary bike two times a week and is careful about her carb intake, however, exercise sometimes makes things worse.     Goals/Action Plan:    Active goal from previous outreach:   What: Exercise            - Where/When/How: 2-3 days a week for at least 45 minutes- 1 hours, by walking on a treadmill, using stationary bike at her friends home. This is short term until they sign up for a health club  Patient reported progress towards goals:                - What: She is meeting her goal.           - Where/When/How: She is exercising 2 times a week for 45 minutes by walking on a treadmill for 30 minutes and riding on a stationary bike or elliptical  for 15 minutes.   Patient Reported Barriers and Concerns: Bilateral knee pain                   - Plan for overcoming barriers: She will follow up in office with Dr. Abbott to discuss options.     Care Managers Interventions:   Encouraged three balanced meals along with 20-30 minutes of daily exercise and stretching. I suggested she follow up  with Dr. Roman to go over her weight loss goals.    Care every where updated    Immunization query completed. No updates available at this time.     Provided support. Encouraged patient to call as needed.    Reviewed Future Appointments:   Future Appointments   Date Time Provider Department Center   8/12/2024  3:20 PM Rosa Abbott MD [facility]   9/16/2024  3:15 PM [facility] Follow Up Date: One month. Encouraged patient to call as needed.    Reason For Follow Up: review progress and or barriers towards patient's goals.     Time Spent This Encounter Total:23 min medical record review, telephone communication, care plan updates where needed, education, goals, and action plan recreation/update. Provided acknowledgment and validation to patient's concerns.   Monthly Minute Total including today: 23 min  Physical assessment, complete health history, and need for CCM established by Rosa Abbott MD.

## 2024-08-10 ENCOUNTER — LAB ENCOUNTER (OUTPATIENT)
Dept: LAB | Age: 74
End: 2024-08-10
Attending: INTERNAL MEDICINE
Payer: MEDICARE

## 2024-08-10 DIAGNOSIS — Z00.00 MEDICARE ANNUAL WELLNESS VISIT, SUBSEQUENT: ICD-10-CM

## 2024-08-10 DIAGNOSIS — E78.5 HYPERLIPIDEMIA, UNSPECIFIED HYPERLIPIDEMIA TYPE: ICD-10-CM

## 2024-08-10 LAB
ALBUMIN SERPL-MCNC: 4.1 G/DL (ref 3.2–4.8)
ALBUMIN/GLOB SERPL: 1.5 {RATIO} (ref 1–2)
ALP LIVER SERPL-CCNC: 99 U/L
ALT SERPL-CCNC: 13 U/L
ANION GAP SERPL CALC-SCNC: 7 MMOL/L (ref 0–18)
AST SERPL-CCNC: 21 U/L (ref ?–34)
BASOPHILS # BLD AUTO: 0.03 X10(3) UL (ref 0–0.2)
BASOPHILS NFR BLD AUTO: 0.8 %
BILIRUB SERPL-MCNC: 0.9 MG/DL (ref 0.2–1.1)
BILIRUB UR QL: NEGATIVE
BUN BLD-MCNC: 14 MG/DL (ref 9–23)
BUN/CREAT SERPL: 16.7 (ref 10–20)
CALCIUM BLD-MCNC: 9.3 MG/DL (ref 8.7–10.4)
CHLORIDE SERPL-SCNC: 113 MMOL/L (ref 98–112)
CHOLEST SERPL-MCNC: 124 MG/DL (ref ?–200)
CO2 SERPL-SCNC: 25 MMOL/L (ref 21–32)
COLOR UR: YELLOW
CREAT BLD-MCNC: 0.84 MG/DL
DEPRECATED RDW RBC AUTO: 47.8 FL (ref 35.1–46.3)
EGFRCR SERPLBLD CKD-EPI 2021: 73 ML/MIN/1.73M2 (ref 60–?)
EOSINOPHIL # BLD AUTO: 0.11 X10(3) UL (ref 0–0.7)
EOSINOPHIL NFR BLD AUTO: 3 %
ERYTHROCYTE [DISTWIDTH] IN BLOOD BY AUTOMATED COUNT: 14 % (ref 11–15)
FASTING PATIENT LIPID ANSWER: YES
FASTING STATUS PATIENT QL REPORTED: YES
GLOBULIN PLAS-MCNC: 2.7 G/DL (ref 2–3.5)
GLUCOSE BLD-MCNC: 82 MG/DL (ref 70–99)
GLUCOSE UR-MCNC: NORMAL MG/DL
HCT VFR BLD AUTO: 36.4 %
HDLC SERPL-MCNC: 62 MG/DL (ref 40–59)
HGB BLD-MCNC: 12.1 G/DL
HGB UR QL STRIP.AUTO: NEGATIVE
IMM GRANULOCYTES # BLD AUTO: 0.01 X10(3) UL (ref 0–1)
IMM GRANULOCYTES NFR BLD: 0.3 %
KETONES UR-MCNC: NEGATIVE MG/DL
LDLC SERPL CALC-MCNC: 50 MG/DL (ref ?–100)
LEUKOCYTE ESTERASE UR QL STRIP.AUTO: 500
LYMPHOCYTES # BLD AUTO: 1.11 X10(3) UL (ref 1–4)
LYMPHOCYTES NFR BLD AUTO: 30 %
MCH RBC QN AUTO: 31 PG (ref 26–34)
MCHC RBC AUTO-ENTMCNC: 33.2 G/DL (ref 31–37)
MCV RBC AUTO: 93.3 FL
MONOCYTES # BLD AUTO: 0.4 X10(3) UL (ref 0.1–1)
MONOCYTES NFR BLD AUTO: 10.8 %
NEUTROPHILS # BLD AUTO: 2.04 X10 (3) UL (ref 1.5–7.7)
NEUTROPHILS # BLD AUTO: 2.04 X10(3) UL (ref 1.5–7.7)
NEUTROPHILS NFR BLD AUTO: 55.1 %
NITRITE UR QL STRIP.AUTO: NEGATIVE
NONHDLC SERPL-MCNC: 62 MG/DL (ref ?–130)
OSMOLALITY SERPL CALC.SUM OF ELEC: 300 MOSM/KG (ref 275–295)
PH UR: 7 [PH] (ref 5–8)
PLATELET # BLD AUTO: 146 10(3)UL (ref 150–450)
POTASSIUM SERPL-SCNC: 3.8 MMOL/L (ref 3.5–5.1)
PROT SERPL-MCNC: 6.8 G/DL (ref 5.7–8.2)
PROT UR-MCNC: NEGATIVE MG/DL
RBC # BLD AUTO: 3.9 X10(6)UL
SODIUM SERPL-SCNC: 145 MMOL/L (ref 136–145)
SP GR UR STRIP: 1.02 (ref 1–1.03)
T4 FREE SERPL-MCNC: 0.9 NG/DL (ref 0.8–1.7)
TRIGL SERPL-MCNC: 56 MG/DL (ref 30–149)
TSI SER-ACNC: 3.32 MIU/ML (ref 0.55–4.78)
UROBILINOGEN UR STRIP-ACNC: NORMAL
VLDLC SERPL CALC-MCNC: 8 MG/DL (ref 0–30)
WBC # BLD AUTO: 3.7 X10(3) UL (ref 4–11)

## 2024-08-10 PROCEDURE — 36415 COLL VENOUS BLD VENIPUNCTURE: CPT

## 2024-08-10 PROCEDURE — 84439 ASSAY OF FREE THYROXINE: CPT

## 2024-08-10 PROCEDURE — 84443 ASSAY THYROID STIM HORMONE: CPT

## 2024-08-10 PROCEDURE — 81001 URINALYSIS AUTO W/SCOPE: CPT

## 2024-08-10 PROCEDURE — 80061 LIPID PANEL: CPT

## 2024-08-10 PROCEDURE — 80053 COMPREHEN METABOLIC PANEL: CPT

## 2024-08-10 PROCEDURE — 85025 COMPLETE CBC W/AUTO DIFF WBC: CPT

## 2024-08-12 ENCOUNTER — OFFICE VISIT (OUTPATIENT)
Dept: INTERNAL MEDICINE CLINIC | Facility: CLINIC | Age: 74
End: 2024-08-12

## 2024-08-12 VITALS
SYSTOLIC BLOOD PRESSURE: 120 MMHG | DIASTOLIC BLOOD PRESSURE: 67 MMHG | WEIGHT: 190 LBS | BODY MASS INDEX: 33.25 KG/M2 | HEIGHT: 63.5 IN | HEART RATE: 64 BPM

## 2024-08-12 DIAGNOSIS — R26.9 GAIT ABNORMALITY: ICD-10-CM

## 2024-08-12 DIAGNOSIS — D69.6 THROMBOCYTOPENIA, UNSPECIFIED (HCC): ICD-10-CM

## 2024-08-12 DIAGNOSIS — J44.89 ASTHMA WITH COPD (CHRONIC OBSTRUCTIVE PULMONARY DISEASE) (HCC): Primary | ICD-10-CM

## 2024-08-12 DIAGNOSIS — M17.0 PRIMARY OSTEOARTHRITIS OF BOTH KNEES: ICD-10-CM

## 2024-08-12 DIAGNOSIS — E78.5 HYPERLIPIDEMIA, UNSPECIFIED HYPERLIPIDEMIA TYPE: ICD-10-CM

## 2024-08-12 PROCEDURE — 99214 OFFICE O/P EST MOD 30 MIN: CPT | Performed by: INTERNAL MEDICINE

## 2024-08-13 NOTE — PROGRESS NOTES
HPI:    Patient ID: Bri Padilla is a 74 year old female.    HPI    Follow up   Pt with disabling osteoarthritis of both knees   gait abnormality  Hx of Asthma with COPD stable on current meds  Hx of varicose veins and venous stasis  no redness or tenderness stable  Hyperlipidemia  gained weight  /67 (BP Location: Right arm, Patient Position: Sitting, Cuff Size: adult)   Pulse 64   Ht 5' 3.5\" (1.613 m)   Wt 190 lb (86.2 kg)   BMI 33.13 kg/m²   Wt Readings from Last 6 Encounters:   08/12/24 190 lb (86.2 kg)   04/08/24 185 lb (83.9 kg)   04/15/24 191 lb (86.6 kg)   12/04/23 185 lb (83.9 kg)   11/27/23 185 lb (83.9 kg)   10/18/23 178 lb 12.8 oz (81.1 kg)     Body mass index is 33.13 kg/m².  HGBA1C:    Lab Results   Component Value Date    A1C 5.8 (H) 05/25/2022    A1C 5.5 09/16/2016    A1C 5.5 06/09/2014     05/25/2022         Review of Systems   Constitutional:  Negative for activity change, chills, fatigue and fever.   HENT:  Negative for ear discharge, nosebleeds, postnasal drip, rhinorrhea, sinus pressure and sore throat.    Eyes:  Negative for pain, discharge and redness.   Respiratory:  Negative for cough, chest tightness, shortness of breath and wheezing.    Cardiovascular:  Negative for chest pain, palpitations and leg swelling.   Gastrointestinal:  Negative for abdominal pain, blood in stool, constipation, diarrhea, nausea and vomiting.   Genitourinary:  Negative for difficulty urinating, dysuria, frequency, hematuria and urgency.   Musculoskeletal:  Positive for arthralgias and gait problem. Negative for joint swelling.   Skin:  Negative for rash.   Neurological:  Negative for syncope, weakness, light-headedness and headaches.   Psychiatric/Behavioral:  Negative for dysphoric mood. The patient is not nervous/anxious.          Current Outpatient Medications   Medication Sig Dispense Refill    omeprazole 20 MG Oral Capsule Delayed Release Take 1 capsule (20 mg total) by mouth before  breakfast. 90 capsule 0    atorvastatin 40 MG Oral Tab Take 1 tablet (40 mg total) by mouth nightly. 90 tablet 3    albuterol (VENTOLIN HFA) 108 (90 Base) MCG/ACT Inhalation Aero Soln Inhale 2 puffs into the lungs every 4 (four) hours as needed. For wheezing      Calcium Carb-Cholecalciferol (CALCIUM-VITAMIN D3) 250-3.125 MG-MCG Oral Tab Take 1 tablet by mouth daily. 90 tablet 3    Magnesium 200 MG Oral Tab Take by mouth.      Biotin 5000 MCG Oral Tab Take by mouth.      clobetasol 0.05 % External Solution Apply 1 mL topically daily as needed. 60 mL 3    Nystatin 528262 UNIT/GM External Powder Apply 1 Application topically 2 (two) times daily. 60 g 1    Acetaminophen 500 MG Oral Cap       Multiple Vitamins-Minerals (WOMENS MULTIVITAMIN PLUS OR)        Allergies:No Known Allergies    HISTORY:  Past Medical History:    Age-related nuclear cataract of both eyes    Anemia    FeSo4    Asthma (HCC)    Bilateral amblyopia    Closed fracture of left hip (HCC)    Colon polyp    Degeneration of lumbar or lumbosacral intervertebral disc    DVT of lower extremity (deep venous thrombosis) (HCC)    post delivery of last child 33 years ago    Esophageal reflux    Fracture    KNEE NO SURGERY    High cholesterol    Hyperlipidemia    Diet    Hyperopia of both eyes with astigmatism and presbyopia    Lipid screening    OA (osteoarthritis)    OA (osteoarthritis)    Obesity (BMI 30-39.9)    Osteoporosis screening    Overweight (BMI 25.0-29.9)    Pathological fracture of left hip due to age-related osteoporosis (HCC)    Postmenopausal status (age-related) (natural)    Shortness of breath    Stroke due to embolism of right cerebellar artery (HCC)    Thrombocytopenia (HCC)      Past Surgical History:   Procedure Laterality Date    Cataract extraction w/  intraocular lens implant Right 11/09/2020    Dr. Gutierrez @ [facility]    Cataract extraction w/  intraocular lens implant Left 11/23/2020    Dr. Gutierrez@ [facility]      Colonoscopy      Colonoscopy N/A 2019    Procedure: COLONOSCOPY;  Surgeon: Jaden Marquis MD;  Location: [facility] ENDOSCOPY    Colonoscopy N/A 2024    Procedure: COLONOSCOPY;  Surgeon: Jaden Marquis MD;  Location: [facility] MAIN OR    Hip replacement surgery Left 2022    Andre localization wire 1 site right (cpt=[id number])      Needle biopsy right  2023      Family History   Problem Relation Age of Onset    Diabetes Mother 62    Cancer Mother         unknown type    Other (Other) Mother     Stroke Father 70        CVA    Diabetes Father     Glaucoma Neg     Macular degeneration Neg     Breast Cancer Neg     Ovarian Cancer Neg       Social History:   Social History     Socioeconomic History    Marital status:    Tobacco Use    Smoking status: Former     Current packs/day: 0.00     Average packs/day: 0.3 packs/day for 30.0 years (9.0 ttl pk-yrs)     Types: Cigarettes     Start date: 1974     Quit date: 2004     Years since quittin.5     Passive exposure: Past    Smokeless tobacco: Never   Vaping Use    Vaping status: Never Used   Substance and Sexual Activity    Alcohol use: Yes     Alcohol/week: 0.0 standard drinks of alcohol     Comment: holidays    Drug use: No    Sexual activity: Never   Other Topics Concern    Caffeine Concern No    Pt has a pacemaker No    Pt has a defibrillator No    Breast feeding No    Reaction to local anesthetic No   Social History Narrative    Live with daughter    Work retired    -     -     No pets no    H/o birds > 10 yrs     Social Determinants of Health     Financial Resource Strain: Low Risk  (3/6/2023)    Financial Resource Strain     Difficulty of Paying Living Expenses: Not very hard     Med Affordability: No   Food Insecurity: No Food Insecurity (3/6/2023)    Food Insecurity     Food Insecurity: Never true   Transportation Needs: No Transportation Needs (3/6/2023)    Transportation Needs     Lack of  Transportation: No   Physical Activity: Insufficiently Active (3/6/2023)    Exercise Vital Sign     Days of Exercise per Week: 2 days     Minutes of Exercise per Session: 10 min   Stress: Stress Concern Present (7/11/2024)    Stress     Feeling of Stress : Yes   Social Connections: Socially Integrated (3/6/2023)    Social Connections     Frequency of Socialization with Friends and Family: 3   Recent Concern: Social Connections - Socially Isolated (3/6/2023)    Social Connections     Frequency of Socialization with Friends and Family: 0   Housing Stability: Low Risk  (3/6/2023)    Housing Stability     Housing Instability: No        PHYSICAL EXAM:    Physical Exam  Constitutional:       Appearance: She is well-developed. She is not ill-appearing.   HENT:      Right Ear: Ear canal normal.      Left Ear: Ear canal normal.      Mouth/Throat:      Pharynx: Oropharynx is clear.   Eyes:      Extraocular Movements: Extraocular movements intact.      Conjunctiva/sclera: Conjunctivae normal.      Pupils: Pupils are equal, round, and reactive to light.   Cardiovascular:      Rate and Rhythm: Normal rate and regular rhythm.      Heart sounds: Normal heart sounds.   Pulmonary:      Effort: Pulmonary effort is normal.      Breath sounds: Normal breath sounds.   Abdominal:      General: Bowel sounds are normal.      Palpations: Abdomen is soft.   Skin:     General: Skin is warm and dry.   Neurological:      Mental Status: She is alert.      Motor: Weakness present.      Gait: Gait abnormal.   Psychiatric:         Mood and Affect: Mood normal.              ASSESSMENT/PLAN:   (J44.89) Asthma with COPD (chronic obstructive pulmonary disease) ([facility])  (primary encounter diagnosis)  Plan:stable cpm    (D69.6) Thrombocytopenia, unspecified ([facility])  Plan: mild monitor    (E78.5) Hyperlipidemia, unspecified hyperlipidemia type  Plan: Low cholesterol diet advised  Avoid saturated and trans fats  controlled monitor  Body mass index is 33.13  kg/m².  Weight loss advised   limit overall caloric intake  Low diet  limit carbohydrate intake   increase activity  as tolerated  exercise      (R26.9) Gait abnormality  Plan: fall peculation     (M17.0) Primary osteoarthritis of both knees  Plan: follow up with ortho  consider knee replacements.    Patient voiced understanding  and agrees with plan  Medications and most recent test results reviewed  Refill medicaitons  as needed  Potential side effect discussed  Modification of risk for CAD advised    Dietary an lifestyle change  Pt voiced understanding and agrees with plan  Pt given time to ask questions  After Visit Summary handout    Discussed  And given to patient.         No orders of the defined types were placed in this encounter.      Meds This Visit:  Requested Prescriptions      No prescriptions requested or ordered in this encounter       Imaging & Referrals:  None        ID#[id number]

## 2024-08-26 RX ORDER — TOPIRAMATE 25 MG/1
25 TABLET, FILM COATED ORAL 2 TIMES DAILY
Qty: 180 TABLET | Refills: 3 | OUTPATIENT
Start: 2024-08-26

## 2024-08-26 RX ORDER — TOPIRAMATE 25 MG/1
25 TABLET, FILM COATED ORAL 2 TIMES DAILY
Qty: 60 TABLET | Refills: 0 | OUTPATIENT
Start: 2024-08-26

## 2024-08-26 NOTE — TELEPHONE ENCOUNTER
Dr. Abbott - at office visit 4/15/24 this medication was marked as discontinued, then marked as patient reported.     Please advise: is this valid order? Should patient keep taking?

## 2024-09-04 ENCOUNTER — TELEPHONE (OUTPATIENT)
Dept: INTERNAL MEDICINE CLINIC | Facility: CLINIC | Age: 74
End: 2024-09-04

## 2024-09-04 ENCOUNTER — PATIENT OUTREACH (OUTPATIENT)
Dept: CASE MANAGEMENT | Age: 74
End: 2024-09-04

## 2024-09-04 DIAGNOSIS — M25.561 CHRONIC PAIN OF BOTH KNEES: ICD-10-CM

## 2024-09-04 DIAGNOSIS — M25.562 CHRONIC PAIN OF BOTH KNEES: ICD-10-CM

## 2024-09-04 DIAGNOSIS — K21.9 GASTROESOPHAGEAL REFLUX DISEASE WITHOUT ESOPHAGITIS: Primary | ICD-10-CM

## 2024-09-04 DIAGNOSIS — G89.29 CHRONIC PAIN OF BOTH KNEES: ICD-10-CM

## 2024-09-04 DIAGNOSIS — E66.09 CLASS 1 OBESITY DUE TO EXCESS CALORIES WITHOUT SERIOUS COMORBIDITY WITH BODY MASS INDEX (BMI) OF 30.0 TO 30.9 IN ADULT: ICD-10-CM

## 2024-09-04 RX ORDER — TOPIRAMATE 25 MG/1
25 TABLET, FILM COATED ORAL 2 TIMES DAILY
Qty: 180 TABLET | Refills: 3 | OUTPATIENT
Start: 2024-09-04

## 2024-09-04 NOTE — PROGRESS NOTES
Spoke to Bri for CCM.      Updates to patient care team/comments: None   Patient reported changes in medications: None    Med Adherence  Comment: Pt confirmed she has been out of Topiramate for 2 weeks. She reports reaching out to her pharmacy and being  advised they are waiting on approval from [facility]      Health Maintenance:   Health Maintenance   Topic Date Due    Zoster Vaccines (1 of 2) Never done    COVID-19 Vaccine (3 - 2023-24 season) 12/12/2112 (Originally 9/1/2024)    Influenza Vaccine (1) 10/01/2024    Mammogram  07/05/2025    Colorectal Cancer Screening  04/18/2029    DEXA Scan  Completed    MA Annual Health Assessment  Completed    Annual Depression Screening  Completed    Fall Risk Screening (Annual)  Completed    Pneumococcal Vaccine: 65+ Years  Completed     Patient updates/concerns:     The patient mentioned being out of her Topiramate prescription. She reports contacting [facility] for a refill two weeks ago, however has not heard back. She is worried Dr. Abbott might not want to refill it.     Additionally, she continues to exercise bilateral knee pain. She is hoping to have her varicose veins taken care of prior to deciding if she will undergo knee replacement as recommended by ortho in the past.     Goals/Action Plan:    Active goal from previous outreach:   What: Exercise            - Where/When/How: 2-3 days a week for at least 45 minutes- 1 hours, by walking on a treadmill, using stationary bike at her friends home. This is short term until they sign up for a health club  Patient reported progress towards goals:                - What: She continues to work towards meeting her goal.           - Where/When/How: She is walking outdoors weather permitted and is being diligent about doing daily strengthening exercise videos she found on YouTube.   Patient Reported Barriers and Concerns: bilateral knee pain persists.                    - Plan for overcoming barriers: The patient states she will follow  up with the vein clinic as recommended by MMP and Ortho to inquire about the priority of her surgeries.     Care Managers Interventions:     I encouraged the patient to follow Dr. Lynn instructions and to schedule an appointment with the vein clinic and ortho to discuss the priority of her surgery and procedure. Additionally, I added a message to an active TE for her Topiramate refill and routed it to the refill pool.     Nutrition and exercise counseling: I encouraged the patient to consume three balanced meals daily and discussed the importance of incorporating 20-30 minutes of daily exercise, including gentle stretches. To improve overall health.    Chat review and documentation: I thoroughly reviewed the patient chart and updated Care Everywhere.     Immunization Status: I conducted an [employer] immunization query, which confirmed that no updates were necessary at this time.     Patient support: I actively listened to the patient's concern's, provided emotional support and encouraged the patient to reach out, if she needs further assistance.     Reviewed Future Appointments:   Future Appointments   Date Time Provider Department Center   9/16/2024  3:15 PM [facility]   11/18/2024  3:40 PM Rosa Abbott MD [facility] Care Manager Follow Up Date: One month. Encouraged patient to call as needed.    Reason For Follow Up: review progress and or barriers towards patient's goals.     Time Spent This Encounter Total:26  min medical record review, telephone communication, care plan updates where needed, education, goals, and action plan recreation/update. Provided acknowledgment and validation to patient's concerns.   Monthly Minute Total including today: 26 min  Physical assessment, complete health history, and need for CCM established by Rosa Abbott MD.

## 2024-09-04 NOTE — TELEPHONE ENCOUNTER
The patient called, reports she has been out of the medication for 1-2 weeks. She states her pharmacy advised her that they are waiting on [facility] approval.

## 2024-09-04 NOTE — TELEPHONE ENCOUNTER
Dear Nursing Staff,     Please call patient to clarify what medications patient is taking as per Dr. Abbott on 8/26/2024. Medication was discontinued on 3/7/2024 and patient reported.    Refused 1 week ago (8/26/2024):   Unexpected request, review all meds with pt.   Per Dr. Abbott

## 2024-09-04 NOTE — TELEPHONE ENCOUNTER
Spoke to patient. She confirmed  that Topiramate is what she is requesting. She said that she has not stopped  taking this. RN shows medication was discontinued on 4/15/24. No notes on why medication was discontinued.     Dr. Abbott, should patient be on Topamax? Medication list shows discontinued in April but patient was not aware of that and has been taking it.

## 2024-09-05 NOTE — TELEPHONE ENCOUNTER
Dr. Abbott:   Patient calling again to follow up on Topamax refill. Was discontinue(d) on 4/15/24 at the time of her annual physical. No notes regarding this.    *please see Nurse's note below as well    Thank you

## 2024-09-05 NOTE — TELEPHONE ENCOUNTER
Prescribed by DR Roman in 2018  bariatric  wt loss program  Based on refills  pt not taking med regulalry  Referral to DR Roman given  Per last OV  pt not taking  med discontinued

## 2024-09-06 NOTE — TELEPHONE ENCOUNTER
Called patient,verified name and date of birth.  Referred Dr Abbott's recommendations from her 9/5/24 note below that she suggested a referral with Dr Roman.  Patient verbalizes understanding and agrees to plan of care.

## 2024-10-07 ENCOUNTER — PATIENT OUTREACH (OUTPATIENT)
Dept: CASE MANAGEMENT | Age: 74
End: 2024-10-07

## 2024-10-07 DIAGNOSIS — E66.09 CLASS 1 OBESITY DUE TO EXCESS CALORIES WITHOUT SERIOUS COMORBIDITY WITH BODY MASS INDEX (BMI) OF 30.0 TO 30.9 IN ADULT: ICD-10-CM

## 2024-10-07 DIAGNOSIS — E66.811 CLASS 1 OBESITY DUE TO EXCESS CALORIES WITHOUT SERIOUS COMORBIDITY WITH BODY MASS INDEX (BMI) OF 30.0 TO 30.9 IN ADULT: ICD-10-CM

## 2024-10-07 DIAGNOSIS — K21.9 GASTROESOPHAGEAL REFLUX DISEASE WITHOUT ESOPHAGITIS: Primary | ICD-10-CM

## 2024-10-08 NOTE — PROGRESS NOTES
Spoke to Bri for CCM.      Updates to patient care team/comments: None   Patient reported changes in medications: She is not taking Topiramate, she needs to follow up with Dr. Roman.    Med Adherence  Comment: Pt confirmed she is taking medications as instructed by their providers.     Health Maintenance:   Health Maintenance   Topic Date Due    Zoster Vaccines (1 of 2) Never done Declined    Influenza Vaccine (1) 10/01/2024  Declined    COVID-19 Vaccine (3 - 2023-24 season) 12/12/2112 (Originally 9/1/2024)    Mammogram  07/05/2025    Colorectal Cancer Screening  04/18/2029    DEXA Scan  Completed    MA Annual Health Assessment  Completed    Annual Depression Screening  Completed    Fall Risk Screening (Annual)  Completed    Pneumococcal Vaccine: 65+ Years  Completed     Patient updates/concerns:     The patient reports she is overall doing okay. She continues on a low carb diet. Focusing on eating mostly chicken, fish and fresh fruits and veggies. She has not seen Dr. Roman in sometime. Dr. Abbott has placed a referral. However, she has not followed through with an appointment.    She continues to experience knee pain. However, is scared to undergo surgery. States her gait is  unsteady and worried about falling.     Goals/Action Plan:    Active goal from previous outreach:   What: Exercise            - Where/When/How: 2-3 days a week for at least 45 minutes- 1 hours, by walking on a treadmill, using stationary bike at her friends home. This is short term until they sign up for a health club  Patient reported progress towards goals:                - What: She continues to work towards maintaining her goal           - Where/When/How: She is walking inside shopping center and goes to her friends home 1-2 times month and uses exercise machines for 1-2 hours.  Patient Reported Barriers and Concerns: She is unsure what her weight is.                   - Plan for overcoming barriers: She plans to increase her  activity by going on walks and checking her weight at her friends home.     Care Managers Interventions:   I encouraged the patient to follow up with Ortho and Dr. Roman as recommended by Dr. Abbott. I reviewed her medical record and did not find any referral. TE routed to [facility] with pended referrals.     Nutrition and exercise counseling: I encouraged the patient to consume three balanced meals daily and discussed the importance of incorporating 20-30 minutes of daily exercise, including gentle stretches. To improve overall health.    Chat review and documentation: I thoroughly reviewed the patient chart and updated Care Everywhere.    Immunization Status: I conducted an [employer] immunization query, which confirmed that no updates were necessary at this time.     Social determinants of health: I updated social determinants of health to reflect any recent changes.    Patient support: I actively listened to the patient's concern's, provided emotional support and encouraged the patient to reach out, if she needs further assistance.     Reviewed Future Appointments:   Future Appointments   Date Time Provider Department Center   11/8/2024 10:15 AM Neela Nguyen MD [facility] [place]   11/18/2024  3:40 PM Rosa Abbott MD [facility] Care Manager Follow Up Date: One month. Encouraged patient to call as needed.    Reason For Follow Up: review progress and or barriers towards patient's goals.     Time Spent This Encounter Total: 24 min min medical record review, telephone communication, care plan updates where needed, education, goals, and action plan recreation/update. Provided acknowledgment and validation to patient's concerns.   Monthly Minute Total including today: 24 min  Physical assessment, complete health history, and need for CCM established by Rosa Abbott MD.

## 2024-10-09 ENCOUNTER — TELEPHONE (OUTPATIENT)
Dept: INTERNAL MEDICINE CLINIC | Facility: CLINIC | Age: 74
End: 2024-10-09

## 2024-10-09 DIAGNOSIS — M25.562 CHRONIC PAIN OF BOTH KNEES: ICD-10-CM

## 2024-10-09 DIAGNOSIS — E66.09 CLASS 1 OBESITY DUE TO EXCESS CALORIES WITHOUT SERIOUS COMORBIDITY WITH BODY MASS INDEX (BMI) OF 30.0 TO 30.9 IN ADULT: Primary | ICD-10-CM

## 2024-10-09 DIAGNOSIS — E66.811 CLASS 1 OBESITY DUE TO EXCESS CALORIES WITHOUT SERIOUS COMORBIDITY WITH BODY MASS INDEX (BMI) OF 30.0 TO 30.9 IN ADULT: Primary | ICD-10-CM

## 2024-10-09 DIAGNOSIS — R26.81 UNSTEADY GAIT: ICD-10-CM

## 2024-10-09 DIAGNOSIS — G89.29 CHRONIC PAIN OF BOTH KNEES: ICD-10-CM

## 2024-10-09 DIAGNOSIS — M25.561 CHRONIC PAIN OF BOTH KNEES: ICD-10-CM

## 2024-10-09 NOTE — TELEPHONE ENCOUNTER
Spoke to Pt and notified referral orders to Dr Roman and Dr Lima  were placed , phone numbers to specialist were provided . Pt verbalized understanding denied questions .

## 2024-10-09 NOTE — TELEPHONE ENCOUNTER
The patient needs a referral to see Dr. Roman and follow up with ortho in regards to chronic knee pain she has seen Dr. Lima in the past.    MMP may you also update her problem list? She is on the CCM program and needs her problem list updated.     She is taking   Topiramate   Albuterol due to asthma with COPD  Atorvastatin due to hx of high cholesterol  And recent Dexa scan indicated Osteopenia    She was hospitalized 6/2022 at [facility] due to   Pathological fracture of left hip due to age-related osteoporosis (HCC)    Peripheral polyneuropathy    Stroke due to embolism of right cerebellar artery (HCC)

## 2024-11-07 ENCOUNTER — PATIENT OUTREACH (OUTPATIENT)
Dept: CASE MANAGEMENT | Age: 74
End: 2024-11-07

## 2024-11-07 DIAGNOSIS — K21.9 GASTROESOPHAGEAL REFLUX DISEASE WITHOUT ESOPHAGITIS: Primary | ICD-10-CM

## 2024-11-07 DIAGNOSIS — E66.09 CLASS 1 OBESITY DUE TO EXCESS CALORIES WITHOUT SERIOUS COMORBIDITY WITH BODY MASS INDEX (BMI) OF 30.0 TO 30.9 IN ADULT: ICD-10-CM

## 2024-11-07 DIAGNOSIS — E66.811 CLASS 1 OBESITY DUE TO EXCESS CALORIES WITHOUT SERIOUS COMORBIDITY WITH BODY MASS INDEX (BMI) OF 30.0 TO 30.9 IN ADULT: ICD-10-CM

## 2024-11-07 PROBLEM — Z86.0100 HISTORY OF COLONIC POLYPS: Status: ACTIVE | Noted: 2024-11-07

## 2024-11-07 NOTE — PROGRESS NOTES
Spoke to Bri for CCM.      Updates to patient care team/comments: None   Patient reported changes in medications: None     Med Adherence  Comment: Pt confirmed she is taking medications as instructed by their providers.     Health Maintenance:   Health Maintenance   Topic Date Due    Influenza Vaccine (1) 06/30/2025 (Originally 10/1/2024)    Zoster Vaccines (1 of 2) 11/09/2026 (Originally 6/24/2000)    COVID-19 Vaccine (3 - 2023-24 season) 12/12/2112 (Originally 9/1/2024)    Mammogram  07/05/2025    Colorectal Cancer Screening  04/18/2029    DEXA Scan  Completed    MA Annual Health Assessment  Completed    Annual Depression Screening  Completed    Fall Risk Screening (Annual)  Completed    Pneumococcal Vaccine: 65+ Years  Completed       Patient updates/concerns:     The patient reports that her biggest concern is bilateral knee pain. She is hesitant to undergo surgery, therefore has not followed up with ortho.     She was diagnosed with Osteopenia in July and is concerned about potentially being recommended injections, stating she doesn't like them and would prefer to try oral medications instead.     Goals/Action Plan:    Active goal from previous outreach:   What: Exercise            - Where/When/How: 2-3 days a week for at least 45 minutes- 1 hours, by walking on a treadmill, using stationary bike at her friends home. This is short term until they sign up for a health club  Patient reported progress towards goals:                - What: She continues to work towards her goal.            - Where/When/How: She is exercising 2 times a week for 30 minutes. She alternates with using a walker, treadmill and stationary bike. She is also careful about her portions and  what she consumes.   Patient Reported Barriers and Concerns: She is not able to exercise for long due to pain.                   - Plan for overcoming barriers: She will work on leg training exercise she learned at PT in the past.     Care Managers  Interventions:   Nutrition and exercise counseling: I encouraged the patient to consume three balanced meals daily and discussed the importance of incorporating 20-30 minutes of daily exercise, including gentle stretches. To improve overall health.    Chat review and documentation: I thoroughly reviewed the patient chart and updated Care Everywhere.     Immunization Status: I conducted an [employer] immunization query, which confirmed that no updates were necessary at this time.     Social determinants of health: I updated social determinants of health to reflect any recent changes.    Patient support: I actively listened to the patient's concern's, provided emotional support and encouraged the patient to reach out, if she needs further assistance.     Reviewed Future Appointments:   Future Appointments   Date Time Provider Department Center   11/8/2024 10:15 AM Neela Nguyen MD [facility]   11/18/2024  3:40 PM Rosa Abbott MD [facility]       [facility] Care Manager Follow Up Date: One month. Encouraged patient to call as needed.    Reason For Follow Up: review progress and or barriers towards patient's goals.     Time Spent This Encounter Total: 25  min medical record review, telephone communication, care plan updates where needed, education, goals, and action plan recreation/update. Provided acknowledgment and validation to patient's concerns.   Monthly Minute Total including today: 25 min  Physical assessment, complete health history, and need for CCM established by Rosa Abbott MD.  Friendly reminder - 2025 Benefits Open Enrollment is here!   We encourage you to review your current Medicare coverage and explore your options during this period. We have developed a Medicare Information Page on our website to serve as a resource for guidance and answers to any questions you might have.   You can access it by visiting, [email or web address]

## 2024-11-08 ENCOUNTER — OFFICE VISIT (OUTPATIENT)
Facility: CLINIC | Age: 74
End: 2024-11-08

## 2024-11-08 VITALS
WEIGHT: 192 LBS | DIASTOLIC BLOOD PRESSURE: 71 MMHG | HEART RATE: 84 BPM | HEIGHT: 63.5 IN | BODY MASS INDEX: 33.6 KG/M2 | SYSTOLIC BLOOD PRESSURE: 146 MMHG

## 2024-11-08 DIAGNOSIS — Z86.0100 HISTORY OF COLONIC POLYPS: ICD-10-CM

## 2024-11-08 DIAGNOSIS — E66.811 CLASS 1 OBESITY WITH BODY MASS INDEX (BMI) OF 33.0 TO 33.9 IN ADULT, UNSPECIFIED OBESITY TYPE, UNSPECIFIED WHETHER SERIOUS COMORBIDITY PRESENT: ICD-10-CM

## 2024-11-08 DIAGNOSIS — K21.9 GASTROESOPHAGEAL REFLUX DISEASE WITHOUT ESOPHAGITIS: ICD-10-CM

## 2024-11-08 DIAGNOSIS — M81.0 AGE-RELATED OSTEOPOROSIS WITHOUT CURRENT PATHOLOGICAL FRACTURE: Primary | ICD-10-CM

## 2024-11-08 DIAGNOSIS — Z99.89 USE OF CANE AS AMBULATORY AID: ICD-10-CM

## 2024-11-08 DIAGNOSIS — Z91.81 AT RISK FOR FALLING: ICD-10-CM

## 2024-11-08 DIAGNOSIS — Z82.3 FAMILY HISTORY OF CEREBROVASCULAR ACCIDENT (CVA): ICD-10-CM

## 2024-11-08 DIAGNOSIS — E78.5 DYSLIPIDEMIA: ICD-10-CM

## 2024-11-08 DIAGNOSIS — K21.9 GASTROESOPHAGEAL REFLUX DISEASE, UNSPECIFIED WHETHER ESOPHAGITIS PRESENT: ICD-10-CM

## 2024-11-08 PROBLEM — J44.89 ASTHMA WITH COPD (CHRONIC OBSTRUCTIVE PULMONARY DISEASE) (HCC): Chronic | Status: ACTIVE | Noted: 2024-11-08

## 2024-11-08 PROCEDURE — 1159F MED LIST DOCD IN RCRD: CPT | Performed by: INTERNAL MEDICINE

## 2024-11-08 PROCEDURE — G2211 COMPLEX E/M VISIT ADD ON: HCPCS | Performed by: INTERNAL MEDICINE

## 2024-11-08 PROCEDURE — 99205 OFFICE O/P NEW HI 60 MIN: CPT | Performed by: INTERNAL MEDICINE

## 2024-11-08 PROCEDURE — 1160F RVW MEDS BY RX/DR IN RCRD: CPT | Performed by: INTERNAL MEDICINE

## 2024-11-08 RX ORDER — ZOLEDRONIC ACID 0.05 MG/ML
5 INJECTION, SOLUTION INTRAVENOUS ONCE
OUTPATIENT
Start: 2024-11-08

## 2024-11-08 NOTE — PROGRESS NOTES
New Patient Evaluation - History and Physical    CONSULT - Reason for Visit:  osteoporosis     Requesting Physician:   Rajeev Abbott MD  No referring provider defined for this encounter.      CHIEF COMPLAINT:    Chief Complaint   Patient presents with    Consult     Patient was referred by PCP to seek care due to recent DEXA Scan result. Pt reports no recent falls/fractures/dental work.        HISTORY OF PRESENT ILLNESS:   Bri Padilla is a 74 year old female who presents with  high risk for fracture, osteopenia with high FRAX, fall risk, previous CVA, uses cane, GERD and obese.   Was seen with her daughter.   She fell few times this year      Had DXA scan in 7/2024 osteopenia FRAX 17% - 3.5%      Fragility fracture: no  Height loss: yes 1 inch  Denied risk factors: steroid use, alcohol abuse, liver disease, kidney disease, hyperthyroid, seizure medications.   Family history of osteoporosis or fragility fracture: no    Patient is on fall precaution.  Patient is taking Vitamin D and calcium.   Educated the patient to do wt-bearing exercise, but avoid falls.     GERD : yes on PPI     ASSESSMENT AND PLAN:  Bri Padilla is a 74 year old female who presents with  high risk for fracture, osteopenia with high FRAX, fall risk, previous CVA, uses cane, GERD and obese.   Has no teeth , dentures only      Plan    Will start Reclast once a year , labs prior   Will give info to read   Take vitamin D3 2000 international unit a day and calcium 600 mg twice a day or 3 servings of dairy a day   Do weight bearing exercise   Follow fall precautions   educational material will be attached to plan  We discussed diagnosis, treatment options, fall risk, long term complications and side effect.   Discussed options oral bisphosphonate vs infusion bisphosphonate vs Denosumab   Side effect from osteoporosis meds   Hypocalcemia: Adequately supplement calcium and vitamin D during  Osteonecrosis of the Jaw: Monitor for symptoms.    Atypical Femoral Fracture: Evaluate new or unusual thigh, hip, or groin    PAST MEDICAL HISTORY:   Past Medical History:    Age-related nuclear cataract of both eyes    Anemia    FeSo4    Asthma (HCC)    Bilateral amblyopia    Closed fracture of left hip (HCC)    Colon polyp    Degeneration of lumbar or lumbosacral intervertebral disc    DVT of lower extremity (deep venous thrombosis) (HCC)    post delivery of last child 33 years ago    Esophageal reflux    Fracture    KNEE NO SURGERY    High cholesterol    Hyperlipidemia    Diet    Hyperopia of both eyes with astigmatism and presbyopia    Lipid screening    OA (osteoarthritis)    OA (osteoarthritis)    Obesity (BMI 30-39.9)    Osteoporosis screening    Overweight (BMI 25.0-29.9)    Pathological fracture of left hip due to age-related osteoporosis (HCC)    Postmenopausal status (age-related) (natural)    Shortness of breath    Stroke due to embolism of right cerebellar artery (HCC)    Thrombocytopenia (HCC)       PAST SURGICAL HISTORY:   Past Surgical History:   Procedure Laterality Date    Cataract extraction w/  intraocular lens implant Right 11/09/2020    Dr. Gutierrez @ [facility] surgery    Cataract extraction w/  intraocular lens implant Left 11/23/2020    Dr. Gutierrez@ [facility] surgery     Colonoscopy  2014    Colonoscopy N/A 11/26/2019    Procedure: COLONOSCOPY;  Surgeon: Jaden Marquis MD;  Location: [facility] ENDOSCOPY    Colonoscopy N/A 04/18/2024    Procedure: COLONOSCOPY;  Surgeon: Jaden Marquis MD;  Location: [facility] MAIN OR    Hip replacement surgery Left 06/19/2022    Andre localization wire 1 site right (cpt=19281)      Needle biopsy right  08/01/2023       CURRENT MEDICATIONS:     topiramate 25 MG Oral Tab Take 1 tablet (25 mg total) by mouth 2 (two) times daily. **Please address further refills at appointment. 60 tablet 0    omeprazole 20 MG Oral Capsule Delayed Release Take 1 capsule (20 mg total) by mouth before breakfast. 90 capsule  0    atorvastatin 40 MG Oral Tab Take 1 tablet (40 mg total) by mouth nightly. 90 tablet 3    albuterol (VENTOLIN HFA) 108 (90 Base) MCG/ACT Inhalation Aero Soln Inhale 2 puffs into the lungs every 4 (four) hours as needed. For wheezing      Calcium Carb-Cholecalciferol (CALCIUM-VITAMIN D3) 250-3.125 MG-MCG Oral Tab Take 1 tablet by mouth daily. 90 tablet 3    Magnesium 200 MG Oral Tab Take by mouth.      Biotin 5000 MCG Oral Tab Take by mouth.      clobetasol 0.05 % External Solution Apply 1 mL topically daily as needed. 60 mL 3    Nystatin 096478 UNIT/GM External Powder Apply 1 Application topically 2 (two) times daily. 60 g 1    Acetaminophen 500 MG Oral Cap       Multiple Vitamins-Minerals (WOMENS MULTIVITAMIN PLUS OR)          ALLERGIES:  Allergies[1]    SOCIAL HISTORY:    Social History     Socioeconomic History    Marital status:    Tobacco Use    Smoking status: Former     Current packs/day: 0.00     Average packs/day: 0.3 packs/day for 30.0 years (9.0 ttl pk-yrs)     Types: Cigarettes     Start date: 1974     Quit date: 2004     Years since quittin.7     Passive exposure: Past    Smokeless tobacco: Never   Vaping Use    Vaping status: Never Used   Substance and Sexual Activity    Alcohol use: Yes     Alcohol/week: 0.0 standard drinks of alcohol     Comment: holidays    Drug use: No    Sexual activity: Never   Other Topics Concern    Caffeine Concern No    Pt has a pacemaker No    Pt has a defibrillator No    Breast feeding No    Reaction to local anesthetic No     Smoking no   FAMILY HISTORY:   Family History   Problem Relation Age of Onset    Diabetes Mother 62    Cancer Mother         unknown type    Other (Other) Mother     Stroke Father 70        CVA    Diabetes Father     Glaucoma Neg     Macular degeneration Neg     Breast Cancer Neg     Ovarian Cancer Neg         REVIEW OF SYSTEMS:  All negative other than HPI    PHYSICAL EXAM:   Height: 5' 3.5\" (161.3 cm) ( 1016)  Weight:  192 lb (87.1 kg) (11/08 1016)  BSA (Calculated - sq m): 1.91 sq meters (11/08 1016)  Pulse: 84 (11/08 1016)  BP: 146/71 (11/08 1016)  Temp: --  Do Not Use - Resp Rate: --  SpO2: --    Body mass index is 33.48 kg/m².  No spine tenderness   CONSTITUTIONAL:  Awake and alert. Age appropriate, good hygiene not in acute distress. Well-nourished and well developed. no acute distress   PSYCH:   Orientated to time, place, person & situation, Normal mood and affect, memory intact, normal insight and judgment, cooperative  Neuro: speech is clear. Awake, alert, no aphasia, no facial asymmetry, no nuchal rigidity  EYES:  No proptosis, no ptosis, conjunctiva normal  ENT:  Normocephalic, atraumatic  Eye: EOMI, normal lids, no discharge, no conjunctival erythema. No exophthalmos/proptosis, Ptosis negative   No rhinorrhea, moist oral mucosa  Neck: full range of motion  Neck/Thyroid: neck inspection: normal, No scar, No goiter   LUNGS:  No acute respiratory distress, non-labored respiration. Speaking full sentences  CARDIOVASCULAR:  regular rate   ABDOMEN:  No abdominal pain.   SKIN:  no bruising or bleeding, no rashes and no lesions, Skin is dry, no obvious rashes or lesions  EXTREMITIES: no gross abnormality   MSK: Moves extremities spontaneously. full range of motion in all major joints      DATA:     Pertinent data reviewed   07/19/24 14:03   XR DEXA BONE DENSITOMETRY   Osteopenia FRAX 17%-     3.5%    Rpt: View report in Results Review for more information   No results found.   Kidney:    Lab Results   Component Value Date    BUN 14 08/10/2024    BUN 15 10/17/2023    BUN 21 (H) 10/06/2023     Lab Results   Component Value Date    CREATSERUM 0.84 08/10/2024    CREATSERUM 0.85 10/17/2023    CREATSERUM 0.74 10/06/2023      BMP:   No results found for: \"GLUCOSE\"  Lab Results   Component Value Date    K 3.8 08/10/2024    K 4.0 10/17/2023    K 3.6 10/06/2023     Lab Results   Component Value Date    BUN 14 08/10/2024    BUN 15  10/17/2023    BUN 21 (H) 10/06/2023     Lab Results   Component Value Date    CREATSERUM 0.84 08/10/2024    CREATSERUM 0.85 10/17/2023    CREATSERUM 0.74 10/06/2023         No results for input(s): \"TSH\", \"T4F\", \"T3F\", \"THYP\" in the last 72 hours.  No results found.    Orders Placed This Encounter   Procedures    Vitamin D [E]     Orders Placed This Encounter    Vitamin D [E]     Standing Status:   Future     Standing Expiration Date:   11/8/2025     Order Specific Question:   Please pick the scenario that best fits the purpose for ordering this test     Answer:   General Screening/Vit D deficiency (25-Hydroxy)     Order Specific Question:   Release to patient     Answer:   Immediate          This is a specialized patient consultation in endocrinology and required comprehensive review of prior records, as well as current evaluation, with time required for consideration of complex endocrine issues and consultation. For this visit, I personally interviewed the patient, and family member if accompanied, performed the pertinent parts of the history and physical examination. ROS included screening for appropriate endocrine conditions.   Today's diagnosis and plan were reviewed in detail with the patient who states understanding and agrees with plan. I discussed with the patient possible diagnosis, differential diagnosis, need for work up, treatment options, alternatives and side effects.     Please see note for details about time spent which includes:   · pre-visit preparation  · reviewing records  · face to face time with the patient   · timely documentation of the encounter  · ordering medications/tests  · communication with care team  · care coordination    I appreciate the opportunity to be part of your patient's medical care and will keep you, as the referring and primary physicians, informed about the care of your patient. Please feel free to contact me should you have any questions.    The 21st Century Cures Act  makes medical notes like these available to patients in the interest of transparency. Please be advised this is a medical document. Medical documents are intended to carry relevant information, facts as evident, and the clinical opinion of the practitioner. The medical note is intended as peer to peer communication and may appear blunt or direct. It is written in medical language and may contain abbreviations or verbiage that are unfamiliar.   Neela Nguyen MD              [1] No Known Allergies

## 2024-11-08 NOTE — PATIENT INSTRUCTIONS
Will start Reclast once a year , labs prior   Will give info to read   Take vitamin D3 2000 international unit a day and calcium 600 mg twice a day or 3 servings of dairy a day   Do weight bearing exercise   Follow fall precautions   educational material will be attached to plan  We discussed diagnosis, treatment options, fall risk, long term complications and side effect.   Discussed options oral bisphosphonate vs infusion bisphosphonate vs Denosumab   Side effect from osteoporosis meds   Hypocalcemia: Adequately supplement calcium and vitamin D during  Osteonecrosis of the Jaw: Monitor for symptoms.   Atypical Femoral Fracture: Evaluate new or unusual thigh, hip, or groin

## 2024-11-18 ENCOUNTER — OFFICE VISIT (OUTPATIENT)
Dept: INTERNAL MEDICINE CLINIC | Facility: CLINIC | Age: 74
End: 2024-11-18

## 2024-11-18 VITALS
WEIGHT: 195 LBS | DIASTOLIC BLOOD PRESSURE: 66 MMHG | BODY MASS INDEX: 34.12 KG/M2 | HEART RATE: 69 BPM | HEIGHT: 63.5 IN | SYSTOLIC BLOOD PRESSURE: 121 MMHG

## 2024-11-18 DIAGNOSIS — E78.5 HYPERLIPIDEMIA, UNSPECIFIED HYPERLIPIDEMIA TYPE: ICD-10-CM

## 2024-11-18 DIAGNOSIS — E66.09 CLASS 1 OBESITY DUE TO EXCESS CALORIES WITHOUT SERIOUS COMORBIDITY WITH BODY MASS INDEX (BMI) OF 30.0 TO 30.9 IN ADULT: ICD-10-CM

## 2024-11-18 DIAGNOSIS — M17.0 PRIMARY OSTEOARTHRITIS OF BOTH KNEES: ICD-10-CM

## 2024-11-18 DIAGNOSIS — J44.89 ASTHMA WITH COPD (CHRONIC OBSTRUCTIVE PULMONARY DISEASE) (HCC): Primary | Chronic | ICD-10-CM

## 2024-11-18 DIAGNOSIS — Z99.89 USE OF CANE AS AMBULATORY AID: ICD-10-CM

## 2024-11-18 DIAGNOSIS — E66.811 CLASS 1 OBESITY DUE TO EXCESS CALORIES WITHOUT SERIOUS COMORBIDITY WITH BODY MASS INDEX (BMI) OF 30.0 TO 30.9 IN ADULT: ICD-10-CM

## 2024-11-18 DIAGNOSIS — M81.0 AGE-RELATED OSTEOPOROSIS WITHOUT CURRENT PATHOLOGICAL FRACTURE: ICD-10-CM

## 2024-11-18 PROCEDURE — 1159F MED LIST DOCD IN RCRD: CPT | Performed by: INTERNAL MEDICINE

## 2024-11-18 PROCEDURE — 99214 OFFICE O/P EST MOD 30 MIN: CPT | Performed by: INTERNAL MEDICINE

## 2024-11-18 PROCEDURE — G2211 COMPLEX E/M VISIT ADD ON: HCPCS | Performed by: INTERNAL MEDICINE

## 2024-11-18 RX ORDER — FLUTICASONE PROPIONATE AND SALMETEROL 250; 50 UG/1; UG/1
1 POWDER RESPIRATORY (INHALATION) 2 TIMES DAILY
Qty: 1 EACH | Refills: 3 | Status: SHIPPED | OUTPATIENT
Start: 2024-11-18

## 2024-11-19 NOTE — PROGRESS NOTES
HPI:    Patient ID: Bri Padilla is a 74 year old female.    HPI  Uses rescue inhaler daily  Chronic pain both knees   /66 (BP Location: Right arm, Patient Position: Sitting, Cuff Size: adult)   Pulse 69   Ht 5' 3.5\" (1.613 m)   Wt 195 lb (88.5 kg)   BMI 34.00 kg/m²   Wt Readings from Last 6 Encounters:   11/18/24 195 lb (88.5 kg)   11/08/24 192 lb (87.1 kg)   08/12/24 190 lb (86.2 kg)   04/08/24 185 lb (83.9 kg)   04/15/24 191 lb (86.6 kg)   12/04/23 185 lb (83.9 kg)     Body mass index is 34 kg/m².  HGBA1C:    Lab Results   Component Value Date    A1C 5.8 (H) 05/25/2022    A1C 5.5 09/16/2016    A1C 5.5 06/09/2014     05/25/2022         Review of Systems   Constitutional:  Negative for activity change, chills, fatigue and fever.   HENT:  Negative for ear discharge, nosebleeds, postnasal drip, rhinorrhea, sinus pressure and sore throat.    Eyes:  Negative for pain, discharge and redness.   Respiratory:  Positive for wheezing. Negative for cough, chest tightness and shortness of breath.    Cardiovascular:  Positive for leg swelling. Negative for chest pain and palpitations.   Gastrointestinal:  Negative for abdominal pain, blood in stool, constipation, diarrhea, nausea and vomiting.   Genitourinary:  Negative for difficulty urinating, dysuria, frequency, hematuria and urgency.   Musculoskeletal:  Positive for arthralgias and gait problem. Negative for back pain and joint swelling.   Skin:  Negative for rash.   Neurological:  Negative for syncope, weakness, light-headedness and headaches.   Psychiatric/Behavioral:  Negative for dysphoric mood. The patient is not nervous/anxious.          Current Outpatient Medications   Medication Sig Dispense Refill    fluticasone-salmeterol (WIXELA INHUB) 250-50 MCG/ACT Inhalation Aerosol Powder, Breath Activated Inhale 1 puff into the lungs 2 (two) times daily. 1 each 3    topiramate 25 MG Oral Tab Take 1 tablet (25 mg total) by mouth 2 (two) times daily.  **Please address further refills at appointment. 60 tablet 0    omeprazole 20 MG Oral Capsule Delayed Release Take 1 capsule (20 mg total) by mouth before breakfast. 90 capsule 0    atorvastatin 40 MG Oral Tab Take 1 tablet (40 mg total) by mouth nightly. 90 tablet 3    albuterol (VENTOLIN HFA) 108 (90 Base) MCG/ACT Inhalation Aero Soln Inhale 2 puffs into the lungs every 4 (four) hours as needed. For wheezing      Calcium Carb-Cholecalciferol (CALCIUM-VITAMIN D3) 250-3.125 MG-MCG Oral Tab Take 1 tablet by mouth daily. 90 tablet 3    Magnesium 200 MG Oral Tab Take by mouth.      Biotin 5000 MCG Oral Tab Take by mouth.      clobetasol 0.05 % External Solution Apply 1 mL topically daily as needed. 60 mL 3    Nystatin [id number] UNIT/GM External Powder Apply 1 Application topically 2 (two) times daily. 60 g 1    Acetaminophen 500 MG Oral Cap       Multiple Vitamins-Minerals (WOMENS MULTIVITAMIN PLUS OR)        Allergies:Allergies[1]    HISTORY:  Past Medical History:    Age-related nuclear cataract of both eyes    Anemia    FeSo4    Asthma (HCC)    Bilateral amblyopia    Closed fracture of left hip (HCC)    Colon polyp    Degeneration of lumbar or lumbosacral intervertebral disc    DVT of lower extremity (deep venous thrombosis) ([facility])    post delivery of last child 33 years ago    Esophageal reflux    Fracture    KNEE NO SURGERY    High cholesterol    Hyperlipidemia    Diet    Hyperopia of both eyes with astigmatism and presbyopia    Lipid screening    OA (osteoarthritis)    OA (osteoarthritis)    Obesity (BMI 30-39.9)    Osteoporosis screening    Overweight (BMI 25.0-29.9)    Pathological fracture of left hip due to age-related osteoporosis (HCC)    Postmenopausal status (age-related) (natural)    Shortness of breath    Stroke due to embolism of right cerebellar artery (HCC)    Thrombocytopenia (HCC)      Past Surgical History:   Procedure Laterality Date    Cataract extraction w/  intraocular lens implant Right  2020    Dr. Gutierrez @ [facility] surgery    Cataract extraction w/  intraocular lens implant Left 2020    Dr. Gutierrez@ [facility] surgery     Colonoscopy      Colonoscopy N/A 2019    Procedure: COLONOSCOPY;  Surgeon: Jaden Marquis MD;  Location: [facility] ENDOSCOPY    Colonoscopy N/A 2024    Procedure: COLONOSCOPY;  Surgeon: Jaden Marquis MD;  Location: [facility] MAIN OR    Hip replacement surgery Left 2022    Andre localization wire 1 site right (cpt=19281)      Needle biopsy right  2023      Family History   Problem Relation Age of Onset    Diabetes Mother 62    Cancer Mother         unknown type    Other (Other) Mother     Stroke Father 70        CVA    Diabetes Father     Glaucoma Neg     Macular degeneration Neg     Breast Cancer Neg     Ovarian Cancer Neg       Social History:   Social History     Socioeconomic History    Marital status:    Tobacco Use    Smoking status: Former     Current packs/day: 0.00     Average packs/day: 0.3 packs/day for 30.0 years (9.0 ttl pk-yrs)     Types: Cigarettes     Start date: 1974     Quit date: 2004     Years since quittin.7     Passive exposure: Past    Smokeless tobacco: Never   Vaping Use    Vaping status: Never Used   Substance and Sexual Activity    Alcohol use: Yes     Alcohol/week: 0.0 standard drinks of alcohol     Comment: holidays    Drug use: No    Sexual activity: Never   Other Topics Concern    Caffeine Concern No    Pt has a pacemaker No    Pt has a defibrillator No    Breast feeding No    Reaction to local anesthetic No   Social History Narrative    Live with daughter    Work retired    -     -     No pets no    H/o birds > 10 yrs     Social Drivers of Health     Financial Resource Strain: Low Risk  (2024)    Financial Resource Strain     Difficulty of Paying Living Expenses: Somewhat hard     Med Affordability: No   Food Insecurity: No Food Insecurity (2024)     Food Insecurity     Food Insecurity: Never true   Transportation Needs: No Transportation Needs (11/7/2024)    Transportation Needs     Lack of Transportation: No   Physical Activity: Insufficiently Active (3/6/2023)    Exercise Vital Sign     Days of Exercise per Week: 2 days     Minutes of Exercise per Session: 10 min   Stress: Stress Concern Present (7/11/2024)    Stress     Feeling of Stress : Yes   Social Connections: Socially Integrated (3/6/2023)    Social Connections     Frequency of Socialization with Friends and Family: 3   Recent Concern: Social Connections - Socially Isolated (3/6/2023)    Social Connections     Frequency of Socialization with Friends and Family: 0   Housing Stability: Low Risk  (11/7/2024)    Housing Stability     Housing Instability: No        PHYSICAL EXAM:    Physical Exam  Constitutional:       Appearance: She is well-developed. She is obese. She is not ill-appearing.   HENT:      Right Ear: Ear canal normal.      Left Ear: Ear canal normal.      Mouth/Throat:      Pharynx: Oropharynx is clear.   Eyes:      Extraocular Movements: Extraocular movements intact.      Conjunctiva/sclera: Conjunctivae normal.      Pupils: Pupils are equal, round, and reactive to light.   Cardiovascular:      Rate and Rhythm: Normal rate and regular rhythm.      Heart sounds: Normal heart sounds.   Pulmonary:      Effort: Pulmonary effort is normal.   Abdominal:      General: Bowel sounds are normal.      Palpations: Abdomen is soft.   Musculoskeletal:         General: Deformity (both knees) present.   Skin:     General: Skin is warm and dry.   Neurological:      Mental Status: She is alert.      Gait: Gait abnormal.   Psychiatric:         Mood and Affect: Mood normal.              ASSESSMENT/PLAN:   (J44.89) Asthma with COPD (chronic obstructive pulmonary disease) (HCC)  (primary encounter diagnosis)  Plan:add wixela    (M17.0) Primary osteoarthritis of both knees  Plan: chronic  follow up with  ortho  Awaiting  knee replacement    (Z99.89) Use of cane as ambulatory aid  Plan: fall precuaiton      Obesityh  : Body mass index is 34 kg/m².  Weight loss advised   limit overall caloric intake  Low diet  limit carbohydrate intake   increase activity  as tolerated  exercise      (M81.0) Age-related osteoporosis without current pathological fracture  Plan: ongong treatment by endo    (E78.5) Hyperlipidemia, unspecified hyperlipidemia type  Plan: Low cholesterol diet advised  Avoid saturated and trans fats       Follow up in 3 months       No orders of the defined types were placed in this encounter.      Meds This Visit:  Requested Prescriptions     Signed Prescriptions Disp Refills    fluticasone-salmeterol (WIXELA INHUB) 250-50 MCG/ACT Inhalation Aerosol Powder, Breath Activated 1 each 3     Sig: Inhale 1 puff into the lungs 2 (two) times daily.       Imaging & Referrals:  None        ID#[id number]           [1] No Known Allergies

## 2024-11-22 RX ORDER — TOPIRAMATE 25 MG/1
25 TABLET, FILM COATED ORAL 2 TIMES DAILY
Qty: 180 TABLET | Refills: 3 | Status: SHIPPED | OUTPATIENT
Start: 2024-11-22

## 2024-11-22 NOTE — TELEPHONE ENCOUNTER
Refill passed per [facility] protocol.  Requested Prescriptions   Pending Prescriptions Disp Refills    TOPIRAMATE 25 MG Oral Tab [Pharmacy Med Name: TOPIRAMATE 25 MG TABLET] 60 tablet 0     Sig: Take 1 tablet (25 mg total) by mouth 2 (two) times daily. **Please address further refills at appointment.       Neurology Medications Passed - 11/22/2024 10:36 AM        Passed - In person appointment or virtual visit in the past 6 mos or appointment in next 3 mos     Recent Outpatient Visits              4 days ago Asthma with COPD (chronic obstructive pulmonary disease) (formerly [facility])    [facility], Rosa Phillips MD    Office Visit    2 weeks ago Age-related osteoporosis without current pathological fracture    [facility], Neela Clark MD    Office Visit    3 months ago Asthma with COPD (chronic obstructive pulmonary disease) ([facility]    [facility] Visit    7 months ago Medicare annual wellness visit, subsequent    [facility]    [facility] Visit    11 months ago Asthma with COPD (chronic obstructive pulmonary disease)    [facility]    [facility]          Future Appointments         Provider Department Appt Notes    In 4 months [facility] last px 4/15/24                       Recent Outpatient Visits              4 days ago Asthma with COPD (chronic obstructive pulmonary disease) ([facility])    [facility]    Office Visit    2 weeks ago Age-related osteoporosis without current pathological fracture    [facility], Neela Clark MD    Office Visit    3 months ago Asthma with COPD  (chronic obstructive pulmonary disease) [facility]    Office Visit    7 months ago Medicare annual wellness visit, subsequent    [facility]    Office Visit    11 months ago Asthma with COPD (chronic obstructive pulmonary disease)    [facility]    Office Visit          Future Appointments         Provider Department Appt Notes    In 4 months [facility] [address], [place] px, last px 4/15/24

## 2024-11-22 NOTE — TELEPHONE ENCOUNTER
Refill passed per [facility] protocol.  Requested Prescriptions   Pending Prescriptions Disp Refills    OMEPRAZOLE 20 MG Oral Capsule Delayed Release [Pharmacy Med Name: OMEPRAZOLE DR 20 MG CAPSULE] 90 capsule 0     Sig: TAKE 1 CAPSULE BY MOUTH BEFORE BREAKFAST EVERY DAY       Gastrointestional Medication Protocol Passed - 11/22/2024 10:37 AM        Passed - In person appointment or virtual visit in the past 12 mos or appointment in next 3 mos     Recent Outpatient Visits              4 days ago Asthma with COPD (chronic obstructive pulmonary disease) ([facility])    [facility], Rosa Phillips MD    Office Visit    2 weeks ago Age-related osteoporosis without current pathological fracture    [facility], Neela Clark MD    Office Visit    3 months ago Asthma with COPD (chronic obstructive pulmonary disease) ([facility])    [facility], Rosa Phillips MD    Office Visit    7 months ago Medicare annual wellness visit, subsequent    [facility]    [facility] Visit    11 months ago Asthma with COPD (chronic obstructive pulmonary disease)    [facility]    [facility]          Future Appointments         Provider Department Appt Notes    In 4 months [facility], last px 4/15/24                       Recent Outpatient Visits              4 days ago Asthma with COPD (chronic obstructive pulmonary disease) ([facility]    Office Visit    2 weeks ago Age-related osteoporosis without current pathological fracture    [facility], Neela Clark MD    Office Visit    3 months ago Asthma with COPD (chronic  obstructive pulmonary disease) ([facility])    [facility] Addison Pantano, Maelen, MD    Office Visit    7 months ago Medicare annual wellness visit, subsequent    [facility] Addison Pantano, Maelen, MD    Office Visit    11 months ago Asthma with COPD (chronic obstructive pulmonary disease)    [facility] Driver Addison Pantano, Maelen, MD    Office Visit          Future Appointments         Provider Department Appt Notes    In 4 months [facility] MD [facility] [address], [place] ma px, last px 4/15/24

## 2024-11-29 NOTE — SPIRITUAL CARE NOTE
----- Message from Yanelis sent at 11/29/2024 10:59 AM CST -----  Contact: Panfilo from [facility]  Type:  Pharmacy Calling to Clarify an RX    Name of Caller:  Panfilo from [facility]    Pharmacy Name:      [facility] - [place] - [address] [facility] [address]  [address] [facility] [address]  [place] [address]  Phone: [phone] Fax: [phone]    Prescription Name:   Amiodarone 200 mg    What do they need to clarify?:  Whether or not to take    Best Call Back Number:    [phone] - Panfilo    Additional Information:   States he needs to speak with someone - states patient recently discharged from hospital - states looks like he was not given this medication and wife is not sure if he should be taking it or not - please call - thank you   Pt was sitting up in bed in a well lit room. Pt is being treated for Fx L hip and is being DC today. Pt expressed her delight in being DC to start her rehab in route of living a pain free life.  left with a blessing for the pt.

## 2024-12-05 ENCOUNTER — TELEPHONE (OUTPATIENT)
Dept: ENDOCRINOLOGY CLINIC | Facility: CLINIC | Age: 74
End: 2024-12-05

## 2024-12-05 DIAGNOSIS — M81.0 AGE-RELATED OSTEOPOROSIS WITHOUT CURRENT PATHOLOGICAL FRACTURE: Primary | ICD-10-CM

## 2024-12-05 RX ORDER — ZOLEDRONIC ACID 0.05 MG/ML
5 INJECTION, SOLUTION INTRAVENOUS ONCE
OUTPATIENT
Start: 2024-12-05

## 2024-12-05 NOTE — TELEPHONE ENCOUNTER
Good morning,     Verified coverage, no authorization is needed for Reclast infusion.     Okay to schedule patient.     Thank You,  Irma

## 2024-12-10 ENCOUNTER — PATIENT OUTREACH (OUTPATIENT)
Dept: CASE MANAGEMENT | Age: 74
End: 2024-12-10

## 2024-12-10 DIAGNOSIS — E66.09 CLASS 1 OBESITY DUE TO EXCESS CALORIES WITHOUT SERIOUS COMORBIDITY WITH BODY MASS INDEX (BMI) OF 30.0 TO 30.9 IN ADULT: Primary | ICD-10-CM

## 2024-12-10 DIAGNOSIS — E66.811 CLASS 1 OBESITY DUE TO EXCESS CALORIES WITHOUT SERIOUS COMORBIDITY WITH BODY MASS INDEX (BMI) OF 30.0 TO 30.9 IN ADULT: Primary | ICD-10-CM

## 2024-12-10 DIAGNOSIS — E78.5 DYSLIPIDEMIA: ICD-10-CM

## 2024-12-10 DIAGNOSIS — K21.9 GASTROESOPHAGEAL REFLUX DISEASE WITHOUT ESOPHAGITIS: ICD-10-CM

## 2024-12-10 DIAGNOSIS — J44.89 ASTHMA WITH COPD (CHRONIC OBSTRUCTIVE PULMONARY DISEASE) (HCC): ICD-10-CM

## 2024-12-10 NOTE — PROGRESS NOTES
Spoke to Bri for CCM.      Updates to patient care team/comments: None   Patient reported changes in medications: None     Med Adherence  Comment: Pt confirmed she is taking medications as instructed by their providers.     Health Maintenance:   Health Maintenance   Topic Date Due    Zoster Vaccines (1 of 2) 11/09/2026 (Originally 6/24/2000)    COVID-19 Vaccine (3 - 2024-25 season) 12/12/2112 (Originally 9/1/2024)    Mammogram  07/05/2025    Colorectal Cancer Screening  04/18/2029    Influenza Vaccine  Completed    DEXA Scan  Completed    MA Annual Health Assessment  Completed    Annual Depression Screening  Completed    Fall Risk Screening (Annual)  Completed    Pneumococcal Vaccine: 65+ Years  Completed     Patient updates/concerns:     The patient reports she is overall doing well. Stress at home is stable. She is looking forward visiting her son over the holidays for a few days.    She states her knees continue to bother her. She is not interested in a knee replacement.     Additionally, she mentioned that she is trying to work on getting her teeth taken care of.     Goals/Action Plan:    Active goal from previous outreach:    What : Exercise.           - Where/When/How: 2-3 days a week for at least 45 minutes- 1 hours, by walking on a treadmill, using stationary bike at her friends home. This is short term until they sign up for a health club  Patient reported progress towards goals:               - What: The patient continues to make an effort to meet her goal.           - Where/When/How: She is following a low carb diet, increased her protein and vegetable intake.  Patient Reported Barriers and Concerns: She gained 3 lbs since last month.                   - Plan for overcoming barriers: She plans on increasing her activity to 3 days a week.    Care Managers Interventions:   Nutrition and exercise counseling: I encouraged the patient to consume three balanced meals daily and discussed the importance of  incorporating 20-30 minutes of daily exercise, including gentle stretches. To improve overall health.    Chat review and documentation: I thoroughly reviewed the patient chart and updated Care Everywhere.    Immunization Status: I conducted an IDPH immunization query, which confirmed that no updates were necessary at this time.     Social determinants of health: I updated social determinants of health to reflect any recent changes.    Patient support: I actively listened to the patient's concern's, provided emotional support and encouraged the patient to reach out, if she needs further assistance.     Reviewed Future Appointments:   Future Appointments   Date Time Provider Department Center   3/24/2025 11:40 AM Rosa Abbott MD [facility]     Next Care Manager Follow Up Date: One month. Encouraged patient to call as needed.    Reason For Follow Up: review progress and or barriers towards patient's goals.     Time Spent This Encounter Total: 23 min medical record review, telephone communication, care plan updates where needed, education, goals, and action plan recreation/update. Provided acknowledgment and validation to patient's concerns.   Monthly Minute Total including today: 23 min  Physical assessment, complete health history, and need for CCM established by Rosa Abbott MD.  Friendly reminder - 2025 Benefits Open Enrollment is here!   We encourage you to review your current Medicare coverage and explore your options during this period. We have developed a Medicare Information Page on our website to serve as a resource for guidance and answers to any questions you might have.   You can access it by visiting, [email or web address]

## 2024-12-24 RX ORDER — ATORVASTATIN CALCIUM 40 MG/1
40 TABLET, FILM COATED ORAL NIGHTLY
Qty: 90 TABLET | Refills: 3 | Status: SHIPPED | OUTPATIENT
Start: 2024-12-24

## 2024-12-30 ENCOUNTER — PATIENT OUTREACH (OUTPATIENT)
Dept: CASE MANAGEMENT | Age: 74
End: 2024-12-30

## 2024-12-30 DIAGNOSIS — E66.811 CLASS 1 OBESITY DUE TO EXCESS CALORIES WITHOUT SERIOUS COMORBIDITY WITH BODY MASS INDEX (BMI) OF 30.0 TO 30.9 IN ADULT: ICD-10-CM

## 2024-12-30 DIAGNOSIS — E78.5 DYSLIPIDEMIA: Primary | ICD-10-CM

## 2024-12-30 DIAGNOSIS — E66.09 CLASS 1 OBESITY DUE TO EXCESS CALORIES WITHOUT SERIOUS COMORBIDITY WITH BODY MASS INDEX (BMI) OF 30.0 TO 30.9 IN ADULT: ICD-10-CM

## 2024-12-30 DIAGNOSIS — M81.0 AGE-RELATED OSTEOPOROSIS WITHOUT CURRENT PATHOLOGICAL FRACTURE: ICD-10-CM

## 2024-12-30 NOTE — PROGRESS NOTES
The patient called inquiring about her referrals. I provided the patient with Dr. Roman office, Dr. Pickens office and transferred her to the infusion center to inquire about her Reclast infusions.

## 2025-01-03 ENCOUNTER — TELEPHONE (OUTPATIENT)
Age: 75
End: 2025-01-03

## 2025-01-08 ENCOUNTER — LAB ENCOUNTER (OUTPATIENT)
Dept: LAB | Facility: HOSPITAL | Age: 75
End: 2025-01-08
Attending: INTERNAL MEDICINE
Payer: MEDICARE

## 2025-01-08 ENCOUNTER — TELEPHONE (OUTPATIENT)
Facility: LOCATION | Age: 75
End: 2025-01-08

## 2025-01-08 DIAGNOSIS — M81.0 AGE-RELATED OSTEOPOROSIS WITHOUT CURRENT PATHOLOGICAL FRACTURE: ICD-10-CM

## 2025-01-08 LAB
ALBUMIN SERPL-MCNC: 4.3 G/DL (ref 3.2–4.8)
CALCIUM BLD-MCNC: 9.7 MG/DL (ref 8.7–10.4)
CREAT BLD-MCNC: 0.79 MG/DL
EGFRCR SERPLBLD CKD-EPI 2021: 78 ML/MIN/1.73M2 (ref 60–?)
VIT D+METAB SERPL-MCNC: 25.1 NG/ML (ref 30–100)

## 2025-01-08 PROCEDURE — 82040 ASSAY OF SERUM ALBUMIN: CPT

## 2025-01-08 PROCEDURE — 82310 ASSAY OF CALCIUM: CPT

## 2025-01-08 PROCEDURE — 82306 VITAMIN D 25 HYDROXY: CPT

## 2025-01-08 PROCEDURE — 36415 COLL VENOUS BLD VENIPUNCTURE: CPT

## 2025-01-08 PROCEDURE — 82565 ASSAY OF CREATININE: CPT

## 2025-01-08 NOTE — TELEPHONE ENCOUNTER
Please call pt with work up result   Vit D is low   Take D3 2000 unit/day     Will discuss more next visit   Thanks

## 2025-01-09 ENCOUNTER — PATIENT OUTREACH (OUTPATIENT)
Dept: CASE MANAGEMENT | Age: 75
End: 2025-01-09

## 2025-01-09 DIAGNOSIS — Z91.81 AT RISK FOR FALLING: ICD-10-CM

## 2025-01-09 DIAGNOSIS — M81.0 AGE-RELATED OSTEOPOROSIS WITHOUT CURRENT PATHOLOGICAL FRACTURE: ICD-10-CM

## 2025-01-09 DIAGNOSIS — E78.5 DYSLIPIDEMIA: Primary | ICD-10-CM

## 2025-01-09 NOTE — PROGRESS NOTES
Spoke to Bri for CCM.      Updates to patient care team/comments: None   Patient reported changes in medications: None     Med Adherence  Comment: Pt confirmed she is taking medications as instructed by their providers.     Health Maintenance:   Health Maintenance   Topic Date Due    Annual Well Visit  01/01/2025 Scheduled    Annual Depression Screening  01/01/2025 Updated    Fall Risk Screening (Annual)  01/01/2025 Updated    Zoster Vaccines (1 of 2) 11/09/2026 (Originally 6/24/2000)    COVID-19 Vaccine (3 - 2024-25 season) 12/12/2112 (Originally 9/1/2024)    Mammogram  07/05/2025    Colorectal Cancer Screening  04/18/2029    Influenza Vaccine  Completed    DEXA Scan  Completed    Pneumococcal Vaccine: 50+ Years  Completed    Meningococcal B Vaccine  Aged Out     Patient updates/concerns:     The patient reports feeling a bit overwhelmed and has a follow up appointment with Dr. Nguyen regarding bone loss and vitamin D deficiency. She is scheduled for a Reclast infusion on the 16th. Aside from this, she states she has no other concerns but mentions that her adult daughter with disabilities continues to cause her stress.     Goals/Action Plan:    Active goal from previous outreach:   What : Exercise.           - Where/When/How: 2-3 days a week for at least 45 minutes- 1 hours, by walking on a treadmill, using stationary bike at her friends home. This is short term until they sign up for a health club  Patient reported progress towards goals:                - What: She continues to work towards her goal           - Where/When/How: She reports eating mostly protein and vegetables. She is avoiding breads and sweets.  Patient Reported Barriers and Concerns: She is not sure if she has lost weight.                   - Plan for overcoming barriers: She will follow up in the office for her annual px and will schedule and appointment with Dr. Roman.    Care Managers Interventions:   We discussed pending referral for   Leonardo and Dr. Jessie Patterson MD  [facility]  [facility] [address]  [place] [address]  [phone]    Nutrition and exercise counseling: I encouraged the patient to consume three balanced meals daily and discussed the importance of incorporating 20-30 minutes of daily exercise, including gentle stretches. To improve overall health.    Chat review and documentation: I thoroughly reviewed the patient chart and updated Care Everywhere.    Risk Screening: The patient's fall and depression  screen were reassessed and updated accordingly.     Immunization Status: I conducted an IDPH immunization query, which confirmed that no updates were necessary at this time.     Social determinants of health: I updated social determinants of health to reflect any recent changes.    Patient support: I actively listened to the patient's concern's, provided emotional support and encouraged the patient to reach out, if she needs further assistance.     Reviewed Future Appointments:   Future Appointments   Date Time Provider Department Center   1/16/2025  9:30 AM [facility] 1 [facility] [place] [facility]   3/17/2025 12:30 PM Neela Nguyen MD [facility]   3/26/2025 11:40 AM Rosa Abbott MD [facility]     Next Care Manager Follow Up Date: One month. Encouraged patient to call as needed.    Reason For Follow Up: review progress and or barriers towards patient's goals.     Time Spent This Encounter Total: 28 min medical record review, telephone communication, care plan updates where needed, education, goals, and action plan recreation/update. Provided acknowledgment and validation to patient's concerns.   Monthly Minute Total including today: 28 min  Physical assessment, complete health history, and need for CCM established by Rosa Abbott MD.  Friendly reminder - 2025 Benefits Open Enrollment is here!   We encourage you to review your current Medicare coverage and explore your options  during this period. We have developed a Medicare Information Page on our website to serve as a resource for guidance and answers to any questions you might have.   You can access it by visiting, [email or web address]

## 2025-01-16 ENCOUNTER — OFFICE VISIT (OUTPATIENT)
Age: 75
End: 2025-01-16
Attending: INTERNAL MEDICINE
Payer: MEDICARE

## 2025-01-16 VITALS
TEMPERATURE: 98 F | RESPIRATION RATE: 16 BRPM | DIASTOLIC BLOOD PRESSURE: 63 MMHG | HEART RATE: 65 BPM | SYSTOLIC BLOOD PRESSURE: 134 MMHG

## 2025-01-16 DIAGNOSIS — M81.0 AGE-RELATED OSTEOPOROSIS WITHOUT CURRENT PATHOLOGICAL FRACTURE: Primary | ICD-10-CM

## 2025-01-16 RX ORDER — ZOLEDRONIC ACID 0.05 MG/ML
INJECTION, SOLUTION INTRAVENOUS
Status: COMPLETED
Start: 2025-01-16 | End: 2025-01-16

## 2025-01-16 RX ORDER — ZOLEDRONIC ACID 0.05 MG/ML
5 INJECTION, SOLUTION INTRAVENOUS ONCE
Status: CANCELLED | OUTPATIENT
Start: 2025-01-16

## 2025-01-16 RX ORDER — ZOLEDRONIC ACID 0.05 MG/ML
5 INJECTION, SOLUTION INTRAVENOUS ONCE
Status: COMPLETED | OUTPATIENT
Start: 2025-01-16 | End: 2025-01-16

## 2025-01-16 RX ADMIN — ZOLEDRONIC ACID 5 MG: 0.05 INJECTION, SOLUTION INTRAVENOUS at 08:23:00

## 2025-01-16 NOTE — PROGRESS NOTES
Pt here for Reclast . Pt denies any issues or concerns. Bri denies any recent or planned invasive dental work or needs.     Ordering Provider: HAYDEN Miller  Order Exp: after dose today     Pt tolerated infusion without difficulty or complaint      Education Record  Learner:  Patient  Disease/DX-Osteoporosis  Barriers / Limitations:  None  Method:  Brief focused and Discussion  General Topics:  Plan of care reviewed, Encourage Bri to push fluids today.  Outcome:  Shows understanding

## 2025-02-05 ENCOUNTER — TELEPHONE (OUTPATIENT)
Dept: ENDOCRINOLOGY CLINIC | Facility: CLINIC | Age: 75
End: 2025-02-05

## 2025-02-05 ENCOUNTER — PATIENT OUTREACH (OUTPATIENT)
Dept: CASE MANAGEMENT | Age: 75
End: 2025-02-05

## 2025-02-05 DIAGNOSIS — E66.09 CLASS 1 OBESITY DUE TO EXCESS CALORIES WITHOUT SERIOUS COMORBIDITY WITH BODY MASS INDEX (BMI) OF 30.0 TO 30.9 IN ADULT: ICD-10-CM

## 2025-02-05 DIAGNOSIS — K21.9 GASTROESOPHAGEAL REFLUX DISEASE, UNSPECIFIED WHETHER ESOPHAGITIS PRESENT: ICD-10-CM

## 2025-02-05 DIAGNOSIS — E66.811 CLASS 1 OBESITY DUE TO EXCESS CALORIES WITHOUT SERIOUS COMORBIDITY WITH BODY MASS INDEX (BMI) OF 30.0 TO 30.9 IN ADULT: ICD-10-CM

## 2025-02-05 DIAGNOSIS — E78.5 DYSLIPIDEMIA: Primary | ICD-10-CM

## 2025-02-05 DIAGNOSIS — M81.0 AGE-RELATED OSTEOPOROSIS WITHOUT CURRENT PATHOLOGICAL FRACTURE: ICD-10-CM
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
## 2025-02-06 RX ORDER — ACETAMINOPHEN 160 MG
2000 TABLET,DISINTEGRATING ORAL DAILY
COMMUNITY

## 2025-02-06 NOTE — PROGRESS NOTES
The patient called, confused about a call stating she had diabetes and needed to start medication.    After reviewing her chart, It was determined Dr. Nguyen office contacted her regarding a January 8th letter about taking 2,000 units of Vitamin D daily.  I reviewed the letter with the patient, who verbalized understanding and stated she would go to the pharmacy to pick it up.     Information reviewed with the patient pasted below.      This letter is to inform you that our office has made several attempts to reach you by phone without success.  We were attempting to contact you by phone regarding your test results.     Dr. Nguyen recently reviewed your lab results and had the following recommendations:    Vitamin D level is low  Please take vitamin D3 over the counter 2000 international units per day.     Please contact our office at the number listed below as soon as you receive this letter to discuss this issue and to make the necessary changes in our system to your contact information.  Thank you for your cooperation.

## 2025-03-06 RX ORDER — CALCIUM CARBONATE-CHOLECALCIFEROL TAB 250 MG-125 UNIT 250-125 MG-UNIT
1 TAB ORAL DAILY
Qty: 90 TABLET | Refills: 3 | Status: SHIPPED | OUTPATIENT
Start: 2025-03-06

## 2025-03-06 NOTE — TELEPHONE ENCOUNTER
Please Review. Protocol Failed; No Protocol   New medication started last fill    Calcium Carb-Cholecalciferol 250-3.125 MG-MCG 1 tablet Oral Daily  Is refill appropriate Medication refill pended for your review/approval     Requested Prescriptions   Pending Prescriptions Disp Refills    CALCIUM-VITAMIN D3 250-3.125 MG-MCG Oral Tab [Pharmacy Med Name: CALCIUM 250-VIT D3 125 TABLET] 90 tablet 3     Sig: TAKE 1 TABLET BY MOUTH EVERY DAY       There is no refill protocol information for this order

## 2025-03-11 ENCOUNTER — PATIENT OUTREACH (OUTPATIENT)
Dept: CASE MANAGEMENT | Age: 75
End: 2025-03-11

## 2025-03-11 NOTE — PROGRESS NOTES
Patient called and LVM asking for care manager Myra to return call. I am helping out. Returned patients call no answer. LMTCB

## 2025-03-17 ENCOUNTER — OFFICE VISIT (OUTPATIENT)
Dept: ENDOCRINOLOGY CLINIC | Facility: CLINIC | Age: 75
End: 2025-03-17

## 2025-03-17 VITALS
HEIGHT: 63.5 IN | BODY MASS INDEX: 32.55 KG/M2 | HEART RATE: 64 BPM | SYSTOLIC BLOOD PRESSURE: 113 MMHG | WEIGHT: 186 LBS | DIASTOLIC BLOOD PRESSURE: 52 MMHG

## 2025-03-17 DIAGNOSIS — K21.9 GASTROESOPHAGEAL REFLUX DISEASE WITHOUT ESOPHAGITIS: Primary | ICD-10-CM

## 2025-03-17 DIAGNOSIS — M81.0 AGE-RELATED OSTEOPOROSIS WITHOUT CURRENT PATHOLOGICAL FRACTURE: ICD-10-CM

## 2025-03-17 DIAGNOSIS — Z99.89 USE OF CANE AS AMBULATORY AID: ICD-10-CM

## 2025-03-17 PROCEDURE — 1160F RVW MEDS BY RX/DR IN RCRD: CPT | Performed by: INTERNAL MEDICINE

## 2025-03-17 PROCEDURE — 99213 OFFICE O/P EST LOW 20 MIN: CPT | Performed by: INTERNAL MEDICINE

## 2025-03-17 PROCEDURE — 1159F MED LIST DOCD IN RCRD: CPT | Performed by: INTERNAL MEDICINE

## 2025-03-17 PROCEDURE — G2211 COMPLEX E/M VISIT ADD ON: HCPCS | Performed by: INTERNAL MEDICINE

## 2025-03-17 NOTE — PATIENT INSTRUCTIONS
RTC in 1/2026  Reclast once a year next dose in 1/2026.   DXA 7/2026     Take vitamin D3 2000 international unit a day and calcium 600 mg twice a day or 3 servings of dairy a day   Do weight bearing exercise   Follow fall precautions   educational material will be attached to plan  We discussed diagnosis, treatment options, fall risk, long term complications and side effect.   Discussed options oral bisphosphonate vs infusion bisphosphonate vs Denosumab   Side effect from osteoporosis meds   Hypocalcemia: Adequately supplement calcium and vitamin D during  Osteonecrosis of the Jaw: Monitor for symptoms.   Atypical Femoral Fracture: Evaluate new or unusual thigh, hip, or groin

## 2025-03-17 NOTE — PROGRESS NOTES
Reason for Visit:  osteoporosis     Requesting Physician:   ..Rosa Abbott MD  No referring provider defined for this encounter.     CHIEF COMPLAINT:    Chief Complaint   Patient presents with    Osteoporosis     F/u        HISTORY OF PRESENT ILLNESS:   Bri Padilla is a 74 year old female who presents with  high risk for fracture, osteopenia with high FRAX, fall risk, previous CVA, uses cane, GERD and obese.   Was seen with her daughter.    She did tolerate her 1st Reclast dose which was on 1/16/2025   Takes vit D3 and Ca BID   Had DXA scan in 7/2024 osteopenia FRAX 17% - 3.5%      Fragility fracture: no  Height loss: yes 1 inch     Family history of osteoporosis or fragility fracture: no    Patient is on fall precaution.  Patient is taking Vitamin D and calcium.   Educated the patient to do wt-bearing exercise, but avoid falls.     GERD : yes on PPI     ASSESSMENT AND PLAN:  Bir Padilla is a 74 year old female who presents with  high risk for fracture, osteopenia with high FRAX, fall risk, previous CVA, uses cane, GERD and obese.   Has no teeth , dentures only    S/p Reclast in Jan 2025. Well tolerated.   Using cane. We gave printout and discussed with the pt   She was overwhelmed today. Will send a msg to staff to call and get an appt in Jan 2026   Plan    RTC in 1/2026 and labs prior   Reclast once a year next dose in 1/2026.   DXA 7/2026     Take vitamin D3 2000 international unit a day and calcium 600 mg twice a day or 3 servings of dairy a day   Do weight bearing exercise   Follow fall precautions   educational material will be attached to plan  We discussed diagnosis, treatment options, fall risk, long term complications and side effect.   Discussed options oral bisphosphonate vs infusion bisphosphonate vs Denosumab   Side effect from osteoporosis meds   Hypocalcemia: Adequately supplement calcium and vitamin D during  Osteonecrosis of the Jaw: Monitor for symptoms.   Atypical Femoral Fracture:  Evaluate new or unusual thigh, hip, or groin    PAST MEDICAL HISTORY:   Past Medical History:    Age-related nuclear cataract of both eyes    Anemia    FeSo4    Asthma (HCC)    Bilateral amblyopia    Closed fracture of left hip (HCC)    Colon polyp    Degeneration of lumbar or lumbosacral intervertebral disc    DVT of lower extremity (deep venous thrombosis) (HCC)    post delivery of last child 33 years ago    Esophageal reflux    Fracture    KNEE NO SURGERY    High cholesterol    Hyperlipidemia    Diet    Hyperopia of both eyes with astigmatism and presbyopia    Lipid screening    OA (osteoarthritis)    OA (osteoarthritis)    Obesity (BMI 30-39.9)    Osteoporosis screening    Overweight (BMI 25.0-29.9)    Pathological fracture of left hip due to age-related osteoporosis (HCC)    Postmenopausal status (age-related) (natural)    Shortness of breath    Stroke due to embolism of right cerebellar artery (HCC)    Thrombocytopenia       PAST SURGICAL HISTORY:   Past Surgical History:   Procedure Laterality Date    Cataract extraction w/  intraocular lens implant Right 11/09/2020    Dr. Gutierrez @ [facility] surgery    Cataract extraction w/  intraocular lens implant Left 11/23/2020    Dr. Gutierrez@ [facility] surgery     Colonoscopy  2014    Colonoscopy N/A 11/26/2019    Procedure: COLONOSCOPY;  Surgeon: Jaden Marquis MD;  Location: [facility] ENDOSCOPY    Colonoscopy N/A 04/18/2024    Procedure: COLONOSCOPY;  Surgeon: Jaden Marquis MD;  Location: [facility] MAIN OR    Hip replacement surgery Left 06/19/2022    Andre localization wire 1 site right (cpt=19281)      Needle biopsy right  08/01/2023       CURRENT MEDICATIONS:     cholecalciferol 50 MCG (2000 UT) Oral Cap Take 1 capsule (2,000 Units total) by mouth daily.      Biotin 5000 MCG Oral Tab Take by mouth.         ALLERGIES:  Allergies[1]    SOCIAL HISTORY:    Social History     Socioeconomic History    Marital status:    Tobacco Use    Smoking status:  Former     Current packs/day: 0.00     Average packs/day: 0.3 packs/day for 30.0 years (9.0 ttl pk-yrs)     Types: Cigarettes     Start date: 1974     Quit date: 2004     Years since quittin.1     Passive exposure: Past    Smokeless tobacco: Never   Vaping Use    Vaping status: Never Used   Substance and Sexual Activity    Alcohol use: Yes     Alcohol/week: 0.0 standard drinks of alcohol     Comment: holidays    Drug use: No    Sexual activity: Never   Other Topics Concern    Caffeine Concern No    Pt has a pacemaker No    Pt has a defibrillator No    Breast feeding No    Reaction to local anesthetic No     Smoking no   FAMILY HISTORY:   Family History   Problem Relation Age of Onset    Diabetes Mother 62    Cancer Mother         unknown type    Other (Other) Mother     Stroke Father 70        CVA    Diabetes Father     Glaucoma Neg     Macular degeneration Neg     Breast Cancer Neg     Ovarian Cancer Neg           PHYSICAL EXAM:   Height: 5' 3.5\" (161.3 cm) ( 1221)  Weight: 186 lb (84.4 kg) ( 1221)  BSA (Calculated - sq m): 1.89 sq meters ( 1221)  Pulse: 64 ( 1221)  BP: 113/52 ( 1221)  Temp: --  Do Not Use - Resp Rate: --  SpO2: --    Body mass index is 32.43 kg/m².    CONSTITUTIONAL:  Awake and alert. Age appropriate, good hygiene not in acute distress. Well-nourished and well developed. no acute distress      DATA:     Pertinent data reviewed   24 14:03   XR DEXA BONE DENSITOMETRY   Osteopenia FRAX 17%-     3.5%    Rpt: View report in Results Review for more information   No results found.   Kidney:    Lab Results   Component Value Date    BUN 14 08/10/2024    BUN 15 10/17/2023    BUN 21 (H) 10/06/2023     Lab Results   Component Value Date    CREATSERUM 0.79 2025    CREATSERUM 0.84 08/10/2024    CREATSERUM 0.85 10/17/2023      BMP:   No results found for: \"GLUCOSE\"  Lab Results   Component Value Date    K 3.8 08/10/2024    K 4.0 10/17/2023    K 3.6 10/06/2023      Lab Results   Component Value Date    BUN 14 08/10/2024    BUN 15 10/17/2023    BUN 21 (H) 10/06/2023     Lab Results   Component Value Date    CREATSERUM 0.79 01/08/2025    CREATSERUM 0.84 08/10/2024    CREATSERUM 0.85 10/17/2023         No results for input(s): \"TSH\", \"T4F\", \"T3F\", \"THYP\" in the last 72 hours.  No results found.    Orders Placed This Encounter   Procedures    Basic Metabolic Panel (8)     Orders Placed This Encounter    Basic Metabolic Panel (8)     Standing Status:   Future     Standing Expiration Date:   3/17/2026     Order Specific Question:   Release to patient     Answer:   Immediate          This is a specialized patient consultation in endocrinology and required comprehensive review of prior records, as well as current evaluation, with time required for consideration of complex endocrine issues and consultation. For this visit, I personally interviewed the patient, and family member if accompanied, performed the pertinent parts of the history and physical examination. ROS included screening for appropriate endocrine conditions.   Today's diagnosis and plan were reviewed in detail with the patient who states understanding and agrees with plan. I discussed with the patient possible diagnosis, differential diagnosis, need for work up, treatment options, alternatives and side effects.     Please see note for details about time spent which includes:   · pre-visit preparation  · reviewing records  · face to face time with the patient   · timely documentation of the encounter  · ordering medications/tests  · communication with care team  · care coordination    I appreciate the opportunity to be part of your patient's medical care and will keep you, as the referring and primary physicians, informed about the care of your patient. Please feel free to contact me should you have any questions.    The 21st Century Cures Act makes medical notes like these available to patients in the interest of  transparency. Please be advised this is a medical document. Medical documents are intended to carry relevant information, facts as evident, and the clinical opinion of the practitioner. The medical note is intended as peer to peer communication and may appear blunt or direct. It is written in medical language and may contain abbreviations or verbiage that are unfamiliar.   Neela Nguyen MD              [1] No Known Allergies

## 2025-04-01 ENCOUNTER — PATIENT OUTREACH (OUTPATIENT)
Dept: CASE MANAGEMENT | Age: 75
End: 2025-04-01

## 2025-04-01 DIAGNOSIS — E78.5 DYSLIPIDEMIA: Primary | ICD-10-CM

## 2025-04-01 DIAGNOSIS — E66.811 CLASS 1 OBESITY DUE TO EXCESS CALORIES WITHOUT SERIOUS COMORBIDITY WITH BODY MASS INDEX (BMI) OF 30.0 TO 30.9 IN ADULT: ICD-10-CM

## 2025-04-01 DIAGNOSIS — Z91.81 AT RISK FOR FALLING: ICD-10-CM

## 2025-04-01 DIAGNOSIS — E66.09 CLASS 1 OBESITY DUE TO EXCESS CALORIES WITHOUT SERIOUS COMORBIDITY WITH BODY MASS INDEX (BMI) OF 30.0 TO 30.9 IN ADULT: ICD-10-CM

## 2025-04-01 DIAGNOSIS — M81.0 AGE-RELATED OSTEOPOROSIS WITHOUT CURRENT PATHOLOGICAL FRACTURE: ICD-10-CM

## 2025-04-01 RX ORDER — ALBUTEROL SULFATE 90 UG/1
2 INHALANT RESPIRATORY (INHALATION) EVERY 4 HOURS PRN
Qty: 54 EACH | Refills: 5 | Status: SHIPPED | OUTPATIENT
Start: 2025-04-01

## 2025-04-01 NOTE — TELEPHONE ENCOUNTER
Refill passed per [facility] protocol.    Requested Prescriptions   Pending Prescriptions Disp Refills    ALBUTEROL 108 (90 Base) MCG/ACT Inhalation Aero Soln [Pharmacy Med Name: ALBUTEROL HFA (VENTOLIN) INH] 54 each 5     Sig: Inhale 2 puffs into the lungs every 4 (four) hours as needed. For wheezing       Asthma & COPD Medication Protocol Passed - 4/1/2025  1:55 PM        Passed - Appointment in past 6 or next 3 months      Recent Outpatient Visits              2 weeks ago Gastroesophageal reflux disease without esophagitis    [facility] Neela Clark MD    Office Visit    2 months ago Age-related osteoporosis without current pathological fracture    Yvette Bojorquez [facility]    Office Visit    4 months ago Asthma with COPD (chronic obstructive pulmonary disease) ([facility])    [facility] Rosa Abbott MD    Office Visit    4 months ago Age-related osteoporosis without current pathological fracture    [facility] Neela Clark MD    Office Visit    7 months ago Asthma with COPD (chronic obstructive pulmonary disease) ([facility])    [facility] Rosa Abbott MD    Office Visit          Future Appointments         Provider Department Appt Notes    In 1 week Rosa Abbott MD [facility] px, last px 4/15/24    In 10 months Neela Nguyen MD [facility] f/up                    Passed - Medication is active on med list             Recent Outpatient Visits              2 weeks ago Gastroesophageal reflux disease without esophagitis    [facility] Neela Nguyen MD    Office Visit    2 months ago Age-related osteoporosis without current pathological fracture     Yvette Bojorquez [facility]    Office Visit    4 months ago Asthma with COPD (chronic obstructive pulmonary disease) ([facility])    [facility], Rosa Phillips MD    Office Visit    4 months ago Age-related osteoporosis without current pathological fracture    [facility] Neela Nguyen MD    Office Visit    7 months ago Asthma with COPD (chronic obstructive pulmonary disease) ([facility])    [facility], [facility], Rosa Phillips MD    Office Visit            Future Appointments         Provider Department Appt Notes    In 1 week Rosa Abbott MD [facility] ma px, last px 4/15/24    In 10 months Neela Nguyen MD [facility] f/up

## 2025-04-02 NOTE — PROGRESS NOTES
Spoke to Bri for CCM.      Updates to patient care team/comments: None   Patient reported changes in medications: None     Med Adherence  Comment: Pt confirmed she  is taking medications as instructed by their providers.     Health Maintenance:   Health Maintenance   Topic Date Due    Annual Well Visit  01/01/2025 Scheduled    Zoster Vaccines (1 of 2) 11/09/2026 (Originally 6/24/2000)    COVID-19 Vaccine (3 - 2024-25 season) 12/12/2112 (Originally 9/1/2024)    Mammogram  07/05/2025    Colorectal Cancer Screening  04/18/2029    Influenza Vaccine  Completed    DEXA Scan  Completed    Annual Depression Screening  Completed    Fall Risk Screening (Annual)  Completed    Pneumococcal Vaccine: 50+ Years  Completed    Meningococcal B Vaccine  Aged Out     Patient updates/concerns:     The patient reports undergoing her first dose of Reclast on 1/16/2025. She mentioned tolerating it well. States she is losing height and was encouraged to start using a cane when ambulating. She mentioned having a cane at home.     She remains committed to her weight loss journey and has lost 9 lbs since November. She follows a low carb diet focusing on healthier food choices while working to increase her activity level. She also incorporates exercises at home that she learned during PT.    Her stress levels at home fluctuate, especially with her daughter back home. She noted that some days are good, while others are more challenging.     Goals/Action Plan:    Active goal from previous outreach:   What : Exercise.           - Where/When/How: 2-3 days a week for at least 45 minutes- 1 hours, by walking on a treadmill, using stationary bike at her friends home. This is short term until they sign up for a health club  Patient reported progress towards goals:                - What: She is committed to meeting and maintaining her goal.           - Where/When/How: She does stretches and online exercises daily for 15-20 minutes. She mentioned  doing sets of 3-5 daily.  Patient Reported Barriers and Concerns: None                   - Plan for overcoming barriers: N/a    Care Managers Interventions:     Nutrition and exercise counseling: I encouraged the patient to consume three balanced meals daily and discussed the importance of incorporating 20-30 minutes of daily exercise, including gentle stretches. To improve overall health.    Chat review and documentation: I thoroughly reviewed the patient chart and updated Care Everywhere.    Immunization Status: I conducted an [employer] immunization query, which confirmed that no updates were necessary at this time.     Patient support: I actively listened to the patient's concern's, provided emotional support and encouraged the patient to reach out, if she needs further assistance.     Reviewed Future Appointments:   Future Appointments   Date Time Provider Department Center   4/9/2025 10:20 AM Rosa Abbott MD [facility]   1/26/2026 12:30 PM Neela Nguyen MD [facility] Care Manager Follow Up Date: One month. Encouraged patient to call as needed.    Reason For Follow Up: review progress and or barriers towards patient's goals.     Time Spent This Encounter Total: 29 min medical record review, telephone communication, care plan updates where needed, education, goals, and action plan recreation/update. Provided acknowledgment and validation to patient's concerns.   Monthly Minute Total including today: 29  Physical assessment, complete health history, and need for CCM established by Rosa Abbott MD.  Friendly reminder - 2025 Benefits Open Enrollment is here!   We encourage you to review your current Medicare coverage and explore your options during this period. We have developed a Medicare Information Page on our website to serve as a resource for guidance and answers to any questions you might have.   You can access it by visiting, [email or web address]

## 2025-04-09 ENCOUNTER — OFFICE VISIT (OUTPATIENT)
Dept: INTERNAL MEDICINE CLINIC | Facility: CLINIC | Age: 75
End: 2025-04-09

## 2025-04-09 ENCOUNTER — LAB ENCOUNTER (OUTPATIENT)
Dept: LAB | Age: 75
End: 2025-04-09
Attending: INTERNAL MEDICINE
Payer: MEDICARE

## 2025-04-09 VITALS
BODY MASS INDEX: 32.72 KG/M2 | DIASTOLIC BLOOD PRESSURE: 63 MMHG | HEIGHT: 63.5 IN | TEMPERATURE: 98 F | SYSTOLIC BLOOD PRESSURE: 112 MMHG | HEART RATE: 63 BPM | WEIGHT: 187 LBS

## 2025-04-09 DIAGNOSIS — E66.09 CLASS 1 OBESITY DUE TO EXCESS CALORIES WITHOUT SERIOUS COMORBIDITY WITH BODY MASS INDEX (BMI) OF 30.0 TO 30.9 IN ADULT: ICD-10-CM

## 2025-04-09 DIAGNOSIS — Z00.00 MEDICARE ANNUAL WELLNESS VISIT, SUBSEQUENT: ICD-10-CM

## 2025-04-09 DIAGNOSIS — Z12.31 VISIT FOR SCREENING MAMMOGRAM: ICD-10-CM

## 2025-04-09 DIAGNOSIS — L30.9 DERMATITIS: ICD-10-CM

## 2025-04-09 DIAGNOSIS — M81.0 AGE-RELATED OSTEOPOROSIS WITHOUT CURRENT PATHOLOGICAL FRACTURE: ICD-10-CM

## 2025-04-09 DIAGNOSIS — E78.5 DYSLIPIDEMIA: ICD-10-CM

## 2025-04-09 DIAGNOSIS — J44.89 ASTHMA WITH COPD (CHRONIC OBSTRUCTIVE PULMONARY DISEASE) (HCC): Chronic | ICD-10-CM

## 2025-04-09 DIAGNOSIS — Z99.89 USE OF CANE AS AMBULATORY AID: ICD-10-CM

## 2025-04-09 DIAGNOSIS — M17.0 PRIMARY OSTEOARTHRITIS OF BOTH KNEES: ICD-10-CM

## 2025-04-09 DIAGNOSIS — Z00.00 ENCOUNTER FOR ANNUAL HEALTH EXAMINATION: ICD-10-CM

## 2025-04-09 DIAGNOSIS — Z91.81 AT RISK FOR FALLING: ICD-10-CM

## 2025-04-09 DIAGNOSIS — E78.5 HYPERLIPIDEMIA, UNSPECIFIED HYPERLIPIDEMIA TYPE: ICD-10-CM

## 2025-04-09 DIAGNOSIS — E66.811 CLASS 1 OBESITY DUE TO EXCESS CALORIES WITHOUT SERIOUS COMORBIDITY WITH BODY MASS INDEX (BMI) OF 30.0 TO 30.9 IN ADULT: ICD-10-CM

## 2025-04-09 DIAGNOSIS — D69.6 THROMBOCYTOPENIA, UNSPECIFIED: ICD-10-CM

## 2025-04-09 DIAGNOSIS — Z00.00 MEDICARE ANNUAL WELLNESS VISIT, SUBSEQUENT: Primary | ICD-10-CM

## 2025-04-09 DIAGNOSIS — R26.9 GAIT ABNORMALITY: ICD-10-CM

## 2025-04-09 DIAGNOSIS — K21.9 GASTROESOPHAGEAL REFLUX DISEASE WITHOUT ESOPHAGITIS: ICD-10-CM

## 2025-04-09 LAB
ALBUMIN SERPL-MCNC: 4.3 G/DL (ref 3.2–4.8)
ALBUMIN/GLOB SERPL: 1.8 {RATIO} (ref 1–2)
ALP LIVER SERPL-CCNC: 81 U/L (ref 55–142)
ALT SERPL-CCNC: 16 U/L (ref 10–49)
ANION GAP SERPL CALC-SCNC: 10 MMOL/L (ref 0–18)
AST SERPL-CCNC: 23 U/L (ref ?–34)
BASOPHILS # BLD AUTO: 0.04 X10(3) UL (ref 0–0.2)
BASOPHILS NFR BLD AUTO: 0.8 %
BILIRUB SERPL-MCNC: 0.7 MG/DL (ref 0.2–1.1)
BILIRUB UR QL: NEGATIVE
BUN BLD-MCNC: 17 MG/DL (ref 9–23)
BUN/CREAT SERPL: 20.7 (ref 10–20)
CALCIUM BLD-MCNC: 9.3 MG/DL (ref 8.7–10.4)
CHLORIDE SERPL-SCNC: 113 MMOL/L (ref 98–112)
CHOLEST SERPL-MCNC: 131 MG/DL (ref ?–200)
CO2 SERPL-SCNC: 24 MMOL/L (ref 21–32)
COLOR UR: YELLOW
CREAT BLD-MCNC: 0.82 MG/DL (ref 0.55–1.02)
DEPRECATED RDW RBC AUTO: 49.3 FL (ref 35.1–46.3)
EGFRCR SERPLBLD CKD-EPI 2021: 75 ML/MIN/1.73M2 (ref 60–?)
EOSINOPHIL # BLD AUTO: 0.38 X10(3) UL (ref 0–0.7)
EOSINOPHIL NFR BLD AUTO: 7.8 %
ERYTHROCYTE [DISTWIDTH] IN BLOOD BY AUTOMATED COUNT: 14.5 % (ref 11–15)
FASTING PATIENT LIPID ANSWER: NO
FASTING STATUS PATIENT QL REPORTED: NO
GLOBULIN PLAS-MCNC: 2.4 G/DL (ref 2–3.5)
GLUCOSE BLD-MCNC: 80 MG/DL (ref 70–99)
GLUCOSE UR-MCNC: NORMAL MG/DL
HCT VFR BLD AUTO: 35.6 % (ref 35–48)
HDLC SERPL-MCNC: 65 MG/DL (ref 40–59)
HGB BLD-MCNC: 11.9 G/DL (ref 12–16)
HGB UR QL STRIP.AUTO: NEGATIVE
IMM GRANULOCYTES # BLD AUTO: 0.01 X10(3) UL (ref 0–1)
IMM GRANULOCYTES NFR BLD: 0.2 %
KETONES UR-MCNC: NEGATIVE MG/DL
LDLC SERPL CALC-MCNC: 54 MG/DL (ref ?–100)
LEUKOCYTE ESTERASE UR QL STRIP.AUTO: 500
LYMPHOCYTES # BLD AUTO: 0.99 X10(3) UL (ref 1–4)
LYMPHOCYTES NFR BLD AUTO: 20.4 %
MCH RBC QN AUTO: 31.2 PG (ref 26–34)
MCHC RBC AUTO-ENTMCNC: 33.4 G/DL (ref 31–37)
MCV RBC AUTO: 93.4 FL (ref 80–100)
MONOCYTES # BLD AUTO: 0.44 X10(3) UL (ref 0.1–1)
MONOCYTES NFR BLD AUTO: 9.1 %
NEUTROPHILS # BLD AUTO: 3 X10 (3) UL (ref 1.5–7.7)
NEUTROPHILS # BLD AUTO: 3 X10(3) UL (ref 1.5–7.7)
NEUTROPHILS NFR BLD AUTO: 61.7 %
NITRITE UR QL STRIP.AUTO: NEGATIVE
NONHDLC SERPL-MCNC: 66 MG/DL (ref ?–130)
OSMOLALITY SERPL CALC.SUM OF ELEC: 305 MOSM/KG (ref 275–295)
PH UR: 7 [PH] (ref 5–8)
PLATELET # BLD AUTO: 135 10(3)UL (ref 150–450)
POTASSIUM SERPL-SCNC: 4.1 MMOL/L (ref 3.5–5.1)
PROT SERPL-MCNC: 6.7 G/DL (ref 5.7–8.2)
PROT UR-MCNC: NEGATIVE MG/DL
RBC # BLD AUTO: 3.81 X10(6)UL (ref 3.8–5.3)
SODIUM SERPL-SCNC: 147 MMOL/L (ref 136–145)
SP GR UR STRIP: 1.02 (ref 1–1.03)
T4 FREE SERPL-MCNC: 1 NG/DL (ref 0.8–1.7)
TRIGL SERPL-MCNC: 51 MG/DL (ref 30–149)
TSI SER-ACNC: 2.63 UIU/ML (ref 0.55–4.78)
UROBILINOGEN UR STRIP-ACNC: NORMAL
VLDLC SERPL CALC-MCNC: 7 MG/DL (ref 0–30)
WBC # BLD AUTO: 4.9 X10(3) UL (ref 4–11)

## 2025-04-09 PROCEDURE — 84443 ASSAY THYROID STIM HORMONE: CPT

## 2025-04-09 PROCEDURE — G0439 PPPS, SUBSEQ VISIT: HCPCS | Performed by: INTERNAL MEDICINE

## 2025-04-09 PROCEDURE — 81001 URINALYSIS AUTO W/SCOPE: CPT

## 2025-04-09 PROCEDURE — 1159F MED LIST DOCD IN RCRD: CPT | Performed by: INTERNAL MEDICINE

## 2025-04-09 PROCEDURE — 80061 LIPID PANEL: CPT

## 2025-04-09 PROCEDURE — 80053 COMPREHEN METABOLIC PANEL: CPT

## 2025-04-09 PROCEDURE — 85025 COMPLETE CBC W/AUTO DIFF WBC: CPT

## 2025-04-09 PROCEDURE — 99213 OFFICE O/P EST LOW 20 MIN: CPT | Performed by: INTERNAL MEDICINE

## 2025-04-09 PROCEDURE — 1125F AMNT PAIN NOTED PAIN PRSNT: CPT | Performed by: INTERNAL MEDICINE

## 2025-04-09 PROCEDURE — 96160 PT-FOCUSED HLTH RISK ASSMT: CPT | Performed by: INTERNAL MEDICINE

## 2025-04-09 PROCEDURE — 36415 COLL VENOUS BLD VENIPUNCTURE: CPT

## 2025-04-09 PROCEDURE — 84439 ASSAY OF FREE THYROXINE: CPT

## 2025-04-09 PROCEDURE — 1170F FXNL STATUS ASSESSED: CPT | Performed by: INTERNAL MEDICINE

## 2025-04-09 RX ORDER — TRIAMCINOLONE ACETONIDE 1 MG/G
1 CREAM TOPICAL 2 TIMES DAILY
Qty: 80 G | Refills: 3 | Status: SHIPPED | OUTPATIENT
Start: 2025-04-09

## 2025-04-23 PROBLEM — Z82.3 FAMILY HISTORY OF CEREBROVASCULAR ACCIDENT (CVA): Status: RESOLVED | Noted: 2024-11-08 | Resolved: 2025-04-23

## 2025-04-23 PROBLEM — Z86.0100 HISTORY OF COLONIC POLYPS: Status: RESOLVED | Noted: 2024-11-07 | Resolved: 2025-04-23

## 2025-04-23 NOTE — PROGRESS NOTES
Subjective:   Bri Padilla is a 74 year old female who presents for a MA AHA (Medicare Advantage Annual Health Assessment) and Subsequent Annual Wellness visit (Pt already had Initial Annual Wellness) and scheduled follow up of multiple significant but stable problems.   History of Present Illness          Asthma with COPD    Denies wheezing or shortness of breath  Pt low level of activity due to severe osteoarthritis of both knees she has gait and balance abnormality walks with a cane  Hyperlipidemia         Headache no  dizziness        no                             Blurred vision no  palpitaionsSyncope no  Chest pain  no  PND  Orthopnea no  Weakness  No  Healthy diet in general   yes    Compliance with exercise stays active  but limited activities due to OA knees  Compliance with medication yes                                            History/Other:   Fall Risk Assessment:   She has been screened for Falls and is High Risk. Fall Prevention information provided to patient in After Visit Summary.    Do you feel unsteady when standing or walking?: Yes  Do you worry about falling?: Yes  Have you fallen in the past year?: Yes  How many times have you fallen?: 3  Were you injured?: Yes     Cognitive Assessment:   She had a completely normal cognitive assessment - see flowsheet entries     Functional Ability/Status:   Bri Padilla has some abnormal functions as listed below:  She has difficulties Shopping for Groceries based on screening of functional status. She has difficulties Affording Meds based on screening of functional status. She has Hearing problems based on screening of functional status.She has Walking problems based on screening of functional status. She has problems with Memory based on screening of functional status.       Depression Screening (PHQ):  PHQ-9 TOTAL SCORE: 4  , done 4/9/2025   Little interest or pleasure in doing things: 1    Feeling down, depressed, or hopeless: 1    Poor  appetite or overeatin    Trouble concentrating on things, such as reading the newspaper or watching television: 1    If you checked off any problems, how difficult have these problems made it for you to do your work, take care of things at home, or get along with other people?: Somewhat difficult           Advanced Directives:   She does NOT have a Living Will. [Do you have a living will?: No]  She does NOT have a Power of  for Health Care. [Do you have a healthcare power of ?: No]  Discussed Advance Care Planning with patient (and family/surrogate if present). Standard forms made available to patient in After Visit Summary.      Problem List[1]  Allergies:  She has no known allergies.    Current Medications:  Active Meds, Sig Only[2]    Medical History:  She  has a past medical history of Age-related nuclear cataract of both eyes (2017), Anemia, Asthma ([facility]), Bilateral amblyopia (10/10/2019), Closed fracture of left hip (HCC) (2022), Colon polyp, Degeneration of lumbar or lumbosacral intervertebral disc (2014), DVT of lower extremity (deep venous thrombosis) ([facility]), Esophageal reflux, Fracture (2016), High cholesterol, Hyperlipidemia, Hyperopia of both eyes with astigmatism and presbyopia (10/10/2019), Lipid screening (2014), OA (osteoarthritis) (2017), OA (osteoarthritis) (2017), Obesity (BMI 30-39.9), Osteoporosis screening (2014), Overweight (BMI 25.0-29.9) (2019), Pathological fracture of left hip due to age-related osteoporosis (HCC) (2022), Postmenopausal status (age-related) (natural) (2014), Shortness of breath, 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  Surgical History:  She  has a past surgical history that includes colonoscopy (); colonoscopy (N/A, 2019); Cataract extraction w/  intraocular lens implant (Right, 2020); Cataract extraction w/  intraocular lens  implant (Left, 11/23/2020); hip replacement surgery (Left, 06/19/2022); needle biopsy right (08/01/2023); colonoscopy (N/A, 04/18/2024); and nato localization wire 1 site right (cpt=19281).   Family History:  Her family history includes Cancer in her mother; Diabetes in her father; Diabetes (age of onset: 62) in her mother; Other in her mother; Stroke (age of onset: 70) in her father.  Social History:  She  reports that she quit smoking about 21 years ago. Her smoking use included cigarettes. She started smoking about 51 years ago. She has a 9 pack-year smoking history. She has been exposed to tobacco smoke. She has never used smokeless tobacco. She reports current alcohol use. She reports that she does not use drugs.    Tobacco:  She smoked tobacco in the past but quit greater than 12 months ago.  Tobacco Use[3]     CAGE Alcohol Screen:   CAGE screening score of 0 on 4/9/2025, showing low risk of alcohol abuse.      Patient Care Team:  Rosa Abbott MD as PCP - General (Internal Medicine)  Ranjit Roman MD as Consulting Physician (Internal Medicine)  Massiel Canales MD (UROLOGY)  Lakeshia Baker MD (DERMATOLOGY)  Anmol Calhoun MD (SURGERY, ORTHOPEDIC)  Myra Sales CMA as [facility] Care Manager (Care Management)  Jaden Marquis MD (GASTROENTEROLOGY)  Danie Rodrigez MD (OPHTHALMOLOGY)  Florinda Brown MD (Surgical Oncology)    Review of Systems   Constitutional:  Negative for activity change, chills, fatigue and fever.   HENT:  Negative for ear discharge, nosebleeds, postnasal drip, rhinorrhea, sinus pressure and sore throat.    Eyes:  Negative for pain, discharge and redness.   Respiratory:  Negative for cough, chest tightness, shortness of breath and wheezing.    Cardiovascular:  Negative for chest pain, palpitations and leg swelling.   Gastrointestinal:  Negative for abdominal pain, blood in stool, constipation, diarrhea, nausea and vomiting.   Genitourinary:  Negative for difficulty  urinating, dysuria, frequency, hematuria and urgency.   Musculoskeletal:  Positive for arthralgias and gait problem. Negative for back pain and joint swelling.   Skin:  Positive for color change (chronic statis dermatitis  lower extremities).   Neurological:  Negative for syncope, weakness, light-headedness and headaches.   Psychiatric/Behavioral:  Negative for dysphoric mood. The patient is not nervous/anxious.          Objective:   Physical Exam  Constitutional:       Appearance: She is well-developed. She is not ill-appearing.   HENT:      Right Ear: Ear canal normal.      Left Ear: Ear canal normal.      Mouth/Throat:      Pharynx: Oropharynx is clear.   Eyes:      Extraocular Movements: Extraocular movements intact.      Conjunctiva/sclera: Conjunctivae normal.      Pupils: Pupils are equal, round, and reactive to light.   Cardiovascular:      Rate and Rhythm: Normal rate and regular rhythm.      Heart sounds: Normal heart sounds.   Pulmonary:      Effort: Pulmonary effort is normal.      Breath sounds: Normal breath sounds.   Abdominal:      General: Bowel sounds are normal.      Palpations: Abdomen is soft.   Skin:     General: Skin is warm and dry.      Findings: Rash (stasis dermatitis lower extm) present.   Neurological:      Mental Status: She is alert.   Psychiatric:         Mood and Affect: Mood normal.           /63 (BP Location: Right arm, Patient Position: Sitting, Cuff Size: adult)   Pulse 63   Temp 97.8 °F (36.6 °C) (Temporal)   Ht 5' 3.5\" (1.613 m)   Wt 187 lb (84.8 kg)   BMI 32.61 kg/m²  Estimated body mass index is 32.61 kg/m² as calculated from the following:    Height as of this encounter: 5' 3.5\" (1.613 m).    Weight as of this encounter: 187 lb (84.8 kg).    Medicare Hearing Assessment:   Hearing Screening    Entry User: Tiffanie Zayas CMA  Screening Method: Questionnaire  I have a problem hearing over the telephone: Yes I have trouble following the conversations when two or more  people are talking at the same time: No   I have trouble understanding things on the TV: Yes I have to strain to understand conversations: Yes   I have to worry about missing the telephone ring or doorbell: Yes I have trouble hearing conversations in a noisy background such as a crowded room or restaurant: Yes   I get confused about where sounds come from: Yes I misunderstand some words in a sentence and need to ask people to repeat themselves: Yes   I especially have trouble understanding the speech of women and children: Yes I have trouble understanding the speaker in a large room such as at a meeting or place of [employer]: Yes   Many people I talk to seem to mumble (or don't speak clearly): Yes People get annoyed because I misunderstand what they say: Yes   I misunderstand what others are saying and make inappropriate responses: Yes I avoid social activities because I cannot hear well and fear I will reply improperly: Yes   Family members and friends have told me they think I may have hearing loss: No                 Assessment & Plan:   Bri Padilla is a 74 year old female who presents for a Medicare Assessment.     @(Z00.00) Medicare annual wellness visit, subsequent  (primary encounter diagnosis)  Plan: Urinalysis, Routine        Patient is independent with ADL.s    Health screening   Colonoscopy, mammography, dexa scan  And routine eye exam advised.      (E78.5) Hyperlipidemia, unspecified hyperlipidemia type  Plan: CBC With Differential With Platelet, Comp         Metabolic Panel (14), Lipid Panel, TSH and Free        T4       Low cholesterol diet advised  Avoid saturated and trans fats       (M17.0) Primary osteoarthritis of both knees  Plan: chronic  follow up with orthopedic    (D69.6) Thrombocytopenia, unspecified  Plan: chronci monitor    (R26.9) Gait abnormality  Plan: fall precaution    (Z12.31) Visit for screening mammogram  Plan: [facility] EMILIA 2D+3D SCREENING BILAT         (CPT=77067/03479)         .Breast exam.  Bilateral  No discrete palpable masses or tenderness    No nipple discharge  And no axillary adenopathy.  Self breast exam advised      (L30.9) Dermatitis  Plan: DERM - INTERNAL        Referral   lower extremiteis    (Z99.89) Use of cane as ambulatory aid  Plan: fall precaiton    (E66.811,  E66.09,  Z68.30) Class 1 obesity due to excess calories without serious comorbidity with body mass index (BMI) of 30.0 to 30.9 in adult  Plan: Body mass index is 32.61 kg/m².  Weight loss advised   limit overall caloric intake  Low diet  limit carbohydrate intake   increase activity  as tolerated  exercise      (J44.89) Asthma with COPD (chronic obstructive pulmonary disease) ([facility])  Plan: chronic stable cpm    (K21.9) Gastroesophageal reflux disease without esophagitis  Plan: chronic GERD percuaiotn adivsed    (E78.5) Dyslipidemia  Plan: Low cholesterol diet advised  Avoid saturated and trans fats       (Z91.81) At risk for falling  Plan: fall precaution    (M81.0) Age-related osteoporosis without current pathological fracture  Plan: chronic  follow up with endo    Medications and most recent test results reviewed  Refill medicaitons  as needed  Potential side effect discussed  Modification of risk for CAD advised    Dietary an lifestyle change  Pt voiced understanding and agrees with plan  Pt given time to ask questions  After Visit Summary handout    Discussed  And given to patient.            The patient indicates understanding of these issues and agrees to the plan.  Reinforced healthy diet, lifestyle, and exercise.      No follow-ups on file.     Rosa Abbott MD, 4/23/2025     Supplementary Documentation:   General Health:  In the past six months, have you lost more than 10 pounds without trying?: 1 - Yes  Has your appetite been poor?: No  Type of Diet: Balanced  How does the patient maintain a good energy level?: Appropriate Exercise, Daily Walks  How would you describe your daily physical activity?:  Light  How would you describe your current health state?: Fair  How do you maintain positive mental well-being?: Puzzles, Visiting Friends, Visiting Family  On a scale of 0 to 10, with 0 being no pain and 10 being severe pain, what is your pain level?: 5 - (Moderate)  In the past six months, have you experienced urine leakage?: 0-No  At any time do you feel concerned for the safety/well-being of yourself and/or your children, in your home or elsewhere?: No  Have you had any immunizations at another office such as Influenza, Hepatitis B, Tetanus, or Pneumococcal?: No    Health Maintenance   Topic Date Due    Annual Well Visit  01/01/2025    Mammogram  07/05/2025    Zoster Vaccines (1 of 2) 11/09/2026 (Originally 6/24/2000)    COVID-19 Vaccine (3 - 2024-25 season) 12/12/2112 (Originally 9/1/2024)    Colorectal Cancer Screening  04/18/2029    Influenza Vaccine  Completed    DEXA Scan  Completed    Annual Depression Screening  Completed    Fall Risk Screening (Annual)  Completed    Pneumococcal Vaccine: 50+ Years  Completed    Meningococcal B Vaccine  Aged Out            [1]   Patient Active Problem List  Diagnosis    GERD (gastroesophageal reflux disease)    Class 1 obesity due to excess calories without serious comorbidity with body mass index (BMI) of 30.0 to 30.9 in adult    Age-related osteoporosis without current pathological fracture    Dyslipidemia    Asthma with COPD (chronic obstructive pulmonary disease) (HCC)    Use of cane as ambulatory aid    At risk for falling   [2]   Outpatient Medications Marked as Taking for the 4/9/25 encounter (Office Visit) with Rosa Abbott MD   Medication Sig    triamcinolone 0.1 % External Cream Apply 1 Application topically 2 (two) times daily. For 10 days    ALBUTEROL 108 (90 Base) MCG/ACT Inhalation Aero Soln INHALE 2 PUFFS INTO THE LUNGS EVERY 4 (FOUR) HOURS AS NEEDED. FOR WHEEZING    Calcium Carb-Cholecalciferol (CALCIUM-VITAMIN D3) 250-3.125 MG-MCG Oral Tab Take 1  tablet by mouth daily.    cholecalciferol 50 MCG (2000 UT) Oral Cap Take 1 capsule (2,000 Units total) by mouth daily.    ATORVASTATIN 40 MG Oral Tab TAKE 1 TABLET BY MOUTH EVERY DAY AT NIGHT    topiramate 25 MG Oral Tab Take 1 tablet (25 mg total) by mouth 2 (two) times daily.    omeprazole 20 MG Oral Capsule Delayed Release Take 1 capsule (20 mg total) by mouth before breakfast.    fluticasone-salmeterol (WIXELA INHUB) 250-50 MCG/ACT Inhalation Aerosol Powder, Breath Activated Inhale 1 puff into the lungs 2 (two) times daily.    Magnesium 200 MG Oral Tab Take by mouth.    Biotin 5000 MCG Oral Tab Take by mouth.    clobetasol 0.05 % External Solution Apply 1 mL topically daily as needed.    Nystatin 079315 UNIT/GM External Powder Apply 1 Application topically 2 (two) times daily.    Acetaminophen 500 MG Oral Cap     Multiple Vitamins-Minerals (WOMENS MULTIVITAMIN PLUS OR)    [3]   Social History  Tobacco Use   Smoking Status Former    Current packs/day: 0.00    Average packs/day: 0.3 packs/day for 30.0 years (9.0 ttl pk-yrs)    Types: Cigarettes    Start date: 1974    Quit date: 2004    Years since quittin.2    Passive exposure: Past   Smokeless Tobacco Never

## 2025-05-08 ENCOUNTER — PATIENT OUTREACH (OUTPATIENT)
Dept: CASE MANAGEMENT | Age: 75
End: 2025-05-08

## 2025-05-14 ENCOUNTER — OFFICE VISIT (OUTPATIENT)
Dept: DERMATOLOGY CLINIC | Facility: CLINIC | Age: 75
End: 2025-05-14

## 2025-05-14 DIAGNOSIS — L97.929 VENOUS STASIS ULCERS OF BOTH LOWER EXTREMITIES (HCC): ICD-10-CM

## 2025-05-14 DIAGNOSIS — I87.2 VENOUS STASIS DERMATITIS: Primary | ICD-10-CM

## 2025-05-14 DIAGNOSIS — L97.919 VENOUS STASIS ULCERS OF BOTH LOWER EXTREMITIES (HCC): ICD-10-CM

## 2025-05-14 DIAGNOSIS — I83.029 VENOUS STASIS ULCERS OF BOTH LOWER EXTREMITIES (HCC): ICD-10-CM

## 2025-05-14 DIAGNOSIS — I83.019 VENOUS STASIS ULCERS OF BOTH LOWER EXTREMITIES (HCC): ICD-10-CM

## 2025-05-14 PROCEDURE — 1159F MED LIST DOCD IN RCRD: CPT | Performed by: STUDENT IN AN ORGANIZED HEALTH CARE EDUCATION/TRAINING PROGRAM

## 2025-05-14 PROCEDURE — 99214 OFFICE O/P EST MOD 30 MIN: CPT | Performed by: STUDENT IN AN ORGANIZED HEALTH CARE EDUCATION/TRAINING PROGRAM

## 2025-05-14 PROCEDURE — 1160F RVW MEDS BY RX/DR IN RCRD: CPT | Performed by: STUDENT IN AN ORGANIZED HEALTH CARE EDUCATION/TRAINING PROGRAM

## 2025-05-14 RX ORDER — MUPIROCIN 20 MG/G
OINTMENT TOPICAL
Qty: 22 G | Refills: 3 | Status: SHIPPED | OUTPATIENT
Start: 2025-05-14

## 2025-05-14 RX ORDER — CLOBETASOL PROPIONATE 0.5 MG/G
1 CREAM TOPICAL 2 TIMES DAILY
Qty: 60 G | Refills: 3 | Status: SHIPPED | OUTPATIENT
Start: 2025-05-14 | End: 2026-05-14

## 2025-05-14 NOTE — PROGRESS NOTES
Established patient     Referred by: Rosa Abbott MD [NPI: [id number]]      CHIEF COMPLAINT: Rash     HISTORY OF PRESENT ILLNESS: Bri Padilla is a 74 year old female here for evaluation of rash.    Location: Dorsal hands, Dorsal feet, Legs, Ankles   Duration: Several months  Improving, worsening, or stable?: Stable  Signs and symptoms: Redness, bumps, bleeding (from scratching), itching. Symptoms increase at night.   Current treatment: triamcinolone 0.1 % External Cream   Past treatments: None    Personal Dermatologic History  History of chronic skin disease:No    Family History  History autoimmune diseases:  No    Past Medical History  Past Medical History[1]    REVIEW OF SYSTEMS:  Constitutional: Denies fever, chills, unintentional weight loss.   Skin as per HPI    Medications  Current Medications[2]    PHYSICAL EXAM:  General: awake, alert, no acute distress  Skin: Skin exam was performed today including the following: lower legs. Pertinent findings include:   - legs with pink scaly plaques and ulcerative plaques    ASSESSMENT & PLAN:  Pathophysiology of diagnoses discussed with patient.  Therapeutic options reviewed. Risks, benefits, and alternatives discussed with patient. Instructions reviewed at length.    #venous stasis dermatitis  #venous stasis Ulcers  - clobetasol 0.05% twice daily to affected areas Monday-Friday. Take weekends off. Avoid use on face, breasts, groin, or axillae.  - Use twice daily on open areas of the skin until healed  - Recommended starting over the counter anti-itch product such as Sarna sensitive, Sarna, Eucerin itch relief, or Cerave itch.   - Recommended daily use of compression socks    Return to clinic: 2 months  or sooner if something concerning arises    Karel Vo MD    By signing my name below, I, Shonna FLYNN MA,  attest that this documentation has been prepared under the direction and in the presence of Karel Vo MD.   Electronically Signed: Shonna FLYNN MA,  5/14/2025, 2:31 PM.    I, Karel Vo MD,  personally performed the services described in this documentation. All medical record entries made by the scribe were at my direction and in my presence.  I have reviewed the chart and agree that the record reflects my personal performance and is accurate and complete.  Karel Vo MD, 5/14/2025, 2:45 PM           [1]   Past Medical History:   Age-related nuclear cataract of both eyes    Anemia    FeSo4    Asthma (HCC)    Bilateral amblyopia    Closed fracture of left hip (HCC)    Colon polyp    Degeneration of lumbar or lumbosacral intervertebral disc    DVT of lower extremity (deep venous thrombosis) (HCC)    post delivery of last child 33 years ago    Esophageal reflux    Fracture    KNEE NO SURGERY    High cholesterol    Hyperlipidemia    Diet    Hyperopia of both eyes with astigmatism and presbyopia    Lipid screening    OA (osteoarthritis)    OA (osteoarthritis)    Obesity (BMI 30-39.9)    Osteoporosis screening    Overweight (BMI 25.0-29.9)    Pathological fracture of left hip due to age-related osteoporosis (HCC)    Postmenopausal status (age-related) (natural)    Shortness of breath    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 For 10 days 80 g 3    ALBUTEROL 108 (90 Base) MCG/ACT Inhalation Aero Soln INHALE 2 PUFFS INTO THE LUNGS EVERY 4 (FOUR) HOURS AS NEEDED. FOR WHEEZING 54 each 5    Calcium Carb-Cholecalciferol (CALCIUM-VITAMIN D3) 250-3.125 MG-MCG Oral Tab Take 1 tablet by mouth daily. 90 tablet 3    cholecalciferol 50 MCG (2000 UT) Oral Cap Take 1 capsule (2,000 Units total) by mouth daily.      ATORVASTATIN 40 MG Oral Tab TAKE 1 TABLET BY MOUTH EVERY DAY AT NIGHT 90 tablet 3    topiramate 25 MG Oral Tab Take 1 tablet (25 mg total) by mouth 2 (two) times  daily. 180 tablet 3    omeprazole 20 MG Oral Capsule Delayed Release Take 1 capsule (20 mg total) by mouth before breakfast. 90 capsule 3    fluticasone-salmeterol (WIXELA INHUB) 250-50 MCG/ACT Inhalation Aerosol Powder, Breath Activated Inhale 1 puff into the lungs 2 (two) times daily. 1 each 3    Magnesium 200 MG Oral Tab Take by mouth.      Biotin 5000 MCG Oral Tab Take by mouth.      clobetasol 0.05 % External Solution Apply 1 mL topically daily as needed. 60 mL 3    Nystatin [id number] UNIT/GM External Powder Apply 1 Application topically 2 (two) times daily. 60 g 1    Acetaminophen 500 MG Oral Cap       Multiple Vitamins-Minerals (WOMENS MULTIVITAMIN PLUS OR)

## 2025-06-25 ENCOUNTER — OFFICE VISIT (OUTPATIENT)
Dept: DERMATOLOGY CLINIC | Facility: CLINIC | Age: 75
End: 2025-06-25

## 2025-06-25 DIAGNOSIS — I87.2 STASIS DERMATITIS: Primary | ICD-10-CM

## 2025-06-25 PROCEDURE — 1159F MED LIST DOCD IN RCRD: CPT | Performed by: STUDENT IN AN ORGANIZED HEALTH CARE EDUCATION/TRAINING PROGRAM

## 2025-06-25 PROCEDURE — 1160F RVW MEDS BY RX/DR IN RCRD: CPT | Performed by: STUDENT IN AN ORGANIZED HEALTH CARE EDUCATION/TRAINING PROGRAM

## 2025-06-25 PROCEDURE — 99213 OFFICE O/P EST LOW 20 MIN: CPT | Performed by: STUDENT IN AN ORGANIZED HEALTH CARE EDUCATION/TRAINING PROGRAM

## 2025-06-25 NOTE — PROGRESS NOTES
Established patient     Referred by: Rosa Abbott MD [NPI: [id number]]      CHIEF COMPLAINT: Venous stasis dermatitis F/U    HISTORY OF PRESENT ILLNESS: Bri Padilla is a 75 year old female here for evaluation of rash.    Location: Dorsal hands, Dorsal feet, Legs, Ankles   Duration: Several months  Improving, worsening, or stable?: Improving- less itching overall but generally flares at night  Signs and symptoms: Redness, bumps, bleeding (from scratching), itching.   Current treatment: clobetasol 0.05% TID daily  Past treatments: triamcinolone 0.1 % External Cream     Personal Dermatologic History  History of chronic skin disease:No    Family History  History autoimmune diseases:  No    Past Medical History  Past Medical History[1]    REVIEW OF SYSTEMS:  Constitutional: Denies fever, chills, unintentional weight loss.   Skin as per HPI    Medications  Current Medications[2]    PHYSICAL EXAM:  General: awake, alert, no acute distress  Skin: Skin exam was performed today including the following: lower legs. Pertinent findings include:   - Legs with hyperpigmentation and atrophy     ASSESSMENT & PLAN:  Pathophysiology of diagnoses discussed with patient.  Therapeutic options reviewed. Risks, benefits, and alternatives discussed with patient. Instructions reviewed at length.    #venous stasis dermatitis  #venous stasis Ulcers- Improved from prior   - Continue clobetasol 0.05% twice daily to affected areas 3 times a week. Take weekends off. Avoid use on face, breasts, groin, or axillae.  - Use twice daily on open areas of the skin until healed  - Recommended starting over the counter anti-itch product such as Sarna sensitive, Sarna, Eucerin itch relief, or Cerave itch.   - Recommended daily use of compression socks    Return to clinic: as needed or sooner if something concerning arises    Karel Vo MD  By signing my name below, I, Shonna FLYNN MA,  attest that this documentation has been prepared under the  direction and in the presence of Karel Vo MD.   Electronically Signed: Shonna FLYNN MA, 6/25/2025, 12:03 PM.    I, Karel Vo MD,  personally performed the services described in this documentation. All medical record entries made by the scribe were at my direction and in my presence.  I have reviewed the chart and agree that the record reflects my personal performance and is accurate and complete.  Karel Vo MD, 6/25/2025, 12:17 PM             [1]   Past Medical History:   Age-related nuclear cataract of both eyes    Anemia    FeSo4    Asthma (HCC)    Bilateral amblyopia    Closed fracture of left hip (HCC)    Colon polyp    Degeneration of lumbar or lumbosacral intervertebral disc    DVT of lower extremity (deep venous thrombosis) (HCC)    post delivery of last child 33 years ago    Esophageal reflux    Fracture    KNEE NO SURGERY    High cholesterol    Hyperlipidemia    Diet    Hyperopia of both eyes with astigmatism and presbyopia    Lipid screening    OA (osteoarthritis)    OA (osteoarthritis)    Obesity (BMI 30-39.9)    Osteoporosis screening    Overweight (BMI 25.0-29.9)    Pathological fracture of left hip due to age-related osteoporosis (HCC)    Postmenopausal status (age-related) (natural)    Shortness of breath    Stroke due to embolism of right cerebellar artery (HCC)    Thrombocytopenia   [2]   Current Outpatient Medications   Medication Sig Dispense Refill    mupirocin 2 % External Ointment Use as directed twice daily to affected area until healed 22 g 3    clobetasol 0.05 % External Cream Apply 1 Application topically 2 (two) times daily. Apply twice daily Monday-Friday. Take weekends off. Use on affected areas. Do not use on face, groin, or armpits. 60 g 3    triamcinolone 0.1 % External Cream Apply 1 Application topically 2 (two) times daily. For 10 days 80 g 3    ALBUTEROL 108 (90 Base) MCG/ACT Inhalation Aero Soln INHALE 2 PUFFS INTO THE LUNGS EVERY 4 (FOUR) HOURS AS NEEDED. FOR  WHEEZING 54 each 5    Calcium Carb-Cholecalciferol (CALCIUM-VITAMIN D3) 250-3.125 MG-MCG Oral Tab Take 1 tablet by mouth daily. 90 tablet 3    cholecalciferol 50 MCG (2000 UT) Oral Cap Take 1 capsule (2,000 Units total) by mouth daily.      ATORVASTATIN 40 MG Oral Tab TAKE 1 TABLET BY MOUTH EVERY DAY AT NIGHT 90 tablet 3    topiramate 25 MG Oral Tab Take 1 tablet (25 mg total) by mouth 2 (two) times daily. 180 tablet 3    omeprazole 20 MG Oral Capsule Delayed Release Take 1 capsule (20 mg total) by mouth before breakfast. 90 capsule 3    fluticasone-salmeterol (WIXELA INHUB) 250-50 MCG/ACT Inhalation Aerosol Powder, Breath Activated Inhale 1 puff into the lungs 2 (two) times daily. 1 each 3    Magnesium 200 MG Oral Tab Take by mouth.      Biotin 5000 MCG Oral Tab Take by mouth.      clobetasol 0.05 % External Solution Apply 1 mL topically daily as needed. 60 mL 3    Nystatin 151407 UNIT/GM External Powder Apply 1 Application topically 2 (two) times daily. 60 g 1    Acetaminophen 500 MG Oral Cap       Multiple Vitamins-Minerals (WOMENS MULTIVITAMIN PLUS OR)

## 2025-07-09 ENCOUNTER — LAB ENCOUNTER (OUTPATIENT)
Dept: LAB | Age: 75
End: 2025-07-09
Attending: INTERNAL MEDICINE
Payer: MEDICARE

## 2025-07-09 ENCOUNTER — HOSPITAL ENCOUNTER (OUTPATIENT)
Dept: MAMMOGRAPHY | Age: 75
Discharge: HOME | End: 2025-07-09
Attending: INTERNAL MEDICINE
Payer: MEDICARE

## 2025-07-09 ENCOUNTER — OFFICE VISIT (OUTPATIENT)
Dept: INTERNAL MEDICINE CLINIC | Facility: CLINIC | Age: 75
End: 2025-07-09

## 2025-07-09 VITALS
TEMPERATURE: 98 F | SYSTOLIC BLOOD PRESSURE: 116 MMHG | HEART RATE: 59 BPM | HEIGHT: 63.5 IN | WEIGHT: 187 LBS | DIASTOLIC BLOOD PRESSURE: 64 MMHG | BODY MASS INDEX: 32.72 KG/M2

## 2025-07-09 DIAGNOSIS — M17.0 PRIMARY OSTEOARTHRITIS OF BOTH KNEES: ICD-10-CM

## 2025-07-09 DIAGNOSIS — Z12.31 VISIT FOR SCREENING MAMMOGRAM: ICD-10-CM

## 2025-07-09 DIAGNOSIS — R26.9 GAIT ABNORMALITY: ICD-10-CM

## 2025-07-09 DIAGNOSIS — K21.9 GASTROESOPHAGEAL REFLUX DISEASE WITHOUT ESOPHAGITIS: ICD-10-CM

## 2025-07-09 DIAGNOSIS — E66.09 CLASS 1 OBESITY DUE TO EXCESS CALORIES WITHOUT SERIOUS COMORBIDITY WITH BODY MASS INDEX (BMI) OF 30.0 TO 30.9 IN ADULT: ICD-10-CM

## 2025-07-09 DIAGNOSIS — D64.9 ANEMIA, UNSPECIFIED TYPE: Primary | ICD-10-CM

## 2025-07-09 DIAGNOSIS — E66.811 CLASS 1 OBESITY DUE TO EXCESS CALORIES WITHOUT SERIOUS COMORBIDITY WITH BODY MASS INDEX (BMI) OF 30.0 TO 30.9 IN ADULT: ICD-10-CM

## 2025-07-09 DIAGNOSIS — J44.89 ASTHMA WITH COPD (CHRONIC OBSTRUCTIVE PULMONARY DISEASE) (HCC): ICD-10-CM

## 2025-07-09 DIAGNOSIS — D69.6 DECREASED PLATELET COUNT: ICD-10-CM

## 2025-07-09 DIAGNOSIS — E78.5 HYPERLIPIDEMIA, UNSPECIFIED HYPERLIPIDEMIA TYPE: ICD-10-CM

## 2025-07-09 DIAGNOSIS — D64.9 ANEMIA, UNSPECIFIED TYPE: ICD-10-CM

## 2025-07-09 LAB
BASOPHILS # BLD AUTO: 0.05 X10(3) UL (ref 0–0.2)
BASOPHILS NFR BLD AUTO: 1 %
DEPRECATED RDW RBC AUTO: 45.8 FL (ref 35.1–46.3)
EOSINOPHIL # BLD AUTO: 0.3 X10(3) UL (ref 0–0.7)
EOSINOPHIL NFR BLD AUTO: 6.3 %
ERYTHROCYTE [DISTWIDTH] IN BLOOD BY AUTOMATED COUNT: 13.4 % (ref 11–15)
FOLATE SERPL-MCNC: 36.6 NG/ML (ref 5.4–?)
HCT VFR BLD AUTO: 36.7 % (ref 35–48)
HGB BLD-MCNC: 12 G/DL (ref 12–16)
IMM GRANULOCYTES # BLD AUTO: 0.01 X10(3) UL (ref 0–1)
IMM GRANULOCYTES NFR BLD: 0.2 %
IRON SATN MFR SERPL: 31 % (ref 15–50)
IRON SERPL-MCNC: 77 UG/DL (ref 50–170)
LYMPHOCYTES # BLD AUTO: 1.08 X10(3) UL (ref 1–4)
LYMPHOCYTES NFR BLD AUTO: 22.5 %
MCH RBC QN AUTO: 30.5 PG (ref 26–34)
MCHC RBC AUTO-ENTMCNC: 32.7 G/DL (ref 31–37)
MCV RBC AUTO: 93.1 FL (ref 80–100)
MONOCYTES # BLD AUTO: 0.39 X10(3) UL (ref 0.1–1)
MONOCYTES NFR BLD AUTO: 8.1 %
NEUTROPHILS # BLD AUTO: 2.97 X10 (3) UL (ref 1.5–7.7)
NEUTROPHILS # BLD AUTO: 2.97 X10(3) UL (ref 1.5–7.7)
NEUTROPHILS NFR BLD AUTO: 61.9 %
PLATELET # BLD AUTO: 155 10(3)UL (ref 150–450)
RBC # BLD AUTO: 3.94 X10(6)UL (ref 3.8–5.3)
TOTAL IRON BINDING CAPACITY: 252 UG/DL (ref 250–425)
TRANSFERRIN SERPL-MCNC: 180 MG/DL (ref 250–380)
VIT B12 SERPL-MCNC: 536 PG/ML (ref 211–911)
WBC # BLD AUTO: 4.8 X10(3) UL (ref 4–11)

## 2025-07-09 PROCEDURE — 84466 ASSAY OF TRANSFERRIN: CPT

## 2025-07-09 PROCEDURE — 99214 OFFICE O/P EST MOD 30 MIN: CPT | Performed by: INTERNAL MEDICINE

## 2025-07-09 PROCEDURE — 1126F AMNT PAIN NOTED NONE PRSNT: CPT | Performed by: INTERNAL MEDICINE

## 2025-07-09 PROCEDURE — 82607 VITAMIN B-12: CPT

## 2025-07-09 PROCEDURE — 82746 ASSAY OF FOLIC ACID SERUM: CPT

## 2025-07-09 PROCEDURE — 83540 ASSAY OF IRON: CPT

## 2025-07-09 PROCEDURE — 85025 COMPLETE CBC W/AUTO DIFF WBC: CPT

## 2025-07-09 PROCEDURE — 77067 SCR MAMMO BI INCL CAD: CPT | Performed by: INTERNAL MEDICINE

## 2025-07-09 PROCEDURE — 77063 BREAST TOMOSYNTHESIS BI: CPT | Performed by: INTERNAL MEDICINE

## 2025-07-09 PROCEDURE — 1159F MED LIST DOCD IN RCRD: CPT | Performed by: INTERNAL MEDICINE

## 2025-07-09 PROCEDURE — 36415 COLL VENOUS BLD VENIPUNCTURE: CPT

## 2025-07-24 ENCOUNTER — PATIENT OUTREACH (OUTPATIENT)
Dept: CASE MANAGEMENT | Age: 75
End: 2025-07-24

## 2025-07-24 NOTE — PROGRESS NOTES
Reviewed the patient chart, patient to remain in ccm.       Time Spent This Encounter Total: 5  min medical record review  Monthly Minute Total including today: 5 minutes

## 2025-08-12 ENCOUNTER — PATIENT OUTREACH (OUTPATIENT)
Dept: CASE MANAGEMENT | Age: 75
End: 2025-08-12

## (undated) DIAGNOSIS — E78.00 PURE HYPERCHOLESTEROLEMIA: ICD-10-CM

## (undated) DIAGNOSIS — E66.9 OBESITY (BMI 30-39.9): ICD-10-CM

## (undated) DIAGNOSIS — R92.8 ABNORMAL MAMMOGRAM OF RIGHT BREAST: Primary | ICD-10-CM

## (undated) DIAGNOSIS — J44.9 ASTHMA WITH COPD (CHRONIC OBSTRUCTIVE PULMONARY DISEASE) (HCC): Chronic | ICD-10-CM

## (undated) DIAGNOSIS — K21.9 GASTROESOPHAGEAL REFLUX DISEASE, UNSPECIFIED WHETHER ESOPHAGITIS PRESENT: ICD-10-CM

## (undated) DEVICE — GAUZE SPONGES,12 PLY: Brand: CURITY

## (undated) DEVICE — 12 ML SYRINGE LUER-LOCK TIP: Brand: MONOJECT

## (undated) DEVICE — FLEXOR, URETERAL ACCESS SHEATH WITH AQ, HYDROPHILIC COATING: Brand: FLEXOR

## (undated) DEVICE — 6 ML SYRINGE LUER-LOCK TIP: Brand: MONOJECT

## (undated) DEVICE — GAMMEX® NON-LATEX PI ORTHO SIZE 8, STERILE POLYISOPRENE POWDER-FREE SURGICAL GLOVE: Brand: GAMMEX

## (undated) DEVICE — DRAPE SHEET LARGE 76X55

## (undated) DEVICE — SUTURE VCRL SZ 3-0 L27IN ABSRB UD L26MM SH

## (undated) DEVICE — ENDOSCOPIC VALVE WITH ADAPTER.: Brand: SURSEAL® II

## (undated) DEVICE — BIT DRL 30MM 3.2MM RNGLC ACTB

## (undated) DEVICE — PROXIMATE SKIN STAPLERS (35 WIDE) CONTAINS 35 STAINLESS STEEL STAPLES (FIXED HEAD): Brand: PROXIMATE

## (undated) DEVICE — BRA SURG ELIZ PINK L

## (undated) DEVICE — CEMENT MIXING SYSTEM WITH FEMORAL BREAKWAY NOZZLE: Brand: REVOLUTION

## (undated) DEVICE — SOL  .9 1000ML BAG

## (undated) DEVICE — SUCTION CANISTER, 3000CC,SAFELINER: Brand: DEROYAL

## (undated) DEVICE — CYSTO PACK: Brand: MEDLINE INDUSTRIES, INC.

## (undated) DEVICE — TOTAL HIP: Brand: MEDLINE INDUSTRIES, INC.

## (undated) DEVICE — NEEDLE SPINAL 18X3-1/2 PINK.

## (undated) DEVICE — CONTAINER SPEC STR 4OZ GRY LID

## (undated) DEVICE — GOWN SURG AERO BLUE PERF LG

## (undated) DEVICE — DRAPE PACK CHEST

## (undated) DEVICE — ISOVUE 300 10X100ML VIAL

## (undated) DEVICE — SYSTEM PREVENA 20CM 45ML

## (undated) DEVICE — GAMMEX® PI HYBRID SIZE 6.5, STERILE POWDER-FREE SURGICAL GLOVE, POLYISOPRENE AND NEOPRENE BLEND: Brand: GAMMEX

## (undated) DEVICE — HOOD, PEEL-AWAY: Brand: FLYTE

## (undated) DEVICE — BIOLOX® DELTA HEAD, 12/14, 28 X -3.5
Type: IMPLANTABLE DEVICE | Site: HIP | Status: NON-FUNCTIONAL
Brand: BIOLOX® DELTA
Removed: 2022-06-19

## (undated) DEVICE — BANDAGE COMP PREMPRO 5YDX4IN

## (undated) DEVICE — BIPOLAR SEALER 23-112-1 AQM 6.0: Brand: AQUAMANTYS™

## (undated) DEVICE — 60 ML SYRINGE LUER-LOCK TIP: Brand: MONOJECT

## (undated) DEVICE — DRAPE TAPE: Brand: CONVERTORS

## (undated) DEVICE — SUT SLK 2-0 18IN FS BK

## (undated) DEVICE — 3M™ STERI-STRIP™ REINFORCED ADHESIVE SKIN CLOSURES, R1547, 1/2 IN X 4 IN (12 MM X 100 MM), 6 STRIPS/ENVELOPE: Brand: 3M™ STERI-STRIP™

## (undated) DEVICE — CAUTERY TIP TEFLON MEGADYNE

## (undated) DEVICE — GAMMEX® PI HYBRID SIZE 7.5, STERILE POWDER-FREE SURGICAL GLOVE, POLYISOPRENE AND NEOPRENE BLEND: Brand: GAMMEX

## (undated) DEVICE — MEDI-VAC NON-CONDUCTIVE SUCTION TUBING: Brand: CARDINAL HEALTH

## (undated) DEVICE — TOWEL SURG OR 17X30IN BLUE

## (undated) DEVICE — SUT VICRYL 2-0 CT-1 J945H

## (undated) DEVICE — FLEXIBLE YANKAUER,MEDIUM TIP, NO VACUUM CONTROL: Brand: ARGYLE

## (undated) DEVICE — SUTURE MCRYL SZ 4-0 L18IN ABSRB UD L19MM PS-2

## (undated) DEVICE — PAD,ABDOMINAL,8"X7.5",STERILE,LF,1/PK: Brand: MEDLINE

## (undated) DEVICE — VIOLET BRAIDED (POLYGLACTIN 910), SYNTHETIC ABSORBABLE SUTURE: Brand: COATED VICRYL

## (undated) DEVICE — SOLUTION  .9 3000ML

## (undated) DEVICE — STERILE LATEX POWDER-FREE SURGICAL GLOVESWITH NITRILE COATING: Brand: PROTEXIS

## (undated) DEVICE — WATER STERILE AQUALITE 1000ML

## (undated) DEVICE — Device: Brand: CUSTOM PROCEDURE KIT

## (undated) DEVICE — PUMP SAPS 1  ACT 1 WY VLV

## (undated) DEVICE — GAMMEX® NON-LATEX PI ORTHO SIZE 7, STERILE POLYISOPRENE POWDER-FREE SURGICAL GLOVE: Brand: GAMMEX

## (undated) DEVICE — COAXIAL FEMORAL CANAL TIP

## (undated) DEVICE — CLIP SM INTNL HMCLP TNTLM ESCP

## (undated) DEVICE — BENTSON PTFE WIRE GUIDE WITH 15CM FLEXIBLE TIP: Brand: BENTSON

## (undated) DEVICE — Device

## (undated) DEVICE — Device: Brand: STABLECUT®

## (undated) DEVICE — HOOD: Brand: FLYTE

## (undated) DEVICE — UROLOGY DRAIN BAG STERILE

## (undated) DEVICE — TRAY SKIN PREP PVP-1

## (undated) DEVICE — DUAL LUMEN CATHETER

## (undated) DEVICE — SPONGE PREMIERPRO 7X18X18

## (undated) DEVICE — SOL  .9 3000ML

## (undated) DEVICE — CLIP MED INTNL HMCLP TNTLM

## (undated) DEVICE — IMPLANTABLE DEVICE
Type: IMPLANTABLE DEVICE | Site: HIP | Status: NON-FUNCTIONAL
Brand: VIVACIT-E®
Removed: 2022-06-19

## (undated) DEVICE — MEDI-VAC NON-CONDUCTIVE SUCTION TUBING 6MM X 1.8M (6FT.) L: Brand: CARDINAL HEALTH

## (undated) DEVICE — KENDALL SCD EXPRESS SLEEVES, KNEE LENGTH, MEDIUM: Brand: KENDALL SCD

## (undated) DEVICE — CAUTERY TIP BLADE EXTENSION

## (undated) DEVICE — 35 ML SYRINGE REGULAR TIP: Brand: MONOJECT

## (undated) DEVICE — SPONGE GZ 4XL4IN 100% COT 12 PLY TYP VII WVN

## (undated) DEVICE — DRAPE SRG 70X60IN SPLT U IMPRV

## (undated) DEVICE — SUT VICRYL 1CT-1  J341H

## (undated) DEVICE — SOLUTION  .9 1000ML BTL

## (undated) DEVICE — 2DE14 2-0 PDO 24 X 24: Brand: 2DE14 2-0 PDO 24 X 24

## (undated) DEVICE — ADHESIVE LIQ 2/3ML VI MASTISOL

## (undated) DEVICE — CATH URET CONE TIP 8FR 138008

## (undated) DEVICE — NITINOL WIRE STR 035

## (undated) DEVICE — SOLUTION IRRIG 1000ML 0.9% NACL USP BTL

## (undated) DEVICE — SOL H2O 1000ML BTL

## (undated) DEVICE — MATTRESS PT HOVERMATT 1/2L 34W

## (undated) DEVICE — MINOR GENERAL: Brand: MEDLINE INDUSTRIES, INC.

## (undated) DEVICE — LINE MNTR ADLT SET O2 INTMD

## (undated) DEVICE — Device: Brand: DEFENDO AIR/WATER/SUCTION AND BIOPSY VALVE

## (undated) NOTE — MR AVS SNAPSHOT
Baldemar Monteiro 12 [facility] 43 [address]  [phone]               Thank you for choosing us for your health care visit with Radha Acosta PT.   We are glad to serve you and happy to provide you with Call the Fuel ([email or web address])k for assistance with your inactive [employer] account    If you have questions, you can call [phone] to talk to our [facility] Staff. Remember, [employer] is NOT to be used for urgent needs. For medical emergencies, dial 911.     Vi

## (undated) NOTE — LETTER
[address] [facility] [address]  [phone]  [phone]  Authorization for Imaging Procedure  Date of Procedure: 10/12/2023    I hereby authorize Dr. Rose Magana , my physician and his/her assistants (if applicable), which may include medical students, residents, and/or fellows, to perform the following procedure and administer such anesthesia as may be determined necessary by my physician: [address]  on 1 [facility]. 2.  I recognize that during the procedure, unforeseen conditions may necessitate additional or different procedures than those listed above. I, therefore, further authorize and request that the above-named physician, assistants, or designees perform such procedures as are, in their judgment, necessary and desirable. 3.  My physician has discussed prior to my procedure the potential benefits, risks and side effects of this procedure; the likelihood of achieving goals; and potential problems that might occur during recuperation. They also discussed reasonable alternatives to the procedure, including risks, benefits, and side effects related to the alternatives and risks related to not receiving this procedure. I have had all my questions answered and I acknowledge that no guarantee has been made as to the result that may be obtained. 4.  Should the need arise during my procedure, which includes change of level of care prior to discharge, I also consent to the administration of blood and/or blood products. Further, I understand that despite careful testing and screening of blood or blood products by collecting agencies, I may still be subject to ill effects as a result of receiving a blood transfusion and/or blood products.  The following are some, but not all, of the potential risks that can occur: fever and allergic reactions, hemolytic reactions, transmission of diseases such as Hepatitis, AIDS and Cytomegalovirus (CMV) and fluid overload. In the event that I wish to have an autologous transfusion of my own blood, or a directed donor transfusion, I will discuss this with my physician. Check only if Refusing Blood or Blood Products  I understand refusal of blood or blood products as deemed necessary by my physician may have serious consequences to my condition to include possible death. I hereby assume responsibility for my refusal and release the hospital, its personnel, and my physicians from any responsibility for the consequences of my refusal.   [  ] Patient Refuses Blood      5. I authorize the use of any specimen, organs, tissues, body parts or foreign objects that may be removed from my body during the procedure for diagnosis, research or teaching purposes and their subsequent disposal by hospital authorities. I also authorize the release of specimen test results and/or written reports to my treating physician on the hospital medical staff or other referring or consulting physicians involved in my care, at the discretion of the Pathologist or my treating physician. 6.  I consent to the photographing or videotaping of the procedures to be performed, including appropriate portions of my body for medical, scientific, or educational purposes, provided my identity is not revealed by the pictures or by descriptive texts accompanying them. If the procedure has been photographed/videotaped, the physician will obtain the original picture, image, videotape or CD. The hospital will not be responsible for storage, release or maintenance of the picture, image, tape or CD.   7.  I consent to the presence of a  or observers in the operating room as deemed necessary by my physician or their designees. 8.  I recognize that in the event my procedure results in extended X-Ray/fluoroscopy time, I may develop a skin reaction. 9.   If I have a Do Not Attempt Resuscitation (DNAR) order in place, that status will be suspended while in the operating room, procedural suite, and during the recovery period unless otherwise explicitly stated by me (or a person authorized to consent on my behalf). The performing physician or my attending physician will determine when the applicable recovery period ends for purposes of reinstating the DNAR order. 10.  I acknowledge that my physician has explained sedation/analgesia administration to me including the risk and benefits I consent to the administration of sedation/analgesia as may be necessary or desirable in the judgment of my physician. I CERTIFY THAT I HAVE READ AND FULLY UNDERSTAND THE ABOVE CONSENT FOR THE PROCEDURE. Signature of Patient: _____________________________________________________________  Responsible person in case of minor, unconscious: ____________________________________  Relationship to patient:  __________________________________________________________  Signature of Witness: _______________________________Date: _________Time: __________    Statement of Physician: My signature below affirms that prior to the time of the procedure, I have explained to the patient and/or her guardian, the risks and benefits involved in the proposed treatment and any reasonable alternative to the proposed treatment. I have also explained the risks and benefits involved in the refusal of the proposed treatment and have answered the patient's questions. If I have a significant financial interest in a co-management agreement or a significant financial interest in any product or implant, or other significant relationship used in the procedure/surgery, I have disclosed this and had a discussion with my patient.   Signature of Physician:   _________________________________Date:_____________Time:________    Patient Name: Unice Merlin : 1950  Printed: 2023   Medical Record #: [id number]

## (undated) NOTE — MR AVS SNAPSHOT
[facility] "[employer] [place] [address] [phone]               Thank you for choosing us for your health care visit with Ayaka He MD.  We are glad to serve you and happy to provide you with this summary of your v Allergies as of May 24, 2017     No Known Allergies                Today's Vital Signs     BP Pulse Height Weight BMI Breastfeeding?     111/65 mmHg 63 5' 6\" (1.676 m) 207 lb 3 oz 33.46 kg/m2 No         Current Medications          This list is accurate as

## (undated) NOTE — MR AVS SNAPSHOT
[address] [facility]  [phone]               Thank you for choosing us for your health care visit with Talat Webster MD.  We are glad to serve you and happy to provide you with this summary Nystatin 285619 UNIT/GM Powd   Apply 1 Application topically 2 (two) times daily.            omeprazole 20 MG Cpdr   TAKE 1 CAPSULE BY MOUTH   EVERY MORNING BEFORE BREAKFAST   Commonly known as:  PRILOSEC           Oyster Shell Calcium/Vitamin D 250-125 MG may be held responsible for payment in full if proper authorization is not acquired. Please contact the Patient Business Office at [phone] if you have any questions related to insurance coverage. Thank you.          Referral Information     Referral 2 ½ hours per week – spread out over time Use a sotero to keep you motivated   Don’t forget strength training with weights and resistance Set goals and track your progress   You don’t need to join a gym. Home exercises work great.  Put more priority on exe

## (undated) NOTE — MR AVS SNAPSHOT
Baldemar Monteiro 12 [facility] [address] [address]  [phone]  [phone]               Thank you for choosing us for your health care visit with Zuri Teixeira PT.   We are glad to serve you and happy to provide you with you are wearing a tampon you will have to remove it just before the exam.            May 12, 2017 10:40 AM   Follow Up Visit with Isabelle Chanel MD   [facility], [address] [facility]

## (undated) NOTE — LETTER
[address], [place], [place]     AUTHORIZATION FOR SURGICAL OPERATION OR PROCEDURE    1.    I hereby authorize Dr. Iveth Day MD, my Physician(s) and whomever may be designated as the doctor's Assistant, to perform the to have an autologous transfusion of my own blood, or a directed donor transfusion, I will discuss this with my         Physician.   5.   I consent to the photographing of procedure(s) to be performed for the purposes of advancing medicine, science (Responsible person in case of minor or unconscious patient)   (Relationship to Patient)      _______________________________________________________________ ____________________________  (Witness signature)

## (undated) NOTE — MR AVS SNAPSHOT
Baldemar Monteiro [address]  [phone]  [phone]               Thank you for choosing us for [facility]   We are glad to serve you and happy to provide you with loose fitting clothing. Apr 21, 2017 12:00 PM   [place] [facility] Visit By Therapist with Reta Meyer, [address] [facility] ([address] [facility] [address])    [address]  [facility] [address]   [phone]

## (undated) NOTE — MR AVS SNAPSHOT
[address] [facility]  [phone]               Thank you for choosing us for your health care visit with Raul Mora MD.  We are glad to serve you and happy to provide you with this summary Consult with Paul Corona MD   [facility] Ophthalmology ([address] [address]   [phone]              Allergies as of Jan 30, 2017     No Known Allergies Return in about 4 weeks (around 2/27/2017). Scheduling Instructions     Monday January 30, 2017     Imaging:  XR KNEE (1 OR 2 VIEWS), LEFT (CPT=73560)    Instructions:   To schedule a test at any [facility], call [facility] service number located on your ID card. To schedule Physical Therapy at any of the [facility] facilities, please call [phone]. Center for [facility]  [address]  [address] [facility]

## (undated) NOTE — LETTER
[address]  Authorization for Surgical Operation and Procedure                                                                                           I hereby authorize                                  , my physician and his/her assistants (if applicable), which may include medical students, residents, and/or fellows, to perform the following surgical operation/ procedure and administer such anesthesia as may be determined necessary by my physician: Operation/Procedure name (s) Right breast wire localization on [address] [place] [address]   2. I recognize that during the surgical operation/procedure, unforeseen conditions may necessitate additional or different procedures than those listed above. I, therefore, further authorize and request that the above-named surgeon, assistants, or designees perform such procedures as are, in their judgment, necessary and desirable. 3.   My surgeon/physician has discussed prior to my surgery the potential benefits, risks and side effects of this procedure; the likelihood of achieving goals; and potential problems that might occur during recuperation. They also discussed reasonable alternatives to the procedure, including risks, benefits, and side effects related to the alternatives and risks related to not receiving this procedure. I have had all my questions answered and I acknowledge that no guarantee has been made as to the result that may be obtained. 4.   Should the need arise during my operation/procedure, which includes change of level of care prior to discharge, I also consent to the administration of blood and/or blood products. Further, I understand that despite careful testing and screening of blood or blood products by collecting agencies, I may still be subject to ill effects as a result of receiving a blood transfusion and/or blood products.   The following are some, but not all, of the potential risks that can occur: fever and allergic reactions, hemolytic reactions, transmission of diseases such as Hepatitis, AIDS and Cytomegalovirus (CMV) and fluid overload. In the event that I wish to have an autologous transfusion of my own blood, or a directed donor transfusion, I will discuss this with my physician. Check only if Refusing Blood or Blood Products  I understand refusal of blood or blood products as deemed necessary by my physician may have serious consequences to my condition to include possible death. I hereby assume responsibility for my refusal and release the hospital, its personnel, and my physicians from any responsibility for the consequences of my refusal.    o  Refuse   5. I authorize the use of any specimen, organs, tissues, body parts or foreign objects that may be removed from my body during the operation/procedure for diagnosis, research or teaching purposes and their subsequent disposal by hospital authorities. I also authorize the release of specimen test results and/or written reports to my treating physician on the hospital medical staff or other referring or consulting physicians involved in my care, at the discretion of the Pathologist or my treating physician. 6.   I consent to the photographing or videotaping of the operations or procedures to be performed, including appropriate portions of my body for medical, scientific, or educational purposes, provided my identity is not revealed by the pictures or by descriptive texts accompanying them. If the procedure has been photographed/videotaped, the surgeon will obtain the original picture, image, videotape or CD. The hospital will not be responsible for storage, release or maintenance of the picture, image, tape or CD.    7.   I consent to the presence of a  or observers in the operating room as deemed necessary by my physician or their designees.     8.   I recognize that in the event my procedure results in extended X-Ray/fluoroscopy time, I may develop a skin reaction. 9. If I have a Do Not Attempt Resuscitation (DNAR) order in place, that status will be suspended while in the operating room, procedural suite, and during the recovery period unless otherwise explicitly stated by me (or a person authorized to consent on my behalf). The surgeon or my attending physician will determine when the applicable recovery period ends for purposes of reinstating the DNAR order. 10. Patients having a sterilization procedure: I understand that if the procedure is successful the results will be permanent and it will therefore be impossible for me to inseminate, conceive, or bear children. I also understand that the procedure is intended to result in sterility, although the result has not been guaranteed. 11. I acknowledge that my physician has explained sedation/analgesia administration to me including the risk and benefits I consent to the administration of sedation/analgesia as may be necessary or desirable in the judgment of my physician. I CERTIFY THAT I HAVE READ AND FULLY UNDERSTAND THE ABOVE CONSENT TO OPERATION and/or OTHER PROCEDURE.     _________________________________________ _________________________________     ___________________________________  Signature of Patient     Signature of Responsible Person                   Printed Name of Responsible Person                              _________________________________________ ______________________________        ___________________________________  Signature of Witness         Date  Time         Relationship to Patient    STATEMENT OF PHYSICIAN My signature below affirms that prior to the time of the procedure; I have explained to the patient and/or his/her legal representative, the risks and benefits involved in the proposed treatment and any reasonable alternative to the proposed treatment.  I have also explained the risks and benefits involved in refusal of the proposed treatment and alternatives to the proposed treatment and have answered the patient's questions.  If I have a significant financial interest in a co-management agreement or a significant financial interest in any product or implant, or other significant relationship used in this procedure/surgery, I have disclosed this and had a discussion with my patient.     _______________________________________________________________ _____________________________  Ashley Sobeida of Physician)                                                                                         (Date)                                   (Time)  Patient Name: Vidal Echeverria    : 1950   Printed: 10/9/2023      Medical Record #: [id number]                                              Page 1 of 1

## (undated) NOTE — MR AVS SNAPSHOT
Baldemar Monteiro [address]  [phone]  [phone]               Thank you for choosing us for your health care visit with Naa key PT.   We are glad to serve you and happy to provide you with [place] [facility] Visit By Therapist with Hermilo Martínez, [address] [facility] ([address] [facility] [address])    [address]   [phone]           Please arrive at your scheduled appointment t

## (undated) NOTE — MR AVS SNAPSHOT
Baldemar Monteiro [address]  [phone]  [phone]               Thank you for choosing us for your [facility]   We are glad to serve you and happy to provide you with [address] [facility] ([address] [place] [address])    [address] [address]   [phone]           Please arrive at your scheduled appointment time. Wear comfortable, loose fitting clothing.             Feb 22, 201

## (undated) NOTE — ED AVS SNAPSHOT
[facility] Emergency Department    [address]     [address] [place] [address]    Phone:  [phone]    Fax:  [address] [facility] [place]   MRN: [id number]    Department:  [facility] Emergency Department   Date of Visit:  3/ and Class Registration line at [phone] or find a doctor online by visiting [email or web address].    IF THERE IS ANY CHANGE OR WORSENING OF YOUR CONDITION, CALL YOUR PRIMARY CARE PHYSICIAN AT ONCE OR RETURN IMMEDIATELY TO [address] [place] [address].     If

## (undated) NOTE — MR AVS SNAPSHOT
Baldemar Monteiro 12 [facility] 51 [facility]  [phone]  [phone]               Thank you for choosing us for your health care visit with [facility].   We are glad to serve you and happy to provide you with [place] [address]   [phone]            Apr 18, 2017 10:30 AM   [facility] Physical Therapy Visit By Therapist with Jose Ríos, [address] [place] [address] ([address] [facility] [address])    [address] [place] [address]

## (undated) NOTE — Clinical Note
2/6/2017              Ajit Paredes4, apt be        [place] [address]         Dear Hiram Olmstead,      It was a pleasure to see you at our [address] [facility] office.   Your urine culture

## (undated) NOTE — IP AVS SNAPSHOT
[facility]    [address] [facility], [place], [place] ~ [phone]                Discharge Summary   2/15/2017    [address] [place] [address]           Admission Information        Provider Department    2/15/2017 Maegan Padgett MD [facility] Pre- These medications were sent to [address] [place] [address], [phone]  Ese Lyle, [facility] [address]     Phone:  [phone]    - Phenazopyridine HCl 200 MG Tabs  - Sulfamethoxazole-TMP -160 MG Tabs per tablet (while you were asleep!). The sore throat should get better within 48 hours. You can gargle with warm salt water (1/2 tsp in 4 oz warm water) or use a throat lozenge for comfort  · To feel muscle aches or soreness especially in the abdomen, chest or neck. If you receive a NSQIP questionnaire, and have questions please contact:   Keke Small RN, MA, Maggie  [phone]      Discharge References/Attachments     DISCHARGE INSTRUCTIONS: AFTER YOUR S 33. 6 -- (02/13/17)  163 (02/13/17)  10.6 (H)      Recent Hematology Lab Results (cont.)  (Last 3 results in the past 90 days)    Neutrophil % Lymphocyte % Monocyte % Eosinophil % Basophil % Prelim Neut Abs Final Neut Abs Lymphocyte Abso Monocyte Absolu Eos returning the survey you will receive in the mail. Thank you! Darinel     Call the [employer] for assistance with your inactive [employer] account    If you have questions, you can call [phone] to talk to our [facility] Staff.  Remember, Mercedes

## (undated) NOTE — MR AVS SNAPSHOT
[facility] SPECIALTY [facility] [place] [address] [place]  [address]  [phone]               Thank you for choosing us for your health care visit with Ab Hopson MD.  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

## (undated) NOTE — LETTER
[address]      Authorization for Surgical Operation and Procedure     Date:___________                                                                                                         Time:__________  1. I hereby Ai Bailey MD, my physician and his/her assistants (if applicable), which may include medical students, residents, and/or fellows, to perform the following surgical operation/ procedure and administer such anesthesia as may be determined necessary by my physician:  Operation/Procedure name (s) LEFT TOTAL HIP ARTHROPLASTY, POSSIBLE LEFT HIP [address] [facility]  on [address] [place] [address]   2. I recognize that during the surgical operation/procedure, unforeseen conditions may necessitate additional or different procedures than those listed above. I, therefore, further authorize and request that the above-named surgeon, assistants, or designees perform such procedures as are, in their judgment, necessary and desirable. 3.   My surgeon/physician has discussed prior to my surgery the potential benefits, risks and side effects of this procedure; the likelihood of achieving goals; and potential problems that might occur during recuperation. They also discussed reasonable alternatives to the procedure, including risks, benefits, and side effects related to the alternatives and risks related to not receiving this procedure. I have had all my questions answered and I acknowledge that no guarantee has been made as to the result that may be obtained. 4.   Should the need arise during my operation or immediate post-operative period, I also consent to the administration of blood and/or blood products. Further, I understand that despite careful testing and screening of blood or blood products by collecting agencies, I may still be subject to ill effects as a result of receiving a blood transfusion and/or blood products.   The following are some, but not all, of the potential risks that can occur: fever and allergic reactions, hemolytic reactions, transmission of diseases such as Hepatitis, AIDS and Cytomegalovirus (CMV) and fluid overload. In the event that I wish to have an autologous transfusion of my own blood, or a directed donor transfusion. I will discuss this with my physician. 5.   I authorize the use of any specimen, organs, tissues, body parts or foreign objects that may be removed from my body during the operation/procedure for diagnosis, research or teaching purposes and their subsequent disposal by hospital authorities. I also authorize the release of specimen test results and/or written reports to my treating physician on the hospital medical staff or other referring or consulting physicians involved in my care, at the discretion of the Pathologist or my treating physician. 6.   I consent to the photographing or videotaping of the operations or procedures to be performed, including appropriate portions of my body for medical, scientific, or educational purposes, provided my identity is not revealed by the pictures or by descriptive texts accompanying them. If the procedure has been photographed/videotaped, the surgeon will obtain the original picture, image, videotape or CD. The hospital will not be responsible for storage, release or maintenance of the picture, image, tape or CD.    7.   I consent to the presence of a  or observers in the operating room as deemed necessary by my physician or their designees. 8.   I recognize that in the event my procedure results in extended X-Ray/fluoroscopy time, I may develop a skin reaction. 9. If I have a Do Not Attempt Resuscitation (DNAR) order in place, that status will be suspended while in the operating room, procedural suite, and during the recovery period unless otherwise explicitly stated by me (or a person authorized to consent on my behalf).  The surgeon or my attending physician will determine when the applicable recovery period ends for purposes of reinstating the DNAR order. 10. Patients having a sterilization procedure: I understand that if the procedure is successful the results will be permanent and it will therefore be impossible for me to inseminate, conceive, or bear children. I also understand that the procedure is intended to result in sterility, although the result has not been guaranteed. 11. I acknowledge that my physician has explained sedation/analgesia administration to me including the risk and benefits I consent to the administration of sedation/analgesia as may be necessary or desirable in the judgment of my physician. I CERTIFY THAT I HAVE READ AND FULLY UNDERSTAND THE ABOVE CONSENT TO OPERATION and/or OTHER PROCEDURE.    _________________________________________  __________________________________  Signature of Patient     Signature of Responsible Person         ___________________________________         Printed Name of Responsible Person           _________________________________                  Relationship to Patient  _________________________________________  ______________________________  Signature of Witness          Date  Time    STATEMENT OF PHYSICIAN My signature below affirms that prior to the time of the procedure; I have explained to the patient and/or his/her legal representative, the risks and benefits involved in the proposed treatment and any reasonable alternative to the proposed treatment. I have also explained the risks and benefits involved in refusal of the proposed treatment and alternatives to the proposed treatment and have answered the patient's questions.  If I have a significant financial interest in a co-management agreement or a significant financial interest in any product or implant, or other significant relationship used in this procedure/surgery, I have disclosed this and had a discussion with my patient.     _______________________________________________________________ _____________________________  Juanito Flores Physician)                                                                                         (Date)                                   (Time)        Patient Name: Gem Gandhi    : 1950   Printed: 2022      Medical Record #: [id number]                                              Page 1 of 1

## (undated) NOTE — MR AVS SNAPSHOT
[place]  [address]  [phone]               Thank you for choosing us for your health care visit with [place] Gray MD.  We are glad to serve you and happy to provide you with this summary [address] [employer]   [phone]           Please arrive at your scheduled appointment time. Wear comfortable, loose fitting clothing. Follow-up Instructions     Return in about 2 weeks (around 4/5/2017).          Reason 1. Patient will be independent with HEP and it's progression. 2. Patient will demo L knee AROM at least 2-120 deg to be able to don a pair of pants without difficulty  3.  Patient will demo L knee strength 4+/5 and L hip strength 4+/5 to negotiate stairs

## (undated) NOTE — ED AVS SNAPSHOT
Maximus Wallace   MRN: [id number]    Department:  [facility] Emergency Department   Date of Visit:  8/19/2018           Disclosure     Insurance plans vary and the physician(s) referred by the ER may not be covered by your plan.  Please contac within the next three months to obtain basic health screening including reassessment of your blood pressure.     IF THERE IS ANY CHANGE OR WORSENING OF YOUR CONDITION, CALL YOUR PRIMARY CARE PHYSICIAN AT ONCE OR RETURN IMMEDIATELY TO THE EMERGENCY DEPARTMEN

## (undated) NOTE — MR AVS SNAPSHOT
Baldemar Monteiro 12 [address] [facility]  [phone]  [phone]               Thank you for choosing us for your health care visit with [facility].   We are glad to serve you and happy to provide you with Santos 49 [facility] [employer])    [facility] [address] [facility] [address]               [employer]     Call the [employer] for assistance with your inactive [employer] account    If you have ques

## (undated) NOTE — MR AVS SNAPSHOT
8453 [facility]  [phone]               Thank you for choosing us for your health care visit with Raul Mora MD.  We are glad to serve you and happy to provide you with this summary [place] [address]   [phone]              Today's Orders     XR KNEE (1 OR 2 VIEWS), LEFT (CPT=73560)    Complete by:   Mar 27, 2017 (Approximate)    Assoc Dx:  Orthopedic aftercare [Z47.89]                 Follow-up Instructions     Return if sympt Acetaminophen 500 MG Caps           Acetaminophen-Codeine #3 300-30 MG Tabs   Take 1 tablet by mouth every 4 (four) hours as needed for Pain.    Commonly known as:  TYLENOL #3           FERROUS SULFATE CR OR           Nystatin 580679 UNIT/GM Powd   Apply 1

## (undated) NOTE — MR AVS SNAPSHOT
Baldemar Monteiro 12 [address]  [facility]  [phone]  [phone]               Thank you for choosing us for your health care visit with Rosmery Middleton PT.   We are glad to serve you and happy to provide you with [address]   [phone]           Please arrive at your scheduled appointment time. Wear comfortable, loose fitting clothing.             Apr 13, 2017 10:00 AM   Procedure with Williams Haskins MD   [facility]

## (undated) NOTE — LETTER
November 29, 2023     [address] [place] [address]      Dear Anabela Montiel:    Below are the results from your recent visit:    Resulted Orders   TSH W Reflex To Free T4 [E]   Result Value Ref Range    TSH 2.519 0.550 - 4.780 mIU/mL   Free T4, (Free Thyroxine)   Result Value Ref Range    Free T4 0.8 0.8 - 1.7 ng/dL     Thyroid test is normal.             If you have any questions or concerns, please don't hesitate to call.      Odilon Noe MD

## (undated) NOTE — Clinical Note
March 28, 2017    Palak Rebolledo MD  [address] [facility]     Patient: Felicity Melendez   YOB: 1950   Date of Visit: 3/28/2017       Dear Dr. Casi Mayo MD:    Thank you for referring Radha Tank to me for evaluatio (four) hours as needed for Pain.  Disp: 20 tablet Rfl: 0   omeprazole 20 MG Oral Capsule Delayed Release TAKE 1 CAPSULE BY MOUTH   EVERY MORNING BEFORE BREAKFAST Disp: 90 capsule Rfl: 0   FERROUS SULFATE CR OR  Disp:  Rfl:    Acetaminophen 500 MG Oral Cap Cornea trace MDF, 1+ pigment  1-2+ MDF, 1+pigment     Anterior Chamber Deep and quiet Deep and quiet    Iris Normal Normal    Lens 1+ Nuclear sclerosis, Trace Cortical cataract 1+ Nuclear sclerosis, Trace Cortical cataract    Vitreous Clear Clear      Fun CC: No Recipients    Document electronically generated by: [employer]

## (undated) NOTE — MR AVS SNAPSHOT
[place]  [address]  [phone]               Thank you for choosing us for your health care visit with Steven Khalil MD.  We are glad to serve you and happy to provide you with this summa [address]              Follow-up Instructions     Return in about 1 year (around 3/28/2018) for Complete eye exam.         Reason for Today's Visit     Consult     Eye Exam           Medical Issues Discusse INHALE 2 PUFFS INTO THE LUNGS EVERY 4 (FOUR) HOURS AS NEEDED FOR WHEEZING.            Vitamin B-12 1000 MCG Tabs   Commonly known as:  VITAMIN B12           WOMENS MULTIVITAMIN PLUS OR                   Health Goals discussed Today        [facility]

## (undated) NOTE — MR AVS SNAPSHOT
[place] [facility]. [address], [place]  [address] [address]  [phone]               Thank you for choosing us for your health care visit with Janes Cordova MD.  We are glad to serve you and happy to provide you with this summary of [address], 231 [facility] (Mary Guerra7)    [address], [facility] [address] [phone]              Reason for Today's Visit     Derm Problem           Instructions and Information about Your Health     None Visit [email or web address] to learn more.            Visit [facility] online at  [email or web address]

## (undated) NOTE — MR AVS SNAPSHOT
Baldemar Monteiro 12 [facility] [address]  [phone]  [phone]               Thank you for choosing us for your health care visit with Jeremie Damon PT.   We are glad to serve you and happy to provide you with [address]  CME   [phone]           Please arrive at your scheduled appointment time. Wear comfortable, loose fitting clothing.             Mar 21, 2017  9:30 AM   [facility] Visit By Therapist with Santos Martinez

## (undated) NOTE — IP AVS SNAPSHOT
[facility]            (For Outpatient Use Only) Initial Admit Date: 2022   Inpt/Obs Admit Date: Inpt: 22 / Obs: N/A   Discharge Date:    Cathie Mar:  [de-identified]   MRN: [de-identified]   CSN: [id number]   CEID: [id number]        ENCOUNTER  Patient Class: Inpatient Admitting Provider: Aida Chance MD Unit: [address]//SE   Hospital Service: Ortho/Spine Attending Provider: Kenn Miguel MD   Bed: 406-A   Visit Type:   Referring Physician: VANESSA LEMA Attending On File Billing Flag:    Admit Diagnosis: Closed fracture of left hip, initial encounter [facility] 1401 [facility]      PATIENT  Legal Name:   Lilliam Lopez   Legal Sex: Female  Gender ID: Female             [address] [facility] [address] Name:    PCP:  Duran Hu MD Home: [phone]   Address:  [address] [place] [address] : 1950 (72 yrs) Mobile: [phone]         City/State/Zip: [place], [address] [place], [place] [address]e [place] Marital:  Language: Corby Balzarine: [place] SSN4: [id number] Yarsanism: [address] Not Mickie Dies*     Race: White Ethnicity: Non  Or [address] [place] [address]   Name Relationship Legal Guardian? Home Phone Work Phone Mobile Phone   1.  Dorie Padilla  2. *No Contact Specified* Daughter            [phone]       GUARANTOR  Guarantor: Sudhir Lunsford : 1950 Home Phone: [phone]   Address: [place] [address]  Sex: Female Work Phone:    City/State/Zip: [place], [address] [place], [place] [address] [place]   Rel. to Patient: Self Guarantor ID: [id number]   GUARANTOR EMPLOYER   Employer:  Status: RETIRED     COVERAGE  PRIMARY INSURANCE   Payor: JD MANCUSO Plan: Adeline Haley   Group Number: [id number] Insurance Type: [address] Name: Lisa General : 1950   Subscriber ID: [id number] Pt Rel to Subscriber: Self   SECONDARY INSURANCE   Payor: COMMERCIAL Plan: COMMERCIAL   Group Number:  Insurance Type: INDEMNITY   Subscriber Name: Lisa Hi : 1950   Subscriber ID: [id number] Pt Rel to Subscriber: [address] [place] [address] Payor:  Plan:    Group Number:  Insurance Type:    Subscriber Name:  Subscriber :    Subscriber ID:  Pt Rel to Subscriber:    Hospital Account Financial Class: Medicare Advantage    2022

## (undated) NOTE — MR AVS SNAPSHOT
[address]  1200 [facility]  [phone]               Thank you for choosing us for your health care visit with Janes Cordova MD.  We are glad to serve you and happy to provide you with this summar [facility] ([facility])    [address] [address]   [phone]           Please arrive at your scheduled appointment time. Wear comfortable, loose fitting clothing.               Allergies 1. Patient will be independent with HEP and it's progression. 2. Patient will demo L knee AROM at least 2-120 deg to be able to don a pair of pants without difficulty  3.  Patient will demo L knee strength 4+/5 and L hip strength 4+/5 to negotiate stairs

## (undated) NOTE — LETTER
October 10, 2019    Christin Herrera MD  [address] [facility]     Patient: Gregor Welch   YOB: 1950   Date of Visit: 10/10/2019       Dear Dr. Roya Arredondo MD:    Thank you for referring Jorge Cam to me for evalua Alcohol/week: 0.0 standard drinks      Comment: socially    Drug use: No      Medications:    Current Outpatient Medications:  topiramate 25 MG Oral Tab Take 1 tablet (25 mg total) by mouth 2 (two) times daily.  Disp: 180 tablet Rfl: 1   hydrochlorothia Right Left    Pressure 10 10          Pupils       Pupils    Right PERRL    Left PERRL          Visual Fields       Left Right     Full Full          Extraocular Movement       Right Left     Full, Ortho Full, Ortho          Dilation     Both eyes:  1.0 As Dr. Valentin Gonzalez is no longer performing cataract surgery, a referral to [facility] was offered. Patient agrees to be referred, so we will send complete exam and information to them and the patient will be contacted.   Information on [place] [facility]

## (undated) NOTE — MR AVS SNAPSHOT
Baldemar Monteiro 12 [facility] 43 [address]  [phone]  [phone]               Thank you for choosing us for your health care visit with Geneva Isaac PT.   We are glad to serve you and happy to provide you with [address] [facility] [address]   [phone]            Feb 20, 2017 10:00 AM   Follow Up Visit with Uche Rodriguez MD   [facility], [facility], [address], 231 [facility] (Mary 1737)    [address], 301 [facility] 83,[address] Floor [address] [place] [address] [phone]

## (undated) NOTE — LETTER
1/31/2018              [address]        [place] [address]         Dear Farida Zamarripa,    It was a pleasure to see you. Your PAP test was normal.  There is no need for further testing at this time.   I look forward to

## (undated) NOTE — MR AVS SNAPSHOT
Baldemar Monteiro 12 [facility] 43 [address]  [phone]  [phone]               Thank you for choosing us for your health care visit with Alvarado Doshi PT.   We are glad to serve you and happy to provide you with Please arrive at your scheduled appointment time. Wear comfortable, loose fitting clothing.             Mar 13, 2017  9:45 AM   [place] [facility] Visit By Therapist with Citlaly Betancur, [address] [facility]

## (undated) NOTE — Clinical Note
2017    Patient: Joseph Joseph  : 1950 Visit date: 2017    Dear  Dr. Doretha Ellsworth MD,    Stoneykianna Martha is a pleasant but unfortunate 77year old,, female, who was referred to us for weight management recommendations.   Tonio Thomas is in

## (undated) NOTE — LETTER
[address] [place] [address], [place]  Authorization for Surgical Operation and Procedure                                                                                           I hereby authorize  DR. Yasmin Mcgrath, my physician and his/her assistants (if applicable), which may include medical students, residents, and/or fellows, to perform the following surgical operation/ procedure and administer such anesthesia as may be determined necessary by my physician: [address] on [address] [place] [address]   2. I recognize that during the surgical operation/procedure, unforeseen conditions may necessitate additional or different procedures than those listed above. I, therefore, further authorize and request that the above-named surgeon, assistants, or designees perform such procedures as are, in their judgment, necessary and desirable. 3.   My surgeon/physician has discussed prior to my surgery the potential benefits, risks and side effects of this procedure; the likelihood of achieving goals; and potential problems that might occur during recuperation. They also discussed reasonable alternatives to the procedure, including risks, benefits, and side effects related to the alternatives and risks related to not receiving this procedure. I have had all my questions answered and I acknowledge that no guarantee has been made as to the result that may be obtained. 4.   Should the need arise during my operation/procedure, which includes change of level of care prior to discharge, I also consent to the administration of blood and/or blood products. Further, I understand that despite careful testing and screening of blood or blood products by collecting agencies, I may still be subject to ill effects as a result of receiving a blood transfusion and/or blood products.   The following are some, but not all, of the potential risks that can occur: fever and allergic reactions, hemolytic reactions, transmission of diseases such as Hepatitis, AIDS and Cytomegalovirus (CMV) and fluid overload. In the event that I wish to have an autologous transfusion of my own blood, or a directed donor transfusion, I will discuss this with my physician. Check only if Refusing Blood or Blood Products  I understand refusal of blood or blood products as deemed necessary by my physician may have serious consequences to my condition to include possible death. I hereby assume responsibility for my refusal and release the hospital, its personnel, and my physicians from any responsibility for the consequences of my refusal.    o  Refuse   5. I authorize the use of any specimen, organs, tissues, body parts or foreign objects that may be removed from my body during the operation/procedure for diagnosis, research or teaching purposes and their subsequent disposal by hospital authorities. I also authorize the release of specimen test results and/or written reports to my treating physician on the hospital medical staff or other referring or consulting physicians involved in my care, at the discretion of the Pathologist or my treating physician. 6.   I consent to the photographing or videotaping of the operations or procedures to be performed, including appropriate portions of my body for medical, scientific, or educational purposes, provided my identity is not revealed by the pictures or by descriptive texts accompanying them. If the procedure has been photographed/videotaped, the surgeon will obtain the original picture, image, videotape or CD. The hospital will not be responsible for storage, release or maintenance of the picture, image, tape or CD.    7.   I consent to the presence of a  or observers in the operating room as deemed necessary by my physician or their designees.     8.   I recognize that in the event my procedure results in extended X-Ray/fluoroscopy time, I may develop a skin reaction. 9. If I have a Do Not Attempt Resuscitation (DNAR) order in place, that status will be suspended while in the operating room, procedural suite, and during the recovery period unless otherwise explicitly stated by me (or a person authorized to consent on my behalf). The surgeon or my attending physician will determine when the applicable recovery period ends for purposes of reinstating the DNAR order. 10. Patients having a sterilization procedure: I understand that if the procedure is successful the results will be permanent and it will therefore be impossible for me to inseminate, conceive, or bear children. I also understand that the procedure is intended to result in sterility, although the result has not been guaranteed. 11. I acknowledge that my physician has explained sedation/analgesia administration to me including the risk and benefits I consent to the administration of sedation/analgesia as may be necessary or desirable in the judgment of my physician. I CERTIFY THAT I HAVE READ AND FULLY UNDERSTAND THE ABOVE CONSENT TO OPERATION and/or OTHER PROCEDURE.     _________________________________________ _________________________________     ___________________________________  Signature of Patient     Signature of Responsible Person                   Printed Name of Responsible Person                              _________________________________________ ______________________________        ___________________________________  Signature of Witness         Date  Time         Relationship to Patient    STATEMENT OF PHYSICIAN My signature below affirms that prior to the time of the procedure; I have explained to the patient and/or his/her legal representative, the risks and benefits involved in the proposed treatment and any reasonable alternative to the proposed treatment.  I have also explained the risks and benefits involved in refusal of the proposed treatment and alternatives to the proposed treatment and have answered the patient's questions.  If I have a significant financial interest in a co-management agreement or a significant financial interest in any product or implant, or other significant relationship used in this procedure/surgery, I have disclosed this and had a discussion with my patient.     _______________________________________________________________ _____________________________  Eleno Frame of Physician)                                                                                         (Date)                                   (Time)  Patient Name: Ashely Maguire    : 1950   Printed: 2023      Medical Record #: [id number]                                              Page 1 of 1

## (undated) NOTE — MR AVS SNAPSHOT
[address] [facility]  [phone]               Thank you for choosing us for your health care visit with Jake Noriega MD.  We are glad to serve you and happy to provide you with this summary [place] [facility] Visit By Therapist with Citlaly Betancur, [address] [facility] ([facility])    [address]   [phone]           Please arrive at your scheduled appointment Referral Details     Referred By    Referred To    Zunilda Noble MD   [facility] [address] [address] [address]   Phone:  [phone]   Fax:  [phone]    Diagnoses:  Closed nondisplaced transverse fracture of left patella with routine healing, s Orthopedic aftercare    -  Primary    Closed nondisplaced transverse fracture of left patella with routine healing, subsequent encounter          Instructions and Information about Your Health     None      Allergies as of Feb 27, 2017     No Known Allerg TykoonharLOOKCAST     Call the [employer] for assistance with your inactive [employer] account    If you have questions, you can call [phone] to talk to our [facility] Staff. Remember, [employer] is NOT to be used for urgent needs.   For medical emergencies, dial

## (undated) NOTE — MR AVS SNAPSHOT
[address] [facility] [address] [address]  [phone]  [phone]               Thank you for choosing us for your health care visit with Wen Murillo PT.   We are glad to serve you and happy to provide you with [phone]           Drink 16 ounces of clear liquid, preferably water. Begin drinking approximately 90 minutes prior to your appointment time. Please finish all 16 ounces 60 minutes before your appointment time. DO NOT VOID/ELIMINATE THE WATER.    Gifty

## (undated) NOTE — MR AVS SNAPSHOT
[address] [facility] [place] [address] [address]  [phone]  [phone]               Thank you for choosing us for your health care visit with Blanca Retana PT.   We are glad to serve you and happy to provide you with [facility] Ophthalmology ([place]) [address] [address]   [phone]            Mar 29, 2017  9:45 AM   [place] [facility] Visit By Therapist with Albertina Shelby

## (undated) NOTE — LETTER
Zayda Mendes [address], [place], [address] [facility]  [address]  INFORMED CONSENT FOR TRANSFUSION OF BLOOD OR BLOOD PRODUCTS  My physician has informed me of the nature, purpose, benefits and risks of transfusion for blood and blood components that he/she may deem necessary during my treatment or hospitalization. He/she has also discussed alternatives to receiving blood from the voluntary blood supply with me, such as self-donation (autologous) and directed donation (blood donated by family or friends to be used specifically for me). I further understand that while the [address] [place] [address] will attempt to supply any autologous or directed donor blood prior to transfusion of blood from the routine blood supply, medical circumstances may require that other or additional blood components may be required for my care. In giving consent, I acknowledge that my physician has also informed me that despite careful screening and testing in accordance with national and regional regulations, there is still a small risk of transmission of infectious agents including hepatitis, HIV-1/2, cytomegalovirus and other viruses or diseases as yet unknown for which licensed definitive screening tests do not currently exist. Additionally, my physician has informed me of the potential for transfusion reactions not related to an infectious agent. [  ]  Check here for Recurring Outpatient Transfusion Therapy (valid for 1 year) In addition to the above, my physician has informed me that I shall receive numerous transfusions over a period of time and that these can lead to other increased risks. I hereby authorize the transfusion of blood and/or blood products to me as deemed necessary and ordered by physicians participating in my care.  My physician has given me the opportunity to ask questions and any questions asked have been answered to my satisfaction  __________________________________________ ______________________________________________  (Signature of Patient)                                                            (Responsible party in case of Minor,                                                                                                 Incompetent, or unconscious Patient)  ___________________________________________       ________ ______________________________________  (Relationship to Patient)                                                       (Signature of Witness)  ______________________________________________________________________________________________   (Date)                                                                           (Time)  REFUSAL OF CONSENT FOR BLOOD TRANSFUSIONS   Sign only if Refusing   [  ] I understand refusal of blood or blood products as deemed necessary by my physician may have serious consequences to my condition to include possible death.  I hereby assume responsibility for my refusal and release the hospital, its personnel, and my physicians from any responsibility for the consequences of my refusal.    ________________________________________________________________________________  (Signature of Patient)                                                         (Responsible Party/Relationship to Patient)    ________________________________________________________________________________  (Signature of Witness)                                                       (Date/Time)     Patient Name: Belia Osuna     : 1950                 Printed: 2022      Medical Record #: [id number]                                 Page 1 of 1

## (undated) NOTE — MR AVS SNAPSHOT
[place]  [address]  [phone]               Thank you for choosing us for your health care visit with Carmella Issa MD.  We are glad to serve you and happy to provide you with this summary Please arrive at your scheduled appointment time. Wear comfortable, loose fitting clothing.             Mar 21, 2017  9:30 AM   [place] [facility] Visit By Therapist with [place] Dave, [address] [facility] you have any questions related to insurance coverage. Thank you.          Referral Details     Referred By    Referred To    Iveth Day MD   [address] [place] [address]   [id number] [place] [address]   Phone:  [phone]   Fax:  [phone]    Diagnoses:  [facility] Current Medications          This list is accurate as of: 3/3/17 12:59 PM.  Always use your most recent med list.                Acetaminophen 500 MG Caps           Acetaminophen-Codeine #3 300-30 MG Tabs   Take 1 tablet by mouth every 4 (four) hours as ne Visit [email or web address] to learn more. Educational Information     Your blood pressure indicates you may be at-risk for Hypertension. Please consider the following Lifestyle Modifications.   Also, please return for a follow-up Blood Pres

## (undated) NOTE — MR AVS SNAPSHOT
[address]  [phone]  [phone]               Thank you for choosing us for your [facility].   We are glad to serve you and happy to provide you with [place] [address]   [phone]            Mar 24, 2017  9:00 AM   [place] [facility] Visit By Therapist with Tucker Penny, [address] [place] [address] ([facility])    [address] [place] [address]

## (undated) NOTE — MR AVS SNAPSHOT
[address] [facility] [address] [address]  [phone]  [phone]               Thank you for choosing us for your health care visit with Aysha Fong PT.   We are glad to serve you and happy to provide you with [address] [facility]   [phone]           Please arrive at your scheduled appointment time. Wear comfortable, loose fitting clothing.             Mar 22, 2017  3:30 PM   Procedure with Sam Avery MD   [facility]

## (undated) NOTE — MR AVS SNAPSHOT
[address] [facility] [address] [address]  [phone]  [phone]               Thank you for choosing us for your health care visit with Dilan Norris PT.   We are glad to serve you and happy to provide you with Please arrive at your scheduled appointment time. Wear comfortable, loose fitting clothing.             Mar 03, 2017 10:45 AM   Exam - Established with Karel Horn MD   [facility], [address], [place] ([address]

## (undated) NOTE — LETTER
10/29/19        [address]      Dear Flaca Ramirez,    [address] [facility] records indicate that you have outstanding lab work and or testing that was ordered for you and has not yet been completed:  Orders Placed This Encounter      C

## (undated) NOTE — LETTER
10/10/2019    Patient: Maximino Bryson   MR Number: [id number]   YOB: 1950   Date of Visit: 10/10/2019   Physician: Paul Corona MD     Dear Medicare Patient:  Keila Coates TO BENEFICIARY:  Please know that while a refraction i

## (undated) NOTE — MR AVS SNAPSHOT
[place]  [address]  [phone]               Thank you for choosing us for your health care visit with Carmella Issa MD.  We are glad to serve you and happy to provide you with this summary approximately 90 minutes prior to your appointment time. Please finish all 16 ounces 60 minutes before your appointment time. DO NOT VOID/ELIMINATE THE WATER.    Patients having their menstrual cycle may have this exam; if you are wearing a tampon you dami Follow - Up           Medical Issues Discussed Today     Kidney stones    -  Primary      Instructions and Information about Your Health     None      Allergies as of Apr 13, 2017     No Known Allergies                Today's Vital Signs     BP Pulse Temp 4. Patient will demo normalized gait pattern with no brace and LRAD to be able to walk for 30 minutes at the grocery store        [address]     Call the [employer] for assistance with your inactive [employer] account    If you have questions, you can call [phone]

## (undated) NOTE — ED AVS SNAPSHOT
[facility] Emergency Department    [address] [place] [address]    Phone:  [phone]    Fax:  [address] [facility]   MRN: [id number]    Department:  [facility] Emergency Department   Date of Visit:  3/ 98 [facility] ([address] [place] [address])    [address] [address]  [place] [id number] [phone]            Apr 13, 2017 10:00 AM   Procedure with Jesus Higuera MD   [facility], [address] [facility] our [facility] at [phone]. Your Emergency Department team is here to serve you. You are our top priority. You were examined and treated today on an urgent basis only. This was not a substitute for ongoing medical care.  Often, one Emergency Dep pertaining to these instructions have been answered in a satisfactory manner. 24-Hour Pharmacies        Pharmacy Address Phone Number   [place] [facility] ([address]) [address] [facility] ([address] [place] [address]

## (undated) NOTE — MR AVS SNAPSHOT
[place]  [address]  [phone]               Thank you for choosing us for your health care visit with Katiana Angeles MD.  We are glad to serve you and happy to provide you with this summary [facility] ([address] [facility])  [address] [facility] Zonia [place], [address] [facility]  [address] (MRI Only)  [address]    It is the patient's responsibility to No Known Allergies                Today's Vital Signs     BP Pulse Temp Height Weight BMI    114/66 mmHg 64 98.3 °F (36.8 °C) (Oral) 5' 6\" (1.676 m) 260 lb (117.935 kg) 41.99 kg/m2         Current Medications          This list is accurate as of: 5/12/17

## (undated) NOTE — LETTER
[address]  Authorization for Surgical Operation and Procedure                                                                                           I hereby authorize Yadira Dsouza MD, my physician and his/her assistants (if applicable), which may include medical students, residents, and/or fellows, to perform the following surgical operation/ procedure and administer such anesthesia as may be determined necessary by my physician: Operation/Procedure name (s) Right breast  excisional biopsy on [address] [address]   2. I recognize that during the surgical operation/procedure, unforeseen conditions may necessitate additional or different procedures than those listed above. I, therefore, further authorize and request that the above-named surgeon, assistants, or designees perform such procedures as are, in their judgment, necessary and desirable. 3.   My surgeon/physician has discussed prior to my surgery the potential benefits, risks and side effects of this procedure; the likelihood of achieving goals; and potential problems that might occur during recuperation. They also discussed reasonable alternatives to the procedure, including risks, benefits, and side effects related to the alternatives and risks related to not receiving this procedure. I have had all my questions answered and I acknowledge that no guarantee has been made as to the result that may be obtained. 4.   Should the need arise during my operation/procedure, which includes change of level of care prior to discharge, I also consent to the administration of blood and/or blood products. Further, I understand that despite careful testing and screening of blood or blood products by collecting agencies, I may still be subject to ill effects as a result of receiving a blood transfusion and/or blood products.   The following are some, but not all, of the potential risks that can occur: fever and allergic reactions, hemolytic reactions, transmission of diseases such as Hepatitis, AIDS and Cytomegalovirus (CMV) and fluid overload. In the event that I wish to have an autologous transfusion of my own blood, or a directed donor transfusion, I will discuss this with my physician. Check only if Refusing Blood or Blood Products  I understand refusal of blood or blood products as deemed necessary by my physician may have serious consequences to my condition to include possible death. I hereby assume responsibility for my refusal and release the hospital, its personnel, and my physicians from any responsibility for the consequences of my refusal.    o  Refuse   5. I authorize the use of any specimen, organs, tissues, body parts or foreign objects that may be removed from my body during the operation/procedure for diagnosis, research or teaching purposes and their subsequent disposal by hospital authorities. I also authorize the release of specimen test results and/or written reports to my treating physician on the hospital medical staff or other referring or consulting physicians involved in my care, at the discretion of the Pathologist or my treating physician. 6.   I consent to the photographing or videotaping of the operations or procedures to be performed, including appropriate portions of my body for medical, scientific, or educational purposes, provided my identity is not revealed by the pictures or by descriptive texts accompanying them. If the procedure has been photographed/videotaped, the surgeon will obtain the original picture, image, videotape or CD. The hospital will not be responsible for storage, release or maintenance of the picture, image, tape or CD.    7.   I consent to the presence of a  or observers in the operating room as deemed necessary by my physician or their designees.     8.   I recognize that in the event my procedure results in extended X-Ray/fluoroscopy time, I may develop a skin reaction. 9. If I have a Do Not Attempt Resuscitation (DNAR) order in place, that status will be suspended while in the operating room, procedural suite, and during the recovery period unless otherwise explicitly stated by me (or a person authorized to consent on my behalf). The surgeon or my attending physician will determine when the applicable recovery period ends for purposes of reinstating the DNAR order. 10. Patients having a sterilization procedure: I understand that if the procedure is successful the results will be permanent and it will therefore be impossible for me to inseminate, conceive, or bear children. I also understand that the procedure is intended to result in sterility, although the result has not been guaranteed. 11. I acknowledge that my physician has explained sedation/analgesia administration to me including the risk and benefits I consent to the administration of sedation/analgesia as may be necessary or desirable in the judgment of my physician. I CERTIFY THAT I HAVE READ AND FULLY UNDERSTAND THE ABOVE CONSENT TO OPERATION and/or OTHER PROCEDURE.     _________________________________________ _________________________________     ___________________________________  Signature of Patient     Signature of Responsible Person                   Printed Name of Responsible Person                              _________________________________________ ______________________________        ___________________________________  Signature of Witness         Date  Time         Relationship to Patient    STATEMENT OF PHYSICIAN My signature below affirms that prior to the time of the procedure; I have explained to the patient and/or his/her legal representative, the risks and benefits involved in the proposed treatment and any reasonable alternative to the proposed treatment.  I have also explained the risks and benefits involved in refusal of the proposed treatment and alternatives to the proposed treatment and have answered the patient's questions.  If I have a significant financial interest in a co-management agreement or a significant financial interest in any product or implant, or other significant relationship used in this procedure/surgery, I have disclosed this and had a discussion with my patient.     _______________________________________________________________ _____________________________  Merry Walton)                                                                                         (Date)                                   (Time)  Patient Name: Belia Osuna    : 1950   Printed: 10/9/2023      Medical Record #: [id number]                                              Page 1 of 1

## (undated) NOTE — MR AVS SNAPSHOT
[place] [address] [place] [address]  [address] [place] [address]  [id number] [phone]               Thank you for choosing us for your health care visit with Sebastian Mccarthy MD.  We are glad to serve you and happy to provide you with this summary of [facility]    It is the patient's responsibility to check with and follow their insurance company's guidelines for prior authorization for this test.  You may be held responsible for payment in full if VENTOLIN  (90 Base) MCG/ACT Aers   Generic drug:  Albuterol Sulfate HFA   INHALE 2 PUFFS INTO THE LUNGS EVERY 4 (FOUR) HOURS AS NEEDED FOR WHEEZING.            Vitamin B-12 1000 MCG Tabs   Commonly known as:  VITAMIN B12           vitamin C 1000 MG ? Cover porch steps with gritty weather proof paint. ? Pay attention to curbs and other changes in surfaces when out in the community. ? Take care when walking on gravel or grassy surfaces. ? Avoid walking on snowy or icy surfaces. ?  Use a cane or walk

## (undated) NOTE — LETTER
9/10/2019    Hiwot Padilla         [address]        [facility] [address]            Dear Mtaeo Gallegos,      Our records indicate that you are due for an appointment for a Colonoscopy in October 2019, or shortly there after, with Caleb South